# Patient Record
Sex: FEMALE | Race: OTHER | NOT HISPANIC OR LATINO
[De-identification: names, ages, dates, MRNs, and addresses within clinical notes are randomized per-mention and may not be internally consistent; named-entity substitution may affect disease eponyms.]

---

## 2017-01-30 ENCOUNTER — APPOINTMENT (OUTPATIENT)
Dept: GYNECOLOGIC ONCOLOGY | Facility: CLINIC | Age: 57
End: 2017-01-30

## 2017-01-30 VITALS
DIASTOLIC BLOOD PRESSURE: 70 MMHG | BODY MASS INDEX: 22.47 KG/M2 | HEIGHT: 61 IN | SYSTOLIC BLOOD PRESSURE: 112 MMHG | WEIGHT: 119 LBS

## 2017-02-23 ENCOUNTER — APPOINTMENT (OUTPATIENT)
Dept: ENDOCRINOLOGY | Facility: CLINIC | Age: 57
End: 2017-02-23

## 2017-02-23 VITALS
SYSTOLIC BLOOD PRESSURE: 94 MMHG | OXYGEN SATURATION: 98 % | HEIGHT: 61 IN | WEIGHT: 123 LBS | DIASTOLIC BLOOD PRESSURE: 66 MMHG | BODY MASS INDEX: 23.22 KG/M2 | HEART RATE: 67 BPM

## 2017-03-13 ENCOUNTER — MED ADMIN CHARGE (OUTPATIENT)
Age: 57
End: 2017-03-13

## 2017-03-13 ENCOUNTER — APPOINTMENT (OUTPATIENT)
Dept: ENDOCRINOLOGY | Facility: CLINIC | Age: 57
End: 2017-03-13

## 2017-03-13 DIAGNOSIS — M81.0 AGE-RELATED OSTEOPOROSIS W/OUT CURRENT PATHOLOGICAL FRACTURE: ICD-10-CM

## 2017-03-13 RX ORDER — DENOSUMAB 60 MG/ML
60 INJECTION SUBCUTANEOUS
Qty: 1 | Refills: 0 | Status: COMPLETED | OUTPATIENT
Start: 2017-03-13

## 2017-03-13 RX ADMIN — DENOSUMAB 0 MG/ML: 60 INJECTION SUBCUTANEOUS at 00:00

## 2017-03-14 ENCOUNTER — APPOINTMENT (OUTPATIENT)
Dept: ENDOCRINOLOGY | Facility: CLINIC | Age: 57
End: 2017-03-14

## 2017-03-30 ENCOUNTER — OTHER (OUTPATIENT)
Age: 57
End: 2017-03-30

## 2017-08-01 ENCOUNTER — APPOINTMENT (OUTPATIENT)
Dept: GYNECOLOGIC ONCOLOGY | Facility: CLINIC | Age: 57
End: 2017-08-01
Payer: COMMERCIAL

## 2017-08-01 VITALS
HEIGHT: 61 IN | DIASTOLIC BLOOD PRESSURE: 68 MMHG | BODY MASS INDEX: 23.03 KG/M2 | WEIGHT: 122 LBS | SYSTOLIC BLOOD PRESSURE: 104 MMHG

## 2017-08-01 PROCEDURE — 99214 OFFICE O/P EST MOD 30 MIN: CPT

## 2017-08-01 RX ORDER — UBIDECARENONE/VIT E ACET 100MG-5
50 MCG CAPSULE ORAL
Refills: 0 | Status: DISCONTINUED | COMMUNITY
End: 2017-08-01

## 2017-08-09 ENCOUNTER — APPOINTMENT (OUTPATIENT)
Dept: MAMMOGRAPHY | Facility: IMAGING CENTER | Age: 57
End: 2017-08-09

## 2017-12-07 ENCOUNTER — APPOINTMENT (OUTPATIENT)
Dept: ENDOCRINOLOGY | Facility: CLINIC | Age: 57
End: 2017-12-07

## 2018-02-27 ENCOUNTER — APPOINTMENT (OUTPATIENT)
Dept: GYNECOLOGIC ONCOLOGY | Facility: CLINIC | Age: 58
End: 2018-02-27
Payer: COMMERCIAL

## 2018-02-27 VITALS
DIASTOLIC BLOOD PRESSURE: 60 MMHG | SYSTOLIC BLOOD PRESSURE: 100 MMHG | WEIGHT: 117 LBS | BODY MASS INDEX: 22.09 KG/M2 | HEIGHT: 61 IN

## 2018-02-27 DIAGNOSIS — C57.00 MALIGNANT NEOPLASM OF UNSPECIFIED FALLOPIAN TUBE: ICD-10-CM

## 2018-02-27 PROCEDURE — 99213 OFFICE O/P EST LOW 20 MIN: CPT

## 2018-02-27 RX ORDER — DENOSUMAB 60 MG/ML
60 INJECTION SUBCUTANEOUS
Qty: 1 | Refills: 1 | Status: DISCONTINUED | COMMUNITY
Start: 2017-03-03 | End: 2018-02-27

## 2018-08-28 ENCOUNTER — APPOINTMENT (OUTPATIENT)
Dept: GYNECOLOGIC ONCOLOGY | Facility: CLINIC | Age: 58
End: 2018-08-28

## 2019-08-22 ENCOUNTER — TRANSCRIPTION ENCOUNTER (OUTPATIENT)
Age: 59
End: 2019-08-22

## 2020-12-28 ENCOUNTER — TRANSCRIPTION ENCOUNTER (OUTPATIENT)
Age: 60
End: 2020-12-28

## 2021-02-08 ENCOUNTER — TRANSCRIPTION ENCOUNTER (OUTPATIENT)
Age: 61
End: 2021-02-08

## 2021-03-01 ENCOUNTER — TRANSCRIPTION ENCOUNTER (OUTPATIENT)
Age: 61
End: 2021-03-01

## 2021-03-29 ENCOUNTER — TRANSCRIPTION ENCOUNTER (OUTPATIENT)
Age: 61
End: 2021-03-29

## 2021-04-05 ENCOUNTER — TRANSCRIPTION ENCOUNTER (OUTPATIENT)
Age: 61
End: 2021-04-05

## 2021-04-05 ENCOUNTER — APPOINTMENT (OUTPATIENT)
Dept: ORTHOPEDIC SURGERY | Facility: CLINIC | Age: 61
End: 2021-04-05
Payer: COMMERCIAL

## 2021-04-05 VITALS
TEMPERATURE: 97.5 F | HEART RATE: 62 BPM | BODY MASS INDEX: 23.6 KG/M2 | HEIGHT: 61 IN | SYSTOLIC BLOOD PRESSURE: 122 MMHG | WEIGHT: 125 LBS | DIASTOLIC BLOOD PRESSURE: 83 MMHG

## 2021-04-05 DIAGNOSIS — Z87.39 PERSONAL HISTORY OF OTHER DISEASES OF THE MUSCULOSKELETAL SYSTEM AND CONNECTIVE TISSUE: ICD-10-CM

## 2021-04-05 PROCEDURE — 99072 ADDL SUPL MATRL&STAF TM PHE: CPT

## 2021-04-05 PROCEDURE — 99204 OFFICE O/P NEW MOD 45 MIN: CPT

## 2021-04-05 RX ORDER — MULTIVITAMIN WITH FOLIC ACID 400 MCG
TABLET ORAL
Qty: 70 | Refills: 0 | Status: ACTIVE | COMMUNITY
Start: 2020-11-19

## 2021-04-05 RX ORDER — METHYLPREDNISOLONE 4 MG/1
4 TABLET ORAL
Qty: 21 | Refills: 0 | Status: ACTIVE | COMMUNITY
Start: 2021-04-02

## 2021-04-05 RX ORDER — OLANZAPINE 2.5 MG/1
2.5 TABLET, FILM COATED ORAL
Qty: 20 | Refills: 0 | Status: ACTIVE | COMMUNITY
Start: 2021-01-21

## 2021-04-05 RX ORDER — TRAMADOL HYDROCHLORIDE AND ACETAMINOPHEN 37.5; 325 MG/1; MG/1
37.5-325 TABLET, FILM COATED ORAL
Qty: 20 | Refills: 0 | Status: ACTIVE | COMMUNITY
Start: 2021-03-31

## 2021-04-05 RX ORDER — TRIAMCINOLONE ACETONIDE 1 MG/G
0.1 CREAM TOPICAL
Qty: 80 | Refills: 0 | Status: ACTIVE | COMMUNITY
Start: 2021-03-12

## 2021-04-05 NOTE — DISCUSSION/SUMMARY
[Medication Risks Reviewed] : Medication risks reviewed [de-identified] : At this time recommended a course of physical therapy for the patient.  She has been instructed to complete the Medrol Dosepak previously prescribed.  Prescribed her diclofenac to take when she is has completed the oral steroids.  She can start taking gabapentin tonight.  If her symptoms persist over the next 2 to 4 weeks she can be seen again for follow-up at which time an MRI lumbar spine may be considered followed by lumbar epidural steroid injections.  She has a history of ovarian cancer and is being followed by the oncologist.  She will complete her last chemotherapy which has been scheduled following which she will also get a PET scan to assess for any remaining tumor burden.\par \par The patient was educated regarding their condition, treatment options as well as prescribed course of treatment. \par Risks and benefits as well as alternatives to the proposed treatment were also provided to the patient \par They were given the opportunity to have all their questions answered to their satisfaction.\par \par Vital signs were reviewed with the patient and the patient was instructed to followup with their primary care provider for further management.\par \par Healthy lifestyle recommendations were also made including a tobacco free lifestyle, proper diet, and weight control.

## 2021-04-05 NOTE — HISTORY OF PRESENT ILLNESS
[Worsening] : worsening [___ wks] : [unfilled] week(s) ago [2] : a current pain level of 2/10 [5] : an average pain level of 5/10 [1] : a minimum pain level of 1/10 [10] : a maximum pain level of 10/10 [Daily] : ~He/She~ states the symptoms seem to be occuring daily [Rest] : relieved by rest [de-identified] : Patient is here for a followup of her right low back pain accompanied with numbness and tingling that stops at the bottom of her foot. She states she had pain 10 days ago with no known injury and went to [facility] 3/30/21 and was told she had a herniated disc. Xray were performed. Her primary doctor prescribes steroid medication, on day 4, and it gave her relief. patient states last night her pain started to come back.\par currently in chemotherapy for ovarian cancer.\par Primarily right buttock pain posterior thigh to knee. No back pain or left leg pain.\par Had some improvement till yesterday then last night pain worsened. Voltaren gel with relief. \par Hx of abdominoplasty in 2015 [Bending] : not worsened by bending [Lifting] : not worsened by lifting [Prolonged Sitting] : not worsened by prolonged sitting [Prolonged Standing] : not worsened by prolonged standing [Sitting] : not worsened by sitting [Standing] : not worsened by standing [Walking] : not worsened by walking [Weight Bearing] : not worsened by weight bearing [Acetaminophen] : not relieved by acetaminophen [Acupuncture] : not relieved by acupuncture [Chiropractic] : not relieved by chiropractic manipulation [Exercise Regimen] : not relieved by exercise regimen [Heat] : not relieved by heat [Ice] : not relieved by ice [NSAIDs] : not relieved by nonsteroidal anti-inflammatory drugs [Opioid Analgesics] : not relieved by opioid analgesics [Physical Therapy] : not relieved by physical therapy [Recumbency] : not relieved by recumbency [Ataxia] : no ataxia [Incontinence] : no incontinence [Loss of Dexterity] : good dexterity [Urinary Ret.] : no urinary retention [de-identified] : laying down [de-identified] : Massage

## 2021-04-05 NOTE — CONSULT LETTER
[Dear  ___] : Dear  [unfilled], [Consult Letter:] : I had the pleasure of evaluating your patient, [unfilled]. [FreeTextEntry2] : Geoffrey Pinto [FreeTextEntry1] : Thank you for this referral. I have enclosed my note for your review. Please feel free to contact my office if you have additional questions regarding this patient.\par \par Regards,\par Raymundo Salazar MD, FACS, FAAOS\par \par  of Orthopaedic Surgery\par [facility] [employer]\par Spinal Reconstruction Surgery\par Minimally Invasive Spinal Surgery\par [facility]

## 2021-04-05 NOTE — REASON FOR VISIT
[Follow-Up Visit] : a follow-up visit for [Herniated Discs] : herniated discs [Back Pain] : back pain

## 2021-04-06 RX ORDER — TRAMADOL HYDROCHLORIDE 50 MG/1
50 TABLET, COATED ORAL
Qty: 40 | Refills: 0 | Status: ACTIVE | COMMUNITY
Start: 2021-04-06 | End: 1900-01-01

## 2021-04-13 ENCOUNTER — APPOINTMENT (OUTPATIENT)
Dept: MRI IMAGING | Facility: IMAGING CENTER | Age: 61
End: 2021-04-13

## 2021-04-19 ENCOUNTER — NON-APPOINTMENT (OUTPATIENT)
Age: 61
End: 2021-04-19

## 2021-04-19 ENCOUNTER — APPOINTMENT (OUTPATIENT)
Dept: ORTHOPEDIC SURGERY | Facility: CLINIC | Age: 61
End: 2021-04-19
Payer: COMMERCIAL

## 2021-04-19 VITALS — TEMPERATURE: 97.5 F

## 2021-04-19 VITALS
DIASTOLIC BLOOD PRESSURE: 66 MMHG | HEIGHT: 61 IN | HEART RATE: 70 BPM | SYSTOLIC BLOOD PRESSURE: 96 MMHG | BODY MASS INDEX: 23.6 KG/M2 | WEIGHT: 125 LBS

## 2021-04-19 PROCEDURE — 99072 ADDL SUPL MATRL&STAF TM PHE: CPT

## 2021-04-19 PROCEDURE — 99214 OFFICE O/P EST MOD 30 MIN: CPT

## 2021-04-19 NOTE — PHYSICAL EXAM
[Normal] : normal [Limited] : is limited [Painful] : is painful [LE] : Sensory: Intact in bilateral lower extremities [Knee] : patellar 2+ and symmetric bilaterally [Ankle] : ankle 2+ and symmetric bilaterally [DP] : dorsalis pedis 2+ and symmetric bilaterally [PT] : posterior tibial 2+ and symmetric bilaterally [de-identified] : PATIENT NAME: Layla Haque\par PATIENT ID: [id number]\par : 1960\par DATE OF EXAM: 2021\par MRI-3T LUMBAR SPINE NON CONTRAST\par History: Right sided lower back pain. History of ovarian cancer.\par MRI of the lumbar spine was performed on a 3.0 Teresa ultra high field wide bore\par magnet.\par Comparison: There are no prior studies available for comparison.\par There is diffuse heterogeneous bone marrow signal of the lumbar spine.\par There is diffuse disc desiccation and mild height loss.\par There are prominent lymph nodes in the right pelvis.\par T12-L1: There is no disc herniation, significant central canal or neural\par foraminal stenosis.\par L1-2: There is no disc herniation, significant central canal or neural foraminal\par stenosis.\par L2-3: There is a broad-based disc bulge. Mild bilateral foraminal stenosis. No\par canal stenosis.\par L3-4: There is a broad-based disc bulge and bilateral facet arthropathy. Mild\par canal stenosis. Mild bilateral foraminal stenosis.\par L4-5: There is a broad-based disc bulge and bilateral facet arthropathy. No\par canal or foraminal stenosis.\par L5-S1: There is a broad-based disc bulge and bilateral facet arthropathy. No\par canal stenosis. Mild right foraminal stenosis.\par The conus is normal in position, configuration and signal characteristics.\par \par IMPRESSION:\par 1. Diffuse disc desiccation, multilevel disc bulges and facet arthropathy.\par 2. Mild bilateral foraminal stenosis of L2-L3 and L3-L4.\par 3. Mild canal stenosis of L3-L4.\par 4. Mild right foraminal stenosis of L5-S1.\par 5. Prominent lymph nodes in the right pelvis, possibly related to ovarian\par malignancy. \par 6. Diffuse heterogeneous bone marrow signal in the lumbar spine, may within\par normal variation. Cannot exclude metastasis, red marrow conversion or drug\par exposure.\par \par Signed by: Arvind Thomas MD\par Signed Date: 2021 8:38 AM EDT\par SIGNED BY: Arvind Thomas M.D., Ext. [id number] 08:38 AM [Poor Appearance] : well-appearing [Acute Distress] : not in acute distress [Obese] : not obese [Abl Mood] : in a normal mood [Abl Affect] : with normal affect [Poor Coordination] : normal coordination [Disorientation] : oriented x 3 [SLR] : negative straight leg raise [FreeTextEntry2] : The pt is awake, alert and oriented to self, place and time, is comfortable and in no acute distress. Gait examination reveals a narrow based, non-ataxic, non-antalgic gait. Can heel and toe walk without difficulty. Inspection of neck, back and lower extremities bilaterally reveals no rashes or ecchymotic lesions.  She leans to the right when sitting or standing. There is no tenderness over the cervical, thoracic spine, or the upper and lower extremities musculature. Minimal midline lumbosacral junctional tenderness noted along with significant right paraspinal lumbar tenderness. There is no sacroiliac tenderness. No greater trochanteric tenderness bilaterally. No atrophy or abnormal movements noted in the upper or lower extremities. There is no swelling noted in the upper or lower extremities bilaterally. No cervical lymphadenopathy noted anteriorly. No joint laxity noted in the upper and lower extremity joints bilaterally.\par Negative straight leg raise to 45° in the sitting position bilaterally. There is no groin pain with hip internal rotation and a negative BRONWYN test bilaterally.  [de-identified] : Flexion to ankles with less pain, extension is 20 degrees with pain [de-identified] : pes planus bilaterally. Tenderness along midline lumbar spine and bilateral paraspinal musculature. Healed bilateral bunion incisions

## 2021-04-19 NOTE — DISCUSSION/SUMMARY
[Medication Risks Reviewed] : Medication risks reviewed [de-identified] : The patient completed a course of oral steroids as well as gabapentin with complete resolution of her right-sided low back and right lateral leg symptoms.  This is suggestive of lumbar radiculopathy which is now improved.  MRI reveals prominent lymph nodes for which she is seeing the oncologist and may proceed with additional evaluations.  She has mild degenerative changes of the lumbar spine with small disc protrusions.  If there is recurrence of symptoms transforaminal lumbar epidural steroid injection be considered on the right side at L4 and L5.  I will see her back on as-needed basis.\par \par I spent over 30 minutes reviewing the patient's records as well as imaging studies which were reviewed with with the patient as well and outlining a plan of care as discussed above.\par \par The patient was educated regarding their condition, treatment options as well as prescribed course of treatment. \par Risks and benefits as well as alternatives to the proposed treatment were also provided to the patient \par They were given the opportunity to have all their questions answered to their satisfaction.\par \par Vital signs were reviewed with the patient and the patient was instructed to followup with their primary care provider for further management.\par \par Healthy lifestyle recommendations were also made including a tobacco free lifestyle, proper diet, and weight control.

## 2021-04-19 NOTE — HISTORY OF PRESENT ILLNESS
[Improving] : improving [1] : a current pain level of 1/10 [5] : an average pain level of 5/10 [Bending] : worsened by bending [Heat] : relieved by heat [Ice] : relieved by ice [Rest] : relieved by rest [de-identified] : Patient is here today for follow up visit for MRI review 4/14/21. Patient states her symptoms have been improving with medication but states that her pain is worse in the night time. She has not gone to physical therapy. she continued to take the medication prescribed and feels that it has helped her.\par Is awaiting additional followup with her oncologist for her ovarian cancer.\par Medications helped, stopped 4-5 days ago\par Pain was localizing to right lower back, some right lateral thigh pain to knee. Pain is now resolved with medications [Lifting] : not worsened by lifting [Prolonged Sitting] : not worsened by prolonged sitting [Prolonged Standing] : not worsened by prolonged standing [Sitting] : not worsened by sitting [Standing] : not worsened by standing [Walking] : not worsened by walking [Weight Bearing] : not worsened by weight bearing [Acetaminophen] : not relieved by acetaminophen [Acupuncture] : not relieved by acupuncture [Chiropractic] : not relieved by chiropractic manipulation [Exercise Regimen] : not relieved by exercise regimen [NSAIDs] : not relieved by nonsteroidal anti-inflammatory drugs [Opioid Analgesics] : not relieved by opioid analgesics [Recumbency] : not relieved by recumbency [Ataxia] : no ataxia [Incontinence] : no incontinence [Loss of Dexterity] : good dexterity [Urinary Ret.] : no urinary retention [de-identified] : laying down [de-identified] : diclofenac

## 2021-04-19 NOTE — REASON FOR VISIT
[Follow-Up Visit] : a follow-up visit for [Degenerative Joint Disease] : degenerative joint disease [Radiculopathy] : radiculopathy [Spondylolistheses] : spondylolistheses

## 2021-04-28 ENCOUNTER — APPOINTMENT (OUTPATIENT)
Dept: ORTHOPEDIC SURGERY | Facility: CLINIC | Age: 61
End: 2021-04-28
Payer: COMMERCIAL

## 2021-04-28 VITALS
DIASTOLIC BLOOD PRESSURE: 83 MMHG | WEIGHT: 125 LBS | HEIGHT: 61 IN | SYSTOLIC BLOOD PRESSURE: 117 MMHG | HEART RATE: 83 BPM | BODY MASS INDEX: 23.6 KG/M2 | TEMPERATURE: 97.2 F

## 2021-04-28 PROCEDURE — 99072 ADDL SUPL MATRL&STAF TM PHE: CPT

## 2021-04-28 PROCEDURE — 99213 OFFICE O/P EST LOW 20 MIN: CPT

## 2021-04-28 NOTE — HISTORY OF PRESENT ILLNESS
[Worsening] : worsening [8] : a current pain level of 8/10 [Daily] : ~He/She~ states the symptoms seem to be occuring daily [None] : No relieving factors are noted [de-identified] : Patient is here today due to increase in her right lower back buttock down right leg into right knee. Patient went for 1 session of physical therapy and was told her issue is muscle spasms to come back and see Orthopedist.\par Awaiting authorization for PET scan.\par Pain is worse with laying down no pain with standing or walking.\par Did not take any meds [de-identified] : at night

## 2021-04-28 NOTE — DISCUSSION/SUMMARY
[Medication Risks Reviewed] : Medication risks reviewed [de-identified] : The patient has not taken any medication for her condition her symptoms are suggestive of lumbar radiculopathy.  Recommended she try gabapentin previously prescribed.  New physical therapy prescription was also provided and she is interested in seeing another physical therapist at this time.  She understands that if her symptoms persist or worsen transforaminal lumbar epidural steroid injection right side L4-L5 may be considered.\par \par She is still awaiting a PET scan through her oncology service for ovarian cancer.\par \par I will see her back in 4 to 6 weeks on as-needed basis.\par I spent 25 minutes reviewing the patient's medical condition prior studies as well as current exam and clinical evaluation as outlined above.\par \par The patient was educated regarding their condition, treatment options as well as prescribed course of treatment. \par Risks and benefits as well as alternatives to the proposed treatment were also provided to the patient \par They were given the opportunity to have all their questions answered to their satisfaction.\par \par Vital signs were reviewed with the patient and the patient was instructed to followup with their primary care provider for further management.\par \par Healthy lifestyle recommendations were also made including a tobacco free lifestyle, proper diet, and weight control.

## 2021-04-28 NOTE — PHYSICAL EXAM
[Normal] : normal [Limited] : is limited [Painful] : is painful [LE] : Sensory: Intact in bilateral lower extremities [Knee] : patellar 2+ and symmetric bilaterally [Ankle] : ankle 2+ and symmetric bilaterally [DP] : dorsalis pedis 2+ and symmetric bilaterally [PT] : posterior tibial 2+ and symmetric bilaterally [de-identified] : PATIENT NAME: Layla Haque\par PATIENT ID: [id number]\par : 1960\par DATE OF EXAM: 2021\par MRI-3T LUMBAR SPINE NON CONTRAST\par History: Right sided lower back pain. History of ovarian cancer.\par MRI of the lumbar spine was performed on a 3.0 Teresa ultra high field wide bore\par magnet.\par Comparison: There are no prior studies available for comparison.\par There is diffuse heterogeneous bone marrow signal of the lumbar spine.\par There is diffuse disc desiccation and mild height loss.\par There are prominent lymph nodes in the right pelvis.\par T12-L1: There is no disc herniation, significant central canal or neural\par foraminal stenosis.\par L1-2: There is no disc herniation, significant central canal or neural foraminal\par stenosis.\par L2-3: There is a broad-based disc bulge. Mild bilateral foraminal stenosis. No\par canal stenosis.\par L3-4: There is a broad-based disc bulge and bilateral facet arthropathy. Mild\par canal stenosis. Mild bilateral foraminal stenosis.\par L4-5: There is a broad-based disc bulge and bilateral facet arthropathy. No\par canal or foraminal stenosis.\par L5-S1: There is a broad-based disc bulge and bilateral facet arthropathy. No\par canal stenosis. Mild right foraminal stenosis.\par The conus is normal in position, configuration and signal characteristics.\par \par IMPRESSION:\par 1. Diffuse disc desiccation, multilevel disc bulges and facet arthropathy.\par 2. Mild bilateral foraminal stenosis of L2-L3 and L3-L4.\par 3. Mild canal stenosis of L3-L4.\par 4. Mild right foraminal stenosis of L5-S1.\par 5. Prominent lymph nodes in the right pelvis, possibly related to ovarian\par malignancy. \par 6. Diffuse heterogeneous bone marrow signal in the lumbar spine, may within\par normal variation. Cannot exclude metastasis, red marrow conversion or drug\par exposure.\par \par Signed by: Arvind Thomas MD\par Signed Date: 2021 8:38 AM EDT\par SIGNED BY: Arvind Thomas M.D., Ext. [phone] 08:38 AM [Poor Appearance] : well-appearing [Acute Distress] : not in acute distress [Obese] : not obese [Abl Mood] : in a normal mood [Abl Affect] : with normal affect [Poor Coordination] : normal coordination [Disorientation] : oriented x 3 [SLR] : negative straight leg raise [FreeTextEntry2] : The pt is awake, alert and oriented to self, place and time, is comfortable and in no acute distress. Gait examination reveals a narrow based, non-ataxic, non-antalgic gait. Can heel and toe walk without difficulty. Inspection of neck, back and lower extremities bilaterally reveals no rashes or ecchymotic lesions.  She leans to the right when sitting or standing. There is no tenderness over the cervical, thoracic spine, or the upper and lower extremities musculature. Minimal midline lumbosacral junctional tenderness noted along with significant right paraspinal lumbar tenderness. There is no sacroiliac tenderness. No greater trochanteric tenderness bilaterally. No atrophy or abnormal movements noted in the upper or lower extremities. There is no swelling noted in the upper or lower extremities bilaterally. No cervical lymphadenopathy noted anteriorly. No joint laxity noted in the upper and lower extremity joints bilaterally.\par Negative straight leg raise to 45° in the sitting position bilaterally. There is no groin pain with hip internal rotation and a negative BRONWYN test bilaterally.  [de-identified] : Flexion to ankles with less pain, extension is 20 degrees with pain [de-identified] : pes planus bilaterally. Tenderness along midline lumbar spine and bilateral paraspinal musculature. Healed bilateral bunion incisions\par right GT tenderness

## 2021-04-28 NOTE — REASON FOR VISIT
[Follow-Up Visit] : a follow-up visit for [Degenerative Joint Disease] : degenerative joint disease [Back Pain] : back pain [Radiculopathy] : radiculopathy [Spondylolistheses] : spondylolistheses

## 2021-07-26 ENCOUNTER — TRANSCRIPTION ENCOUNTER (OUTPATIENT)
Age: 61
End: 2021-07-26

## 2021-11-20 ENCOUNTER — TRANSCRIPTION ENCOUNTER (OUTPATIENT)
Age: 61
End: 2021-11-20

## 2021-11-27 ENCOUNTER — TRANSCRIPTION ENCOUNTER (OUTPATIENT)
Age: 61
End: 2021-11-27

## 2022-01-15 ENCOUNTER — TRANSCRIPTION ENCOUNTER (OUTPATIENT)
Age: 62
End: 2022-01-15

## 2022-01-17 ENCOUNTER — OUTPATIENT (OUTPATIENT)
Dept: OUTPATIENT SERVICES | Facility: HOSPITAL | Age: 62
LOS: 1 days | End: 2022-01-17
Payer: COMMERCIAL

## 2022-01-17 ENCOUNTER — APPOINTMENT (OUTPATIENT)
Dept: ULTRASOUND IMAGING | Facility: CLINIC | Age: 62
End: 2022-01-17
Payer: COMMERCIAL

## 2022-01-17 DIAGNOSIS — M79.89 OTHER SPECIFIED SOFT TISSUE DISORDERS: ICD-10-CM

## 2022-01-17 DIAGNOSIS — Z98.89 OTHER SPECIFIED POSTPROCEDURAL STATES: Chronic | ICD-10-CM

## 2022-01-17 DIAGNOSIS — Z90.710 ACQUIRED ABSENCE OF BOTH CERVIX AND UTERUS: Chronic | ICD-10-CM

## 2022-01-17 PROCEDURE — 93971 EXTREMITY STUDY: CPT | Mod: 26,RT

## 2022-01-17 PROCEDURE — 93971 EXTREMITY STUDY: CPT

## 2022-05-31 ENCOUNTER — APPOINTMENT (OUTPATIENT)
Dept: ORTHOPEDIC SURGERY | Facility: CLINIC | Age: 62
End: 2022-05-31

## 2022-05-31 ENCOUNTER — NON-APPOINTMENT (OUTPATIENT)
Age: 62
End: 2022-05-31

## 2022-05-31 VITALS — HEART RATE: 76 BPM | DIASTOLIC BLOOD PRESSURE: 73 MMHG | SYSTOLIC BLOOD PRESSURE: 126 MMHG

## 2022-05-31 PROCEDURE — 96372 THER/PROPH/DIAG INJ SC/IM: CPT

## 2022-05-31 PROCEDURE — 99212 OFFICE O/P EST SF 10 MIN: CPT | Mod: 25

## 2022-05-31 RX ORDER — GABAPENTIN 100 MG/1
100 CAPSULE ORAL
Qty: 30 | Refills: 2 | Status: ACTIVE | COMMUNITY
Start: 2021-04-05 | End: 1900-01-01

## 2022-06-01 RX ORDER — DICLOFENAC SODIUM 75 MG/1
75 TABLET, DELAYED RELEASE ORAL
Qty: 20 | Refills: 0 | Status: ACTIVE | COMMUNITY
Start: 2021-04-05 | End: 1900-01-01

## 2022-06-03 ENCOUNTER — NON-APPOINTMENT (OUTPATIENT)
Age: 62
End: 2022-06-03

## 2022-06-03 RX ORDER — CYCLOBENZAPRINE HYDROCHLORIDE 10 MG/1
10 TABLET, FILM COATED ORAL
Qty: 30 | Refills: 0 | Status: ACTIVE | COMMUNITY
Start: 2022-06-03 | End: 1900-01-01

## 2022-06-05 RX ORDER — OXYCODONE AND ACETAMINOPHEN 5; 325 MG/1; MG/1
5-325 TABLET ORAL
Qty: 20 | Refills: 0 | Status: ACTIVE | COMMUNITY
Start: 2022-06-05 | End: 1900-01-01

## 2022-06-05 RX ORDER — GABAPENTIN 100 MG/1
100 CAPSULE ORAL
Qty: 60 | Refills: 0 | Status: ACTIVE | COMMUNITY
Start: 2022-06-05 | End: 1900-01-01

## 2022-06-06 ENCOUNTER — NON-APPOINTMENT (OUTPATIENT)
Age: 62
End: 2022-06-06

## 2022-06-08 ENCOUNTER — APPOINTMENT (OUTPATIENT)
Dept: ORTHOPEDIC SURGERY | Facility: CLINIC | Age: 62
End: 2022-06-08
Payer: COMMERCIAL

## 2022-06-08 VITALS
SYSTOLIC BLOOD PRESSURE: 110 MMHG | DIASTOLIC BLOOD PRESSURE: 79 MMHG | BODY MASS INDEX: 24.17 KG/M2 | HEART RATE: 78 BPM | WEIGHT: 128 LBS | HEIGHT: 61 IN

## 2022-06-08 DIAGNOSIS — Z01.818 ENCOUNTER FOR OTHER PREPROCEDURAL EXAMINATION: ICD-10-CM

## 2022-06-08 PROCEDURE — 99214 OFFICE O/P EST MOD 30 MIN: CPT

## 2022-06-08 NOTE — HISTORY OF PRESENT ILLNESS
[Daily] : ~He/She~ states the symptoms seem to be occuring daily [Improving] : improving [___ wks] : [unfilled] week(s) ago [de-identified] : Patient is here today to review mri's lumbar and pelvis 6/3/22. Patient saw Crystal PENALOZA on 5/31/22 due to acute low back leg pain. Patient given toradol flexeril oxycodone prescription for physical therapy which she is starting next week. Patient states no pain with resting but lying down and trying to sleep pain is a 10. \par Gabapentin with oxycodone helped, did not take diclofenac [de-identified] : lying down and sleeping [de-identified] : medication

## 2022-06-08 NOTE — PHYSICAL EXAM
[Normal] : normal [Limited] : is limited [Painful] : is painful [LE] : Sensory: Intact in bilateral lower extremities [Knee] : patellar 2+ and symmetric bilaterally [Ankle] : ankle 2+ and symmetric bilaterally [DP] : dorsalis pedis 2+ and symmetric bilaterally [PT] : posterior tibial 2+ and symmetric bilaterally [de-identified] : MRI lumbar spine performed at [facility] on [place] 3, 2021 was independently reviewed by me and findings discussed with patient.  Comparison was made with a CT lumbar spine from March 31, 2021.  Disc osteophyte complex with disc protrusion into the right foramen L5-S1 with moderate right foraminal stenosis.  There is contact of the exiting right L5 nerve root.  L4-5 broad-based disc protrusion.  L3-4 narrowing of the right right lateral recess with mass-effect on the descending right L4 nerve root.  No findings suspicious for metastatic disease.\par \par MRI of the pelvis performed the same hospital on the same date demonstrates irregular nodule enhancing mass lesion deep in the pelvis measuring 9.8 x 4.4 x 6.4 cm.  Undermining and elevating the iliac vessels near the inguinal canal.  Additional enhancing lobulated mass in the midline between the upper rectum and sacrum measuring approximately 5 x 5 x 4.7 cm.  No osseous metastatic disease or invasion of the sacral foramen. [Poor Appearance] : well-appearing [Acute Distress] : not in acute distress [Obese] : not obese [Abl Mood] : in a normal mood [Abl Affect] : with normal affect [Poor Coordination] : normal coordination [Disorientation] : oriented x 3 [SLR] : negative straight leg raise [FreeTextEntry2] : The pt is awake, alert and oriented to self, place and time, is comfortable and in no acute distress. Gait examination reveals a narrow based, non-ataxic, non-antalgic gait. Can heel and toe walk without difficulty. Inspection of neck, back and lower extremities bilaterally reveals no rashes or ecchymotic lesions.  She leans to the right when sitting or standing. There is no tenderness over the cervical, thoracic spine, or the upper and lower extremities musculature. Minimal midline lumbosacral junctional tenderness noted along with significant right paraspinal lumbar tenderness. There is no sacroiliac tenderness. No greater trochanteric tenderness bilaterally. No atrophy or abnormal movements noted in the upper or lower extremities. There is no swelling noted in the upper extremities bilaterally. No cervical lymphadenopathy noted anteriorly. No joint laxity noted in the upper and lower extremity joints bilaterally.\par Negative straight leg raise to 45° in the sitting position bilaterally. There is no groin pain with hip internal rotation and a negative BRONWYN test bilaterally.  [de-identified] : Flexion to ankles with less pain, extension is 20 degrees with pain [de-identified] : pes planus bilaterally. Tenderness along midline lumbar spine and bilateral paraspinal musculature. Healed bilateral bunion incisions\par right GT tenderness\par Right leg edema

## 2022-06-08 NOTE — DISCUSSION/SUMMARY
[Medication Risks Reviewed] : Medication risks reviewed [de-identified] : I spoke at length with the patient about her current presentation and reviewed the MRI lumbar spine findings.  She has a known diagnosis of ovarian cancer has been followed by oncologist at [facility].  I strongly recommended she follow-up with the oncologist to discuss further treatment options of her pelvic cancers.  However she does appear to have some foraminal stenosis on the right side and has radicular pain component.  We discussed the option of an epidural steroid injection we will schedule right L4 and L5 transforaminal steroid injection at her earliest convenience.  She may continue use of diclofenac gabapentin and Flexeril.\par \par Patient understands that her leg pain may not respond to the epidural steroid injection if there is a component of L3 compression on the lumbar plexus.  She will need definitive treatment for ovarian cancer pelvic mass by the oncology service which is beyond the scope of my practice and she understands this.

## 2022-06-09 ENCOUNTER — APPOINTMENT (OUTPATIENT)
Dept: ORTHOPEDIC SURGERY | Facility: HOSPITAL | Age: 62
End: 2022-06-09

## 2022-06-10 LAB — SARS-COV-2 N GENE NPH QL NAA+PROBE: NOT DETECTED

## 2022-06-17 ENCOUNTER — APPOINTMENT (OUTPATIENT)
Dept: ORTHOPEDIC SURGERY | Facility: HOSPITAL | Age: 62
End: 2022-06-17

## 2022-12-01 ENCOUNTER — NON-APPOINTMENT (OUTPATIENT)
Age: 62
End: 2022-12-01

## 2023-01-01 ENCOUNTER — NON-APPOINTMENT (OUTPATIENT)
Age: 63
End: 2023-01-01

## 2023-02-23 ENCOUNTER — APPOINTMENT (OUTPATIENT)
Dept: ORTHOPEDIC SURGERY | Facility: CLINIC | Age: 63
End: 2023-02-23
Payer: COMMERCIAL

## 2023-02-23 DIAGNOSIS — M54.16 RADICULOPATHY, LUMBAR REGION: ICD-10-CM

## 2023-02-23 PROCEDURE — 99213 OFFICE O/P EST LOW 20 MIN: CPT | Mod: 95

## 2023-02-23 NOTE — HISTORY OF PRESENT ILLNESS
[Home] : at home, [unfilled] , at the time of the visit. [Medical Office: ([facility])___] : at the medical office located in  [Verbal consent obtained from patient] : the patient, [unfilled] [8] : a current pain level of 8/10 [Walking] : walking [Standing] : standing [Daily] : ~He/She~ states the symptoms seem to be occuring daily [Acupuncture] : relieved by acupuncture [Exercise Regimen] : relieved by exercise regimen [de-identified] : Patient would like to get prescription for physical therapy. Patient has not been seen in awhile. Patient had flare up last month. Been going for massage acupuncture slightly better but wants physical therapy.\par Oncologist is private practice in [place].\mary Since last visit had a stent in her vessels for compressive mass in pelvis, had stent at [facility]. \mary had another mass in pelvis obstructing colon, has resolved\mary has another mass in pelvis at present which may need surgery - for now chemo and maintenance drug every 2 weeks, another PET scan in 5/2023\mary Has LBP, shoots down left leg some pain at night.\par Acupuncture Dr Bateman and massage therapy. \par Meloxicam and gabapentin with some relief, took 3 times a month. \mary Is on lovenox for at least another month [de-identified] : meloxicam

## 2023-02-23 NOTE — DISCUSSION/SUMMARY
[Medication Risks Reviewed] : Medication risks reviewed [PRN] : PRN [de-identified] : Recommend she d/c meloxicam because she is on lovenox to minimize risk of bleeding.\par May call for muscle relaxant or gabapentin refill\par PT Rx provided.  She understands that given the current use of Lovenox and recent vascular stenting spinal surgery invasive treatments are contraindicated unless it is an emergency.\par She will continue followup with her oncology team for management of her pelvic masses.\par \par The patient was educated regarding their condition, treatment options as well as prescribed course of treatment. \par Risks and benefits as well as alternatives to the proposed treatment were also provided to the patient \par They were given the opportunity to have all their questions answered to their satisfaction.\par \par Vital signs were reviewed with the patient and the patient was instructed to followup with their primary care provider for further management. There were no PAs or scribes used in the evaluation, exam or treatment plan discussion. The surgeon was the primary evaluating or treating physician as noted above.\par

## 2023-07-12 ENCOUNTER — INPATIENT (INPATIENT)
Facility: HOSPITAL | Age: 63
LOS: 0 days | Discharge: HOME | DRG: 916 | End: 2023-07-13
Payer: COMMERCIAL

## 2023-07-12 VITALS
SYSTOLIC BLOOD PRESSURE: 92 MMHG | WEIGHT: 160.06 LBS | HEART RATE: 80 BPM | DIASTOLIC BLOOD PRESSURE: 66 MMHG | OXYGEN SATURATION: 96 % | RESPIRATION RATE: 18 BRPM

## 2023-07-12 DIAGNOSIS — F32.9 MAJOR DEPRESSIVE DISORDER, SINGLE EPISODE, UNSPECIFIED: ICD-10-CM

## 2023-07-12 DIAGNOSIS — Z98.89 OTHER SPECIFIED POSTPROCEDURAL STATES: Chronic | ICD-10-CM

## 2023-07-12 DIAGNOSIS — Z90.710 ACQUIRED ABSENCE OF BOTH CERVIX AND UTERUS: Chronic | ICD-10-CM

## 2023-07-12 DIAGNOSIS — Z29.9 ENCOUNTER FOR PROPHYLACTIC MEASURES, UNSPECIFIED: ICD-10-CM

## 2023-07-12 DIAGNOSIS — T78.2XXA ANAPHYLACTIC SHOCK, UNSPECIFIED, INITIAL ENCOUNTER: ICD-10-CM

## 2023-07-12 DIAGNOSIS — C57.00 MALIGNANT NEOPLASM OF UNSPECIFIED FALLOPIAN TUBE: ICD-10-CM

## 2023-07-12 LAB
ALBUMIN SERPL ELPH-MCNC: 3.4 G/DL — SIGNIFICANT CHANGE UP (ref 3.3–5)
ALP SERPL-CCNC: 99 U/L — SIGNIFICANT CHANGE UP (ref 40–120)
ALT FLD-CCNC: 22 U/L — SIGNIFICANT CHANGE UP (ref 10–45)
ANION GAP SERPL CALC-SCNC: 15 MMOL/L — SIGNIFICANT CHANGE UP (ref 5–17)
AST SERPL-CCNC: 33 U/L — SIGNIFICANT CHANGE UP (ref 10–40)
BASOPHILS # BLD AUTO: 0 K/UL — SIGNIFICANT CHANGE UP (ref 0–0.2)
BASOPHILS NFR BLD AUTO: 0 % — SIGNIFICANT CHANGE UP (ref 0–2)
BILIRUB SERPL-MCNC: 0.2 MG/DL — SIGNIFICANT CHANGE UP (ref 0.2–1.2)
BUN SERPL-MCNC: 18 MG/DL — SIGNIFICANT CHANGE UP (ref 7–23)
CALCIUM SERPL-MCNC: 8.4 MG/DL — SIGNIFICANT CHANGE UP (ref 8.4–10.5)
CHLORIDE SERPL-SCNC: 114 MMOL/L — HIGH (ref 96–108)
CO2 SERPL-SCNC: 16 MMOL/L — LOW (ref 22–31)
CREAT SERPL-MCNC: 0.72 MG/DL — SIGNIFICANT CHANGE UP (ref 0.5–1.3)
EGFR: 96 ML/MIN/1.73M2 — SIGNIFICANT CHANGE UP
EOSINOPHIL # BLD AUTO: 0 K/UL — SIGNIFICANT CHANGE UP (ref 0–0.5)
EOSINOPHIL NFR BLD AUTO: 0 % — SIGNIFICANT CHANGE UP (ref 0–6)
GIANT PLATELETS BLD QL SMEAR: PRESENT — SIGNIFICANT CHANGE UP
GLUCOSE SERPL-MCNC: 260 MG/DL — HIGH (ref 70–99)
HCT VFR BLD CALC: 47.3 % — HIGH (ref 34.5–45)
HGB BLD-MCNC: 14.4 G/DL — SIGNIFICANT CHANGE UP (ref 11.5–15.5)
LYMPHOCYTES # BLD AUTO: 0.76 K/UL — LOW (ref 1–3.3)
LYMPHOCYTES # BLD AUTO: 8 % — LOW (ref 13–44)
MAGNESIUM SERPL-MCNC: 1.8 MG/DL — SIGNIFICANT CHANGE UP (ref 1.6–2.6)
MANUAL SMEAR VERIFICATION: SIGNIFICANT CHANGE UP
MCHC RBC-ENTMCNC: 30.4 GM/DL — LOW (ref 32–36)
MCHC RBC-ENTMCNC: 31.2 PG — SIGNIFICANT CHANGE UP (ref 27–34)
MCV RBC AUTO: 102.6 FL — HIGH (ref 80–100)
MONOCYTES # BLD AUTO: 0.09 K/UL — SIGNIFICANT CHANGE UP (ref 0–0.9)
MONOCYTES NFR BLD AUTO: 0.9 % — LOW (ref 2–14)
NEUTROPHILS # BLD AUTO: 8.67 K/UL — HIGH (ref 1.8–7.4)
NEUTROPHILS NFR BLD AUTO: 86.7 % — HIGH (ref 43–77)
NEUTS BAND # BLD: 4.4 % — SIGNIFICANT CHANGE UP (ref 0–8)
PLAT MORPH BLD: NORMAL — SIGNIFICANT CHANGE UP
PLATELET # BLD AUTO: 205 K/UL — SIGNIFICANT CHANGE UP (ref 150–400)
POTASSIUM SERPL-MCNC: 3.8 MMOL/L — SIGNIFICANT CHANGE UP (ref 3.5–5.3)
POTASSIUM SERPL-SCNC: 3.8 MMOL/L — SIGNIFICANT CHANGE UP (ref 3.5–5.3)
PROT SERPL-MCNC: 5.6 G/DL — LOW (ref 6–8.3)
RBC # BLD: 4.61 M/UL — SIGNIFICANT CHANGE UP (ref 3.8–5.2)
RBC # FLD: 15.4 % — HIGH (ref 10.3–14.5)
RBC BLD AUTO: NORMAL — SIGNIFICANT CHANGE UP
SMUDGE CELLS # BLD: PRESENT — SIGNIFICANT CHANGE UP
SODIUM SERPL-SCNC: 145 MMOL/L — SIGNIFICANT CHANGE UP (ref 135–145)
WBC # BLD: 9.52 K/UL — SIGNIFICANT CHANGE UP (ref 3.8–10.5)
WBC # FLD AUTO: 9.52 K/UL — SIGNIFICANT CHANGE UP (ref 3.8–10.5)

## 2023-07-12 PROCEDURE — 99291 CRITICAL CARE FIRST HOUR: CPT

## 2023-07-12 PROCEDURE — 99223 1ST HOSP IP/OBS HIGH 75: CPT | Mod: GC

## 2023-07-12 RX ORDER — DIPHENHYDRAMINE HCL 50 MG
25 CAPSULE ORAL EVERY 6 HOURS
Refills: 0 | Status: DISCONTINUED | OUTPATIENT
Start: 2023-07-12 | End: 2023-07-12

## 2023-07-12 RX ORDER — ENOXAPARIN SODIUM 100 MG/ML
60 INJECTION SUBCUTANEOUS EVERY 12 HOURS
Refills: 0 | Status: DISCONTINUED | OUTPATIENT
Start: 2023-07-12 | End: 2023-07-13

## 2023-07-12 RX ORDER — FAMOTIDINE 10 MG/ML
20 INJECTION INTRAVENOUS ONCE
Refills: 0 | Status: COMPLETED | OUTPATIENT
Start: 2023-07-12 | End: 2023-07-12

## 2023-07-12 RX ORDER — POLYETHYLENE GLYCOL 3350 17 G/17G
17 POWDER, FOR SOLUTION ORAL EVERY 24 HOURS
Refills: 0 | Status: DISCONTINUED | OUTPATIENT
Start: 2023-07-12 | End: 2023-07-13

## 2023-07-12 RX ORDER — DIPHENHYDRAMINE HCL 50 MG
50 CAPSULE ORAL ONCE
Refills: 0 | Status: COMPLETED | OUTPATIENT
Start: 2023-07-12 | End: 2023-07-12

## 2023-07-12 RX ORDER — EPINEPHRINE 0.3 MG/.3ML
0.3 INJECTION INTRAMUSCULAR; SUBCUTANEOUS ONCE
Refills: 0 | Status: COMPLETED | OUTPATIENT
Start: 2023-07-12 | End: 2023-07-12

## 2023-07-12 RX ORDER — SODIUM CHLORIDE 9 MG/ML
1000 INJECTION INTRAMUSCULAR; INTRAVENOUS; SUBCUTANEOUS ONCE
Refills: 0 | Status: COMPLETED | OUTPATIENT
Start: 2023-07-12 | End: 2023-07-12

## 2023-07-12 RX ORDER — DIPHENHYDRAMINE HCL 50 MG
25 CAPSULE ORAL EVERY 6 HOURS
Refills: 0 | Status: DISCONTINUED | OUTPATIENT
Start: 2023-07-12 | End: 2023-07-13

## 2023-07-12 RX ORDER — PANTOPRAZOLE SODIUM 20 MG/1
40 TABLET, DELAYED RELEASE ORAL
Refills: 0 | Status: DISCONTINUED | OUTPATIENT
Start: 2023-07-12 | End: 2023-07-13

## 2023-07-12 RX ORDER — DEXAMETHASONE 0.5 MG/5ML
4 ELIXIR ORAL EVERY 6 HOURS
Refills: 0 | Status: DISCONTINUED | OUTPATIENT
Start: 2023-07-12 | End: 2023-07-12

## 2023-07-12 RX ORDER — PANTOPRAZOLE SODIUM 20 MG/1
40 TABLET, DELAYED RELEASE ORAL ONCE
Refills: 0 | Status: COMPLETED | OUTPATIENT
Start: 2023-07-12 | End: 2023-07-12

## 2023-07-12 RX ORDER — ESCITALOPRAM OXALATE 10 MG/1
10 TABLET, FILM COATED ORAL EVERY 24 HOURS
Refills: 0 | Status: DISCONTINUED | OUTPATIENT
Start: 2023-07-13 | End: 2023-07-13

## 2023-07-12 RX ADMIN — Medication 50 MILLIGRAM(S): at 15:00

## 2023-07-12 RX ADMIN — POLYETHYLENE GLYCOL 3350 17 GRAM(S): 17 POWDER, FOR SOLUTION ORAL at 22:53

## 2023-07-12 RX ADMIN — ENOXAPARIN SODIUM 60 MILLIGRAM(S): 100 INJECTION SUBCUTANEOUS at 21:00

## 2023-07-12 RX ADMIN — SODIUM CHLORIDE 1000 MILLILITER(S): 9 INJECTION INTRAMUSCULAR; INTRAVENOUS; SUBCUTANEOUS at 15:10

## 2023-07-12 RX ADMIN — Medication 25 MILLIGRAM(S): at 22:00

## 2023-07-12 RX ADMIN — EPINEPHRINE 0.3 MILLIGRAM(S): 0.3 INJECTION INTRAMUSCULAR; SUBCUTANEOUS at 14:55

## 2023-07-12 RX ADMIN — FAMOTIDINE 20 MILLIGRAM(S): 10 INJECTION INTRAVENOUS at 15:00

## 2023-07-12 RX ADMIN — Medication 125 MILLIGRAM(S): at 15:00

## 2023-07-12 NOTE — ED ADULT TRIAGE NOTE - CHIEF COMPLAINT QUOTE
Pt BIBA s/p allergic reaction to chemotherapy. +lip swelling and difficulty swallowing on arrival. Pt pre-medicated with 25 mg of benadryl prior to chemo and given IM Epi and Zofran by EMS. Unable to obtain oral temperature in triage.

## 2023-07-12 NOTE — H&P ADULT - PROBLEM SELECTOR PLAN 1
Known h/o platinum allergy, p/w generalized itching, diaphoresis, chills, throat/lip swelling, nausea, and difficulty breathing within 5-10 min of starting infusion containing oxaliplatin, despite being premedicated with benadryl 25mg PO prior. Symptoms improving s/p epinepherine 0.3 x 1, methylprednisone 125mg x 1, benadryl 50mg x 1.  - give methylpred 75mg IVP one time dose tomorrow 24hr after first dose   - give zofran 8mg q8 PRN  - monitor respiratory status   - admit to [facility] for freq vital checks

## 2023-07-12 NOTE — PROGRESS NOTE ADULT - SUBJECTIVE AND OBJECTIVE BOX
OVERNIGHT EVENTS:    SUBJECTIVE / INTERVAL HPI: Patient seen and examined at bedside.     VITAL SIGNS:  Vital Signs Last 24 Hrs  T(C): 36.4 (12 Jul 2023 15:10), Max: 36.4 (12 Jul 2023 15:10)  T(F): 97.5 (12 Jul 2023 15:10), Max: 97.5 (12 Jul 2023 15:10)  HR: 77 (12 Jul 2023 15:40) (77 - 80)  BP: 115/63 (12 Jul 2023 15:40) (92/66 - 116/73)  BP(mean): --  RR: 16 (12 Jul 2023 15:40) (16 - 18)  SpO2: 97% (12 Jul 2023 15:40) (96% - 97%)    Parameters below as of 12 Jul 2023 15:40  Patient On (Oxygen Delivery Method): room air        PHYSICAL EXAM:    General: NAD  HEENT: NC/AT; PERRL, anicteric sclera; MMM  Neck: supple w/o palpable nodularity  Cardiovascular: +S1/S2; RRR  Respiratory: CTA B/L; no W/R/R  Gastrointestinal: soft, NT/ND; +BSx4  Extremities: WWP; no edema, clubbing or cyanosis  Vascular: 2+ radial, DP/PT pulses B/L  Neurological: AAOx3; no focal deficits    MEDICATIONS:  MEDICATIONS  (STANDING):  enoxaparin Injectable 60 milliGRAM(s) SubCutaneous every 12 hours    MEDICATIONS  (PRN):  diphenhydrAMINE Injectable 25 milliGRAM(s) IntraMuscular every 6 hours PRN Itching      ALLERGIES:  Allergies    amoxicillin (Anaphylaxis)  platinum containing compounds (Anaphylaxis)    Intolerances        LABS:                        14.4   9.52  )-----------( 205      ( 12 Jul 2023 15:22 )             47.3     07-12    145  |  114<H>  |  18  ----------------------------<  260<H>  3.8   |  16<L>  |  0.72    Ca    8.4      12 Jul 2023 15:22  Mg     1.8     07-12    TPro  5.6<L>  /  Alb  3.4  /  TBili  0.2  /  DBili  x   /  AST  33  /  ALT  22  /  AlkPhos  99  07-12      Urinalysis Basic - ( 12 Jul 2023 15:22 )    Color: x / Appearance: x / SG: x / pH: x  Gluc: 260 mg/dL / Ketone: x  / Bili: x / Urobili: x   Blood: x / Protein: x / Nitrite: x   Leuk Esterase: x / RBC: x / WBC x   Sq Epi: x / Non Sq Epi: x / Bacteria: x      CAPILLARY BLOOD GLUCOSE          RADIOLOGY & ADDITIONAL TESTS: Reviewed.

## 2023-07-12 NOTE — PATIENT PROFILE ADULT - FALL HARM RISK - HARM RISK INTERVENTIONS

## 2023-07-12 NOTE — ED PROVIDER NOTE - ENMT, MLM
Airway patent, Nasal mucosa clear. Mouth with normal mucosa. Throat has no vesicles, no oropharyngeal exudates and uvula is midline. Mild lip swelling noted.

## 2023-07-12 NOTE — CONSULT NOTE ADULT - ASSESSMENT
61 y/o female with known h/o platinum allergy and PMHx of recurring fallopian tube carcinoma s/p CAMERON/BSO, BIBEMS from [facility] for allergic reaction to chemotherapy containing Oxaliplatin, improved after receiving steroids, benadryl, and epinephrine, not requiring supplemental O2, being admitted for additional monitoring.     #Anaphylaxis   Known h/o platinum allergy, p/w generalized itching, diaphoresis, chills, throat/lip swelling, nausea, and difficulty breathing within 5-10 min of starting infusion containing oxaliplatin, despite being premedicated with benadryl 25mg PO prior. Sxs largely improved after receiving epinephrine 0.3 x1, methylprednisolone 125mg x1, benadryl 50mg x1.   - give methylpred 75mg IVP one time dose tomorrow 24hr after first dose   - give zofran 8mg q8 PRN  - monitor respiratory status   - admit to [facility] for freq vital checks     #stage 3a fallopian carcinoma   Dx'd in 2016, was found to have b/l adnexal masses underwent LAVH BSO, omentectomy. 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 She continued taxol carbo chemo thru 2016 and into 2017 but surveillance CT in 9/2017 showed multiple new lesions in abd cavity. 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 She continued taxol and Avastin x6 cycles with good response and continued on low dose Taxol and Avastin then just Toxol alone. However Avastin was reintroduced d/t worsening CA-125 and pt underwent 2nd ex lap for debulking with successful mass removal but found to have unresectable 1.6cm LN in R obturator fossa that has since caused venous obstruction necessitating stenting and initiation of daily AC. Since 2020, patient has continued chemotherapy and current regiment initiated 6/28 and includes oxaliplatin, Avastin, gemcitabine, irinotecan, and cyclophosphamide every 2 weeks. Additionally takes lovenox 60mg IM BID and Filgastrim 0.6mg 1-2 days after chemo sessions   - give one dose lovenox 60mg tonight and continue BID tomorrow    - contact oncologist Valentin ([phone]) to ask if pt needs to cont filgrastim and/or other post chemo meds    #depression   Takes Lexapro 10mg PO daily at home. Likely 2/2 difficulty dealing with advanced cancer dx  - c/w Lexapro 10mg starting tomorrow AM    #prophylaxis   F: s/p 1L NS  E: replete as needed   N: regular   GI: famotidine 20 qd  DVT: lovenox 60 BID  Code: Full  63 y/o female with known h/o platinum allergy and PMHx of recurring fallopian tube carcinoma s/p CAMERON/BSO, BIBEMS from [facility] for allergic reaction to chemotherapy containing Oxaliplatin, improved after receiving steroids, benadryl, and epinephrine, not requiring supplemental O2, being admitted for additional monitoring.     #Anaphylaxis   Known h/o platinum allergy, p/w generalized itching, diaphoresis, chills, throat/lip swelling, nausea, and difficulty breathing within 5-10 min of starting infusion containing oxaliplatin, despite being premedicated with benadryl 25mg PO prior. Sxs largely improved after receiving epinephrine 0.3 x1, methylprednisolone 125mg x1, benadryl 50mg x1.   - start decadron 4mg PO q6   - start benadryl 25mg PO q6  - start zofran 8mg q8 PRN  - monitor respiratory status   - admit to [facility] for freq vital checks     #stage 3a fallopian carcinoma   Dx'd in 2016, was found to have b/l adnexal masses underwent LAVH BSO, omentectomy. 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 She continued taxol carbo chemo thru 2016 and into 2017 but surveillance CT in 9/2017 showed multiple new lesions in abd cavity. 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 She continued taxol and Avastin x6 cycles with good response and continued on low dose Taxol and Avastin then just Toxol alone. However Avastin was reintroduced d/t worsening CA-125 and pt underwent 2nd ex lap for debulking with successful mass removal but found to have unresectable 1.6cm LN in R obturator fossa that has since caused venous obstruction necessitating stenting and initiation of daily AC. Since 2020, patient has continued chemotherapy and current regiment initiated 6/28 and includes oxaliplatin, Avastin, gemcitabine, irinotecan, and cyclophosphamide every 2 weeks. Additionally takes lovenox 60mg IM BID and Filgastrim 0.6mg 1-2 days after chemo sessions   - give one dose lovenox 60mg tonight and continue BID tomorrow    - contact oncologist Valentin ([phone]) to ask if pt needs to cont filgrastim and/or other post chemo meds    #depression   Takes Lexapro 10mg PO daily at home. Likely 2/2 difficulty dealing with advanced cancer dx  - c/w Lexapro 10mg starting tomorrow AM    #prophylaxis   F: s/p 1L NS  E: replete as needed   N: regular   GI: famotidine 20 qd  DVT: lovenox 60 BID  Code: Full

## 2023-07-12 NOTE — PROGRESS NOTE ADULT - PROBLEM SELECTOR PLAN 2
Dx'd in 2016, was found to have b/l adnexal masses underwent LAVH BSO, omentectomy.  - give one dose lovenox 60mg tonight and continue BID tomorrow    - contact oncologist Valentin ([phone]) to ask if pt needs to cont filgrastim and/or other post chemo meds

## 2023-07-12 NOTE — H&P ADULT - PROBLEM SELECTOR PLAN 2
Dx'd in 2016, was found to have b/l adnexal masses underwent LAVH BSO, omentectomy.  - give one dose lovenox 60mg tonight and continue BID tomorrow    - contact oncologist Valentin ([phone]) to ask if pt needs to cont filgrastim and/or other post chemo meds

## 2023-07-12 NOTE — H&P ADULT - TIME BILLING
Patient is non-verbal Patient seen and examined with house-staff during bedside rounds.  Resident note read, including vitals, physical findings, laboratory data, and radiological reports.   Revisions included below.  Direct personal management at bed side and extensive interpretation of the data.  Plan was outlined and discussed in details with the housestaff.  Decision making of high complexity  Action taken for acute disease activity to reflect the level of care provided:  - medication reconciliation  - review laboratory data  Patient developed anaphylaxis related to cisplatin.  Patient was given epinephrine due to hypotension which resolved.  The patient has no evidence of upper airway obstruction.  Observe overnight.  Decadron Benadryl and monitor the airway and blood pressure

## 2023-07-12 NOTE — H&P ADULT - NSHPLABSRESULTS_GEN_ALL_CORE
VITAL SIGNS:  Vital Signs Last 24 Hrs  T(C): 36.4 (12 Jul 2023 15:10), Max: 36.4 (12 Jul 2023 15:10)  T(F): 97.5 (12 Jul 2023 15:10), Max: 97.5 (12 Jul 2023 15:10)  HR: 77 (12 Jul 2023 15:40) (77 - 80)  BP: 115/63 (12 Jul 2023 15:40) (92/66 - 116/73)  BP(mean): --  RR: 16 (12 Jul 2023 15:40) (16 - 18)  SpO2: 97% (12 Jul 2023 15:40) (96% - 97%)    LABS:                        14.4   9.52  )-----------( 205      ( 12 Jul 2023 15:22 )             47.3     07-12    145  |  114<H>  |  18  ----------------------------<  260<H>  3.8   |  16<L>  |  0.72    Ca    8.4      12 Jul 2023 15:22  Mg     1.8     07-12    TPro  5.6<L>  /  Alb  3.4  /  TBili  0.2  /  DBili  x   /  AST  33  /  ALT  22  /  AlkPhos  99  07-12

## 2023-07-12 NOTE — ED PROVIDER NOTE - OBJECTIVE STATEMENT
63 yo F with PMH history of osteoporosis and fallopian tube carcinoma 5/2016, s/p CAMERON/BSO, currently getting chemo BIBA from infusion center for allergic reaction to chemotherapy. Per patient and daughter who is at bedside, she is on her 2nd cycle of chemo regimen oxaliplatin + gemcitabine + bevacizumab. Reports that despite being premedicated with Benadryl 25mg PO prior to infusion, she developed generalized itching, diaphoresis, chills, throat/lip swelling, nausea, and difficulty breathing within 5-10 min of starting infusion. Denies any LOC, vision changes, tongue swelling, rash, CP, abd pain, vomiting, diarrhea, LE swelling/weakness. EMS was called and gave pt 0.2 mg Epi IM and IVF and brought pt to [facility] ED. Per EMS, pt's SBP was in 80s. Of note, pt has a known allergy to platinum based agents and had a milder reaction to first round of oxaliplatin 4 months ago.

## 2023-07-12 NOTE — ED ADULT NURSE NOTE - NSFALLUNIVINTERV_ED_ALL_ED
Bed/Stretcher in lowest position, wheels locked, appropriate side rails in place/Call bell, personal items and telephone in reach/Instruct patient to call for assistance before getting out of bed/chair/stretcher/Non-slip footwear applied when patient is off stretcher/[place] to call system/Physically safe environment - no spills, clutter or unnecessary equipment/Purposeful proactive rounding/Room/bathroom lighting operational, light cord in reach

## 2023-07-12 NOTE — ED PROVIDER NOTE - CLINICAL SUMMARY MEDICAL DECISION MAKING FREE TEXT BOX
61 yo F with PMH history of osteoporosis and fallopian tube carcinoma 5/2016, s/p CAMERON/BSO, currently getting chemo BIBA from infusion center for allergic reaction to chemotherapy. Per patient and daughter who is at bedside, she is on her 2nd cycle of chemo regimen oxaliplatin + gemcitabine + bevacizumab. Reports that despite being premedicated with Benadryl 25mg PO prior to infusion, she developed generalized itching, diaphoresis, chills, throat/lip swelling, nausea, and difficulty breathing within 5-10 min of starting infusion. Denies any LOC, vision changes, tongue swelling, rash, CP, abd pain, vomiting, diarrhea, LE swelling/weakness. EMS was called and gave pt 0.2 mg Epi IM and IVF and brought pt to [facility] ED. Per EMS, pt's SBP was in 80s. Of note, pt has a known allergy to platinum based agents and had a milder reaction to first round of oxaliplatin 4 months ago. Pt reports some improvement in sxs post meds from EMS.     ED course: VS noted. BP 90s/60s, rest of VS WNL. Pt pale appearing. Given IVF, IV benadryl, Pepcid and Solumedrol with improvement in sxs. BPs improved. Seen by Dr. Scott in the ED. Admitted to Medicine/tele for observation for anaphylaxis. Improved in ED with no recurrence of sxs.

## 2023-07-12 NOTE — CONSULT NOTE ADULT - SUBJECTIVE AND OBJECTIVE BOX
Patient is a 62y old  Female who presents with a chief complaint of allergic reaction    Consult reason:  ED vitals: T: 97.5, HR: 80, RR: 18, BP: 92/66, SpO2: 96% room air  Labs: WBC 9.52, Hgb 14.4, Plt 205, Na 145, K 3.8, Cl 114, Bicarb 15, Glucose 260  EKG: NSR no ST or T wave changes  Interventions: Benadryl 50mg IV x 1, Epi 0.3mg IM, Pepcid 20mg IV, Solumederol 125mg x 1, 1L NS bolus   Consults: ICU    HPI:    PAST MEDICAL & SURGICAL HISTORY:    Home Medications:    MEDICATIONS  (STANDING):    MEDICATIONS  (PRN):      Allergies    amoxicillin (Anaphylaxis)  platinum containing compounds (Anaphylaxis)    Intolerances    SOCIAL HX:       FAMILY HISTORY:    PHYSICAL EXAM:    ICU Vital Signs Last 24 Hrs  T(C): 36.4 (12 Jul 2023 15:10), Max: 36.4 (12 Jul 2023 15:10)  T(F): 97.5 (12 Jul 2023 15:10), Max: 97.5 (12 Jul 2023 15:10)  HR: 77 (12 Jul 2023 15:40) (77 - 80)  BP: 115/63 (12 Jul 2023 15:40) (92/66 - 116/73)  BP(mean): --  ABP: --  ABP(mean): --  RR: 16 (12 Jul 2023 15:40) (16 - 18)  SpO2: 97% (12 Jul 2023 15:40) (96% - 97%)    O2 Parameters below as of 12 Jul 2023 15:40  Patient On (Oxygen Delivery Method): room air    General: NC/AT, lying in bed   HEENT:  NC/AT, EOMI, sclera anicteric, PERRL       Lymphatic system: No LN  Lungs: Bilateral BS  Cardiovascular: RRR, nl s1, s2, no m/r/g appreciated  Gastrointestinal: abdomen soft, NTND, bowel sounds present  Musculoskeletal: No clubbing.  Moves all extremities.    Skin: Warm, dry, intact. No rashes noted.  Neurological: A&Ox3, strength 5/5 and sensation intact in all extremities     LABS:                       14.4   9.52  )-----------( 205      ( 12 Jul 2023 15:22 )             47.3                                               07-12    145  |  114<H>  |  18  ----------------------------<  260<H>  3.8   |  16<L>  |  0.72    Ca    8.4      12 Jul 2023 15:22  Mg     1.8     07-12    TPro  5.6<L>  /  Alb  3.4  /  TBili  0.2  /  DBili  x   /  AST  33  /  ALT  22  /  AlkPhos  99  07-12    Urinalysis Basic - ( 12 Jul 2023 15:22 )    Color: x / Appearance: x / SG: x / pH: x  Gluc: 260 mg/dL / Ketone: x  / Bili: x / Urobili: x   Blood: x / Protein: x / Nitrite: x   Leuk Esterase: x / RBC: x / WBC x   Sq Epi: x / Non Sq Epi: x / Bacteria: x    LIVER FUNCTIONS - ( 12 Jul 2023 15:22 )  Alb: 3.4 g/dL / Pro: 5.6 g/dL / ALK PHOS: 99 U/L / ALT: 22 U/L / AST: 33 U/L / GGT: x                                                                                                                                        Patient is a 61 y/o old  Female who presents with a chief complaint of allergic reaction    Consult reason:  ED vitals: T: 97.5, HR: 80, RR: 18, BP: 92/66, SpO2: 96% room air  Labs: WBC 9.52, Hgb 14.4, Plt 205, Na 145, K 3.8, Cl 114, Bicarb 15, Glucose 260  EKG: NSR no ST or T wave changes  Interventions: Benadryl 50mg IV x 1, Epi 0.3mg IM, Pepcid 20mg IV, Solumederol 125mg x 1, 1L NS bolus   Consults: ICU    HPI: 61 y/o female with history of osteoporosis and fallopian tube ca 5/2016, CAMERON/BSO, presenting after allergic reaction to chemotherapy.  Patient received Cisplatin based chemotherapy on 7/12, was premedicated with Benadryl 25mg po.      PAST MEDICAL & SURGICAL HISTORY:    Home Medications:    MEDICATIONS  (STANDING):    MEDICATIONS  (PRN):    Allergies    amoxicillin (Anaphylaxis)  platinum containing compounds (Anaphylaxis)    Intolerances    SOCIAL HX:       FAMILY HISTORY:    PHYSICAL EXAM:    ICU Vital Signs Last 24 Hrs  T(C): 36.4 (12 Jul 2023 15:10), Max: 36.4 (12 Jul 2023 15:10)  T(F): 97.5 (12 Jul 2023 15:10), Max: 97.5 (12 Jul 2023 15:10)  HR: 77 (12 Jul 2023 15:40) (77 - 80)  BP: 115/63 (12 Jul 2023 15:40) (92/66 - 116/73)  BP(mean): --  ABP: --  ABP(mean): --  RR: 16 (12 Jul 2023 15:40) (16 - 18)  SpO2: 97% (12 Jul 2023 15:40) (96% - 97%)    O2 Parameters below as of 12 Jul 2023 15:40  Patient On (Oxygen Delivery Method): room air    General: NC/AT, lying in bed   HEENT:  NC/AT, EOMI, sclera anicteric, PERRL       Lymphatic system: No LN  Lungs: Bilateral BS  Cardiovascular: RRR, nl s1, s2, no m/r/g appreciated  Gastrointestinal: abdomen soft, NTND, bowel sounds present  Musculoskeletal: No clubbing.  Moves all extremities.    Skin: Warm, dry, intact. No rashes noted.  Neurological: A&Ox3, strength 5/5 and sensation intact in all extremities     LABS:                       14.4   9.52  )-----------( 205      ( 12 Jul 2023 15:22 )             47.3                                               07-12    145  |  114<H>  |  18  ----------------------------<  260<H>  3.8   |  16<L>  |  0.72    Ca    8.4      12 Jul 2023 15:22  Mg     1.8     07-12    TPro  5.6<L>  /  Alb  3.4  /  TBili  0.2  /  DBili  x   /  AST  33  /  ALT  22  /  AlkPhos  99  07-12    Urinalysis Basic - ( 12 Jul 2023 15:22 )    Color: x / Appearance: x / SG: x / pH: x  Gluc: 260 mg/dL / Ketone: x  / Bili: x / Urobili: x   Blood: x / Protein: x / Nitrite: x   Leuk Esterase: x / RBC: x / WBC x   Sq Epi: x / Non Sq Epi: x / Bacteria: x    LIVER FUNCTIONS - ( 12 Jul 2023 15:22 )  Alb: 3.4 g/dL / Pro: 5.6 g/dL / ALK PHOS: 99 U/L / ALT: 22 U/L / AST: 33 U/L / GGT: x                                                                                                                                        Patient is a 63 y/o old  Female who presents with a chief complaint of allergic reaction    Consult reason: anaphylaxis     HPI: 63 y/o female with history of osteoporosis and fallopian tube carcinoma 5/2016, CAMERON/DEVEN JOHNSON from [facility] center for allergic reaction to chemotherapy. Per patient, she is on 2nd cycle of chemo regimen oxaliplatin + gemcitabine + bevacizumab. Reports that despite being premedicated with Benadryl 25mg PO prior to infusion, she developed generalized itching, diaphoresis, chills, throat/lip swelling, nausea, and difficulty breathing within 5-10 min of starting infusion; she denied any LOC, vision changes, tongue swelling, rash, CP, abd pain, vomiting, diarrhea, LE swelling/weakness. EMS was called and brought to [facility] ED. Since coming to ED and receiving medication, pts difficulty breathing, nausea resolved but she continues to have mild throat/lip swelling, chills, and pruritis. Able to speak in full sentences and provide accurate history. Daughter at bedside.     Of note, pt has a known allergy to platinum based agents and had a milder reaction to first round of oxaliplatin 4 months ago. Her oncologist is Dr Sahara Hanson ([phone]). Pharmacy is [facility] on [facility] ([phone]). She also uses [facility] delivery service.       ED Course:  ED vitals: T: 97.5, HR: 80, RR: 18, BP: 92/66 ->116/73 , SpO2: 96% room air  Labs: WBC 9.52, Hgb 14.4, Plt 205, Na 145, K 3.8, Cl 114, Bicarb 15, Glucose 260  EKG: NSR no ST or T wave changes  Interventions: Benadryl 50mg IV x 1, Epi 0.3mg IM, Pepcid 20mg IV, Solumederol 125mg x 1, 1L NS bolus   Consults: ICU      PAST MEDICAL & SURGICAL HISTORY:    Home Medications: Lexapro 10mg qd, Lovenox BID    MEDICATIONS  (STANDING):    MEDICATIONS  (PRN):    Allergies    amoxicillin (Anaphylaxis)  platinum containing compounds (Anaphylaxis)  contrast dye (Anaphylaxis)    Intolerances    SOCIAL HX:       FAMILY HISTORY:    PHYSICAL EXAM:    ICU Vital Signs Last 24 Hrs  T(C): 36.4 (12 Jul 2023 15:10), Max: 36.4 (12 Jul 2023 15:10)  T(F): 97.5 (12 Jul 2023 15:10), Max: 97.5 (12 Jul 2023 15:10)  HR: 77 (12 Jul 2023 15:40) (77 - 80)  BP: 115/63 (12 Jul 2023 15:40) (92/66 - 116/73)  BP(mean): --  ABP: --  ABP(mean): --  RR: 16 (12 Jul 2023 15:40) (16 - 18)  SpO2: 97% (12 Jul 2023 15:40) (96% - 97%)    O2 Parameters below as of 12 Jul 2023 15:40  Patient On (Oxygen Delivery Method): room air    General: NC/AT, lying in bed, NAD, chills  HEENT:  Mild perioral erythema, swelling. No tongue swelling. Poor dentition, EOMI  Lymphatic system: No swollen neck LN  Lungs: Bilateral BS, no wheezing, stridor   Cardiovascular: RRR, nl s1, s2, no m/r/g appreciated  Gastrointestinal: abdomen soft, NTND, bowel sounds present  Musculoskeletal: No clubbing.  Moves all extremities.  No peripheral edema.   Skin: Warm, dry, intact. No rashes noted.  Neurological: A&Ox3, strength 5/5 and sensation intact in all extremities     LABS:                       14.4   9.52  )-----------( 205      ( 12 Jul 2023 15:22 )             47.3                                               07-12    145  |  114<H>  |  18  ----------------------------<  260<H>  3.8   |  16<L>  |  0.72    Ca    8.4      12 Jul 2023 15:22  Mg     1.8     07-12    TPro  5.6<L>  /  Alb  3.4  /  TBili  0.2  /  DBili  x   /  AST  33  /  ALT  22  /  AlkPhos  99  07-12    Urinalysis Basic - ( 12 Jul 2023 15:22 )    Color: x / Appearance: x / SG: x / pH: x  Gluc: 260 mg/dL / Ketone: x  / Bili: x / Urobili: x   Blood: x / Protein: x / Nitrite: x   Leuk Esterase: x / RBC: x / WBC x   Sq Epi: x / Non Sq Epi: x / Bacteria: x    LIVER FUNCTIONS - ( 12 Jul 2023 15:22 )  Alb: 3.4 g/dL / Pro: 5.6 g/dL / ALK PHOS: 99 U/L / ALT: 22 U/L / AST: 33 U/L / GGT: x

## 2023-07-12 NOTE — H&P ADULT - ASSESSMENT
63 y/o female with known h/o platinum allergy and PMHx of recurring fallopian tube carcinoma s/p CAMERON/BSO, BIBEMS from [facility] for allergic reaction to chemotherapy containing Oxaliplatin, improved after receiving steroids, benadryl, and epinephrine, not requiring supplemental O2, being admitted for additional monitoring.

## 2023-07-12 NOTE — ED ADULT NURSE NOTE - ISOLATION TYPE:
Caller: Miguelina Hensley    Relationship: Self    Best call back number: [phone]    What is the best time to reach you: ANYTIME    Who are you requesting to speak with (clinical staff, provider,  specific staff member): FELISHA SALMERON     What was the call regarding: PLEASE CALL PT TO DISCUSS HER RECENT BONE SCAN RESULTS.     Do you require a callback: YES     None

## 2023-07-12 NOTE — H&P ADULT - HISTORY OF PRESENT ILLNESS
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 Per patient, she is on 2nd cycle of chemo regimen oxaliplatin + gemcitabine + bevacizumab. Reports that despite being premedicated with Benadryl 25mg PO prior to infusion, she developed generalized itching, diaphoresis, chills, throat/lip swelling, nausea, and difficulty breathing within 5-10 min of starting infusion; she denied any LOC, vision changes, tongue swelling, rash, CP, abd pain, vomiting, diarrhea, LE swelling/weakness. EMS was called and brought to [facility] ED. Since coming to ED and receiving medication, pts difficulty breathing, nausea resolved but she continues to have mild throat/lip swelling, chills, and pruritis. Able to speak in full sentences and provide accurate history. Daughter at bedside.     Of note, pt has a known allergy to platinum based agents and had a milder reaction to first round of oxaliplatin 4 months ago. Her oncologist is Dr Sahara Hanson ([phone]). Pharmacy is [facility] on [facility] ([phone]). She also uses [facility] delivery service.

## 2023-07-12 NOTE — H&P ADULT - NSHPPHYSICALEXAM_GEN_ALL_CORE
General: Lying in bed, appears pale, complaining of sore throat  HEENT:  Mild perioral erythema, swelling. No tongue swelling. Poor dentition, EOMI  Lymphatic system: No swollen neck LN  Lungs: Bilateral BS, no wheezing, stridor   Cardiovascular: RRR, nl s1, s2, no m/r/g appreciated  Gastrointestinal: abdomen soft, NTND, bowel sounds present  Musculoskeletal: No clubbing.  Moves all extremities. Mild bilateral foot swelling.   Skin: Warm, dry, intact. No rashes noted.  Neurological: A&Ox3, strength 5/5 and sensation intact in all extremities

## 2023-07-12 NOTE — PROGRESS NOTE ADULT - PROBLEM SELECTOR PLAN 1
Known h/o platinum allergy, p/w generalized itching, diaphoresis, chills, throat/lip swelling, nausea, and difficulty breathing within 5-10 min of starting infusion containing oxaliplatin, despite being premedicated with benadryl 25mg PO prior. Symptoms improving s/p epinepherine 0.3 x 1, methylprednisone 125mg x 1, benadryl 50mg x 1.  - give methylpred 75mg IVP one time dose tomorrow 24hr after first dose   - give zofran 8mg q8 PRN  - monitor respiratory status   - admit to [facility] for freq vital checks

## 2023-07-12 NOTE — PROGRESS NOTE ADULT - PROBLEM SELECTOR PLAN 4
F: s/p 1L NS  E: replete as needed   N: regular   GI: famotidine 20 qd  DVT: lovenox 60 BID  Code: Full

## 2023-07-12 NOTE — ED ADULT NURSE NOTE - OBJECTIVE STATEMENT
patient presents to ED with allergic reaction after chemo, complaining of sob, dizziness, shivering. A&OX4.

## 2023-07-13 ENCOUNTER — TRANSCRIPTION ENCOUNTER (OUTPATIENT)
Age: 63
End: 2023-07-13

## 2023-07-13 VITALS
OXYGEN SATURATION: 98 % | RESPIRATION RATE: 15 BRPM | SYSTOLIC BLOOD PRESSURE: 111 MMHG | HEART RATE: 88 BPM | DIASTOLIC BLOOD PRESSURE: 72 MMHG

## 2023-07-13 LAB
ALBUMIN SERPL ELPH-MCNC: 3.3 G/DL — SIGNIFICANT CHANGE UP (ref 3.3–5)
ALP SERPL-CCNC: 57 U/L — SIGNIFICANT CHANGE UP (ref 40–120)
ALT FLD-CCNC: 22 U/L — SIGNIFICANT CHANGE UP (ref 10–45)
ANION GAP SERPL CALC-SCNC: 7 MMOL/L — SIGNIFICANT CHANGE UP (ref 5–17)
ANISOCYTOSIS BLD QL: SIGNIFICANT CHANGE UP
AST SERPL-CCNC: 23 U/L — SIGNIFICANT CHANGE UP (ref 10–40)
BASOPHILS # BLD AUTO: 0 K/UL — SIGNIFICANT CHANGE UP (ref 0–0.2)
BASOPHILS NFR BLD AUTO: 0 % — SIGNIFICANT CHANGE UP (ref 0–2)
BILIRUB SERPL-MCNC: 0.3 MG/DL — SIGNIFICANT CHANGE UP (ref 0.2–1.2)
BUN SERPL-MCNC: 17 MG/DL — SIGNIFICANT CHANGE UP (ref 7–23)
CALCIUM SERPL-MCNC: 7.6 MG/DL — LOW (ref 8.4–10.5)
CHLORIDE SERPL-SCNC: 107 MMOL/L — SIGNIFICANT CHANGE UP (ref 96–108)
CO2 SERPL-SCNC: 23 MMOL/L — SIGNIFICANT CHANGE UP (ref 22–31)
CREAT SERPL-MCNC: 0.85 MG/DL — SIGNIFICANT CHANGE UP (ref 0.5–1.3)
EGFR: 78 ML/MIN/1.73M2 — SIGNIFICANT CHANGE UP
EOSINOPHIL # BLD AUTO: 0 K/UL — SIGNIFICANT CHANGE UP (ref 0–0.5)
EOSINOPHIL NFR BLD AUTO: 0 % — SIGNIFICANT CHANGE UP (ref 0–6)
GLUCOSE SERPL-MCNC: 122 MG/DL — HIGH (ref 70–99)
HCT VFR BLD CALC: 34.9 % — SIGNIFICANT CHANGE UP (ref 34.5–45)
HCV AB S/CO SERPL IA: 0.04 S/CO — SIGNIFICANT CHANGE UP
HCV AB SERPL-IMP: SIGNIFICANT CHANGE UP
HGB BLD-MCNC: 10.8 G/DL — LOW (ref 11.5–15.5)
HYPOCHROMIA BLD QL: SLIGHT — SIGNIFICANT CHANGE UP
LYMPHOCYTES # BLD AUTO: 0.47 K/UL — LOW (ref 1–3.3)
LYMPHOCYTES # BLD AUTO: 4.4 % — LOW (ref 13–44)
MACROCYTES BLD QL: SIGNIFICANT CHANGE UP
MAGNESIUM SERPL-MCNC: 1.8 MG/DL — SIGNIFICANT CHANGE UP (ref 1.6–2.6)
MANUAL SMEAR VERIFICATION: SIGNIFICANT CHANGE UP
MCHC RBC-ENTMCNC: 30.9 GM/DL — LOW (ref 32–36)
MCHC RBC-ENTMCNC: 30.9 PG — SIGNIFICANT CHANGE UP (ref 27–34)
MCV RBC AUTO: 99.7 FL — SIGNIFICANT CHANGE UP (ref 80–100)
MICROCYTES BLD QL: SLIGHT — SIGNIFICANT CHANGE UP
MONOCYTES # BLD AUTO: 0.1 K/UL — SIGNIFICANT CHANGE UP (ref 0–0.9)
MONOCYTES NFR BLD AUTO: 0.9 % — LOW (ref 2–14)
NEUTROPHILS # BLD AUTO: 10.13 K/UL — HIGH (ref 1.8–7.4)
NEUTROPHILS NFR BLD AUTO: 94.7 % — HIGH (ref 43–77)
OVALOCYTES BLD QL SMEAR: SLIGHT — SIGNIFICANT CHANGE UP
PHOSPHATE SERPL-MCNC: 3.3 MG/DL — SIGNIFICANT CHANGE UP (ref 2.5–4.5)
PLAT MORPH BLD: NORMAL — SIGNIFICANT CHANGE UP
PLATELET # BLD AUTO: 145 K/UL — LOW (ref 150–400)
POIKILOCYTOSIS BLD QL AUTO: SLIGHT — SIGNIFICANT CHANGE UP
POLYCHROMASIA BLD QL SMEAR: SLIGHT — SIGNIFICANT CHANGE UP
POTASSIUM SERPL-MCNC: 4.4 MMOL/L — SIGNIFICANT CHANGE UP (ref 3.5–5.3)
POTASSIUM SERPL-SCNC: 4.4 MMOL/L — SIGNIFICANT CHANGE UP (ref 3.5–5.3)
PROT SERPL-MCNC: 5.6 G/DL — LOW (ref 6–8.3)
RBC # BLD: 3.5 M/UL — LOW (ref 3.8–5.2)
RBC # FLD: 15.5 % — HIGH (ref 10.3–14.5)
RBC BLD AUTO: ABNORMAL
SODIUM SERPL-SCNC: 137 MMOL/L — SIGNIFICANT CHANGE UP (ref 135–145)
WBC # BLD: 10.7 K/UL — HIGH (ref 3.8–10.5)
WBC # FLD AUTO: 10.7 K/UL — HIGH (ref 3.8–10.5)

## 2023-07-13 PROCEDURE — 99239 HOSP IP/OBS DSCHRG MGMT >30: CPT | Mod: GC

## 2023-07-13 RX ORDER — CHLORHEXIDINE GLUCONATE 213 G/1000ML
1 SOLUTION TOPICAL DAILY
Refills: 0 | Status: DISCONTINUED | OUTPATIENT
Start: 2023-07-13 | End: 2023-07-13

## 2023-07-13 RX ORDER — ACETAMINOPHEN 500 MG
650 TABLET ORAL ONCE
Refills: 0 | Status: COMPLETED | OUTPATIENT
Start: 2023-07-13 | End: 2023-07-13

## 2023-07-13 RX ADMIN — PANTOPRAZOLE SODIUM 40 MILLIGRAM(S): 20 TABLET, DELAYED RELEASE ORAL at 06:48

## 2023-07-13 RX ADMIN — ENOXAPARIN SODIUM 60 MILLIGRAM(S): 100 INJECTION SUBCUTANEOUS at 09:24

## 2023-07-13 RX ADMIN — ESCITALOPRAM OXALATE 10 MILLIGRAM(S): 10 TABLET, FILM COATED ORAL at 11:15

## 2023-07-13 RX ADMIN — Medication 650 MILLIGRAM(S): at 07:30

## 2023-07-13 RX ADMIN — CHLORHEXIDINE GLUCONATE 1 APPLICATION(S): 213 SOLUTION TOPICAL at 13:08

## 2023-07-13 RX ADMIN — Medication 650 MILLIGRAM(S): at 06:48

## 2023-07-13 RX ADMIN — Medication 300 UNIT(S): at 12:14

## 2023-07-13 NOTE — DISCHARGE NOTE PROVIDER - NSDCMRMEDTOKEN_GEN_ALL_CORE_FT
escitalopram 10 mg oral tablet: 1 orally once a day  Lovenox 60 mg/0.6 mL injectable solution: 60 subcutaneously 2 times a day

## 2023-07-13 NOTE — DISCHARGE NOTE PROVIDER - CARE PROVIDER_API CALL
Jes Scott Pancho  Pulmonary Disease  [address] [address]  Phone: [phone]  Fax: [phone]  Follow Up Time:     MICHELLE MADRIGAL  [address] [facility],  [address]  Phone: [phone]  Fax: [phone]  Established Patient  Follow Up Time:

## 2023-07-13 NOTE — DISCHARGE NOTE PROVIDER - NSDCCPCAREPLAN_GEN_ALL_CORE_FT
PRINCIPAL DISCHARGE DIAGNOSIS  Diagnosis: Anaphylaxis  Assessment and Plan of Treatment:      PRINCIPAL DISCHARGE DIAGNOSIS  Diagnosis: Anaphylaxis  Assessment and Plan of Treatment: You were admitted to the hospital after receiving your chemotherapy and having an alleric reaction called anapylaxis. Anaphylaxis is the term doctors use to describe a serious allergic reaction. It can happen very quickly and can cause death. Anaphylaxis can happen after a person eats a food they are allergic to, takes a medicine they are allergic to, is stung by an insect they are allergic to, or touches something made out of latex if they are allergic to latex. Other triggers can also cause anaphylaxis. You might know if you are allergic to something. But you can also have anaphylaxis even if you don't know that you have an allergy. Anaphylaxis can involve 1 or more parts of the body. The most common symptoms are hives (raised pathces of skin) and angioedema (swelling). You were treated with a dose of epinepherine 0.3mg, a dose of methylprednisone 125mg, and a dose of benadryl 50mg. You had some nausea, which we treated with Zofran 8mg. You were admitted to telemitry for close examination. This morning you are doing much better, your symptoms have improved, and we believe you are medically stable to be discharged. Please follow up with your oncologist to discuss your chemotherapy regimen.

## 2023-07-13 NOTE — DISCHARGE NOTE PROVIDER - NSDCCAREPROVSEEN_GEN_ALL_CORE_FT
Jes Scott Jes Scott A  Patient seen and examined with house-staff during bedside rounds.  Resident note read, including vitals, physical findings, laboratory data, and radiological reports.   Revisions included below.  Direct personal management at bed side and extensive interpretation of the data.  Plan was outlined and discussed in details with the housestaff.  Decision making of high complexity  Action taken for acute disease activity to reflect the level of care provided:  - medication reconciliation  - review laboratory data  The patient is clinically stable.  No evidence of airway disorder.  The patient is refusing steroids.  Discussed case with her oncologist.  Blood pressure is stable.  Discharge and follow-up as an outpatient

## 2023-07-13 NOTE — DISCHARGE NOTE PROVIDER - HOSPITAL COURSE
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 Per patient, she is on 2nd cycle of chemo regimen oxaliplatin + gemcitabine + bevacizumab. Reports that despite being premedicated with Benadryl 25mg PO prior to infusion, she developed generalized itching, diaphoresis, chills, throat/lip swelling, nausea, and difficulty breathing within 5-10 min of starting infusion; she denied any LOC, vision changes, tongue swelling, rash, CP, abd pain, vomiting, diarrhea, LE swelling/weakness. EMS was called and brought to [facility] ED. Of note, pt has a known allergy to platinum based agents and had a milder reaction to first round of oxaliplatin 4 months ago. Her oncologist is Dr Sahara Hanson ([phone]). Patient was treated with methylprednisolone, epinephrine, and benadryl. Her symptoms improved, and she is now medically stable for discharge.    # Anaphylaxis.   Known h/o platinum allergy, p/w generalized itching, diaphoresis, chills, throat/lip swelling, nausea, and difficulty breathing within 5-10 min of starting infusion containing oxaliplatin, despite being premedicated with benadryl 25mg PO prior. Symptoms improved s/p epinepherine 0.3 x 1, methylprednisone 125mg x 1, benadryl 50mg x 1.  - RESOLVED    # Fallopian tube carcinoma.   Dx'd in 2016, was found to have b/l adnexal masses underwent [facility] BSO, omentectomy.  - c/w home lovenox bid  - f/u oncologist Valentin ([phone]) as scheduled    # Depression, major.   Takes Lexapro 10mg PO daily at home. Likely 2/2 difficulty dealing with advanced cancer dx  - c/w Lexapro 10mg     New medications: none  Old medications discontinued: none    Physical Exam  General: Lying in bed, appears pale, complaining of sore throat  HEENT:  Mild perioral erythema, swelling. No tongue swelling. Poor dentition, EOMI  Lymphatic system: No swollen neck LN  Lungs: Bilateral BS, no wheezing, stridor   Cardiovascular: RRR, nl s1, s2, no m/r/g appreciated  Gastrointestinal: abdomen soft, NTND, bowel sounds present  Musculoskeletal: No clubbing.  Moves all extremities. Mild bilateral foot swelling.   Skin: Warm, dry, intact. No rashes noted.  Neurological: A&Ox3, strength 5/5 and sensation intact in all extremities

## 2023-07-13 NOTE — DISCHARGE NOTE NURSING/CASE MANAGEMENT/SOCIAL WORK - PATIENT PORTAL LINK FT
You can access the FollowMyHealth Patient Portal offered by [facility] by registering at the following website: [email or web address]. By joining [facility]’s FollowMyHealth portal, you will also be able to view your health information using other applications (apps) compatible with our system.

## 2023-07-19 DIAGNOSIS — Z88.8 ALLERGY STATUS TO OTHER DRUGS, MEDICAMENTS AND BIOLOGICAL SUBSTANCES: ICD-10-CM

## 2023-07-19 DIAGNOSIS — Z90.79 ACQUIRED ABSENCE OF OTHER GENITAL ORGAN(S): ICD-10-CM

## 2023-07-19 DIAGNOSIS — I95.2 HYPOTENSION DUE TO DRUGS: ICD-10-CM

## 2023-07-19 DIAGNOSIS — Z90.710 ACQUIRED ABSENCE OF BOTH CERVIX AND UTERUS: ICD-10-CM

## 2023-07-19 DIAGNOSIS — R22.0 LOCALIZED SWELLING, MASS AND LUMP, HEAD: ICD-10-CM

## 2023-07-19 DIAGNOSIS — R11.0 NAUSEA: ICD-10-CM

## 2023-07-19 DIAGNOSIS — R06.9 UNSPECIFIED ABNORMALITIES OF BREATHING: ICD-10-CM

## 2023-07-19 DIAGNOSIS — Z85.89 PERSONAL HISTORY OF MALIGNANT NEOPLASM OF OTHER ORGANS AND SYSTEMS: ICD-10-CM

## 2023-07-19 DIAGNOSIS — R61 GENERALIZED HYPERHIDROSIS: ICD-10-CM

## 2023-07-19 DIAGNOSIS — T45.1X5A ADVERSE EFFECT OF ANTINEOPLASTIC AND IMMUNOSUPPRESSIVE DRUGS, INITIAL ENCOUNTER: ICD-10-CM

## 2023-07-19 DIAGNOSIS — Z90.722 ACQUIRED ABSENCE OF OVARIES, BILATERAL: ICD-10-CM

## 2023-07-19 DIAGNOSIS — T88.6XXA ANAPHYLACTIC REACTION DUE TO ADVERSE EFFECT OF CORRECT DRUG OR MEDICAMENT PROPERLY ADMINISTERED, INITIAL ENCOUNTER: ICD-10-CM

## 2023-07-19 DIAGNOSIS — C78.6 SECONDARY MALIGNANT NEOPLASM OF RETROPERITONEUM AND PERITONEUM: ICD-10-CM

## 2023-07-19 DIAGNOSIS — C77.5 SECONDARY AND UNSPECIFIED MALIGNANT NEOPLASM OF INTRAPELVIC LYMPH NODES: ICD-10-CM

## 2023-07-19 DIAGNOSIS — Z95.820 PERIPHERAL VASCULAR ANGIOPLASTY STATUS WITH IMPLANTS AND GRAFTS: ICD-10-CM
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
## 2023-07-19 DIAGNOSIS — F32.9 MAJOR DEPRESSIVE DISORDER, SINGLE EPISODE, UNSPECIFIED: ICD-10-CM

## 2023-07-19 DIAGNOSIS — T44.5X5A ADVERSE EFFECT OF PREDOMINANTLY BETA-ADRENORECEPTOR AGONISTS, INITIAL ENCOUNTER: ICD-10-CM

## 2023-07-19 DIAGNOSIS — Z88.0 ALLERGY STATUS TO PENICILLIN: ICD-10-CM

## 2023-08-31 ENCOUNTER — EMERGENCY (EMERGENCY)
Facility: HOSPITAL | Age: 63
LOS: 1 days | Discharge: HOME | End: 2023-08-31
Attending: EMERGENCY MEDICINE
Payer: COMMERCIAL

## 2023-08-31 VITALS
RESPIRATION RATE: 18 BRPM | OXYGEN SATURATION: 99 % | SYSTOLIC BLOOD PRESSURE: 135 MMHG | HEART RATE: 62 BPM | TEMPERATURE: 98 F | DIASTOLIC BLOOD PRESSURE: 80 MMHG

## 2023-08-31 VITALS
SYSTOLIC BLOOD PRESSURE: 123 MMHG | OXYGEN SATURATION: 98 % | RESPIRATION RATE: 16 BRPM | HEIGHT: 61 IN | DIASTOLIC BLOOD PRESSURE: 72 MMHG | TEMPERATURE: 98 F | WEIGHT: 117.07 LBS | HEART RATE: 74 BPM

## 2023-08-31 DIAGNOSIS — Z98.89 OTHER SPECIFIED POSTPROCEDURAL STATES: Chronic | ICD-10-CM

## 2023-08-31 DIAGNOSIS — Z90.710 ACQUIRED ABSENCE OF BOTH CERVIX AND UTERUS: Chronic | ICD-10-CM

## 2023-08-31 PROBLEM — F32.9 MAJOR DEPRESSIVE DISORDER, SINGLE EPISODE, UNSPECIFIED: Chronic | Status: ACTIVE | Noted: 2023-07-12

## 2023-08-31 PROBLEM — C57.00 MALIGNANT NEOPLASM OF UNSPECIFIED FALLOPIAN TUBE: Chronic | Status: ACTIVE | Noted: 2023-07-12

## 2023-08-31 LAB
ALBUMIN SERPL ELPH-MCNC: 4.2 G/DL — SIGNIFICANT CHANGE UP (ref 3.3–5)
ALP SERPL-CCNC: 91 U/L — SIGNIFICANT CHANGE UP (ref 40–120)
ALT FLD-CCNC: 34 U/L — SIGNIFICANT CHANGE UP (ref 10–45)
ANION GAP SERPL CALC-SCNC: 12 MMOL/L — SIGNIFICANT CHANGE UP (ref 5–17)
APTT BLD: 34.9 SEC — SIGNIFICANT CHANGE UP (ref 24.5–35.6)
AST SERPL-CCNC: 26 U/L — SIGNIFICANT CHANGE UP (ref 10–40)
BASOPHILS # BLD AUTO: 0.02 K/UL — SIGNIFICANT CHANGE UP (ref 0–0.2)
BASOPHILS NFR BLD AUTO: 0.3 % — SIGNIFICANT CHANGE UP (ref 0–2)
BILIRUB SERPL-MCNC: 0.1 MG/DL — LOW (ref 0.2–1.2)
BUN SERPL-MCNC: 15 MG/DL — SIGNIFICANT CHANGE UP (ref 7–23)
CALCIUM SERPL-MCNC: 9.7 MG/DL — SIGNIFICANT CHANGE UP (ref 8.4–10.5)
CHLORIDE SERPL-SCNC: 101 MMOL/L — SIGNIFICANT CHANGE UP (ref 96–108)
CO2 SERPL-SCNC: 22 MMOL/L — SIGNIFICANT CHANGE UP (ref 22–31)
CREAT SERPL-MCNC: 0.68 MG/DL — SIGNIFICANT CHANGE UP (ref 0.5–1.3)
EGFR: 99 ML/MIN/1.73M2 — SIGNIFICANT CHANGE UP
EOSINOPHIL # BLD AUTO: 0.09 K/UL — SIGNIFICANT CHANGE UP (ref 0–0.5)
EOSINOPHIL NFR BLD AUTO: 1.3 % — SIGNIFICANT CHANGE UP (ref 0–6)
GLUCOSE SERPL-MCNC: 84 MG/DL — SIGNIFICANT CHANGE UP (ref 70–99)
HCT VFR BLD CALC: 38.2 % — SIGNIFICANT CHANGE UP (ref 34.5–45)
HGB BLD-MCNC: 11.6 G/DL — SIGNIFICANT CHANGE UP (ref 11.5–15.5)
IMM GRANULOCYTES NFR BLD AUTO: 1 % — HIGH (ref 0–0.9)
INR BLD: 0.95 RATIO — SIGNIFICANT CHANGE UP (ref 0.85–1.18)
LYMPHOCYTES # BLD AUTO: 1.27 K/UL — SIGNIFICANT CHANGE UP (ref 1–3.3)
LYMPHOCYTES # BLD AUTO: 17.9 % — SIGNIFICANT CHANGE UP (ref 13–44)
MCHC RBC-ENTMCNC: 30.4 GM/DL — LOW (ref 32–36)
MCHC RBC-ENTMCNC: 31.2 PG — SIGNIFICANT CHANGE UP (ref 27–34)
MCV RBC AUTO: 102.7 FL — HIGH (ref 80–100)
MONOCYTES # BLD AUTO: 0.51 K/UL — SIGNIFICANT CHANGE UP (ref 0–0.9)
MONOCYTES NFR BLD AUTO: 7.2 % — SIGNIFICANT CHANGE UP (ref 2–14)
NEUTROPHILS # BLD AUTO: 5.15 K/UL — SIGNIFICANT CHANGE UP (ref 1.8–7.4)
NEUTROPHILS NFR BLD AUTO: 72.3 % — SIGNIFICANT CHANGE UP (ref 43–77)
NRBC # BLD: 0 /100 WBCS — SIGNIFICANT CHANGE UP (ref 0–0)
PLATELET # BLD AUTO: 150 K/UL — SIGNIFICANT CHANGE UP (ref 150–400)
POTASSIUM SERPL-MCNC: 4.5 MMOL/L — SIGNIFICANT CHANGE UP (ref 3.5–5.3)
POTASSIUM SERPL-SCNC: 4.5 MMOL/L — SIGNIFICANT CHANGE UP (ref 3.5–5.3)
PROT SERPL-MCNC: 7.1 G/DL — SIGNIFICANT CHANGE UP (ref 6–8.3)
PROTHROM AB SERPL-ACNC: 10.5 SEC — SIGNIFICANT CHANGE UP (ref 9.5–13)
RBC # BLD: 3.72 M/UL — LOW (ref 3.8–5.2)
RBC # FLD: 16.3 % — HIGH (ref 10.3–14.5)
SODIUM SERPL-SCNC: 135 MMOL/L — SIGNIFICANT CHANGE UP (ref 135–145)
WBC # BLD: 7.11 K/UL — SIGNIFICANT CHANGE UP (ref 3.8–10.5)
WBC # FLD AUTO: 7.11 K/UL — SIGNIFICANT CHANGE UP (ref 3.8–10.5)

## 2023-08-31 PROCEDURE — 99284 EMERGENCY DEPT VISIT MOD MDM: CPT | Mod: 25

## 2023-08-31 PROCEDURE — 70450 CT HEAD/BRAIN W/O DYE: CPT | Mod: MA

## 2023-08-31 PROCEDURE — 80053 COMPREHEN METABOLIC PANEL: CPT

## 2023-08-31 PROCEDURE — 85025 COMPLETE CBC W/AUTO DIFF WBC: CPT

## 2023-08-31 PROCEDURE — 71250 CT THORAX DX C-: CPT | Mod: 26,MA

## 2023-08-31 PROCEDURE — 74176 CT ABD & PELVIS W/O CONTRAST: CPT | Mod: 26,MA

## 2023-08-31 PROCEDURE — 70450 CT HEAD/BRAIN W/O DYE: CPT | Mod: 26,MA

## 2023-08-31 PROCEDURE — 74176 CT ABD & PELVIS W/O CONTRAST: CPT | Mod: MA

## 2023-08-31 PROCEDURE — 86850 RBC ANTIBODY SCREEN: CPT

## 2023-08-31 PROCEDURE — 85610 PROTHROMBIN TIME: CPT

## 2023-08-31 PROCEDURE — 99285 EMERGENCY DEPT VISIT HI MDM: CPT

## 2023-08-31 PROCEDURE — 86901 BLOOD TYPING SEROLOGIC RH(D): CPT

## 2023-08-31 PROCEDURE — 71250 CT THORAX DX C-: CPT | Mod: MA

## 2023-08-31 PROCEDURE — 86900 BLOOD TYPING SEROLOGIC ABO: CPT

## 2023-08-31 PROCEDURE — 96374 THER/PROPH/DIAG INJ IV PUSH: CPT

## 2023-08-31 PROCEDURE — 85730 THROMBOPLASTIN TIME PARTIAL: CPT

## 2023-08-31 PROCEDURE — 36415 COLL VENOUS BLD VENIPUNCTURE: CPT

## 2023-08-31 RX ORDER — SODIUM CHLORIDE 9 MG/ML
1000 INJECTION INTRAMUSCULAR; INTRAVENOUS; SUBCUTANEOUS ONCE
Refills: 0 | Status: COMPLETED | OUTPATIENT
Start: 2023-08-31 | End: 2023-08-31

## 2023-08-31 RX ORDER — DIPHENHYDRAMINE HCL 50 MG
50 CAPSULE ORAL ONCE
Refills: 0 | Status: DISCONTINUED | OUTPATIENT
Start: 2023-08-31 | End: 2023-08-31

## 2023-08-31 RX ORDER — ACETAMINOPHEN 500 MG
1000 TABLET ORAL ONCE
Refills: 0 | Status: COMPLETED | OUTPATIENT
Start: 2023-08-31 | End: 2023-08-31

## 2023-08-31 RX ORDER — HYDROCORTISONE 20 MG
200 TABLET ORAL EVERY 8 HOURS
Refills: 0 | Status: DISCONTINUED | OUTPATIENT
Start: 2023-08-31 | End: 2023-08-31

## 2023-08-31 RX ADMIN — Medication 400 MILLIGRAM(S): at 12:49

## 2023-08-31 RX ADMIN — SODIUM CHLORIDE 1000 MILLILITER(S): 9 INJECTION INTRAMUSCULAR; INTRAVENOUS; SUBCUTANEOUS at 12:49

## 2023-08-31 NOTE — ED PROVIDER NOTE - PROGRESS NOTE DETAILS
New mass in right pelvis, may be possible hematoma  Patient endorses that she had bilateral oophorectomy and hysterectomy   Most recent PET scan showed no uptake in this area  General surgery consulted Previous Ct shows 4.4 cm x 2.9 cm cystic pelvic mass, spoke to oncologist who indicated they are following mass  Plan to DC with f/u

## 2023-08-31 NOTE — ED ADULT NURSE NOTE - NS PRO AD NO ADVANCE DIRECTIVE
January 28, 2023       Dennis JOSE MARTIN Nunn MD  [address] [place] [address]  Via Fax: [phone]      Patient: Vania Barrientos   YOB: 1996   Date of Visit: 1/28/2023       Dear Dr. Nunn:    I saw your patient, Vania Barrientos, for an evaluation. Below are my notes for this visit with her.    If you have questions, please do not hesitate to call me.      Sincerely,        Avelina Edmonds MD        CC: No Recipients  Avelina Edmonds MD  1/28/2023 11:33 AM  Signed    Patient ID: Vania Barrientos is a 26 year old female.    Chief Complaint   Patient presents with   • fatigue     X 3-4 days - Pt states she  is 33 weeks pregnant -    • Cough     Productive cough x 3-4 days - Pt states she is unable to sleep because of the coughing.  Pt stated she started coughing up a tinge of blood this morning .        HPI: Productive cough x 3-4 days - Pt states she is unable to sleep because of the coughing.  Pt stated she started coughing up a tinge of blood this morning .     Past Medical History:   Diagnosis Date   • Thyroid disease      Past Surgical History:   Procedure Laterality Date   • Shoulder surg proc unlisted Left    • Upper arm/elbow surgery unlisted      torn ligaments     Social History     Socioeconomic History   • Marital status: /Civil Union     Spouse name: Not on file   • Number of children: Not on file   • Years of education: Not on file   • Highest education level: Not on file   Occupational History   • Not on file   Tobacco Use   • Smoking status: Never   • Smokeless tobacco: Never   Vaping Use   • Vaping Use: Some days   • Substances: Nicotine   Substance and Sexual Activity   • Alcohol use: Not Currently     Comment: occasional   • Drug use: Not Currently     Types: Marijuana     Comment: occasionally   • Sexual activity: Not on file   Other Topics Concern   • Not on file   Social History Narrative   • Not on file     Social Determinants of Health     Financial Resource  Strain: Not on file   Food Insecurity: Not on file   Transportation Needs: Not on file   Physical Activity: Not on file   Stress: Not on file   Social Connections: Not on file   Intimate Partner Violence: Not on file     Family History   Problem Relation Age of Onset   • Anemia Mother    • Hypertension Father    • Diabetes Father    • Diabetes Maternal Grandmother      Current Outpatient Medications   Medication Sig Dispense Refill   • guaiFENesin (MUCINEX PO)      • amoxicillin (AMOXIL) 875 MG tablet Take 1 tablet by mouth in the morning and 1 tablet in the evening. 20 tablet 0   • fluticasone (Flonase Allergy Relief) 50 MCG/ACT nasal spray Spray 1 spray in each nostril daily. 16 g 0   • benzonatate (TESSALON PERLES) 100 MG capsule Take 1 capsule by mouth 3 times daily as needed for Cough. 20 capsule 0   • Prenatal Vit-Fe Fumarate-FA (multivitamin & mineral w/folic acid- PRENATAL) 27-1 MG Tab Take 1 tablet by mouth daily.     • levothyroxine 50 MCG tablet as directed, 1 tab PO QAM  on empty stomach. Oral as directed     • cholecalciferol (VITAMIN D) 10 mcg (400 units)/mL oral liquid Take by mouth daily.     • acetaminophen (TYLENOL) 500 MG tablet Take 500 mg by mouth.       No current facility-administered medications for this visit.     ALLERGIES:   Allergen Reactions   • Shellfish-Derived Products   (Food Or Med) Other (See Comments)       Review of Systems   Constitutional: Negative.    HENT: Positive for ear pain, postnasal drip, sinus pressure and sore throat.    Eyes: Negative.    Respiratory: Positive for cough.    Cardiovascular: Negative.    Gastrointestinal: Negative.    Endocrine: Negative.    Genitourinary: Negative.    Musculoskeletal: Negative.    Skin: Negative.    Allergic/Immunologic: Negative.    Neurological: Negative.    Hematological: Negative.    Psychiatric/Behavioral: Negative.        Visit Vitals  /85   Pulse 100   Temp 98.2 °F (36.8 °C)   Resp 18   Ht 5' 2\" (1.575 m)   Wt 131.5 kg  (290 lb)   LMP 05/20/2022   SpO2 97%   BMI 53.04 kg/m²     Physical Exam  Vitals and nursing note reviewed.   Constitutional:       Appearance: She is well-developed.   HENT:      Head: Normocephalic and atraumatic.      Right Ear: External ear normal. Tympanic membrane is injected.      Left Ear: External ear normal. Tympanic membrane is injected.      Nose: Congestion present.      Mouth/Throat:      Pharynx: Posterior oropharyngeal erythema present.      Neck: Normal range of motion and neck supple.   Eyes:      Conjunctiva/sclera: Conjunctivae normal.      Pupils: Pupils are equal, round, and reactive to light.   Cardiovascular:      Rate and Rhythm: Normal rate and regular rhythm.      Heart sounds: Normal heart sounds.   Pulmonary:      Effort: Pulmonary effort is normal.      Breath sounds: Normal breath sounds.   Abdominal:      General: Bowel sounds are normal.      Palpations: Abdomen is soft.   Musculoskeletal:         General: Normal range of motion.   Skin:     General: Skin is warm and dry.   Neurological:      Mental Status: She is alert and oriented to person, place, and time.   Psychiatric:         Behavior: Behavior normal.         Thought Content: Thought content normal.         Judgment: Judgment normal.         MRI ABDOMEN WO CONTRAST  Narrative: EXAM: MRI ABDOMEN WO CONTRAST    CLINICAL INDICATION: Right lower quadrant pain, rule out appendicitis.  Pregnant.    COMPARISON:  Ultrasound 10/20/2022    TECHNIQUE: Multisequential MRI was performed of the abdomen using a  targeted protocol for appendicitis. No IV contrast was given.    FINDINGS:  The appendix is normal with no evidence of appendicitis. No evidence of an  active inflammatory process in the field-of-view.    Gravid uterus with single second trimester gestation in breech position.  Placenta is posterior with no evidence of previa. Fetal anatomy is not  assessed.     There is mild right hydroureteronephrosis, presumably related to  mass  effect from gravid uterus. Remaining solid organs are unremarkable on this  noncontrast exam.    Incidental finding of stones in the otherwise normal gallbladder.  Impression: 1.   No appendicitis.  2.   Cholelithiasis.  3.   Mild right hydroureteronephrosis, presumably related to mass effect  from gravid uterus.    Electronically Signed by: FORD MUSE M.D.   Signed on: 10/21/2022 8:05 AM   US OB 14 WEEKS OR MORE SINGLE GESTATION  Narrative: EXAM: US OB 14 WEEKS OR MORE SINGLE GESTATION    CLINICAL INDICATION: Pain. Pregnancy. Evaluation of fetal well-being.    COMPARISON: None.    FINDINGS:     Single live intrauterine gestation in variable presentation. Posterior  placenta containing possible venous lake located near the cervical os, per  sonographer.     Fetal heart rate of 144 bpm.    Estimated fetal weight of 315 g (+/-47 g)    Amniotic fluid index of 12.7.    Fetal biometric measurements provided below.   Biparietal diameter: 43.8 mm.  Head circumference: 166.5 mm.  Abdominal circumference: 146.9 mm.   Femur length: 31.6 mm.     Biometric measurements correlate to a gestational age of 19 weeks, 4 days.  JASVIR of 3/12/2023.     Limited structural survey is unremarkable.  Impression:  Single live intrauterine gestation of 19 weeks, 4 days.  Posterior placenta containing possible venous lake near the cervical os.  Sonographic follow-up is advised.  Preliminary report was provided to the emergency department by  [employer] at 12:00 AM on 10/21/2022    Electronically Signed by: FELICITA BAKER M.D.   Signed on: 10/21/2022 7:45 AM       No results found for this visit on 01/28/23.       Assessment/Plan:  Acute sinusitis, recurrence not specified, unspecified location    - Discussed with patient the natural history, course and prognosis of illness.    Therapeutic options discussed and explained.  Side effects, risks and benefits, and alternatives discussed.  Patient acknowledges  understanding of disease and treatment plan.  - Keep well hydrated, humidifier, steam bath, gargle with salt and water. Medication as prescribed. Return if worse.    New Prescriptions    AMOXICILLIN (AMOXIL) 875 MG TABLET    Take 1 tablet by mouth in the morning and 1 tablet in the evening.    BENZONATATE (TESSALON PERLES) 100 MG CAPSULE    Take 1 capsule by mouth 3 times daily as needed for Cough.    FLUTICASONE (FLONASE ALLERGY RELIEF) 50 MCG/ACT NASAL SPRAY    Spray 1 spray in each nostril daily.         Avelina Edmonds MD     No
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
## 2023-08-31 NOTE — ED ADULT NURSE NOTE - NSICDXPASTSURGICALHX_GEN_ALL_CORE_FT
PAST SURGICAL HISTORY:  H/O total hysterectomy     S/P abdominoplasty     S/P bunionectomy bilateral    S/P  x 3 , ,     S/P D&C (status post dilation and curettage)

## 2023-08-31 NOTE — ED ADULT NURSE NOTE - NSFALLUNIVINTERV_ED_ALL_ED
Bed/Stretcher in lowest position, wheels locked, appropriate side rails in place/Call bell, personal items and telephone in reach/Instruct patient to call for assistance before getting out of bed/chair/stretcher/Non-slip footwear applied when patient is off stretcher/[place] to call system/Physically safe environment - no spills, clutter or unnecessary equipment/Purposeful proactive rounding/Room/bathroom lighting operational, light cord in reach
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
## 2023-08-31 NOTE — ED PROVIDER NOTE - PATIENT PORTAL LINK FT
You can access the FollowMyHealth Patient Portal offered by [facility] by registering at the following website: [email or web address]. By joining [facility]’s FollowMyHealth portal, you will also be able to view your health information using other applications (apps) compatible with our system.

## 2023-08-31 NOTE — ED ADULT NURSE NOTE - CAS TRG GEN SKIN CONDITION
Physical Therapy Visit    Referred by: MARY Collazo; Medical Diagnosis (from order):    Diagnosis Information      Diagnosis    781.99 (ICD-9-CM) - R26.89 (ICD-10-CM) - Balance problem    E888.9 (ICD-9-CM) - W19.XXXA (ICD-10-CM) - Falls              Visit: 5    Visit Type: Daily Treatment Note    SUBJECTIVE                                                                                                               Reports he is feeling \"so so\" today.   Pain / Symptoms:  Pain rating (out of 10): Current: 0     OBJECTIVE                                                                                                                        TREATMENT                                                                                                                  Therapeutic Exercise:  Stepper x 10:00, Level 3    Cable Column:  1. Forward x 5 (20#)  2. Backward x 5 (20#)  3. Side step x5 (10#)    8\" step up: x20 R/L     3-Way cone tap x 10 each R/L (no UE)  Air ex: march: 2x10 (no UE assistance)    Seated stretching:   Hamstring: R/L  Adductor: R/L     Pro stretcher: x2    Skilled input: verbal instruction/cues    Writer verbally educated and received verbal consent for hand placement, positioning of patient, and techniques to be performed today from patient for therapist position for techniques and hand placement and palpation for techniques as described above and how they are pertinent to the patient's plan of care.       ASSESSMENT                                                                                                             Session continues with balance and strength exercises. He does well today, however, requires encouragement to complete exercise with out UE assistance. He states he is unable to do the exercise unless he holds on, however, he is able to do so w/o assistance but does have slight difficulty which is to be expected. Progressed weight on resisted ambulation and added in side step  today, will progress as able.  Pain/symptoms after session (out of 10): 0  Patient Education:   Results of above outlined education: Verbalizes understanding and Needs reinforcement      PLAN                                                                                                                           Suggestions for next session as indicated: Progress per plan of care         Therapy procedure time and total treatment time can be found documented on the Time Entry flowsheet   Warm

## 2023-08-31 NOTE — ED ADULT NURSE NOTE - NSICDXFAMILYHX_GEN_ALL_CORE_FT
FAMILY HISTORY:  Mother  Still living? Unknown  History of hypertension, Age at diagnosis: Age Unknown

## 2023-08-31 NOTE — ED PROVIDER NOTE - PHYSICAL EXAMINATION
Physical Exam:  Gen: NAD, non-toxic appearing  Head: NCAT  HEENT: Normal conjunctiva, trachea midline, moist mucous membranes  Lung: CTAB, no respiratory distress, no wheezes/rhonchi/rales B/L, speaking in full sentences  CV: RRR, no murmurs, rubs or gallops  Chest wall: pain with palpation over right thoracic ribs, pain with palpation over sternum  Abd: Soft, NT, ND, no guarding, rigidity, rebound tenderness  MSK: No visible deformities, moves all four extremities   Neuro: No focal motor deficits  Skin: Warm, well perfused, no visible rashes, no leg swelling  Psych: appropriate affect and mood
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
## 2023-08-31 NOTE — ED ADULT NURSE NOTE - NSICDXPASTMEDICALHX_GEN_ALL_CORE_FT
PAST MEDICAL HISTORY:  Anxiety     Cancer of fallopian tube     Lower back pain     Major depression     Ovarian ca     Vertigo

## 2023-10-08 PROCEDURE — 84100 ASSAY OF PHOSPHORUS: CPT

## 2023-10-08 PROCEDURE — 80053 COMPREHEN METABOLIC PANEL: CPT

## 2023-10-08 PROCEDURE — 85025 COMPLETE CBC W/AUTO DIFF WBC: CPT

## 2023-10-08 PROCEDURE — 96374 THER/PROPH/DIAG INJ IV PUSH: CPT

## 2023-10-08 PROCEDURE — 36415 COLL VENOUS BLD VENIPUNCTURE: CPT

## 2023-10-08 PROCEDURE — 83735 ASSAY OF MAGNESIUM: CPT

## 2023-10-08 PROCEDURE — 96375 TX/PRO/DX INJ NEW DRUG ADDON: CPT

## 2023-10-08 PROCEDURE — 86803 HEPATITIS C AB TEST: CPT

## 2023-10-08 PROCEDURE — 99285 EMERGENCY DEPT VISIT HI MDM: CPT

## 2023-10-08 PROCEDURE — 93005 ELECTROCARDIOGRAM TRACING: CPT

## 2023-12-11 ENCOUNTER — APPOINTMENT (OUTPATIENT)
Dept: ORTHOPEDIC SURGERY | Facility: CLINIC | Age: 63
End: 2023-12-11
Payer: COMMERCIAL

## 2023-12-11 VITALS
HEART RATE: 80 BPM | HEIGHT: 61 IN | DIASTOLIC BLOOD PRESSURE: 86 MMHG | WEIGHT: 120 LBS | BODY MASS INDEX: 22.66 KG/M2 | SYSTOLIC BLOOD PRESSURE: 127 MMHG

## 2023-12-11 PROCEDURE — 99214 OFFICE O/P EST MOD 30 MIN: CPT

## 2023-12-11 RX ORDER — METHYLPREDNISOLONE 4 MG/1
4 TABLET ORAL
Qty: 1 | Refills: 0 | Status: ACTIVE | COMMUNITY
Start: 2023-12-11 | End: 1900-01-01

## 2023-12-21 RX ORDER — GABAPENTIN 100 MG/1
100 CAPSULE ORAL
Qty: 60 | Refills: 0 | Status: ACTIVE | COMMUNITY
Start: 2023-12-21 | End: 1900-01-01

## 2023-12-26 ENCOUNTER — NON-APPOINTMENT (OUTPATIENT)
Age: 63
End: 2023-12-26

## 2024-01-15 ENCOUNTER — APPOINTMENT (OUTPATIENT)
Dept: ORTHOPEDIC SURGERY | Facility: CLINIC | Age: 64
End: 2024-01-15
Payer: COMMERCIAL

## 2024-01-15 ENCOUNTER — MED ADMIN CHARGE (OUTPATIENT)
Age: 64
End: 2024-01-15

## 2024-01-15 ENCOUNTER — NON-APPOINTMENT (OUTPATIENT)
Age: 64
End: 2024-01-15

## 2024-01-15 VITALS
HEIGHT: 61 IN | HEART RATE: 89 BPM | DIASTOLIC BLOOD PRESSURE: 91 MMHG | BODY MASS INDEX: 22.66 KG/M2 | WEIGHT: 120 LBS | SYSTOLIC BLOOD PRESSURE: 131 MMHG

## 2024-01-15 DIAGNOSIS — M25.559 PAIN IN UNSPECIFIED HIP: ICD-10-CM

## 2024-01-15 DIAGNOSIS — M70.61 TROCHANTERIC BURSITIS, RIGHT HIP: ICD-10-CM

## 2024-01-15 DIAGNOSIS — M51.26 OTHER INTERVERTEBRAL DISC DISPLACEMENT, LUMBAR REGION: ICD-10-CM

## 2024-01-15 PROCEDURE — 20610 DRAIN/INJ JOINT/BURSA W/O US: CPT | Mod: RT

## 2024-01-15 PROCEDURE — 99214 OFFICE O/P EST MOD 30 MIN: CPT | Mod: 25

## 2024-01-15 RX ADMIN — LIDOCAINE HYDROCHLORIDE %: 10 INJECTION, SOLUTION INFILTRATION; PERINEURAL at 00:00

## 2024-01-15 RX ADMIN — Medication %: at 00:00

## 2024-01-15 RX ADMIN — METHYLPREDNISOLONE ACETATE MG/ML: 40 INJECTION, SUSPENSION INTRA-ARTICULAR; INTRALESIONAL; INTRAMUSCULAR; SOFT TISSUE at 00:00

## 2024-01-16 RX ORDER — METHYLPRED ACET/NACL,ISO-OS/PF 40 MG/ML
40 VIAL (ML) INJECTION
Qty: 1 | Refills: 0 | Status: COMPLETED | OUTPATIENT
Start: 2024-01-15

## 2024-01-16 RX ORDER — LIDOCAINE HYDROCHLORIDE 10 MG/ML
1 INJECTION, SOLUTION INFILTRATION; PERINEURAL
Refills: 0 | Status: COMPLETED | OUTPATIENT
Start: 2024-01-15

## 2024-01-16 RX ORDER — BUPIVACAINE HCL/PF 5 MG/ML
0.5 VIAL (ML) INJECTION
Qty: 0 | Refills: 0 | Status: COMPLETED | OUTPATIENT
Start: 2024-01-15

## 2024-01-16 NOTE — HISTORY OF PRESENT ILLNESS
[4] : a current pain level of 4/10 [Daily] : ~He/She~ states the symptoms seem to be occuring daily [Prolonged Sitting] : worsened by prolonged sitting [Prolonged Standing] : worsened by prolonged standing [de-identified] : Patient states since her last office visit 12/11/2023 had to go to [facility] ER on 12/22/2023 and 1/4/2024 due to severe right sciatica pain. Patient had cat scan of her abdomen in the ER and also another one at 's office on 1/8/2024. Patient given medrol dose pack and trigger point injections no relief then given lyrica and oxycodone 10 mg which seems to be controlling her pain at this time. Pain primarily right flank and right leg On xaeralto [de-identified] : lying down [de-identified] : medication

## 2024-01-16 NOTE — DISCUSSION/SUMMARY
[Medication Risks Reviewed] : Medication risks reviewed [de-identified] : Patient currently is not a candidate for surgical interventions.  She understands that there is underlying spinal stenosis and neural compression. Consider R L4, L5 DANA if the trochanteric bursa injection is not helpful. Will do R GT injection today.  Under sterile conditions 40 mg of Depo-Medrol mixed with a 6 cc solution of 1% lidocaine and 0.5% bupivacaine was injected into the right hip trochanteric bursa by the nurse practitioner without incident.  will continue treatment for ovarian cancer through her oncologist.  Given her medical comorbidities she is not a candidate for surgical intervention on her spine at this time and she understands this we will follow her symptomatically  The patient was educated regarding their condition, treatment options as well as prescribed course of treatment.  Risks and benefits as well as alternatives to the proposed treatment were also provided to the patient  They were given the opportunity to have all their questions answered to their satisfaction.  Vital signs were reviewed with the patient and the patient was instructed to followup with their primary care provider for further management. There were no PAs or scribes used in the evaluation, exam or treatment plan discussion. The surgeon was the primary evaluating or treating physician as noted above.

## 2024-01-16 NOTE — PHYSICAL EXAM
[Normal] : normal [Limited] : is limited [Painful] : is painful [LE] : Sensory: Intact in bilateral lower extremities [Knee] : patellar 2+ and symmetric bilaterally [Ankle] : ankle 2+ and symmetric bilaterally [DP] : dorsalis pedis 2+ and symmetric bilaterally [PT] : posterior tibial 2+ and symmetric bilaterally [Poor Appearance] : well-appearing [Acute Distress] : not in acute distress [Obese] : not obese [Abl Mood] : in a normal mood [Abl Affect] : with normal affect [Poor Coordination] : normal coordination [Disorientation] : oriented x 3 [SLR] : negative straight leg raise [FreeTextEntry2] : The pt is awake, alert and oriented to self, place and time, is comfortable and in no acute distress. Gait examination reveals a narrow based, non-ataxic, non-antalgic gait. Can heel and toe walk without difficulty. Inspection of neck, back and lower extremities bilaterally reveals no rashes or ecchymotic lesions.  She leans to the right when sitting or standing. There is no tenderness over the cervical, thoracic spine, or the upper and lower extremities musculature. Minimal midline lumbosacral junctional tenderness noted along with significant right paraspinal lumbar tenderness. There is no sacroiliac tenderness. No greater trochanteric tenderness bilaterally. No atrophy or abnormal movements noted in the upper or lower extremities. There is no swelling noted in the upper or lower extremities bilaterally. No cervical lymphadenopathy noted anteriorly. No joint laxity noted in the upper and lower extremity joints bilaterally.\par  Negative straight leg raise to 45 in the sitting position bilaterally. There is no groin pain with hip internal rotation and a negative BRONWYN test bilaterally.  [de-identified] : Flexion to ankles with less pain, extension is 20 degrees with pain [de-identified] : pes planus bilaterally. Tenderness along midline lumbar spine and bilateral paraspinal musculature. Healed bilateral bunion incisions no right GT tenderness

## 2024-02-01 ENCOUNTER — INPATIENT (INPATIENT)
Facility: HOSPITAL | Age: 64
LOS: 1 days | Discharge: HOME | DRG: 375 | End: 2024-02-03
Attending: STUDENT IN AN ORGANIZED HEALTH CARE EDUCATION/TRAINING PROGRAM | Admitting: STUDENT IN AN ORGANIZED HEALTH CARE EDUCATION/TRAINING PROGRAM
Payer: COMMERCIAL

## 2024-02-01 VITALS
RESPIRATION RATE: 18 BRPM | HEART RATE: 93 BPM | WEIGHT: 119.93 LBS | HEIGHT: 60 IN | DIASTOLIC BLOOD PRESSURE: 73 MMHG | TEMPERATURE: 98 F | SYSTOLIC BLOOD PRESSURE: 140 MMHG | OXYGEN SATURATION: 98 %

## 2024-02-01 DIAGNOSIS — Z98.89 OTHER SPECIFIED POSTPROCEDURAL STATES: Chronic | ICD-10-CM

## 2024-02-01 DIAGNOSIS — Z90.710 ACQUIRED ABSENCE OF BOTH CERVIX AND UTERUS: Chronic | ICD-10-CM

## 2024-02-01 LAB
ALBUMIN SERPL ELPH-MCNC: 4.3 G/DL — SIGNIFICANT CHANGE UP (ref 3.3–5)
ALP SERPL-CCNC: 71 U/L — SIGNIFICANT CHANGE UP (ref 40–120)
ALT FLD-CCNC: 16 U/L — SIGNIFICANT CHANGE UP (ref 10–45)
ANION GAP SERPL CALC-SCNC: 9 MMOL/L — SIGNIFICANT CHANGE UP (ref 5–17)
APPEARANCE UR: CLEAR — SIGNIFICANT CHANGE UP
APTT BLD: 37.8 SEC — HIGH (ref 24.5–35.6)
AST SERPL-CCNC: 18 U/L — SIGNIFICANT CHANGE UP (ref 10–40)
BACTERIA # UR AUTO: NEGATIVE /HPF — SIGNIFICANT CHANGE UP
BASE EXCESS BLDV CALC-SCNC: 2 MMOL/L — SIGNIFICANT CHANGE UP (ref -2–3)
BASOPHILS # BLD AUTO: 0.04 K/UL — SIGNIFICANT CHANGE UP (ref 0–0.2)
BASOPHILS NFR BLD AUTO: 1 % — SIGNIFICANT CHANGE UP (ref 0–2)
BILIRUB SERPL-MCNC: 0.3 MG/DL — SIGNIFICANT CHANGE UP (ref 0.2–1.2)
BILIRUB UR-MCNC: NEGATIVE — SIGNIFICANT CHANGE UP
BUN SERPL-MCNC: 15 MG/DL — SIGNIFICANT CHANGE UP (ref 7–23)
CA-I SERPL-SCNC: 1.3 MMOL/L — SIGNIFICANT CHANGE UP (ref 1.15–1.33)
CALCIUM SERPL-MCNC: 9.7 MG/DL — SIGNIFICANT CHANGE UP (ref 8.4–10.5)
CAST: 1 /LPF — SIGNIFICANT CHANGE UP (ref 0–4)
CHLORIDE BLDV-SCNC: 103 MMOL/L — SIGNIFICANT CHANGE UP (ref 96–108)
CHLORIDE SERPL-SCNC: 103 MMOL/L — SIGNIFICANT CHANGE UP (ref 96–108)
CO2 BLDV-SCNC: 31 MMOL/L — HIGH (ref 22–26)
CO2 SERPL-SCNC: 27 MMOL/L — SIGNIFICANT CHANGE UP (ref 22–31)
COLOR SPEC: YELLOW — SIGNIFICANT CHANGE UP
CREAT SERPL-MCNC: 0.92 MG/DL — SIGNIFICANT CHANGE UP (ref 0.5–1.3)
DIFF PNL FLD: NEGATIVE — SIGNIFICANT CHANGE UP
EGFR: 71 ML/MIN/1.73M2 — SIGNIFICANT CHANGE UP
EOSINOPHIL # BLD AUTO: 0.03 K/UL — SIGNIFICANT CHANGE UP (ref 0–0.5)
EOSINOPHIL NFR BLD AUTO: 0.7 % — SIGNIFICANT CHANGE UP (ref 0–6)
GAS PNL BLDV: 135 MMOL/L — LOW (ref 136–145)
GAS PNL BLDV: SIGNIFICANT CHANGE UP
GAS PNL BLDV: SIGNIFICANT CHANGE UP
GLUCOSE BLDV-MCNC: 94 MG/DL — SIGNIFICANT CHANGE UP (ref 70–99)
GLUCOSE SERPL-MCNC: 96 MG/DL — SIGNIFICANT CHANGE UP (ref 70–99)
GLUCOSE UR QL: NEGATIVE MG/DL — SIGNIFICANT CHANGE UP
HCO3 BLDV-SCNC: 29 MMOL/L — SIGNIFICANT CHANGE UP (ref 22–29)
HCT VFR BLD CALC: 41.4 % — SIGNIFICANT CHANGE UP (ref 34.5–45)
HCT VFR BLDA CALC: 38 % — SIGNIFICANT CHANGE UP (ref 34.5–46.5)
HGB BLD CALC-MCNC: 12.7 G/DL — SIGNIFICANT CHANGE UP (ref 11.7–16.1)
HGB BLD-MCNC: 12.4 G/DL — SIGNIFICANT CHANGE UP (ref 11.5–15.5)
IMM GRANULOCYTES NFR BLD AUTO: 0.2 % — SIGNIFICANT CHANGE UP (ref 0–0.9)
INR BLD: 1.71 RATIO — HIGH (ref 0.85–1.18)
KETONES UR-MCNC: 15 MG/DL
LACTATE BLDV-MCNC: 1.5 MMOL/L — SIGNIFICANT CHANGE UP (ref 0.5–2)
LEUKOCYTE ESTERASE UR-ACNC: NEGATIVE — SIGNIFICANT CHANGE UP
LIDOCAIN IGE QN: 44 U/L — SIGNIFICANT CHANGE UP (ref 7–60)
LYMPHOCYTES # BLD AUTO: 0.97 K/UL — LOW (ref 1–3.3)
LYMPHOCYTES # BLD AUTO: 23.2 % — SIGNIFICANT CHANGE UP (ref 13–44)
MAGNESIUM SERPL-MCNC: 2.2 MG/DL — SIGNIFICANT CHANGE UP (ref 1.6–2.6)
MCHC RBC-ENTMCNC: 30 GM/DL — LOW (ref 32–36)
MCHC RBC-ENTMCNC: 30.3 PG — SIGNIFICANT CHANGE UP (ref 27–34)
MCV RBC AUTO: 101.2 FL — HIGH (ref 80–100)
MONOCYTES # BLD AUTO: 0.49 K/UL — SIGNIFICANT CHANGE UP (ref 0–0.9)
MONOCYTES NFR BLD AUTO: 11.7 % — SIGNIFICANT CHANGE UP (ref 2–14)
NEUTROPHILS # BLD AUTO: 2.64 K/UL — SIGNIFICANT CHANGE UP (ref 1.8–7.4)
NEUTROPHILS NFR BLD AUTO: 63.2 % — SIGNIFICANT CHANGE UP (ref 43–77)
NITRITE UR-MCNC: NEGATIVE — SIGNIFICANT CHANGE UP
NRBC # BLD: 0 /100 WBCS — SIGNIFICANT CHANGE UP (ref 0–0)
PCO2 BLDV: 55 MMHG — HIGH (ref 39–42)
PH BLDV: 7.33 — SIGNIFICANT CHANGE UP (ref 7.32–7.43)
PH UR: 6 — SIGNIFICANT CHANGE UP (ref 5–8)
PLATELET # BLD AUTO: 234 K/UL — SIGNIFICANT CHANGE UP (ref 150–400)
PO2 BLDV: 28 MMHG — SIGNIFICANT CHANGE UP (ref 25–45)
POTASSIUM BLDV-SCNC: 4.6 MMOL/L — SIGNIFICANT CHANGE UP (ref 3.5–5.1)
POTASSIUM SERPL-MCNC: 4.4 MMOL/L — SIGNIFICANT CHANGE UP (ref 3.5–5.3)
POTASSIUM SERPL-SCNC: 4.4 MMOL/L — SIGNIFICANT CHANGE UP (ref 3.5–5.3)
PROT SERPL-MCNC: 7 G/DL — SIGNIFICANT CHANGE UP (ref 6–8.3)
PROT UR-MCNC: 100 MG/DL
PROTHROM AB SERPL-ACNC: 18.5 SEC — HIGH (ref 9.5–13)
RBC # BLD: 4.09 M/UL — SIGNIFICANT CHANGE UP (ref 3.8–5.2)
RBC # FLD: 14.9 % — HIGH (ref 10.3–14.5)
RBC CASTS # UR COMP ASSIST: 3 /HPF — SIGNIFICANT CHANGE UP (ref 0–4)
SAO2 % BLDV: 32.3 % — LOW (ref 67–88)
SODIUM SERPL-SCNC: 139 MMOL/L — SIGNIFICANT CHANGE UP (ref 135–145)
SP GR SPEC: 1.02 — SIGNIFICANT CHANGE UP (ref 1–1.03)
SQUAMOUS # UR AUTO: 0 /HPF — SIGNIFICANT CHANGE UP (ref 0–5)
UROBILINOGEN FLD QL: 0.2 MG/DL — SIGNIFICANT CHANGE UP (ref 0.2–1)
WBC # BLD: 4.18 K/UL — SIGNIFICANT CHANGE UP (ref 3.8–10.5)
WBC # FLD AUTO: 4.18 K/UL — SIGNIFICANT CHANGE UP (ref 3.8–10.5)
WBC UR QL: 1 /HPF — SIGNIFICANT CHANGE UP (ref 0–5)

## 2024-02-01 PROCEDURE — 99285 EMERGENCY DEPT VISIT HI MDM: CPT

## 2024-02-01 PROCEDURE — 74177 CT ABD & PELVIS W/CONTRAST: CPT | Mod: 26,MA

## 2024-02-01 RX ORDER — DIPHENHYDRAMINE HCL 50 MG
50 CAPSULE ORAL ONCE
Refills: 0 | Status: COMPLETED | OUTPATIENT
Start: 2024-02-01 | End: 2024-02-01

## 2024-02-01 RX ADMIN — Medication 50 MILLIGRAM(S): at 22:13

## 2024-02-01 RX ADMIN — Medication 40 MILLIGRAM(S): at 18:09
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
## 2024-02-01 NOTE — ED ADULT TRIAGE NOTE - BSA (M2)
1.5 living with , adult children x 2  Retired from work as a business owner with , now volunteers with gardening projects.

## 2024-02-01 NOTE — ED PROVIDER NOTE - ATTENDING CONTRIBUTION TO CARE
I performed a history and physical exam of the patient and discussed their management with the resident. I reviewed the resident's note and agree with the documented findings and plan of care.  Destiny Goode MD

## 2024-02-01 NOTE — ED PROVIDER NOTE - RAPID ASSESSMENT
63-year-old female with past medical history of ovarian cancer (not currently on chemo), DVT on Xarelto presenting with rectal pain.  Patient reports that she has had intermittent episodes of rectal pain over the past few months.  Patient symptoms have acutely worsened over the past few days.  Pain is associated with constipation and poor appetite.  Denies vomiting.  Patient only able to pass a very small amount of stool which is new for her.  CT scan in August 2023 had showed a 4 x 3 cm pelvic soft tissue density concerning for possible rectal mass.  Patient contacted her oncologist who instructed her to come to the ER for repeat CT scan.  Patient with IV contrast allergy.  Previous imaging in August had recommended IV contrast study to better evaluate mass.  Discussed with patient who would be agreeable with premedication which she has tolerated in the past.    **Patient was rapidly assessed by me, Isrrael Gruber PA-C. A limited history was obtained. The patient will be seen and further examined/worked up in the main ED and their care will be completed by the main ED team. Receiving team will follow up on labs, analgesia, any clinical imaging, and perform reassessment and disposition of the patient as clinically indicated. All decisions regarding the progression of care will be made at their discretion.

## 2024-02-01 NOTE — ED ADULT TRIAGE NOTE - AS TEMP SITE
aspiration precautions  dysphagia diet with assistance Aspiration precautions   Fall precautions Aspiration precautions   Fall precautions Aspiration precautions   Fall precautions Aspiration precautions   Fall precautions oral

## 2024-02-01 NOTE — ED PROVIDER NOTE - CLINICAL SUMMARY MEDICAL DECISION MAKING FREE TEXT BOX
*** INCOMPLETE ***  63F with h/o Ovarian Ca with compression of rectum    ROS negative except as above.    GEN: Awake, AOx3, NAD.  HEENT: NCAT  ---EYES: no scleral icterus, EOMI, PERRLA  CARDIO: RRR. Normal S1/S2, no m/r/g. No JVD.  RESP: CTAB, no w/r/r  ABD: Soft, NTND.   MSK: No obvious deformity or ROM deficit.   SKIN: Warm, dry.   NEURO: Moves all four extremities spontaneously  PSYCH: Appropriate mood & affect.     MDM  63F with h/o Ovarian Ca who presents c/o worsening constipation which may or may not be related to her malignancy. She has a relatively benign exam & is welli-appearing. May be iatrogenic (opioid-induced constipation). Ultimately, unable to really tell if POD or not, so will have to scan.   - Pre-medication for IV contrast scan  - CT A/P with IVC  - labs reviewed per QPA 63F with h/o Ovarian Ca with compression of rectum    ROS negative except as above.    GEN: Awake, AOx3, NAD.  HEENT: NCAT  ---EYES: no scleral icterus, EOMI, PERRLA  CARDIO: RRR. Normal S1/S2, no m/r/g. No JVD.  RESP: CTAB, no w/r/r  ABD: Soft, NTND.   MSK: No obvious deformity or ROM deficit.   SKIN: Warm, dry.   NEURO: Moves all four extremities spontaneously  PSYCH: Appropriate mood & affect.     MDM  63F with h/o Ovarian Ca who presents c/o worsening constipation which may or may not be related to her malignancy. She has a relatively benign exam & is well-appearing. May be iatrogenic (opioid-induced constipation). Ultimately, unable to really tell if POD or not, so will have to scan.   - Pre-medication for IV contrast scan  - CT A/P with IVC  - labs reviewed per QPA

## 2024-02-01 NOTE — ED PROVIDER NOTE - CARE PLAN
1 Principal Discharge DX:	Ovarian mass  Secondary Diagnosis:	Hydronephrosis, right  Secondary Diagnosis:	EVARISTO (acute kidney injury)

## 2024-02-01 NOTE — ED ADULT NURSE NOTE - OBJECTIVE STATEMENT
62 y/o female came to the Ed with complaints of rectal pain x a few months on and off. Increasing in pain the past few days. Constipation and poor appetite. Denies bloody stools, diarrhea, hematuria, dysuria, fevers, chills. Does have ovarian cancer and was supposed to start a new treatment today. Possible rectal mass from prior CT in august of 2023.

## 2024-02-02 ENCOUNTER — TRANSCRIPTION ENCOUNTER (OUTPATIENT)
Age: 64
End: 2024-02-02

## 2024-02-02 DIAGNOSIS — N83.8 OTHER NONINFLAMMATORY DISORDERS OF OVARY, FALLOPIAN TUBE AND BROAD LIGAMENT: ICD-10-CM

## 2024-02-02 LAB
ANION GAP SERPL CALC-SCNC: 11 MMOL/L — SIGNIFICANT CHANGE UP (ref 5–17)
BUN SERPL-MCNC: 19 MG/DL — SIGNIFICANT CHANGE UP (ref 7–23)
CALCIUM SERPL-MCNC: 9.5 MG/DL — SIGNIFICANT CHANGE UP (ref 8.4–10.5)
CHLORIDE SERPL-SCNC: 104 MMOL/L — SIGNIFICANT CHANGE UP (ref 96–108)
CO2 SERPL-SCNC: 24 MMOL/L — SIGNIFICANT CHANGE UP (ref 22–31)
CREAT SERPL-MCNC: 0.95 MG/DL — SIGNIFICANT CHANGE UP (ref 0.5–1.3)
EGFR: 68 ML/MIN/1.73M2 — SIGNIFICANT CHANGE UP
GLUCOSE SERPL-MCNC: 123 MG/DL — HIGH (ref 70–99)
POTASSIUM SERPL-MCNC: 4.6 MMOL/L — SIGNIFICANT CHANGE UP (ref 3.5–5.3)
POTASSIUM SERPL-SCNC: 4.6 MMOL/L — SIGNIFICANT CHANGE UP (ref 3.5–5.3)
SODIUM SERPL-SCNC: 139 MMOL/L — SIGNIFICANT CHANGE UP (ref 135–145)

## 2024-02-02 RX ORDER — ESCITALOPRAM OXALATE 10 MG/1
10 TABLET, FILM COATED ORAL DAILY
Refills: 0 | Status: DISCONTINUED | OUTPATIENT
Start: 2024-02-02 | End: 2024-02-03

## 2024-02-02 RX ORDER — ASPIRIN/CALCIUM CARB/MAGNESIUM 324 MG
325 TABLET ORAL DAILY
Refills: 0 | Status: DISCONTINUED | OUTPATIENT
Start: 2024-02-02 | End: 2024-02-03

## 2024-02-02 RX ORDER — SODIUM CHLORIDE 9 MG/ML
1000 INJECTION, SOLUTION INTRAVENOUS ONCE
Refills: 0 | Status: COMPLETED | OUTPATIENT
Start: 2024-02-02 | End: 2024-02-02

## 2024-02-02 RX ORDER — SOD SULF/SODIUM/NAHCO3/KCL/PEG
2000 SOLUTION, RECONSTITUTED, ORAL ORAL ONCE
Refills: 0 | Status: DISCONTINUED | OUTPATIENT
Start: 2024-02-02 | End: 2024-02-03

## 2024-02-02 RX ORDER — ENOXAPARIN SODIUM 100 MG/ML
60 INJECTION SUBCUTANEOUS
Refills: 0 | DISCHARGE

## 2024-02-02 RX ORDER — SODIUM CHLORIDE 9 MG/ML
1000 INJECTION INTRAMUSCULAR; INTRAVENOUS; SUBCUTANEOUS
Refills: 0 | Status: DISCONTINUED | OUTPATIENT
Start: 2024-02-02 | End: 2024-02-03

## 2024-02-02 RX ORDER — ESCITALOPRAM OXALATE 10 MG/1
1 TABLET, FILM COATED ORAL
Refills: 0 | DISCHARGE

## 2024-02-02 RX ORDER — INFLUENZA VIRUS VACCINE 15; 15; 15; 15 UG/.5ML; UG/.5ML; UG/.5ML; UG/.5ML
0.5 SUSPENSION INTRAMUSCULAR ONCE
Refills: 0 | Status: COMPLETED | OUTPATIENT
Start: 2024-02-02 | End: 2024-02-02

## 2024-02-02 RX ADMIN — Medication 325 MILLIGRAM(S): at 11:40

## 2024-02-02 RX ADMIN — SODIUM CHLORIDE 1333.33 MILLILITER(S): 9 INJECTION, SOLUTION INTRAVENOUS at 01:36

## 2024-02-02 RX ADMIN — SODIUM CHLORIDE 75 MILLILITER(S): 9 INJECTION INTRAMUSCULAR; INTRAVENOUS; SUBCUTANEOUS at 17:35

## 2024-02-02 RX ADMIN — ESCITALOPRAM OXALATE 10 MILLIGRAM(S): 10 TABLET, FILM COATED ORAL at 11:41

## 2024-02-02 NOTE — DISCHARGE NOTE PROVIDER - HOSPITAL COURSE
HPI: 63 year old female PMH: Anxiety Chronic back pain, Depression, Ovarian ca and Vertigo. Presents to [facility] for intermittent episodes of rectal pain over the past few months. She is unable to pass stool. Denies any known exacerbating or alleviating factors. Associated [id number] are fatigue, decreased appetite constipation Denies chest pain, palpitations, fever, chills, sob, headaches, dizziness, lightheadedness, N/V/D. She was advised by her Oncologist to come to the hospital for further evaluation.        Hospital Course: Patient presented with rectal pain: CT A/P with IV contrast, rectum /sigmoid colon with malignancy, mass effect adjacent to distal colon w/o obstruction, mass effect on distal R ureter, with moderate R nephrogram. GI and colorectal surgery following, patient not agreeable to colostomy at this time. Seen by GI and colorectal surgery. Patient declines colostomy at this time, will re-evaluate.        Important Medication Changes and Reason: *****      Active or Pending Issues Requiring Follow-up:  Follow-up with primary care provider.   Follow-up with surgeon.   Follow-up with oncologist.   Follow-up with gastroenterologist.     Advanced Directives:   [X] Full code  [ ] DNR  [ ] Hospice    Discharge Diagnoses:  Rectal pain         HPI: 63 year old female PMH: Anxiety Chronic back pain, Depression, Ovarian ca and Vertigo. Presents to [facility] for intermittent episodes of rectal pain over the past few months. She is unable to pass stool. Denies any known exacerbating or alleviating factors. Associated [id number] are fatigue, decreased appetite constipation Denies chest pain, palpitations, fever, chills, sob, headaches, dizziness, lightheadedness, N/V/D. She was advised by her Oncologist to come to the hospital for further evaluation.        Hospital Course: Patient presented with rectal pain: CT A/P with IV contrast, rectum /sigmoid colon with malignancy, mass effect adjacent to distal colon w/o obstruction, mass effect on distal R ureter, with moderate R nephrogram. GI and colorectal surgery following, patient not agreeable to colostomy at this time. Seen by GI and colorectal surgery. Patient declines colostomy at this time, will re-evaluate. Patient would like a second opinion with [facility] surgery. Dr. Tong clears patient for discharge today once tolerating a diet.    Discharge planning discussed with attending Dr. Tong. Patient is medically cleared and stable for discharge home. Medication Reconciliation reviewed with attending.    Important Medication Changes and Reason:   Continue all home medications with no new meds per Dr. Tong.    Active or Pending Issues Requiring Follow-up:  Follow-up with primary care provider.   Follow-up with surgeon.   Follow-up with oncologist.   Follow-up with gastroenterologist.     Advanced Directives:   [X] Full code  [ ] DNR  [ ] Hospice    Discharge Diagnoses:  Rectal pain

## 2024-02-02 NOTE — H&P ADULT - NS ATTEND AMEND GEN_ALL_CORE FT
64yo F PMH: Anxiety, Chronic back pain, Depression, Ovarian ca and Vertigo. Presents to [facility] for intermittent episodes of rectal pain over the past few months.    # Rectal pain / mass r/o Malignancy  - CT A/P w/ Interval enlargement of abnormal soft tissue density in the right and left pelvis in close proximity to the rectum/distal sigmoid colon   compared with 8/31/2023, as described above, concerning for malignancy.  - concern for obstruction, has had minimal bowel movements  - surgery and GI called  - Pain control  - NPO for now  - onc called    # Anxiety/ Depression  - C/w home Lexapro    # H/o DVT  - On Xarelto at home, currently on hold for possible intervention    [facility]

## 2024-02-02 NOTE — H&P ADULT - NSHPPHYSICALEXAM_GEN_ALL_CORE
PHYSICAL EXAM:  GENERAL: NAD, well-developed, comfortable  HEAD:  Atraumatic, Normocephalic  EYES: EOMI, PERRLA, conjunctiva and sclera clear  NECK: Supple, No JVD  CHEST/LUNG: Clear to auscultation bilaterally; No wheeze  HEART: Regular rate and rhythm; No murmurs, rubs, or gallops  ABDOMEN: Soft, Nontender, Nondistended; Bowel sounds present  NEURO: AAOx3, no focal weakness, 5/5 b/l extremity strength, b/l knee no arthritis, no effusion   EXTREMITIES:  2+ Peripheral Pulses, No clubbing, cyanosis, or edema  SKIN: No rashes or lesions

## 2024-02-02 NOTE — CONSULT NOTE ADULT - ASSESSMENT
63 year old female presenting with constipation in the setting of advanced ovrian CA    1. Constipation. Imaging reviewed. Colonic/rectal impingement by pelvic mass. CLinically and radiographically not currently obstructed, but obstruction may be impending.  -bowel purge  -colorectal sugery consultation for possible diversion    2. Ovarian cancer on chemotherapy. Currently not responding to current regimen, is planned for next line of treatment with her oncologist.    3. Iliac vein occlusion   status post stent      I had a prolonged conversation with the patient regarding the hospital course, differential diagnosis, results of diagnostic tests this far, and therapeutic modalities available. Plan of care discussed with the patient after the evaluation. Patient expresses a clear understanding of the plan of care.  Sixty five minutes spent on the total encounter, of which more than fifty percent of the encounter was spent on counseling and/or coordinating care by the attending physician.  Advanced care planning forms were discussed. Code status including forceful chest compressions, defibrillation and intubation were discussed. The risks benefits and alternatives to pertinent gastrointestinal procedures and interventions were discussed in detail and all questions were answered. Duration: 15 Minutes.    [facility]  Sachin Palumbo M.D.   [address] [place], [place]  Office: [phone]

## 2024-02-02 NOTE — CONSULT NOTE ADULT - SUBJECTIVE AND OBJECTIVE BOX
Capsule Endoscopy for Scarlet BRAVO [place]    Your procedure is scheduled on 1/24/2023.  Please arrive at 8:00 am, at our [facility], [address], [place] [address].  You will need to return to our office the next day between 8:00-9:00 am.      One week Before:  1/18/2023    1. Do not take iron tablets one week prior to your test.  2. You will need to purchase, over the counter, at your pharmacy:  a. A container of Miralax or Glycolax powder (120 grams).  b. Two (2) over the counter Dulcolax (5 mg bisacodyl) tablets at your pharmacy.  You will find these under \"laxatives\".  c. One (1) liter of Gatorade (any color except red or purple).     Day Before: 1/23/2023    1. In the morning, mix 120 grams Miralax (or Glycolax) powder with 1 liter of Gatorade (any color except red or purple) and put in the refrigerator to chill until it is time to be consumed (see #5).     2. You may have breakfast and a light lunch the day before your test.    3. You may have only clear liquids from 1:00 pm - 9:00 pm the day before your test.    These include the following:   Soft drinks (ginger ale, cola, sprite, 7-up, etc.), Gatorade, Nito-Aid, strained fruit juices without pulp (apple, white grape, orange, lemonade, etc.), water, tea, coffee (no milk or non-dairy creamer), low sodium bouillon/broth, hard candies, popsicles.   **DO NOT EAT OR DRINK ANYTHING COLORED RED OR PURPLE**    4.  At 6:00 pm, take 2 Dulcolax tablets.    5.  At 7:00 pm, drink the Miralax/Gatorade mixture, eight (8) oz. every ten (10) minutes.  You should have about four (4) glasses to drink.      6.  You may continue to have clear liquids until 9:00 pm the night before the test, then nothing else to drink prior to the test.    Day of Test:  1/24/2023    7.  Do not take any of your morning medications.  See #10 below for information about your heart and blood pressure medications.    8.  When you arrive at the office, you will be asked to swallow  a capsule containing a camera which will communicate with a monitor.  You will carry this monitor with you for twelve (12) hours.    9.  After swallowing the capsule when you arrive about 8:00 am, it is important that you do not eat or drink anything for the next two (2) hours.    10.  After two (2) hours have passed, (about 10:00 am) you have clear liquids for the next two (2) hours.   If you take heart and/or blood pressure medication, you can take it at this time.   Clear liquids include the following:  Soft drinks (ginger ale, cola, sprite, 7-up, etc.), Gatorade, Nito-Aid, strained fruit juices without pulp (apple, white grape, orange, lemonade, etc.), water, tea, coffee (no milk or non-dairy creamer), low sodium bouillon/broth, hard candies, popsicles.   **DO NOT EAT OR DRINK ANYTHING COLORED RED OR PURPLE**    11.  After two (2) hours of clear liquids, (about 12:00 pm) you may eat and drink normally.      12.  Twelve (12) hours after swallowing the capsule, between 8:00 - 8:30 pm, you will carefully removed the equipment that you will return the next day.     13.  The capsule will later be expelled with a bowel movement.  You likely will not even see or feel it.  The capsule itself does not need to be returned, it may be flushed.    14.  If you need an MRI in the days following your test, you must notify your physician.  It is important to confirm that the camera has passed out of your system.  An X-ray test may be needed to confirm this.    15.  Special Instructions: It typically takes about 10-14 days to receive the results of your test.  After the physician has reviewed the results, our office will either call you or you will receive a letter in the mail summarizing your results.      16.  If you have any questions or problems with these instructions, please call your GI physician day or night at [phone]    [facility] HEMATOLOGY/ONCOLOGY CONSULT NOTE     HPI:  Patient is a 63y Female with PMHx of     ROS: pertinent positives and negatives as per HPI    Allergies: amoxicillin (Rash)  platinum containing compounds (Anaphylaxis)  IV Contrast (Rash)  amoxicillin (Anaphylaxis)    PMHx:  Anxiety  Vertigo  Lower back pain  Ovarian ca  Cancer of fallopian tube  Major depression    SurgHx:   S/P   S/P bunionectomy  S/P D&C (status post dilation and curettage)  S/P abdominoplasty  H/O total hysterectomy    FHx:   History of hypertension (Mother)    SocHx:     Meds:   MEDICATIONS  (STANDING):  aspirin 325 milliGRAM(s) Oral daily  escitalopram 10 milliGRAM(s) Oral daily  influenza   Vaccine 0.5 milliLiter(s) IntraMuscular once  polyethylene glycol/electrolyte Solution 2000 milliLiter(s) Oral once  sodium chloride 0.9%. 1000 milliLiter(s) (75 mL/Hr) IV Continuous <Continuous>    MEDICATIONS  (PRN):    Vital Signs  T(C): 36.6 (24 @ 11:35), Max: 37.1 (24 @ 18:10)  T(F): 97.8 (24 @ 11:35), Max: 98.7 (24 @ 18:10)  HR: 80 (24 @ 11:35) (74 - 86)  BP: 147/97 (24 @ 11:35) (129/91 - 150/81)  RR: 18 (24 @ 11:35) (15 - 18)  SpO2: 100% (24 @ 11:35) (98% - 100%)    Physical Exam:  Gen:   HEENT:   Chest:   Cardiac:  Abd:   Ext:   Neuro:   Integument:     Labs:  CBC Full  -  ( 2024 14:17 )  WBC Count : 4.18 K/uL  RBC Count : 4.09 M/uL  Hemoglobin : 12.4 g/dL  Hematocrit : 41.4 %  Platelet Count - Automated : 234 K/uL  Mean Cell Volume : 101.2 fl  Mean Cell Hemoglobin : 30.3 pg  Mean Cell Hemoglobin Concentration : 30.0 gm/dL  Auto Neutrophil # : 2.64 K/uL  Auto Lymphocyte # : 0.97 K/uL  Auto Monocyte # : 0.49 K/uL  Auto Eosinophil # : 0.03 K/uL  Auto Basophil # : 0.04 K/uL  Auto Neutrophil % : 63.2 %  Auto Lymphocyte % : 23.2 %  Auto Monocyte % : 11.7 %  Auto Eosinophil % : 0.7 %  Auto Basophil % : 1.0 %        139  |  104  |  19  ----------------------------<  123<H>  4.6   |  24  |  0.95    Ca    9.5      2024 02:14  Mg     2.2     -    TPro  7.0  /  Alb  4.3  /  TBili  0.3  /  DBili  x   /  AST  18  /  ALT  16  /  AlkPhos  71  02-    PT/INR - ( 2024 14:17 )   PT: 18.5 sec;   INR: 1.71 ratio         PTT - ( 2024 14:17 )  PTT:37.8 sec   [facility] HEMATOLOGY/ONCOLOGY CONSULT NOTE     HPI:  Patient is a 63y Female with PMHx of DVT on Xarelto, metastatic ovarian cancer, CAMERON/ BSO 2016, ex lap 2018 x2 and again in  was sent in by her [facility] cancer specialist after she stated on tele visit that she was having difficulty with her bowel movements. She had most recently been on Gemcitabine/Avastin based regimen with her Oncologist, Dr. Brandt and given progression of disease plan was to move forward with Elahere (Mirvetuximab soravtansine) which has not yet been started.     CT A/P here showed a 6.8cm  right adnexal mass  in close proximity to the distal sigmoid colon/rectum, which is focally effaced. Along a right lateral aspect, there is abnormal heterogeneous soft tissue along the  right pelvic sidewall, abutting the the patient's previously seen right iliac vein stent; there is filling defect within the midportion of the stent, which is severely stenosed. Along the left lateral aspect of the rectum, previously seen asymmetric wall thickening has increased, and now appears to be a heterogeneous lobulated mass lesion projecting to the left and posteriorly of the rectum and distal sigmoid colon, measuring up to 7.2 cm without obvious complete bowel obstruction but with moderate amount of retained fecal material within the colon. Additionally there is mass effect on the distal right ureter, with moderate right   hydroureteronephrosis and delayed right nephrogram. Colorectal surgery, GI and Gyn oncology consulted, recommendations pending.     ROS: pertinent positives and negatives as per HPI    Allergies: amoxicillin (Rash)  platinum containing compounds (Anaphylaxis)  IV Contrast (Rash)  amoxicillin (Anaphylaxis)    PMHx:  Anxiety  Vertigo  Lower back pain  Ovarian ca  Cancer of fallopian tube  Major depression    SurgHx:   S/P   S/P bunionectomy  S/P D&C (status post dilation and curettage)  S/P abdominoplasty  H/O total hysterectomy    FHx:   History of hypertension (Mother)  Father  at 91 from prostate cancer     SocHx:   Lives at home with    Denied smoking, ETOH, illicit drug use  Denied previous exposures    Meds:   MEDICATIONS  (STANDING):  aspirin 325 milliGRAM(s) Oral daily  escitalopram 10 milliGRAM(s) Oral daily  influenza   Vaccine 0.5 milliLiter(s) IntraMuscular once  polyethylene glycol/electrolyte Solution 2000 milliLiter(s) Oral once  sodium chloride 0.9%. 1000 milliLiter(s) (75 mL/Hr) IV Continuous <Continuous>    MEDICATIONS  (PRN):    Vital Signs  T(C): 36.6 (24 @ 11:35), Max: 37.1 (24 @ 18:10)  T(F): 97.8 (24 @ 11:35), Max: 98.7 (24 @ 18:10)  HR: 80 (24 @ 11:35) (74 - 86)  BP: 147/97 (24 @ 11:35) (129/91 - 150/81)  RR: 18 (24 @ 11:35) (15 - 18)  SpO2: 100% (24 @ 11:35) (98% - 100%)    Physical Exam:  Gen: NAD  HEENT: EOMI, MMM  Chest: equal chest rise, speaking full sentences   Cardiac: RR  Abd: Nondistended   Ext: No edema   Neuro: AAOx3, normal mood and affect    Labs:                        12.4   4.18  )-----------( 234      ( 2024 14:17 )             41.4     CBC Full  -  ( 2024 14:17 )  WBC Count : 4.18 K/uL  RBC Count : 4.09 M/uL  Hemoglobin : 12.4 g/dL  Hematocrit : 41.4 %  Platelet Count - Automated : 234 K/uL  Mean Cell Volume : 101.2 fl  Mean Cell Hemoglobin : 30.3 pg  Mean Cell Hemoglobin Concentration : 30.0 gm/dL  Auto Neutrophil # : 2.64 K/uL  Auto Lymphocyte # : 0.97 K/uL  Auto Monocyte # : 0.49 K/uL  Auto Eosinophil # : 0.03 K/uL  Auto Basophil # : 0.04 K/uL  Auto Neutrophil % : 63.2 %  Auto Lymphocyte % : 23.2 %  Auto Monocyte % : 11.7 %  Auto Eosinophil % : 0.7 %  Auto Basophil % : 1.0 %        139  |  104  |  19  ----------------------------<  123<H>  4.6   |  24  |  0.95    Ca    9.5      2024 02:14  Mg     2.2         TPro  7.0  /  Alb  4.3  /  TBili  0.3  /  DBili  x   /  AST  18  /  ALT  16  /  AlkPhos  71  -    PT/INR - ( 2024 14:17 )   PT: 18.5 sec;   INR: 1.71 ratio         PTT - ( 2024 14:17 )  PTT:37.8 sec

## 2024-02-02 NOTE — H&P ADULT - NSHPLABSRESULTS_GEN_ALL_CORE
LABS:                        12.4   4.18  )-----------( 234      ( 01 Feb 2024 14:17 )             41.4     02-02    139  |  104  |  19  ----------------------------<  123<H>  4.6   |  24  |  0.95    Ca    9.5      02 Feb 2024 02:14  Mg     2.2     02-01    TPro  7.0  /  Alb  4.3  /  TBili  0.3  /  DBili  x   /  AST  18  /  ALT  16  /  AlkPhos  71  02-01    PT/INR - ( 01 Feb 2024 14:17 )   PT: 18.5 sec;   INR: 1.71 ratio         PTT - ( 01 Feb 2024 14:17 )  PTT:37.8 sec  CAPILLARY BLOOD GLUCOSE            Urinalysis Basic - ( 02 Feb 2024 02:14 )    Color: x / Appearance: x / SG: x / pH: x  Gluc: 123 mg/dL / Ketone: x  / Bili: x / Urobili: x   Blood: x / Protein: x / Nitrite: x   Leuk Esterase: x / RBC: x / WBC x   Sq Epi: x / Non Sq Epi: x / Bacteria: x        RADIOLOGY & ADDITIONAL TESTS: < from: CT Abdomen and Pelvis w/ IV Cont (02.01.24 @ 23:34) >      IMPRESSION:  Interval enlargement of abnormal soft tissue density in the right and   left pelvis in close proximity to the rectum/distal sigmoid colon   compared with 8/31/2023, as described above, concerning for malignancy.   Abnormal soft tissue abuts the stentin the right common/internal iliac   vein, which demonstrates focal severe luminal narrowing.    Mass effect on the adjacent distal colon, though without definite   obstruction. Moderate amount of retained fecal material within the colon.    Mass effect on the distal right ureter, with moderate right   hydroureteronephrosis and delayed right nephrogram.        --- End of Report ---    < end of copied text >        Imaging Personally Reviewed:  [x] YES  [ ] NO    Consultant(s) Notes Reviewed:  [x] YES  [ ] NO    MEDICATIONS  (STANDING):    MEDICATIONS  (PRN):      Care Discussed with Consultants/Other Providers [x] YES  [ ] NO    Vital Signs Last 24 Hrs  T(C): 36.5 (02 Feb 2024 06:30), Max: 37.1 (01 Feb 2024 18:10)  T(F): 97.7 (02 Feb 2024 06:30), Max: 98.7 (01 Feb 2024 18:10)  HR: 75 (02 Feb 2024 06:30) (74 - 93)  BP: 131/87 (02 Feb 2024 06:30) (129/91 - 150/81)  BP(mean): 101 (02 Feb 2024 01:36) (101 - 101)  RR: 16 (02 Feb 2024 06:30) (15 - 18)  SpO2: 98% (02 Feb 2024 06:30) (98% - 99%)    Parameters below as of 02 Feb 2024 06:30  Patient On (Oxygen Delivery Method): room air      I&O's Summary

## 2024-02-02 NOTE — CONSULT NOTE ADULT - SUBJECTIVE AND OBJECTIVE BOX
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 The patient states that she continues to have small bowel movements with increased straining. The patient denies any change in the caliber or consistency of her stool. She also denies any melena or hematochezia. The patient states that she continues to tolerate a regular diet without bloating or nausea.       PAST MEDICAL HISTORY:  Anxiety  Vertigo  Lower back pain  Ovarian ca  Cancer of fallopian tube  Major depression      PAST SURGICAL HISTORY:  S/P   S/P bunionectomy  S/P D&C (status post dilation and curettage)  S/P abdominoplasty  H/O total hysterectomy      MEDICATIONS:  aspirin 325 milliGRAM(s) Oral daily  escitalopram 10 milliGRAM(s) Oral daily      ALLERGIES:  amoxicillin (Rash)  platinum containing compounds (Anaphylaxis)  IV Contrast (Rash)  amoxicillin (Anaphylaxis)      VITALS & I/Os:  Vital Signs Last 24 Hrs  T(C): 36.5 (2024 06:30), Max: 37.1 (2024 18:10)  T(F): 97.7 (2024 06:30), Max: 98.7 (2024 18:10)  HR: 75 (2024 06:30) (74 - 93)  BP: 131/87 (2024 06:30) (129/91 - 150/81)  BP(mean): 101 (2024 01:36) (101 - 101)  RR: 16 (2024 06:30) (15 - 18)  SpO2: 98% (2024 06:30) (98% - 99%)    Parameters below as of 2024 06:30  Patient On (Oxygen Delivery Method): room air      PHYSICAL EXAM:  General: No acute distress  Respiratory: Nonlabored  Cardiovascular: normotensive, regular rate  Abdominal: Soft, nondistended, nontender. No rebound or guarding. No organomegaly, no palpable mass.  Extremities: Warm      LABS:                        12.4   4.18  )-----------( 234      ( 2024 14:17 )             41.4     -    139  |  104  |  19  ----------------------------<  123<H>  4.6   |  24  |  0.95    Ca    9.5      2024 02:14  Mg     2.2     -    TPro  7.0  /  Alb  4.3  /  TBili  0.3  /  DBili  x   /  AST  18  /  ALT  16  /  AlkPhos  71      Lactate:   @ 13:50  1.5    PT/INR - ( 2024 14:17 )   PT: 18.5 sec;   INR: 1.71 ratio      PTT - ( 2024 14:17 )  PTT:37.8 sec    Urinalysis Basic - ( 2024 02:14 )    Color: x / Appearance: x / SG: x / pH: x  Gluc: 123 mg/dL / Ketone: x  / Bili: x / Urobili: x   Blood: x / Protein: x / Nitrite: x   Leuk Esterase: x / RBC: x / WBC x   Sq Epi: x / Non Sq Epi: x / Bacteria: x      IMAGING:  CT AP:  LOWER CHEST: Mild dependent atelectasis at the lung bases. Partially   visualized catheter tip in the right atrium.    LIVER: 1 cm cyst, several smaller subcentimeter hypodensities too small   to characterize.  BILE DUCTS: Normal caliber.  GALLBLADDER: Within normal limits.  SPLEEN: Within normal limits.  PANCREAS: Within normal limits.  ADRENALS: Within normal limits.  KIDNEYS/URETERS: Moderate right hydroureteronephrosis to the level of the   distal ureter, which is compressed by abnormal soft tissue as described   below. Delayed right nephrogram. Left kidney is normal. No left   hydronephrosis.    BLADDER: Underdistended, thick-walled; nonspecific.  REPRODUCTIVE ORGANS: Hysterectomy. Hypodense right adnexal lesion   measures 6.8 x 5.4 x 4.3 cm (301:83, 601:76), in close proximity to the   distal sigmoid colon/rectum, which is focally effaced. Along a right   lateral aspect, there is abnormal heterogeneous soft tissue along the   right pelvic sidewall, abutting the the patient's previously seen right   iliac vein stent; there is filling defect within the midportion of the   stent, which is severely stenosed (301:84). Along the left lateral aspect   of the rectum, previously seen asymmetric wall thickening has increased,   and now appears to be a heterogeneous lobulated mass lesion projecting to   the left and posteriorly of the rectum and distal sigmoid colon,   measuring up to 5.5 x 4.5 cm in transaxial dimension (301:95) and up to   approximately 7.2 cm in craniocaudal dimension (602:64).    BOWEL: No bowel obstruction. Appendix is normal. Moderate amount of   retained fecal material within the colon.  PERITONEUM: No ascites.  VESSELS: Right common/external iliac vein stent, with severe focal   stenosis as above.  RETROPERITONEUM/LYMPH NODES: Abnormal soft tissue in the right pelvic   sidewall, as above.  ABDOMINAL WALL: Surgical clips along the ventral abdominal wall.  BONES: Degenerative changes.    IMPRESSION:  Interval enlargement of abnormal soft tissue density in the right and   left pelvis in close proximity to the rectum/distal sigmoid colon   compared with 2023, as described above, concerning for malignancy.   Abnormal soft tissue abuts the stent in the right common/internal iliac   vein, which demonstrates focal severe luminal narrowing.    Mass effect on the adjacent distal colon, though without definite   obstruction. Moderate amount of retained fecal material within the colon.    Mass effect on the distal right ureter, with moderate right   hydroureteronephrosis and delayed right nephrogram.                                                                                               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 The patient states that she continues to have small bowel movements with increased straining. The patient denies any change in the caliber or consistency of her stool. She also denies any melena or hematochezia. The patient states that she continues to tolerate a regular diet without bloating or nausea.       PAST MEDICAL HISTORY:  Anxiety  Vertigo  Lower back pain  Ovarian ca  Cancer of fallopian tube  Major depression      PAST SURGICAL HISTORY:  S/P   S/P bunionectomy  S/P D&C (status post dilation and curettage)  S/P abdominoplasty  H/O total hysterectomy      MEDICATIONS:  aspirin 325 milliGRAM(s) Oral daily  escitalopram 10 milliGRAM(s) Oral daily      ALLERGIES:  amoxicillin (Rash)  platinum containing compounds (Anaphylaxis)  IV Contrast (Rash)  amoxicillin (Anaphylaxis)      VITALS & I/Os:  Vital Signs Last 24 Hrs  T(C): 36.5 (2024 06:30), Max: 37.1 (2024 18:10)  T(F): 97.7 (2024 06:30), Max: 98.7 (2024 18:10)  HR: 75 (2024 06:30) (74 - 93)  BP: 131/87 (2024 06:30) (129/91 - 150/81)  BP(mean): 101 (2024 01:36) (101 - 101)  RR: 16 (2024 06:30) (15 - 18)  SpO2: 98% (2024 06:30) (98% - 99%)    Parameters below as of 2024 06:30  Patient On (Oxygen Delivery Method): room air      PHYSICAL EXAM:  General: No acute distress  Respiratory: Nonlabored  Cardiovascular: normotensive, regular rate  Abdominal: Soft, nondistended, nontender. No rebound or guarding. No organomegaly, no palpable mass. Lower midline incision with right lateral port sites.  Rectal: no perianal disease, no palpable masses  Extremities: Warm      LABS:                        12.4   4.18  )-----------( 234      ( 2024 14:17 )             41.4         139  |  104  |  19  ----------------------------<  123<H>  4.6   |  24  |  0.95    Ca    9.5      2024 02:14  Mg     2.2     -    TPro  7.0  /  Alb  4.3  /  TBili  0.3  /  DBili  x   /  AST  18  /  ALT  16  /  AlkPhos  71  -    Lactate:   @ 13:50  1.5    PT/INR - ( 2024 14:17 )   PT: 18.5 sec;   INR: 1.71 ratio      PTT - ( 2024 14:17 )  PTT:37.8 sec    Urinalysis Basic - ( 2024 02:14 )    Color: x / Appearance: x / SG: x / pH: x  Gluc: 123 mg/dL / Ketone: x  / Bili: x / Urobili: x   Blood: x / Protein: x / Nitrite: x   Leuk Esterase: x / RBC: x / WBC x   Sq Epi: x / Non Sq Epi: x / Bacteria: x      IMAGING:  CT AP:  LOWER CHEST: Mild dependent atelectasis at the lung bases. Partially   visualized catheter tip in the right atrium.    LIVER: 1 cm cyst, several smaller subcentimeter hypodensities too small   to characterize.  BILE DUCTS: Normal caliber.  GALLBLADDER: Within normal limits.  SPLEEN: Within normal limits.  PANCREAS: Within normal limits.  ADRENALS: Within normal limits.  KIDNEYS/URETERS: Moderate right hydroureteronephrosis to the level of the   distal ureter, which is compressed by abnormal soft tissue as described   below. Delayed right nephrogram. Left kidney is normal. No left   hydronephrosis.    BLADDER: Underdistended, thick-walled; nonspecific.  REPRODUCTIVE ORGANS: Hysterectomy. Hypodense right adnexal lesion   measures 6.8 x 5.4 x 4.3 cm (301:83, 601:76), in close proximity to the   distal sigmoid colon/rectum, which is focally effaced. Along a right   lateral aspect, there is abnormal heterogeneous soft tissue along the   right pelvic sidewall, abutting the the patient's previously seen right   iliac vein stent; there is filling defect within the midportion of the   stent, which is severely stenosed (301:84). Along the left lateral aspect   of the rectum, previously seen asymmetric wall thickening has increased,   and now appears to be a heterogeneous lobulated mass lesion projecting to   the left and posteriorly of the rectum and distal sigmoid colon,   measuring up to 5.5 x 4.5 cm in transaxial dimension (301:95) and up to   approximately 7.2 cm in craniocaudal dimension (602:64).    BOWEL: No bowel obstruction. Appendix is normal. Moderate amount of   retained fecal material within the colon.  PERITONEUM: No ascites.  VESSELS: Right common/external iliac vein stent, with severe focal   stenosis as above.  RETROPERITONEUM/LYMPH NODES: Abnormal soft tissue in the right pelvic   sidewall, as above.  ABDOMINAL WALL: Surgical clips along the ventral abdominal wall.  BONES: Degenerative changes.    IMPRESSION:  Interval enlargement of abnormal soft tissue density in the right and   left pelvis in close proximity to the rectum/distal sigmoid colon   compared with 2023, as described above, concerning for malignancy.   Abnormal soft tissue abuts the stent in the right common/internal iliac   vein, which demonstrates focal severe luminal narrowing.    Mass effect on the adjacent distal colon, though without definite   obstruction. Moderate amount of retained fecal material within the colon.    Mass effect on the distal right ureter, with moderate right   hydroureteronephrosis and delayed right nephrogram.

## 2024-02-02 NOTE — CONSULT NOTE ADULT - SUBJECTIVE AND OBJECTIVE BOX
Chief Complaint:  Patient is a 63y old  Female who presents with a chief complaint of rectal pain (2024 11:03)      Date of service: 24 @ 14:04    HPI:    The patient is a a 63 year old female with ovarian cancer (dx in 2017) s/p CAMERON BSO presenting with constipaton for several days.  SHe can pass flatus.  She had a very slight BM yesterday.    The patient denies dysphagia, nausea and vomiting, abdominal pain.      Allergies:  amoxicillin (Rash)  platinum containing compounds (Anaphylaxis)  IV Contrast (Rash)  amoxicillin (Anaphylaxis)      Home Medications:    Hospital Medications:  aspirin 325 milliGRAM(s) Oral daily  escitalopram 10 milliGRAM(s) Oral daily  influenza   Vaccine 0.5 milliLiter(s) IntraMuscular once  polyethylene glycol/electrolyte Solution 2000 milliLiter(s) Oral once      PMHX/PSHX:  No pertinent past medical history    Anxiety    Vertigo    Lower back pain    Ovarian ca    Cancer of fallopian tube    Major depression    S/P     S/P bunionectomy    S/P D&C (status post dilation and curettage)    S/P abdominoplasty    H/O total hysterectomy        Family history:  No pertinent family history in first degree relatives    History of hypertension (Mother)        Social History:   Denies ethanol use.  Denies illicit drug use.    ROS:     General:  No wt loss, fevers, chills, night sweats, fatigue,   Eyes:  Good vision, no reported pain  ENT:  No sore throat, pain, runny nose, dysphagia  CV:  No pain, palpitations, hypo/hypertension  Resp:  No dyspnea, cough, tachypnea, wheezing  GI:  See HPI  :  No pain, bleeding, incontinence, nocturia  Muscle:  No pain, weakness  Neuro:  No weakness, tingling, memory problems  Psych:  No fatigue, insomnia, mood problems, depression  Endocrine:  No polyuria, polydipsia, cold/heat intolerance  Heme:  No petechiae, ecchymosis, easy bruisability  Integumentary:  No rash, edema      PHYSICAL EXAM:     GENERAL:  Appears stated age, well-groomed, well-nourished, no distress  HEENT:  NC/AT,  conjunctivae anicteric, clear and pink,   NECK: supple, trachea midline  CHEST:  Full & symmetric excursion, no increased effort, breath sounds clear  HEART:  Regular rhythm, no JVD  ABDOMEN:  Soft, non-tender, non-distended, normoactive bowel sounds,  no masses , no hepatosplenomegaly  EXTREMITIES:  no cyanosis,clubbing or edema  SKIN:  No rash, erythema, or, ecchymoses, no jaundice  NEURO:  Alert, non-focal, no asterixis  PSYCH: Appropriate affect, oriented to place and time  RECTAL: Deferred      Vital Signs:  Vital Signs Last 24 Hrs  T(C): 36.6 (2024 11:35), Max: 37.1 (2024 18:10)  T(F): 97.8 (2024 11:35), Max: 98.7 (2024 18:10)  HR: 80 (2024 11:35) (74 - 86)  BP: 147/97 (2024 11:35) (129/91 - 150/81)  BP(mean): 101 (2024 01:36) (101 - 101)  RR: 18 (2024 11:35) (15 - 18)  SpO2: 100% (2024 11:35) (98% - 100%)    Parameters below as of 2024 11:35  Patient On (Oxygen Delivery Method): room air      Daily     Daily     LABS: Labs personally reviewed by me:                        12.4   4.18  )-----------( 234      ( 2024 14:17 )             41.4     02-    139  |  104  |  19  ----------------------------<  123<H>  4.6   |  24  |  0.95    Ca    9.5      2024 02:14  Mg     2.2     02-    TPro  7.0  /  Alb  4.3  /  TBili  0.3  /  DBili  x   /  AST  18  /  ALT  16  /  AlkPhos  71  02-    LIVER FUNCTIONS - ( 2024 14:17 )  Alb: 4.3 g/dL / Pro: 7.0 g/dL / ALK PHOS: 71 U/L / ALT: 16 U/L / AST: 18 U/L / GGT: x           PT/INR - ( 2024 14:17 )   PT: 18.5 sec;   INR: 1.71 ratio         PTT - ( 2024 14:17 )  PTT:37.8 sec  Urinalysis Basic - ( 2024 02:14 )    Color: x / Appearance: x / SG: x / pH: x  Gluc: 123 mg/dL / Ketone: x  / Bili: x / Urobili: x   Blood: x / Protein: x / Nitrite: x   Leuk Esterase: x / RBC: x / WBC x   Sq Epi: x / Non Sq Epi: x / Bacteria: x      Amylase Serum--      Lipase serum44       Ammonia--      Imaging personally reviewed by me:

## 2024-02-02 NOTE — DISCHARGE NOTE PROVIDER - CARE PROVIDER_API CALL
Geoffrey Pinto  Internal Medicine  [address], [address]  [place] [address]  Phone: [phone]  Fax: [phone]  Follow Up Time: 1-3 days   Geoffrey Pinto  Internal Medicine  [address], [address]  [place] [address]  Phone: [phone]  Fax: [phone]  Follow Up Time: 1-3 days    Tari Tnog  Surgery  [address] [place] [address]  Phone: [phone]  Fax: [phone]  Follow Up Time: 1 week    Isrrael Miller  Obstetrics and Gynecology  [address] [address]  Phone: ()-  Fax: ()-  Established Patient  Follow Up Time: 1 week    Valentin   Oncologist  Phone: (   )    -  Fax: (   )    -  Established Patient  Follow Up Time: 1 week

## 2024-02-02 NOTE — DISCHARGE NOTE PROVIDER - NSDCFUADDAPPT_GEN_ALL_CORE_FT
APPTS ARE READY TO BE MADE: [X] YES    Best Family or Patient Contact (if needed):    Additional Information about above appointments (if needed):    1: Follow-up with PCP.  2: Follow-up with gastroenterologist.   3: Follow-up with oncologist.   4. Follow-up with colorectal surgeon.   Other comments or requests:    APPTS ARE READY TO BE MADE: [X] YES    Best Family or Patient Contact (if needed):    Additional Information about above appointments (if needed):    1: Follow-up with PCP.  2: Follow-up with gastroenterologist.   3: Follow-up with oncologist.   4. Follow-up with colorectal surgeon.   Other comments or requests:     Patient was outreached, however they advised they were readmitted to the hospital.

## 2024-02-02 NOTE — DISCHARGE NOTE PROVIDER - NSDCQMSTROKE_NEU_ALL_CORE
Ok each  
Pt calls for oxycodone and morphine to be picked up 5/7 afternoon.  Last refill of morphine 5/7 and oxycodone 5/9.  Md not in Friday.    Cannot fill without M.D. authorization.    
No

## 2024-02-02 NOTE — H&P ADULT - ASSESSMENT
This is a 63 year old female PMH: Anxiety, Chronic back pain, Depression, Ovarian ca and Vertigo. Presents to [facility] for intermittent episodes of rectal pain over the past few months.    Plan:    # Rectal pain r/o Malignancy:      # Anxiety/ Depression:      # GI ppx:    # DVT ppx:    [facility]  [phone]     This is a 63 year old female PMH: Anxiety, Chronic back pain, Depression, Ovarian ca and Vertigo. Presents to [facility] for intermittent episodes of rectal pain over the past few months.    Plan:    # Rectal pain r/o Malignancy:  - No leukocytosis. H/H stable. UA negative  - CT A/P w/ Interval enlargement of abnormal soft tissue density in the right and   left pelvis in close proximity to the rectum/distal sigmoid colon   compared with 8/31/2023, as described above, concerning for malignancy.  - Pain control  - Diet per GI prec's, NPO for now  - GI and Heme onc consult pending    # Anxiety/ Depression:  - C/w home Lexapro =    # H/o DVT/ DVT ppx:  - On Xarelto at home, currently on hold    [facility]  [phone]

## 2024-02-02 NOTE — DISCHARGE NOTE PROVIDER - PROVIDER TOKENS
PROVIDER:[TOKEN:[[id number]],FOLLOWUP:[1-3 days]] PROVIDER:[TOKEN:[[id number]],FOLLOWUP:[1-3 days]],PROVIDER:[TOKEN:[[id number]],FOLLOWUP:[1 week]],PROVIDER:[TOKEN:[[id number]],FOLLOWUP:[1 week],ESTABLISHEDPATIENT:[T]],FREE:[LAST:[[place]],PHONE:[(   )    -],FAX:[(   )    -],ADDRESS:[Oncologist],FOLLOWUP:[1 week],ESTABLISHEDPATIENT:[T]]

## 2024-02-02 NOTE — H&P ADULT - NSHPREVIEWOFSYSTEMS_GEN_ALL_CORE
GENERAL: +weakness, no fever/chills, no weight loss/gain  EYES/ENT: No visual changes, no vertigo or throat pain  NECK: No pain or stiffness   RESPIRATORY: no cough, no wheezing, no hemoptysis, no dyspnea, no shortness of breath  CARDIOVASCULAR: no chest pain or palpitations  GASTROINTESTINAL: + Abd pain, constipation   GENITOURINARY: no dysuria, no frequency, no nocturia, no hematuria  MUSCULOSKELETAL: no trauma, no sprain/strain, no myalgias, no arthralgias, no fracture  NEUROLOGICAL: no HA, no dizziness, no weakness, no numbness  SKIN: No itching, rashes

## 2024-02-02 NOTE — DISCHARGE NOTE PROVIDER - NSFOLLOWUPCLINICS_GEN_ALL_ED_FT
A Gastroenterologist  Gastroenterology  .  [place]   Phone:   Fax:     [facility]  Hematology/Oncology  [address] [place] [address]  Phone: [phone]  Fax:

## 2024-02-02 NOTE — H&P ADULT - HISTORY OF PRESENT ILLNESS
This is a 63 year old female PMH: AXniety, Chronic back pain, Depression, Ovarian ca and Vertigo. Presents to [facility] for intermittent episodes of rectal pain over the past few months. This is a 63 year old female PMH: Anxiety Chronic back pain, Depression, Ovarian ca and Vertigo. Presents to [facility] for intermittent episodes of rectal pain over the past few months. She is unable to pass stool. Denies any known exacerbating or alleviating factors. Associated [id number] are fatigue, decreased appetite constipation Denies chest pain, palpitations, fever, chills, sob, headaches, dizziness, lightheadedness, N/V/D. She was advised by her Oncologist to come to the hospital for further evaluation.

## 2024-02-02 NOTE — CONSULT NOTE ADULT - ASSESSMENT
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 CT demonstrating a large pelvic mass with interval size increase. Right hydronephrosis also noted. Patient without complete large bowel obstruction, although mass is resulting in some changes in bowel function.     PLAN:  - No emergent indication for surgical intervention.  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 CT demonstrating a large pelvic mass with interval size increase. Right hydronephrosis also noted. Patient without complete large bowel obstruction, although mass is resulting in some changes in bowel function, concerning for impending obstruction.  Clinically well right now. Abdominal exam benign right now.      PLAN:  - No emergent indication for surgical intervention.  - Recommend diverting colostomy to relieve symptoms and prevent a full obstruction. Extensive discussion with the family regarding the risks of perforation, which would preclude her from undergoing further treatment. At this point, given the extent of her disease, I would be wary of starting her on any treatment that would put her at increased risk of bowel perforation  I spoke with her Gyn-Onc surgeon Dr. Miller who agrees with the surgical plan  The patient is not agreeable to a colostomy at this time but will consider it. If she is agreeable, would tentatively plan for Tuesday after her Xarelto has been held for 5 days.     [facility]  [id number]

## 2024-02-02 NOTE — CONSULT NOTE ADULT - ASSESSMENT
Ms. Haque is a 63 year old female with PMHx of DVT on Xarelto, metastatic ovarian cancer, CAMERON/ BSO 2016, ex lap 2018 x2 and again in 2020 was sent in by her [facility] cancer specialist after she stated on tele visit that she was having difficulty with her bowel movements.       Onc Hx:  patient was diagnosed with stage IIIA fallopian tube high-grade carcinoma in 2016 and underwent RTLH, BSO, omentectomy, P&PA LND, staging 5/19/16  and was subsequently [place] [facility] [place] carbo [place]  for 6 cycles ending 11/20/2016 and subsequently on surveillance in 09/2017 ( 10 month platinum free interval) shown to have  multifocal recurrence and underwent  debulking 06/20/2018  with partial debulking, but found to have unresectable 1.6cm LN in R obturator fossa that has sincecaused venous obstruction necessitating stenting and initiation of daily AC continue on Taxol and Avastin for 6 cycles with a good response on low-dose Taxol Avastin and then just Taxol.  Patient's disease worsened and Avastin was reintroduced and she underwent 2nd ex lap for debulking.  Since 2020 patient continue chemotherapy and was previously on gemcitabine irinotecan Cyclophosphamide Avastin oxaliplatin every 2 weeks and was eventually  also found to have DVT of the lower extremity and is now on Xarelto   Ms. Haque is a 63 year old female with PMHx of DVT on Xarelto, metastatic ovarian cancer, CAMERON/ BSO 2016, ex lap 2018 x2 and again in 2020 was sent in by her [facility] cancer specialist after she stated on tele visit that she was having difficulty with her bowel movements.     CT A/P here showed a 6.8cm  right adnexal mass  in close proximity to the distal sigmoid colon/rectum, which is focally effaced. Along a right lateral aspect, there is abnormal heterogeneous soft tissue along the  right pelvic sidewall, abutting the the patient's previously seen right iliac vein stent; there is filling defect within the midportion of the stent, which is severely stenosed. Along the left lateral aspect of the rectum, previously seen asymmetric wall thickening has increased,   and now appears to be a heterogeneous lobulated mass lesion projecting to the left and posteriorly of the rectum and distal sigmoid colon, measuring up to 7.2 cm without obvious complete bowel obstruction but with moderate amount of   retained fecal material within the colon. Additionally there is mass effect on the distal right ureter, with moderate right   hydroureteronephrosis and delayed right nephrogram.     Colorectal surgery, GI and Gyn oncology consulted, recommendations pending.       Onc Hx:  Patient was diagnosed with stage IIIA fallopian tube high-grade carcinoma in 2016 and underwent RTLH, BSO, omentectomy, P&PA LND, staging 5/19/16  and [place] [facility] [place]  for 6 cycles ending 11/20/2016 and subsequently on surveillance in 09/2017 ( 10 month platinum free interval) shown to have  multifocal recurrence and underwent  debulking 06/20/2018  with partial debulking, but found to have unresectable 1.6cm LN in R obturator fossa that has since caused venous obstruction necessitating stenting and initiation of daily AC continue on Taxol and Avastin for 6 cycles with a good response on low-dose Taxol Avastin and then just Taxol.  Patient's disease worsened and Avastin was reintroduced and she underwent 2nd ex lap for debulking.  Since 2020 patient continue chemotherapy and was previously on gemcitabine irinotecan Cyclophosphamide Avastin oxaliplatin every 2 weeks and was eventually  also found to have DVT of the lower extremity and is now on Xarelto    # Metastatic Fallopian tube carcinoma   - Dx 2016 see hx above   - Gyn Onc at [facility] is her treatment center?  - f/u Gyn-Onc recs    # Pelvic/Colon mass  - without complete obstruction at this time  - Possible diversion needed   - Consider radiation oncology recommendations for palliative control of mass   - f/u GI, Colorectal surgery recs     # Hydronephrosis   - moderate right hydroureteronephrosis and delayed right nephrogram  - Consider urology referral to assess given mass affect   - Rad/Onc consult as above     # Coagulopathy  - Likely due to Xarelto    # Hx of Iliac stenosis and DVT  - On Xarelto, was previously on lovenox   - Filling defect, acute as above  - Consider vascular surgery consult     Thank you for allowing me to participate in the care of this patient, please do  not hesitate to call or text me if you have further questions or concerns.     Nikos Case MD  [facility] [place]   Division of Hematology/Oncology  [address] [place] [address], [address]  [place] [address]  P: [phone]  F: [phone]    Attestation:   Total time spent on the encounter: >** minutes   ----Including ** min face-to-face interaction in addition to chart review, reviewing treatment plan, and managing the patient’s chronic diagnoses as listed in the assessment----     1.	I have reviewed, analyzed and interpreted the following labs: CBC, CMP, imaging:  CT A/P and impressions as above.   2.	I have reviewed notes stating pts current admission, consultants and follow ups.   3.	I have reviewed the past medical, family, and surgical history and confirmed with outpatient records available in EMR Ms. Haque is a 63 year old female with PMHx of DVT on Xarelto, metastatic ovarian cancer, CAMERON/ BSO 2016, ex lap 2018 x2 and again in 2020 was sent in by her [facility] cancer specialist after she stated on tele visit that she was having difficulty with her bowel movements. She had most recently been on Gemcitabine/Avastin based regimen with her Oncologist, Dr. Brandt and given progression of disease plan was to move forward with Elahere (Mirvetuximab soravtansine) which has not yet been started.     CT A/P here showed a 6.8cm  right adnexal mass  in close proximity to the distal sigmoid colon/rectum, which is focally effaced. Along a right lateral aspect, there is abnormal heterogeneous soft tissue along the  right pelvic sidewall, abutting the the patient's previously seen right iliac vein stent; there is filling defect within the midportion of the stent, which is severely stenosed. Along the left lateral aspect of the rectum, previously seen asymmetric wall thickening has increased, and now appears to be a heterogeneous lobulated mass lesion projecting to the left and posteriorly of the rectum and distal sigmoid colon, measuring up to 7.2 cm without obvious complete bowel obstruction but with moderate amount of retained fecal material within the colon. Additionally there is mass effect on the distal right ureter, with moderate right   hydroureteronephrosis and delayed right nephrogram.     Colorectal surgery, GI and Gyn oncology consulted, recommendations pending.       Onc Hx:  Patient was diagnosed with stage IIIA fallopian tube high-grade carcinoma in 2016 and underwent RTLH, BSO, omentectomy, P&PA LND, staging 5/19/16  and [place] [facility] [place]  for 6 cycles ending 11/20/2016 and subsequently on surveillance in 09/2017 ( 10 month platinum free interval) shown to have  multifocal recurrence and underwent  debulking 06/20/2018  with partial debulking, but found to have unresectable 1.6cm LN in R obturator fossa that has since caused venous obstruction necessitating stenting and initiation of daily AC continue on Taxol and Avastin for 6 cycles with a good response on low-dose Taxol Avastin and then just Taxol.  Patient's disease worsened and Avastin was reintroduced and she underwent 2nd ex lap for debulking.  Since 2020 patient continue chemotherapy and was previously on gemcitabine irinotecan Cyclophosphamide Avastin oxaliplatin every 2 weeks and more recently continued a Gemzar/Avastin based regimen.        Discussed patients case with her Oncologist. Dr. Hanson, and he is in agreement with surgical diversion if required as he would likely not be able to proceed with Elahere if patient has pending clinical instability. I discussed this information at length with the patient and her son Geoffrey today so that they can make the decision and is in line best with the patients wishes. Pt wishes to speak to Dr. Miller and Dr. Hanson and decide thereafter.     # Metastatic Fallopian tube carcinoma   - Dx 2016 see hx above   - Her Gyn/Onc is Dr. Miller at [place]  - Her Oncologist is Dr. Hanson  - f/u Gyn-Onc recs    # Pelvic/Colon mass  - without complete obstruction at this time  - Possible diversion needed, discussed with patient at bedside   - f/u GI, Colorectal surgery recs     # Hydronephrosis   - moderate right hydroureteronephrosis and delayed right nephrogram  - Consider urology referral to assess given mass affect     # Coagulopathy  - Likely due to Xarelto    # Hx of Iliac stenosis and DVT  - On Xarelto last dose 02/01/2024 AM, was previously on Lovenox   - Filling defect, acute as above  - Consider vascular surgery consult     Thank you for allowing me to participate in the care of Ms. Haque, please do not hesitate to call or text me if you have further questions or concerns.     Nikos Case MD  [facility] [place]   Division of Hematology/Oncology  [address] [place] [address], [address]  [place] [address]  P: [phone]  F: [phone]    Attestation:   Total time spent on the encounter: >60 minutes   ----Including >45 min face-to-face interaction in addition to chart review, reviewing treatment plan, and managing the patient’s chronic diagnoses as listed in the assessment----     1.	I have reviewed, analyzed and interpreted the following labs: CBC, CMP, imaging:  CT A/P and impressions as above.   2.	I have reviewed notes stating pts current admission, consultants and follow ups.   3.	I have reviewed the past medical, family, and surgical history and confirmed with outpatient records available in EMR

## 2024-02-02 NOTE — DISCHARGE NOTE PROVIDER - CARE PROVIDERS DIRECT ADDRESSES
[email or web address]

## 2024-02-02 NOTE — DISCHARGE NOTE PROVIDER - NSDCCPCAREPLAN_GEN_ALL_CORE_FT
PRINCIPAL DISCHARGE DIAGNOSIS  Diagnosis: Rectal pain  Assessment and Plan of Treatment: CT A/P w/ Interval enlargement of abnormal soft tissue density in the right and left pelvis in close proximity to the rectum/distal sigmoid colon compared with 8/31/2023, as described above, concerning for malignancy.  Concern for obstruction, GI and colorectal surgery called.   Follow-up with GI, oncology, PCP and colorectal surgery.     PRINCIPAL DISCHARGE DIAGNOSIS  Diagnosis: Rectal pain  Assessment and Plan of Treatment: CT A/P w/ Interval enlargement of abnormal soft tissue density in the right and left pelvis in close proximity to the rectum/distal sigmoid colon compared with 8/31/2023, as described above, concerning for malignancy.  Concern for impending obstruction, GI and colorectal surgery called.   You will likely need diverting colostomy to tolerate further treatment.  As you would like a second opinion at [place] regarding colostomy, please follow-up with [facility] surgical department promptly within 1-3 days upon discharge.   Follow-up with GI, oncology, PCP and colorectal surgery.

## 2024-02-03 ENCOUNTER — TRANSCRIPTION ENCOUNTER (OUTPATIENT)
Age: 64
End: 2024-02-03

## 2024-02-03 VITALS
RESPIRATION RATE: 18 BRPM | TEMPERATURE: 98 F | DIASTOLIC BLOOD PRESSURE: 76 MMHG | SYSTOLIC BLOOD PRESSURE: 117 MMHG | OXYGEN SATURATION: 96 % | HEART RATE: 74 BPM

## 2024-02-03 LAB
A1C WITH ESTIMATED AVERAGE GLUCOSE RESULT: 5.2 % — SIGNIFICANT CHANGE UP (ref 4–5.6)
ALBUMIN SERPL ELPH-MCNC: 3.5 G/DL — SIGNIFICANT CHANGE UP (ref 3.3–5)
ALP SERPL-CCNC: 56 U/L — SIGNIFICANT CHANGE UP (ref 40–120)
ALT FLD-CCNC: 11 U/L — SIGNIFICANT CHANGE UP (ref 10–45)
ANION GAP SERPL CALC-SCNC: 13 MMOL/L — SIGNIFICANT CHANGE UP (ref 5–17)
AST SERPL-CCNC: 14 U/L — SIGNIFICANT CHANGE UP (ref 10–40)
BASOPHILS # BLD AUTO: 0.03 K/UL — SIGNIFICANT CHANGE UP (ref 0–0.2)
BASOPHILS NFR BLD AUTO: 0.7 % — SIGNIFICANT CHANGE UP (ref 0–2)
BILIRUB SERPL-MCNC: 0.3 MG/DL — SIGNIFICANT CHANGE UP (ref 0.2–1.2)
BUN SERPL-MCNC: 19 MG/DL — SIGNIFICANT CHANGE UP (ref 7–23)
CALCIUM SERPL-MCNC: 9 MG/DL — SIGNIFICANT CHANGE UP (ref 8.4–10.5)
CHLORIDE SERPL-SCNC: 104 MMOL/L — SIGNIFICANT CHANGE UP (ref 96–108)
CO2 SERPL-SCNC: 21 MMOL/L — LOW (ref 22–31)
CREAT SERPL-MCNC: 1.05 MG/DL — SIGNIFICANT CHANGE UP (ref 0.5–1.3)
CULTURE RESULTS: SIGNIFICANT CHANGE UP
EGFR: 60 ML/MIN/1.73M2 — SIGNIFICANT CHANGE UP
EOSINOPHIL # BLD AUTO: 0.04 K/UL — SIGNIFICANT CHANGE UP (ref 0–0.5)
EOSINOPHIL NFR BLD AUTO: 0.9 % — SIGNIFICANT CHANGE UP (ref 0–6)
ESTIMATED AVERAGE GLUCOSE: 103 MG/DL — SIGNIFICANT CHANGE UP (ref 68–114)
GLUCOSE SERPL-MCNC: 73 MG/DL — SIGNIFICANT CHANGE UP (ref 70–99)
HCT VFR BLD CALC: 36.2 % — SIGNIFICANT CHANGE UP (ref 34.5–45)
HGB BLD-MCNC: 11.1 G/DL — LOW (ref 11.5–15.5)
IMM GRANULOCYTES NFR BLD AUTO: 0.2 % — SIGNIFICANT CHANGE UP (ref 0–0.9)
LYMPHOCYTES # BLD AUTO: 1.29 K/UL — SIGNIFICANT CHANGE UP (ref 1–3.3)
LYMPHOCYTES # BLD AUTO: 29.9 % — SIGNIFICANT CHANGE UP (ref 13–44)
MCHC RBC-ENTMCNC: 30.4 PG — SIGNIFICANT CHANGE UP (ref 27–34)
MCHC RBC-ENTMCNC: 30.7 GM/DL — LOW (ref 32–36)
MCV RBC AUTO: 99.2 FL — SIGNIFICANT CHANGE UP (ref 80–100)
MONOCYTES # BLD AUTO: 0.46 K/UL — SIGNIFICANT CHANGE UP (ref 0–0.9)
MONOCYTES NFR BLD AUTO: 10.7 % — SIGNIFICANT CHANGE UP (ref 2–14)
NEUTROPHILS # BLD AUTO: 2.48 K/UL — SIGNIFICANT CHANGE UP (ref 1.8–7.4)
NEUTROPHILS NFR BLD AUTO: 57.6 % — SIGNIFICANT CHANGE UP (ref 43–77)
NRBC # BLD: 0 /100 WBCS — SIGNIFICANT CHANGE UP (ref 0–0)
PLATELET # BLD AUTO: 203 K/UL — SIGNIFICANT CHANGE UP (ref 150–400)
POTASSIUM SERPL-MCNC: 3.9 MMOL/L — SIGNIFICANT CHANGE UP (ref 3.5–5.3)
POTASSIUM SERPL-SCNC: 3.9 MMOL/L — SIGNIFICANT CHANGE UP (ref 3.5–5.3)
PROT SERPL-MCNC: 5.8 G/DL — LOW (ref 6–8.3)
RBC # BLD: 3.65 M/UL — LOW (ref 3.8–5.2)
RBC # FLD: 14.8 % — HIGH (ref 10.3–14.5)
SODIUM SERPL-SCNC: 138 MMOL/L — SIGNIFICANT CHANGE UP (ref 135–145)
SPECIMEN SOURCE: SIGNIFICANT CHANGE UP
WBC # BLD: 4.31 K/UL — SIGNIFICANT CHANGE UP (ref 3.8–10.5)
WBC # FLD AUTO: 4.31 K/UL — SIGNIFICANT CHANGE UP (ref 3.8–10.5)

## 2024-02-03 PROCEDURE — 85014 HEMATOCRIT: CPT

## 2024-02-03 PROCEDURE — 81001 URINALYSIS AUTO W/SCOPE: CPT

## 2024-02-03 PROCEDURE — 74177 CT ABD & PELVIS W/CONTRAST: CPT | Mod: MA

## 2024-02-03 PROCEDURE — 84132 ASSAY OF SERUM POTASSIUM: CPT

## 2024-02-03 PROCEDURE — 96374 THER/PROPH/DIAG INJ IV PUSH: CPT

## 2024-02-03 PROCEDURE — 86850 RBC ANTIBODY SCREEN: CPT

## 2024-02-03 PROCEDURE — 83690 ASSAY OF LIPASE: CPT

## 2024-02-03 PROCEDURE — 82435 ASSAY OF BLOOD CHLORIDE: CPT

## 2024-02-03 PROCEDURE — 85018 HEMOGLOBIN: CPT

## 2024-02-03 PROCEDURE — 83605 ASSAY OF LACTIC ACID: CPT

## 2024-02-03 PROCEDURE — 85730 THROMBOPLASTIN TIME PARTIAL: CPT

## 2024-02-03 PROCEDURE — 84295 ASSAY OF SERUM SODIUM: CPT

## 2024-02-03 PROCEDURE — 85610 PROTHROMBIN TIME: CPT

## 2024-02-03 PROCEDURE — 83735 ASSAY OF MAGNESIUM: CPT

## 2024-02-03 PROCEDURE — 96375 TX/PRO/DX INJ NEW DRUG ADDON: CPT

## 2024-02-03 PROCEDURE — 80053 COMPREHEN METABOLIC PANEL: CPT

## 2024-02-03 PROCEDURE — 82330 ASSAY OF CALCIUM: CPT

## 2024-02-03 PROCEDURE — 86900 BLOOD TYPING SEROLOGIC ABO: CPT

## 2024-02-03 PROCEDURE — 87086 URINE CULTURE/COLONY COUNT: CPT

## 2024-02-03 PROCEDURE — 85025 COMPLETE CBC W/AUTO DIFF WBC: CPT

## 2024-02-03 PROCEDURE — 82947 ASSAY GLUCOSE BLOOD QUANT: CPT

## 2024-02-03 PROCEDURE — 99285 EMERGENCY DEPT VISIT HI MDM: CPT

## 2024-02-03 PROCEDURE — 83036 HEMOGLOBIN GLYCOSYLATED A1C: CPT

## 2024-02-03 PROCEDURE — 82803 BLOOD GASES ANY COMBINATION: CPT

## 2024-02-03 PROCEDURE — 80048 BASIC METABOLIC PNL TOTAL CA: CPT

## 2024-02-03 PROCEDURE — 86901 BLOOD TYPING SEROLOGIC RH(D): CPT

## 2024-02-03 RX ORDER — ASPIRIN/CALCIUM CARB/MAGNESIUM 324 MG
81 TABLET ORAL DAILY
Refills: 0 | Status: DISCONTINUED | OUTPATIENT
Start: 2024-02-04 | End: 2024-02-03

## 2024-02-03 RX ORDER — ENOXAPARIN SODIUM 100 MG/ML
40 INJECTION SUBCUTANEOUS EVERY 24 HOURS
Refills: 0 | Status: DISCONTINUED | OUTPATIENT
Start: 2024-02-03 | End: 2024-02-03

## 2024-02-03 RX ADMIN — ESCITALOPRAM OXALATE 10 MILLIGRAM(S): 10 TABLET, FILM COATED ORAL at 13:21

## 2024-02-03 NOTE — PROGRESS NOTE ADULT - ASSESSMENT
63 year old female presenting with constipation in the setting of advanced ovarian CA    1. Pelvic mass w/ mass effect on the colon   - not completely obstructed at this time   - pending tx to [facility] for diverting colostomy     2. Ovarian cancer on chemotherapy  per Heme/onc     3. Iliac vein occlusion   status post stent  on AC      I had a prolonged conversation with the patient regarding the hospital course, differential diagnosis, results of diagnostic tests this far, and therapeutic modalities available. Plan of care discussed with the patient after the evaluation. Patient expresses a clear understanding of the plan of care.      Sachin Rolon D.O.  Gastroenterology and Hepatology  [address] [facility], [address]  [place], [place]  Office: [phone]

## 2024-02-03 NOTE — PROGRESS NOTE ADULT - ASSESSMENT
Ms. Haque is a 63 year old female with PMHx of DVT on Xarelto, metastatic ovarian cancer, CAMERON/ BSO 2016, ex lap 2018 x2 and again in 2020 was sent in by her [facility] cancer specialist after she stated on tele visit that she was having difficulty with her bowel movements. She had most recently been on Gemcitabine/Avastin based regimen with her Oncologist, Dr. Brandt and given progression of disease plan was to move forward with Elahere (Mirvetuximab soravtansine) which has not yet been started.     CT A/P here showed a 6.8cm  right adnexal mass  in close proximity to the distal sigmoid colon/rectum, which is focally effaced. Along a right lateral aspect, there is abnormal heterogeneous soft tissue along the  right pelvic sidewall, abutting the the patient's previously seen right iliac vein stent; there is filling defect within the midportion of the stent, which is severely stenosed. Along the left lateral aspect of the rectum, previously seen asymmetric wall thickening has increased, and now appears to be a heterogeneous lobulated mass lesion projecting to the left and posteriorly of the rectum and distal sigmoid colon, measuring up to 7.2 cm without obvious complete bowel obstruction but with moderate amount of retained fecal material within the colon. Additionally there is mass effect on the distal right ureter, with moderate right   hydroureteronephrosis and delayed right nephrogram.     Colorectal surgery, GI and Gyn oncology consulted, recommendations pending.       Onc Hx:  Patient was diagnosed with stage IIIA fallopian tube high-grade carcinoma in 2016 and underwent RTLH, BSO, omentectomy, P&PA LND, staging 5/19/16  and was subsequently treated at [facility] with carbo Taxol  for 6 cycles ending 11/20/2016 and subsequently on surveillance in 09/2017 ( 10 month platinum free interval) shown to have  multifocal recurrence and underwent  debulking 06/20/2018  with partial debulking, but found to have unresectable 1.6cm LN in R obturator fossa that has since caused venous obstruction necessitating stenting and initiation of daily AC continue on Taxol and Avastin for 6 cycles with a good response on low-dose Taxol Avastin and then just Taxol.  Patient's disease worsened and Avastin was reintroduced and she underwent 2nd ex lap for debulking.  Since 2020 patient continue chemotherapy and was previously on gemcitabine irinotecan Cyclophosphamide Avastin oxaliplatin every 2 weeks and more recently continued a Gemzar/Avastin based regimen.        Discussed patients case with her Oncologist. Dr. Hanson, and he is in agreement with surgical diversion if required as he would likely not be able to proceed with Elahere if patient has pending clinical instability. I discussed this information at length with the patient and her son Geoffrey today so that they can make the decision and is in line best with the patients wishes. Pt wishes to speak to Dr. Miller and Dr. Hanson and decide thereafter.     # Metastatic Fallopian tube carcinoma   - Dx 2016 see hx above   - Her Gyn/Onc is Dr. Miller at [facility]  - Her Oncologist is Dr. Hanson  - f/u Gyn-Onc recs    # Pelvic/Colon mass  - without complete obstruction at this time  - Possible diversion needed, discussed with patient at bedside   - f/u GI, Colorectal surgery recs     # Hydronephrosis   - moderate right hydroureteronephrosis and delayed right nephrogram  - Consider urology referral to assess given mass affect     # Coagulopathy  - Likely due to Xarelto    # Hx of Iliac stenosis and DVT  - On Xarelto last dose 02/01/2024 AM, was previously on Lovenox   - Filling defect, acute as above  - Consider vascular surgery consult     Thank you for allowing me to participate in the care of Ms. Haque, please do not hesitate to call or text me if you have further questions or concerns.     Nikos Case MD  [facility] [place]   Division of Hematology/Oncology  [address] [place] [address], [address]  [place] [address]  P: [phone]  F: [phone]    Attestation:   Total time spent on the encounter: >60 minutes   ----Including >45 min face-to-face interaction in addition to chart review, reviewing treatment plan, and managing the patient’s chronic diagnoses as listed in the assessment----     1.	I have reviewed, analyzed and interpreted the following labs: CBC, CMP, imaging:  CT A/P and impressions as above.   2.	I have reviewed notes stating pts current admission, consultants and follow ups.   3.	I have reviewed the past medical, family, and surgical history and confirmed with outpatient records available in EMR Ms. Haque is a 63 year old female with PMHx of DVT on Xarelto, metastatic ovarian cancer, CAMERON/ BSO 2016, ex lap 2018 x2 and again in 2020 was sent in by her [facility] cancer specialist after she stated on tele visit that she was having difficulty with her bowel movements. She had most recently been on Gemcitabine/Avastin based regimen with her Oncologist, Dr. Brandt and given progression of disease plan was to move forward with Elahere (Mirvetuximab soravtansine) which has not yet been started.     CT A/P here showed a 6.8cm  right adnexal mass  in close proximity to the distal sigmoid colon/rectum, which is focally effaced. Along a right lateral aspect, there is abnormal heterogeneous soft tissue along the  right pelvic sidewall, abutting the the patient's previously seen right iliac vein stent; there is filling defect within the midportion of the stent, which is severely stenosed. Along the left lateral aspect of the rectum, previously seen asymmetric wall thickening has increased, and now appears to be a heterogeneous lobulated mass lesion projecting to the left and posteriorly of the rectum and distal sigmoid colon, measuring up to 7.2 cm without obvious complete bowel obstruction but with moderate amount of retained fecal material within the colon. Additionally there is mass effect on the distal right ureter, with moderate right   hydroureteronephrosis and delayed right nephrogram. Colorectal surgery, GI and Gyn oncology consulted, recommendations pending.       Onc Hx:  Patient was diagnosed with stage IIIA fallopian tube high-grade carcinoma in 2016 and underwent RTLH, BSO, omentectomy, P&PA LND, staging 5/19/16  and was [place] [facility] [place] carbo [place]  for 6 cycles ending 11/20/2016 and subsequently on surveillance in 09/2017 ( 10 month platinum free interval) shown to have  multifocal recurrence and underwent  debulking 06/20/2018  with partial debulking, but found to have unresectable 1.6cm LN in R obturator fossa that has since caused venous obstruction necessitating stenting and initiation of daily AC continue on Taxol and Avastin for 6 cycles with a good response on low-dose Taxol Avastin and then just Taxol.  Patient's disease worsened and Avastin was reintroduced and she underwent 2nd ex lap for debulking.  Since 2020 patient continue chemotherapy and was previously on gemcitabine irinotecan Cyclophosphamide Avastin oxaliplatin every 2 weeks and more recently continued a Gemzar/Avastin based regimen.    Discussed patients case with her Oncologist. Dr. Hanson, and he is in agreement with surgical diversion if required as he would likely not be able to proceed with Elahere if patient has pending clinical instability. I discussed this information at length with the patient and her son Geoffrey so that they can make the decision and is in line best with the patients wishes. Pt wishes to speak to Dr. Miller and Dr. Hanson and decide thereafter. She has decided to proceed with diverting colostomy    # Metastatic Fallopian tube carcinoma   - Dx 2016 see hx above   - Her Gyn/Onc is Dr. Miller at [place]  - Her Oncologist is Dr. Hanson  - f/u Gyn-Onc recs    # Pelvic/Colon mass  - without complete obstruction at this time  - Pt agreeable to proceed with diverting colostomy   - f/u GI, Colorectal surgery recs     # Hydronephrosis   - moderate right hydroureteronephrosis and delayed right nephrogram  - Consider urology referral to assess given mass affect     # Coagulopathy  - Likely due to Xarelto    # Hx of Iliac stenosis and DVT  - On Xarelto last dose 02/01/2024 AM, was previously on Lovenox   - Filling defect, acute as above  - Consider vascular surgery consult     Thank you for allowing me to participate in the care of Ms. Haque, please do not hesitate to call or text me if you have further questions or concerns.     Nikos Case MD  [facility] [place]   Division of Hematology/Oncology  [facility], [address]  [place] [address]  P: [phone]  F: [phone]    Attestation:    ----Including face-to-face interaction in addition to chart review, reviewing treatment plan, and managing the patient’s chronic diagnoses as listed in the assessment----

## 2024-02-03 NOTE — PROGRESS NOTE ADULT - SUBJECTIVE AND OBJECTIVE BOX
Chief Complaint:  Patient is a 63y old  Female who presents with a chief complaint of rectal pain (03 Feb 2024 07:38)      Date of service 02-03-24 @ 09:48      Interval Events:     Hospital Medications:  enoxaparin Injectable 40 milliGRAM(s) SubCutaneous every 24 hours  escitalopram 10 milliGRAM(s) Oral daily  influenza   Vaccine 0.5 milliLiter(s) IntraMuscular once  polyethylene glycol/electrolyte Solution 2000 milliLiter(s) Oral once  sodium chloride 0.9%. 1000 milliLiter(s) IV Continuous <Continuous>        Review of Systems:  General:  No wt loss, fevers, chills, night sweats, fatigue,   Eyes:  Good vision, no reported pain  ENT:  No sore throat, pain, runny nose, dysphagia  CV:  No pain, palpitations, hypo/hypertension  Resp:  No dyspnea, cough, tachypnea, wheezing  GI:  See HPI  :  No pain, bleeding, incontinence, nocturia  Muscle:  No pain, weakness  Neuro:  No weakness, tingling, memory problems  Psych:  No fatigue, insomnia, mood problems, depression  Endocrine:  No polyuria, polydipsia, cold/heat intolerance  Heme:  No petechiae, ecchymosis, easy bruisability  Integumentary:  No rash, edema    PHYSICAL EXAM:   Vital Signs:  Vital Signs Last 24 Hrs  T(C): 36.8 (03 Feb 2024 04:20), Max: 36.8 (03 Feb 2024 04:20)  T(F): 98.2 (03 Feb 2024 04:20), Max: 98.2 (03 Feb 2024 04:20)  HR: 74 (03 Feb 2024 04:20) (71 - 80)  BP: 117/76 (03 Feb 2024 04:20) (117/76 - 147/97)  BP(mean): --  RR: 18 (03 Feb 2024 04:20) (18 - 18)  SpO2: 96% (03 Feb 2024 04:20) (96% - 100%)    Parameters below as of 03 Feb 2024 04:20  Patient On (Oxygen Delivery Method): room air      Daily     Daily       PHYSICAL EXAM:     GENERAL:  Appears stated age, well-groomed, well-nourished, no distress  HEENT:  NC/AT,  conjunctivae anicteric, clear and pink,   NECK: supple, trachea midline  CHEST:  Full & symmetric excursion, no increased effort, breath sounds clear  HEART:  Regular rhythm, no JVD  ABDOMEN:  Soft, non-tender, non-distended, normoactive bowel sounds,  no masses , no hepatosplenomegaly  EXTREMITIES:  no cyanosis,clubbing or edema  SKIN:  No rash, erythema, or, ecchymoses, no jaundice  NEURO:  Alert, non-focal, no asterixis  PSYCH: Appropriate affect, oriented to place and time  RECTAL: Deferred      LABS Personally reviewed by me:                        11.1   4.31  )-----------( 203      ( 03 Feb 2024 06:30 )             36.2     Mean Cell Volume: 99.2 fl (02-03-24 @ 06:30)    02-03    138  |  104  |  19  ----------------------------<  73  3.9   |  21<L>  |  1.05    Ca    9.0      03 Feb 2024 06:30  Mg     2.2     02-01    TPro  5.8<L>  /  Alb  3.5  /  TBili  0.3  /  DBili  x   /  AST  14  /  ALT  11  /  AlkPhos  56  02-03    LIVER FUNCTIONS - ( 03 Feb 2024 06:30 )  Alb: 3.5 g/dL / Pro: 5.8 g/dL / ALK PHOS: 56 U/L / ALT: 11 U/L / AST: 14 U/L / GGT: x           PT/INR - ( 01 Feb 2024 14:17 )   PT: 18.5 sec;   INR: 1.71 ratio         PTT - ( 01 Feb 2024 14:17 )  PTT:37.8 sec  Urinalysis Basic - ( 03 Feb 2024 06:30 )    Color: x / Appearance: x / SG: x / pH: x  Gluc: 73 mg/dL / Ketone: x  / Bili: x / Urobili: x   Blood: x / Protein: x / Nitrite: x   Leuk Esterase: x / RBC: x / WBC x   Sq Epi: x / Non Sq Epi: x / Bacteria: x                              11.1   4.31  )-----------( 203      ( 03 Feb 2024 06:30 )             36.2                         12.4   4.18  )-----------( 234      ( 01 Feb 2024 14:17 )             41.4       Imaging personally reviewed by me:

## 2024-02-03 NOTE — PROGRESS NOTE ADULT - SUBJECTIVE AND OBJECTIVE BOX
[facility] HEMATOLOGY/ONCOLOGY INPATIENT PROGRESS NOTE     Interval Hx:   02-03-24: Ms. Haque was seen at bedside today.    Meds:   MEDICATIONS  (STANDING):  aspirin 325 milliGRAM(s) Oral daily  escitalopram 10 milliGRAM(s) Oral daily  influenza   Vaccine 0.5 milliLiter(s) IntraMuscular once  polyethylene glycol/electrolyte Solution 2000 milliLiter(s) Oral once  sodium chloride 0.9%. 1000 milliLiter(s) (75 mL/Hr) IV Continuous <Continuous>    MEDICATIONS  (PRN):    Vital Signs Last 24 Hrs  T(C): 36.8 (03 Feb 2024 04:20), Max: 36.8 (03 Feb 2024 04:20)  T(F): 98.2 (03 Feb 2024 04:20), Max: 98.2 (03 Feb 2024 04:20)  HR: 74 (03 Feb 2024 04:20) (71 - 80)  BP: 117/76 (03 Feb 2024 04:20) (117/76 - 147/97)  BP(mean): --  RR: 18 (03 Feb 2024 04:20) (16 - 18)  SpO2: 96% (03 Feb 2024 04:20) (96% - 100%)    Parameters below as of 03 Feb 2024 04:20  Patient On (Oxygen Delivery Method): room air    Physical Exam:  Gen: NAD  HEENT: EOMI, MMM  Chest: equal chest rise, speaking full sentences   Cardiac: RR  Abd: Nondistended   Ext: No edema   Neuro: AAOx3, normal mood and affect    Labs:                        12.4   4.18  )-----------( 234      ( 01 Feb 2024 14:17 )             41.4     CBC Full  -  ( 01 Feb 2024 14:17 )  WBC Count : 4.18 K/uL  RBC Count : 4.09 M/uL  Hemoglobin : 12.4 g/dL  Hematocrit : 41.4 %  Platelet Count - Automated : 234 K/uL  Mean Cell Volume : 101.2 fl  Mean Cell Hemoglobin : 30.3 pg  Mean Cell Hemoglobin Concentration : 30.0 gm/dL  Auto Neutrophil # : 2.64 K/uL  Auto Lymphocyte # : 0.97 K/uL  Auto Monocyte # : 0.49 K/uL  Auto Eosinophil # : 0.03 K/uL  Auto Basophil # : 0.04 K/uL  Auto Neutrophil % : 63.2 %  Auto Lymphocyte % : 23.2 %  Auto Monocyte % : 11.7 %  Auto Eosinophil % : 0.7 %  Auto Basophil % : 1.0 %    02-02    139  |  104  |  19  ----------------------------<  123<H>  4.6   |  24  |  0.95    Ca    9.5      02 Feb 2024 02:14  Mg     2.2     02-01    TPro  7.0  /  Alb  4.3  /  TBili  0.3  /  DBili  x   /  AST  18  /  ALT  16  /  AlkPhos  71  02-01    PT/INR - ( 01 Feb 2024 14:17 )   PT: 18.5 sec;   INR: 1.71 ratio         PTT - ( 01 Feb 2024 14:17 )  PTT:37.8 sec   [facility] HEMATOLOGY/ONCOLOGY INPATIENT PROGRESS NOTE     Interval Hx:   02-03-24: Ms. Haque was seen at bedside today, stated she is feeling well, she had decided to proceed with diverting colostomy, support for decision provided, questions answered,  also at bedside this morning     Meds:   MEDICATIONS  (STANDING):  aspirin 325 milliGRAM(s) Oral daily  escitalopram 10 milliGRAM(s) Oral daily  influenza   Vaccine 0.5 milliLiter(s) IntraMuscular once  polyethylene glycol/electrolyte Solution 2000 milliLiter(s) Oral once  sodium chloride 0.9%. 1000 milliLiter(s) (75 mL/Hr) IV Continuous <Continuous>    MEDICATIONS  (PRN):    Vital Signs Last 24 Hrs  T(C): 36.8 (03 Feb 2024 04:20), Max: 36.8 (03 Feb 2024 04:20)  T(F): 98.2 (03 Feb 2024 04:20), Max: 98.2 (03 Feb 2024 04:20)  HR: 74 (03 Feb 2024 04:20) (71 - 80)  BP: 117/76 (03 Feb 2024 04:20) (117/76 - 147/97)  BP(mean): --  RR: 18 (03 Feb 2024 04:20) (16 - 18)  SpO2: 96% (03 Feb 2024 04:20) (96% - 100%)    Parameters below as of 03 Feb 2024 04:20  Patient On (Oxygen Delivery Method): room air    Physical Exam:  Gen: NAD  HEENT: EOMI, MMM  Chest: equal chest rise, speaking full sentences   Cardiac: RR  Abd: Nondistended   Ext: No edema   Neuro: AAOx3, normal mood and affect    Labs:                        12.4   4.18  )-----------( 234      ( 01 Feb 2024 14:17 )             41.4     CBC Full  -  ( 01 Feb 2024 14:17 )  WBC Count : 4.18 K/uL  RBC Count : 4.09 M/uL  Hemoglobin : 12.4 g/dL  Hematocrit : 41.4 %  Platelet Count - Automated : 234 K/uL  Mean Cell Volume : 101.2 fl  Mean Cell Hemoglobin : 30.3 pg  Mean Cell Hemoglobin Concentration : 30.0 gm/dL  Auto Neutrophil # : 2.64 K/uL  Auto Lymphocyte # : 0.97 K/uL  Auto Monocyte # : 0.49 K/uL  Auto Eosinophil # : 0.03 K/uL  Auto Basophil # : 0.04 K/uL  Auto Neutrophil % : 63.2 %  Auto Lymphocyte % : 23.2 %  Auto Monocyte % : 11.7 %  Auto Eosinophil % : 0.7 %  Auto Basophil % : 1.0 %    02-02    139  |  104  |  19  ----------------------------<  123<H>  4.6   |  24  |  0.95    Ca    9.5      02 Feb 2024 02:14  Mg     2.2     02-01    TPro  7.0  /  Alb  4.3  /  TBili  0.3  /  DBili  x   /  AST  18  /  ALT  16  /  AlkPhos  71  02-01    PT/INR - ( 01 Feb 2024 14:17 )   PT: 18.5 sec;   INR: 1.71 ratio         PTT - ( 01 Feb 2024 14:17 )  PTT:37.8 sec

## 2024-02-03 NOTE — DISCHARGE NOTE NURSING/CASE MANAGEMENT/SOCIAL WORK - NSDCPNINST_GEN_ALL_CORE
call MD // go to ER:  temperature, nausea, vomiting;  follow all instructions at [facility] (pt to be admitted to [facility] at her request) call MD // go to ER:  temperature, nausea, vomiting, increased pain; follow all instructions at [facility] (pt to be admitted to [facility] at her request)

## 2024-02-03 NOTE — DISCHARGE NOTE NURSING/CASE MANAGEMENT/SOCIAL WORK - NSDCFUADDAPPT_GEN_ALL_CORE_FT
APPTS ARE READY TO BE MADE: [X] YES    Best Family or Patient Contact (if needed):    Additional Information about above appointments (if needed):    1: Follow-up with PCP.  2: Follow-up with gastroenterologist.   3: Follow-up with oncologist.   4. Follow-up with colorectal surgeon.   Other comments or requests:

## 2024-02-03 NOTE — CHART NOTE - NSCHARTNOTEFT_GEN_A_CORE
patient says she expects to be transferred to [facility] for surgery evaluation    will sign off at this time  please call us back with any questions or concerns    RED SURGERY  [phone] patient says she expects to be transferred to [facility] for surgery evaluation    will sign off at this time  please call us back with any questions or concerns    RED SURGERY  [phone]    ---------  Attending addendum    D/w pt, being transferred to prior surgeon at [facility] Today

## 2024-02-03 NOTE — DISCHARGE NOTE NURSING/CASE MANAGEMENT/SOCIAL WORK - PATIENT PORTAL LINK FT
You can access the FollowMyHealth Patient Portal offered by [facility] by registering at the following website: [email or web address]. By joining [email or web address] FollowMyHealth portal, you will also be able to view your health information using other applications (apps) compatible with our system.

## 2024-02-03 NOTE — PROGRESS NOTE ADULT - SUBJECTIVE AND OBJECTIVE BOX
Patient is a 63y old  Female who presents with a chief complaint of rectal pain (03 Feb 2024 06:06)      SUBJECTIVE / OVERNIGHT EVENTS:    Patient seen and examined. co some pain. otherwise okay,      Vital Signs Last 24 Hrs  T(C): 36.8 (03 Feb 2024 04:20), Max: 36.8 (03 Feb 2024 04:20)  T(F): 98.2 (03 Feb 2024 04:20), Max: 98.2 (03 Feb 2024 04:20)  HR: 74 (03 Feb 2024 04:20) (71 - 80)  BP: 117/76 (03 Feb 2024 04:20) (117/76 - 147/97)  BP(mean): --  RR: 18 (03 Feb 2024 04:20) (18 - 18)  SpO2: 96% (03 Feb 2024 04:20) (96% - 100%)    Parameters below as of 03 Feb 2024 04:20  Patient On (Oxygen Delivery Method): room air      I&O's Summary    02 Feb 2024 07:01  -  03 Feb 2024 07:00  --------------------------------------------------------  IN: 1325 mL / OUT: 125 mL / NET: 1200 mL        PE:  GENERAL: NAD, AAOx3  CHEST/LUNG: CTABL, No wheeze  HEART: Regular rate and rhythm; + murmur  ABDOMEN: Soft, Nontender, distended; Bowel sounds present  EXTREMITIES:  2+ Peripheral Pulses, No edema  NEURO: No focal deficits    LABS:                        11.1   4.31  )-----------( 203      ( 03 Feb 2024 06:30 )             36.2     02-03    138  |  104  |  19  ----------------------------<  73  3.9   |  21<L>  |  1.05    Ca    9.0      03 Feb 2024 06:30  Mg     2.2     02-01    TPro  5.8<L>  /  Alb  3.5  /  TBili  0.3  /  DBili  x   /  AST  14  /  ALT  11  /  AlkPhos  56  02-03    PT/INR - ( 01 Feb 2024 14:17 )   PT: 18.5 sec;   INR: 1.71 ratio         PTT - ( 01 Feb 2024 14:17 )  PTT:37.8 sec  CAPILLARY BLOOD GLUCOSE            Urinalysis Basic - ( 03 Feb 2024 06:30 )    Color: x / Appearance: x / SG: x / pH: x  Gluc: 73 mg/dL / Ketone: x  / Bili: x / Urobili: x   Blood: x / Protein: x / Nitrite: x   Leuk Esterase: x / RBC: x / WBC x   Sq Epi: x / Non Sq Epi: x / Bacteria: x        RADIOLOGY & ADDITIONAL TESTS:    Imaging Personally Reviewed:  [x] YES  [ ] NO    Consultant(s) Notes Reviewed:  [x] YES  [ ] NO    MEDICATIONS  (STANDING):  enoxaparin Injectable 40 milliGRAM(s) SubCutaneous every 24 hours  escitalopram 10 milliGRAM(s) Oral daily  influenza   Vaccine 0.5 milliLiter(s) IntraMuscular once  polyethylene glycol/electrolyte Solution 2000 milliLiter(s) Oral once  sodium chloride 0.9%. 1000 milliLiter(s) (75 mL/Hr) IV Continuous <Continuous>    MEDICATIONS  (PRN):      Care Discussed with Consultants/Other Providers [x] YES  [ ] NO    HEALTH ISSUES - PROBLEM Dx:       Patient is a 63y old  Female who presents with a chief complaint of rectal pain (03 Feb 2024 06:06)      SUBJECTIVE / OVERNIGHT EVENTS:    Patient seen and examined. denies pain. otherwise okay,      Vital Signs Last 24 Hrs  T(C): 36.8 (03 Feb 2024 04:20), Max: 36.8 (03 Feb 2024 04:20)  T(F): 98.2 (03 Feb 2024 04:20), Max: 98.2 (03 Feb 2024 04:20)  HR: 74 (03 Feb 2024 04:20) (71 - 80)  BP: 117/76 (03 Feb 2024 04:20) (117/76 - 147/97)  BP(mean): --  RR: 18 (03 Feb 2024 04:20) (18 - 18)  SpO2: 96% (03 Feb 2024 04:20) (96% - 100%)    Parameters below as of 03 Feb 2024 04:20  Patient On (Oxygen Delivery Method): room air      I&O's Summary    02 Feb 2024 07:01  -  03 Feb 2024 07:00  --------------------------------------------------------  IN: 1325 mL / OUT: 125 mL / NET: 1200 mL        PE:  GENERAL: NAD, AAOx3  CHEST/LUNG: CTABL, No wheeze  HEART: Regular rate and rhythm; + murmur  ABDOMEN: Soft, Nontender, distended; Bowel sounds present  EXTREMITIES:  2+ Peripheral Pulses, No edema  NEURO: No focal deficits    LABS:                        11.1   4.31  )-----------( 203      ( 03 Feb 2024 06:30 )             36.2     02-03    138  |  104  |  19  ----------------------------<  73  3.9   |  21<L>  |  1.05    Ca    9.0      03 Feb 2024 06:30  Mg     2.2     02-01    TPro  5.8<L>  /  Alb  3.5  /  TBili  0.3  /  DBili  x   /  AST  14  /  ALT  11  /  AlkPhos  56  02-03    PT/INR - ( 01 Feb 2024 14:17 )   PT: 18.5 sec;   INR: 1.71 ratio         PTT - ( 01 Feb 2024 14:17 )  PTT:37.8 sec  CAPILLARY BLOOD GLUCOSE            Urinalysis Basic - ( 03 Feb 2024 06:30 )    Color: x / Appearance: x / SG: x / pH: x  Gluc: 73 mg/dL / Ketone: x  / Bili: x / Urobili: x   Blood: x / Protein: x / Nitrite: x   Leuk Esterase: x / RBC: x / WBC x   Sq Epi: x / Non Sq Epi: x / Bacteria: x        RADIOLOGY & ADDITIONAL TESTS:    Imaging Personally Reviewed:  [x] YES  [ ] NO    Consultant(s) Notes Reviewed:  [x] YES  [ ] NO    MEDICATIONS  (STANDING):  enoxaparin Injectable 40 milliGRAM(s) SubCutaneous every 24 hours  escitalopram 10 milliGRAM(s) Oral daily  influenza   Vaccine 0.5 milliLiter(s) IntraMuscular once  polyethylene glycol/electrolyte Solution 2000 milliLiter(s) Oral once  sodium chloride 0.9%. 1000 milliLiter(s) (75 mL/Hr) IV Continuous <Continuous>    MEDICATIONS  (PRN):      Care Discussed with Consultants/Other Providers [x] YES  [ ] NO    HEALTH ISSUES - PROBLEM Dx:

## 2024-02-03 NOTE — PROGRESS NOTE ADULT - ASSESSMENT
64yo F PMH: Anxiety, Chronic back pain, Depression, Ovarian ca and Vertigo. Presents to [facility] for intermittent episodes of rectal pain over the past few months.    # Rectal pain / mass, impending obstruction  - CT A/P w/ Interval enlargement of abnormal soft tissue density in the right and left pelvis in close proximity to the rectum/distal sigmoid colon compared with 8/31/2023, as described above, concerning for malignancy.  - concern for obstruction, appreciate colorectal and oncology recs  - NPO for now, IVF  - will likely need diverting colostomy to tolerate further treatment, hold xarelto for now, if pt is agreeable, would tentatively plan for Tuesday per surgery    # Anxiety/ Depression  - C/w home Lexapro    # H/o DVT  - On Xarelto at home, currently on hold    dvt ppx lovenox    [facility]   62yo F PMH: Anxiety, Chronic back pain, Depression, Ovarian ca and Vertigo. Presents to [facility] for intermittent episodes of rectal pain over the past few months.    # Rectal pain / mass, impending obstruction  - CT A/P w/ Interval enlargement of abnormal soft tissue density in the right and left pelvis in close proximity to the rectum/distal sigmoid colon compared with 8/31/2023, as described above, concerning for malignancy.  - concern for impending obstruction, appreciate colorectal and oncology recs  - will likely need diverting colostomy to tolerate further treatment, hold xarelto, pt declined surgery here, to go to [place] after discharge    # Anxiety/ Depression  - C/w home Lexapro    # H/o DVT  - On Xarelto at home, currently on hold    dvt ppx lovenox    dc planning    [facility]  [phone]

## 2024-03-28 ENCOUNTER — OFFICE (OUTPATIENT)
Dept: URBAN - METROPOLITAN AREA CLINIC 27 | Facility: CLINIC | Age: 64
Setting detail: OPHTHALMOLOGY
End: 2024-03-28
Payer: COMMERCIAL

## 2024-03-28 DIAGNOSIS — H25.13: ICD-10-CM

## 2024-03-28 DIAGNOSIS — H16.8: ICD-10-CM

## 2024-03-28 PROCEDURE — 92134 CPTRZ OPH DX IMG PST SGM RTA: CPT | Performed by: OPHTHALMOLOGY

## 2024-03-28 PROCEDURE — 92004 COMPRE OPH EXAM NEW PT 1/>: CPT | Performed by: OPHTHALMOLOGY

## 2024-04-03 ASSESSMENT — KERATOMETRY
OS_AXISANGLE_DEGREES: 74
OD_K1POWER_DIOPTERS: 40.75
OD_AXISANGLE_DEGREES: 91
OD_K2POWER_DIOPTERS: 47.00
METHOD_AUTO_MANUAL: AUTO
OS_K1POWER_DIOPTERS: 44.50
OS_K2POWER_DIOPTERS: 47.00

## 2024-04-03 ASSESSMENT — REFRACTION_CURRENTRX
OD_OVR_VA: 20/
OS_AXIS: 169
OD_SPHERE: +1.75
OS_SPHERE: +2.00
OS_CYLINDER: +0.25
OS_ADD: +2.25
OS_OVR_VA: 20/
OS_VPRISM_DIRECTION: PROGS
OD_ADD: +2.25
OD_CYLINDER: +0.50
OD_VPRISM_DIRECTION: PROGS
OD_AXIS: 172

## 2024-04-15 ENCOUNTER — RX ONLY (RX ONLY)
Age: 64
End: 2024-04-15

## 2024-04-15 ENCOUNTER — OFFICE (OUTPATIENT)
Dept: URBAN - METROPOLITAN AREA CLINIC 27 | Facility: CLINIC | Age: 64
Setting detail: OPHTHALMOLOGY
End: 2024-04-15
Payer: COMMERCIAL

## 2024-04-15 DIAGNOSIS — H16.223: ICD-10-CM

## 2024-04-15 DIAGNOSIS — H25.13: ICD-10-CM

## 2024-04-15 DIAGNOSIS — H16.8: ICD-10-CM

## 2024-04-15 PROCEDURE — 92012 INTRM OPH EXAM EST PATIENT: CPT | Performed by: OPHTHALMOLOGY

## 2024-04-15 PROCEDURE — 92285 EXTERNAL OCULAR PHOTOGRAPHY: CPT | Performed by: OPHTHALMOLOGY

## 2024-04-18 ENCOUNTER — TRANSCRIPTION ENCOUNTER (OUTPATIENT)
Age: 64
End: 2024-04-18

## 2024-04-18 VITALS
SYSTOLIC BLOOD PRESSURE: 117 MMHG | WEIGHT: 117.95 LBS | HEART RATE: 75 BPM | RESPIRATION RATE: 16 BRPM | TEMPERATURE: 98 F | DIASTOLIC BLOOD PRESSURE: 79 MMHG | HEIGHT: 61 IN | OXYGEN SATURATION: 99 %

## 2024-04-18 RX ORDER — RIVAROXABAN 15 MG-20MG
1 KIT ORAL
Refills: 0 | DISCHARGE

## 2024-04-18 NOTE — ASU DISCHARGE PLAN (ADULT/PEDIATRIC) - OK TO LEAVE MESSAGE ON VOICEMAIL
Subjective:      10:21 AM     Patient ID: Skyler Espinoza is a 70 y.o. female.    Chief Complaint: Hypertension    HPI           CHIEF COMPLAINT: Hypertension  HPI:     ONSET:      QUALITY/COURSE:   Unchanged.     INTENSITY/SEVERITY:  Average blood pressure is unknown.      MODIFIERS/TREATMENTS:  Taking medications: yes. .High sodium intake: no. alcohol: no      The following symptoms are positive only if BOLDED, otherwise are negative.      SYMPTOMS/RELATED: Possible medication side effects include:   Depression..  . Cough. . Constipation.    REVIEW OF SYMPTOMS: . Weight_loss . Weight_gain . Leg_cramps .Potency_problems .    TARGET ORGAN DAMAGE:: angina/ prior myocardial infarction, chronic kidney disease, heart failure, left ventricular hypertrophy, peripheral artery disease, prior coronary revascularization, retinopathy, stroke. transient ischemic attack.    CHIEF COMPLAINT: dizziness .  HPI:     ONSET/TIMING: Onset   2 weeks    days ago.         Trauma: no    DURATION:  Intermittent     QUALITY/COURSE:  unchanged    INTENSITY/SEVERITY:  severity 5   (on a 1-10 scale).        MODIFIERS/TREATMENTS:   Taking medications:   . ENT consult done: no.     SYMPTOMS/RELATED:  Possible medication side effects include:        The following symptoms/statements are positive if BOLDED, otherwise negative.          CONTEXT/WHEN:  Lying down.  Sitting up .Standing up. turning head.  Sudden.. Trauma. Similar_problems_in_past.           .     REVIEW OF SYSTEMS :   vertigo . visual aura.    CULPRIT MEDICATIONS: : alpha blockers, beta blockers, calcium channel blockers, diuretics, epileptogenic medication, hypoglycemic agents, hypotensive medications            Review of Systems   HENT: Negative for hearing loss and tinnitus.    Cardiovascular: Negative for palpitations.   Gastrointestinal: Negative for diarrhea, nausea and vomiting.   Musculoskeletal: Negative for gait problem.   Neurological: Positive for dizziness.      "    Objective:      Vitals:    02/27/20 0942   BP: (!) 100/50   Pulse: 78   Resp: 18   Temp: 98.2 °F (36.8 °C)   TempSrc: Oral   SpO2: 96%   Weight: 78.3 kg (172 lb 9.9 oz)   Height: 5' 7" (1.702 m)   PainSc: 0-No pain   PainLoc: Generalized    Blood pressure lying 110/80 pulse 65   blood pressure standing 98/80 pulse 90  Physical Exam   Constitutional: She appears well-developed and well-nourished.   Cardiovascular: Normal rate, regular rhythm and normal heart sounds.   Pulmonary/Chest: Effort normal and breath sounds normal.   Abdominal: Soft. There is no tenderness.   Neurological: She is alert.   Psychiatric: She has a normal mood and affect. Her behavior is normal. Thought content normal.   Nursing note and vitals reviewed.        Assessment:       1. Orthostatic dizziness    2. Non-seasonal allergic rhinitis, unspecified trigger    3. Chronic rhinitis    4. Hypertension, unspecified type          Plan:       Orthostatic dizziness    Non-seasonal allergic rhinitis, unspecified trigger  -     azelastine (ASTELIN) 137 mcg (0.1 %) nasal spray; 1 spray (137 mcg total) by Nasal route 2 (two) times daily.  Dispense: 30 mL; Refill: 5  -     fluticasone propionate (FLONASE) 50 mcg/actuation nasal spray; 1 spray (50 mcg total) by Each Nostril route once daily.  Dispense: 16 g; Refill: 5    Chronic rhinitis  -     ipratropium (ATROVENT) 0.03 % nasal spray; 2 sprays by Nasal route 4 (four) times daily as needed for Rhinitis.  Dispense: 30 mL; Refill: 3    Hypertension, unspecified type      Follow up in about 3 months (around 5/27/2020).      " Yes

## 2024-04-18 NOTE — ASU PATIENT PROFILE, ADULT - PRO ARRIVE FROM
home Ear Star Wedge Flap Text: The defect edges were debeveled with a #15 blade scalpel.  Given the location of the defect and the proximity to free margins (helical rim) an ear star wedge flap was deemed most appropriate.  Using a sterile surgical marker, the appropriate flap was drawn incorporating the defect and placing the expected incisions between the helical rim and antihelix where possible.  The area thus outlined was incised through and through with a #15 scalpel blade.

## 2024-04-18 NOTE — ASU DISCHARGE PLAN (ADULT/PEDIATRIC) - NS MD DC FALL RISK RISK
For information on Fall & Injury Prevention, visit: [email or web address] OR  [email or web address] OR  https://www.cdc.gov/steadi/patient.html

## 2024-04-18 NOTE — ASU PATIENT PROFILE, ADULT - NSICDXPASTSURGICALHX_GEN_ALL_CORE_FT
PAST SURGICAL HISTORY:  H/O total hysterectomy     S/P abdominoplasty     S/P bunionectomy bilateral    S/P  x 3 , ,     S/P D&C (status post dilation and curettage)      PAST SURGICAL HISTORY:  H/O total hysterectomy     S/P abdominoplasty     S/P bunionectomy bilateral    S/P  x 3 , ,     S/P colostomy 2024    S/P D&C (status post dilation and curettage)      PAST SURGICAL HISTORY:  H/O total hysterectomy     H/O ureteroscopy B/L stent placement    S/P abdominoplasty     S/P bunionectomy bilateral    S/P  x 3 , ,     S/P colostomy 2024    S/P D&C (status post dilation and curettage)

## 2024-04-18 NOTE — ASU PATIENT PROFILE, ADULT - NSICDXPASTMEDICALHX_GEN_ALL_CORE_FT
PAST MEDICAL HISTORY:  Anxiety     Cancer of fallopian tube     Lower back pain     Major depression     Ovarian ca     Vertigo      PAST MEDICAL HISTORY:  Anxiety     Cancer of fallopian tube     Lower back pain     Major depression     Ovarian ca continues target chemotherapy at this time 4/24    Vertigo

## 2024-04-18 NOTE — H&P PST ADULT - PRO ARRIVE FROM
home Doxepin Counseling:  Patient advised that the medication is sedating and not to drive a car after taking this medication. Patient informed of potential adverse effects including but not limited to dry mouth, urinary retention, and blurry vision.  The patient verbalized understanding of the proper use and possible adverse effects of doxepin.  All of the patient's questions and concerns were addressed.

## 2024-04-18 NOTE — ASU PATIENT PROFILE, ADULT - FALL HARM RISK - UNIVERSAL INTERVENTIONS
Bed in lowest position, wheels locked, appropriate side rails in place/Call bell, personal items and telephone in reach/Instruct patient to call for assistance before getting out of bed or chair/Non-slip footwear when patient is out of bed/[place] to call system/Physically safe environment - no spills, clutter or unnecessary equipment/Purposeful Proactive Rounding/Room/bathroom lighting operational, light cord in reach

## 2024-04-18 NOTE — H&P PST ADULT - NSICDXPROCEDURE_GEN_ALL_CORE_FT
PROCEDURES:  Injection, steroid, lumbar, epidural, transforaminal 19-Apr-2024 09:53:31  Gabriel Morris  Transforaminal epidural injection into lumbar spine 19-Apr-2024 09:54:13  Gabriel Morris

## 2024-04-19 ENCOUNTER — OUTPATIENT (OUTPATIENT)
Dept: OUTPATIENT SERVICES | Facility: HOSPITAL | Age: 64
LOS: 1 days | End: 2024-04-19
Payer: COMMERCIAL

## 2024-04-19 ENCOUNTER — APPOINTMENT (OUTPATIENT)
Dept: ORTHOPEDIC SURGERY | Facility: HOSPITAL | Age: 64
End: 2024-04-19

## 2024-04-19 VITALS
HEART RATE: 77 BPM | RESPIRATION RATE: 23 BRPM | DIASTOLIC BLOOD PRESSURE: 80 MMHG | SYSTOLIC BLOOD PRESSURE: 117 MMHG | OXYGEN SATURATION: 100 %

## 2024-04-19 DIAGNOSIS — M54.16 RADICULOPATHY, LUMBAR REGION: ICD-10-CM

## 2024-04-19 DIAGNOSIS — Z93.3 COLOSTOMY STATUS: Chronic | ICD-10-CM

## 2024-04-19 DIAGNOSIS — M70.61 TROCHANTERIC BURSITIS, RIGHT HIP: ICD-10-CM

## 2024-04-19 DIAGNOSIS — Z98.89 OTHER SPECIFIED POSTPROCEDURAL STATES: Chronic | ICD-10-CM

## 2024-04-19 DIAGNOSIS — Z98.890 OTHER SPECIFIED POSTPROCEDURAL STATES: Chronic | ICD-10-CM

## 2024-04-19 DIAGNOSIS — M43.10 SPONDYLOLISTHESIS, SITE UNSPECIFIED: ICD-10-CM

## 2024-04-19 DIAGNOSIS — M51.26 OTHER INTERVERTEBRAL DISC DISPLACEMENT, LUMBAR REGION: ICD-10-CM

## 2024-04-19 DIAGNOSIS — Z90.710 ACQUIRED ABSENCE OF BOTH CERVIX AND UTERUS: Chronic | ICD-10-CM

## 2024-04-19 PROCEDURE — 64483 NJX AA&/STRD TFRM EPI L/S 1: CPT | Mod: RT

## 2024-04-19 PROCEDURE — 64484 NJX AA&/STRD TFRM EPI L/S EA: CPT | Mod: RT

## 2024-04-19 RX ORDER — ESCITALOPRAM OXALATE 10 MG/1
1 TABLET, FILM COATED ORAL
Refills: 0 | DISCHARGE

## 2024-04-19 RX ORDER — CYCLOBENZAPRINE HYDROCHLORIDE 10 MG/1
1 TABLET, FILM COATED ORAL
Refills: 0 | DISCHARGE

## 2024-04-19 RX ORDER — ASPIRIN/CALCIUM CARB/MAGNESIUM 324 MG
1 TABLET ORAL
Refills: 0 | DISCHARGE

## 2024-04-19 RX ORDER — DICLOFENAC SODIUM 75 MG/1
1 TABLET, DELAYED RELEASE ORAL
Refills: 0 | DISCHARGE

## 2024-04-19 RX ORDER — GABAPENTIN 400 MG/1
1 CAPSULE ORAL
Refills: 0 | DISCHARGE

## 2024-05-01 ENCOUNTER — APPOINTMENT (OUTPATIENT)
Dept: ORTHOPEDIC SURGERY | Facility: CLINIC | Age: 64
End: 2024-05-01
Payer: COMMERCIAL

## 2024-05-01 ENCOUNTER — TRANSCRIPTION ENCOUNTER (OUTPATIENT)
Age: 64
End: 2024-05-01

## 2024-05-01 VITALS
WEIGHT: 118 LBS | HEART RATE: 88 BPM | HEIGHT: 61 IN | SYSTOLIC BLOOD PRESSURE: 109 MMHG | DIASTOLIC BLOOD PRESSURE: 76 MMHG | BODY MASS INDEX: 22.28 KG/M2

## 2024-05-01 DIAGNOSIS — M51.36 OTHER INTERVERTEBRAL DISC DEGENERATION, LUMBAR REGION: ICD-10-CM

## 2024-05-01 DIAGNOSIS — M43.10 SPONDYLOLISTHESIS, SITE UNSPECIFIED: ICD-10-CM

## 2024-05-01 DIAGNOSIS — M54.16 RADICULOPATHY, LUMBAR REGION: ICD-10-CM

## 2024-05-01 PROCEDURE — 99213 OFFICE O/P EST LOW 20 MIN: CPT

## 2024-05-01 NOTE — DISCUSSION/SUMMARY
[Medication Risks Reviewed] : Medication risks reviewed [de-identified] : - Summary : The plan is to continue managing the low back pain with pain management under the guidance of the oncology team, considering the patient's worsening cancer condition and upcoming treatments. Epidural injections can be repeated as needed, but surgery is not recommended given the patient's overall condition. Travel plans should be discussed with the oncology team. - Plan : - Refer to pain management team affiliated with oncology for comprehensive management of cancer-related pain.  - Continue self-directed exercises and home physical therapy program as tolerated.  - Consider trial of Lyrica or other neuropathic pain medications if constipation is manageable.  She will call for prescription  - Repeat epidural injections every 3-4 months as needed for pain relief.  Right lumbar L4-L5  - Discuss travel plans with oncology team before starting new treatments.  - Avoid surgical intervention unless absolutely necessary due to overall condition and cancer treatments.   complains of pain/discomfort

## 2024-05-01 NOTE — PHYSICAL EXAM
[Limited] : is limited [Painful] : is painful [LE] : Sensory: Intact in bilateral lower extremities [Knee] : patellar 2+ and symmetric bilaterally [Ankle] : ankle 2+ and symmetric bilaterally [DP] : dorsalis pedis 2+ and symmetric bilaterally [PT] : posterior tibial 2+ and symmetric bilaterally [Poor Appearance] : well-appearing [Acute Distress] : not in acute distress [Obese] : not obese [Abl Mood] : in a normal mood [Abl Affect] : with normal affect [Poor Coordination] : normal coordination [Disorientation] : oriented x 3 [SLR] : negative straight leg raise [FreeTextEntry2] : The pt is awake, alert and oriented to self, place and time, is comfortable and in no acute distress. Gait examination reveals a narrow based, non-ataxic, non-antalgic gait. Can heel and toe walk without difficulty. Inspection of neck, back and lower extremities bilaterally reveals no rashes or ecchymotic lesions.  She leans to the right when sitting or standing. There is no tenderness over the cervical, thoracic spine, or the upper and lower extremities musculature. Minimal midline lumbosacral junctional tenderness noted along with significant right paraspinal lumbar tenderness. There is no sacroiliac tenderness. No greater trochanteric tenderness bilaterally. No atrophy or abnormal movements noted in the upper or lower extremities. There is no swelling noted in the upper or lower extremities bilaterally. No cervical lymphadenopathy noted anteriorly. No joint laxity noted in the upper and lower extremity joints bilaterally.\par  Negative straight leg raise to 45 in the sitting position bilaterally. There is no groin pain with hip internal rotation and a negative BRONWYN test bilaterally.  [de-identified] : Flexion to ankles with less pain, extension is 20 degrees with pain [de-identified] : pes planus bilaterally. Tenderness along midline lumbar spine and bilateral paraspinal musculature. Healed bilateral bunion incisions no right GT tenderness

## 2024-05-01 NOTE — REASON FOR VISIT
[Follow-Up Visit] : a follow-up visit for [FreeTextEntry2] : Right L4,L5 Epidural Injection 04/19/2024

## 2024-05-01 NOTE — HISTORY OF PRESENT ILLNESS
[___ wks] : [unfilled] week(s) ago [2] : a current pain level of 2/10 [Stable] : stable [de-identified] : Patient presents today for follow up 2 weeks after epidural injection.  Patient states she feels a 70% relief. She is feeling numbness and tingling in the left hand, she is also having radiating pain down the right leg. Patient is stating that the pain does reach a 10/10 especially at night.  Takes oxycodone 10 mg qhs prn - every other night or so. takes tylenol prn as well

## 2024-12-14 ENCOUNTER — NON-APPOINTMENT (OUTPATIENT)
Age: 64
End: 2024-12-14

## 2025-02-13 ENCOUNTER — NON-APPOINTMENT (OUTPATIENT)
Age: 65
End: 2025-02-13

## 2025-02-20 ENCOUNTER — OUTPATIENT (OUTPATIENT)
Dept: OUTPATIENT SERVICES | Facility: HOSPITAL | Age: 65
LOS: 1 days | Discharge: HOME | End: 2025-02-20

## 2025-02-20 DIAGNOSIS — Z93.3 COLOSTOMY STATUS: Chronic | ICD-10-CM

## 2025-02-20 DIAGNOSIS — Z90.710 ACQUIRED ABSENCE OF BOTH CERVIX AND UTERUS: Chronic | ICD-10-CM

## 2025-02-20 DIAGNOSIS — Z98.89 OTHER SPECIFIED POSTPROCEDURAL STATES: Chronic | ICD-10-CM

## 2025-02-20 DIAGNOSIS — Z98.890 OTHER SPECIFIED POSTPROCEDURAL STATES: Chronic | ICD-10-CM

## 2025-02-20 DIAGNOSIS — C57.00 MALIGNANT NEOPLASM OF UNSPECIFIED FALLOPIAN TUBE: ICD-10-CM

## 2025-02-21 ENCOUNTER — APPOINTMENT (OUTPATIENT)
Dept: HEMATOLOGY ONCOLOGY | Facility: CLINIC | Age: 65
End: 2025-02-21
Payer: COMMERCIAL

## 2025-02-21 VITALS
WEIGHT: 130.51 LBS | HEIGHT: 60.24 IN | TEMPERATURE: 98.8 F | SYSTOLIC BLOOD PRESSURE: 109 MMHG | HEART RATE: 99 BPM | OXYGEN SATURATION: 97 % | BODY MASS INDEX: 25.29 KG/M2 | DIASTOLIC BLOOD PRESSURE: 77 MMHG | RESPIRATION RATE: 16 BRPM

## 2025-02-21 DIAGNOSIS — C57.00 MALIGNANT NEOPLASM OF UNSPECIFIED FALLOPIAN TUBE: ICD-10-CM

## 2025-02-21 DIAGNOSIS — I89.0 LYMPHEDEMA, NOT ELSEWHERE CLASSIFIED: ICD-10-CM

## 2025-02-21 PROCEDURE — 99205 OFFICE O/P NEW HI 60 MIN: CPT

## 2025-03-12 ENCOUNTER — RESULT REVIEW (OUTPATIENT)
Age: 65
End: 2025-03-12

## 2025-03-12 ENCOUNTER — APPOINTMENT (OUTPATIENT)
Dept: HEMATOLOGY ONCOLOGY | Facility: CLINIC | Age: 65
End: 2025-03-12
Payer: COMMERCIAL

## 2025-03-12 ENCOUNTER — NON-APPOINTMENT (OUTPATIENT)
Age: 65
End: 2025-03-12

## 2025-03-12 VITALS
WEIGHT: 129.98 LBS | HEART RATE: 90 BPM | TEMPERATURE: 97.9 F | OXYGEN SATURATION: 97 % | BODY MASS INDEX: 25.19 KG/M2 | SYSTOLIC BLOOD PRESSURE: 116 MMHG | RESPIRATION RATE: 16 BRPM | DIASTOLIC BLOOD PRESSURE: 81 MMHG

## 2025-03-12 DIAGNOSIS — C57.00 MALIGNANT NEOPLASM OF UNSPECIFIED FALLOPIAN TUBE: ICD-10-CM

## 2025-03-12 LAB
BASOPHILS # BLD AUTO: 0.07 K/UL — SIGNIFICANT CHANGE UP (ref 0–0.2)
BASOPHILS NFR BLD AUTO: 0.6 % — SIGNIFICANT CHANGE UP (ref 0–2)
EOSINOPHIL # BLD AUTO: 0.03 K/UL — SIGNIFICANT CHANGE UP (ref 0–0.5)
EOSINOPHIL NFR BLD AUTO: 0.2 % — SIGNIFICANT CHANGE UP (ref 0–6)
HCT VFR BLD CALC: 31.2 % — LOW (ref 34.5–45)
HGB BLD-MCNC: 9.5 G/DL — LOW (ref 11.5–15.5)
IMM GRANULOCYTES NFR BLD AUTO: 4.5 % — HIGH (ref 0–0.9)
LYMPHOCYTES # BLD AUTO: 0.54 K/UL — LOW (ref 1–3.3)
LYMPHOCYTES # BLD AUTO: 4.4 % — LOW (ref 13–44)
MCHC RBC-ENTMCNC: 29.1 PG — SIGNIFICANT CHANGE UP (ref 27–34)
MCHC RBC-ENTMCNC: 30.4 G/DL — LOW (ref 32–36)
MCV RBC AUTO: 95.7 FL — SIGNIFICANT CHANGE UP (ref 80–100)
MONOCYTES # BLD AUTO: 1.28 K/UL — HIGH (ref 0–0.9)
MONOCYTES NFR BLD AUTO: 10.4 % — SIGNIFICANT CHANGE UP (ref 2–14)
NEUTROPHILS # BLD AUTO: 9.86 K/UL — HIGH (ref 1.8–7.4)
NEUTROPHILS NFR BLD AUTO: 79.9 % — HIGH (ref 43–77)
NRBC BLD AUTO-RTO: 0 /100 WBCS — SIGNIFICANT CHANGE UP (ref 0–0)
PLATELET # BLD AUTO: 419 K/UL — HIGH (ref 150–400)
RBC # BLD: 3.26 M/UL — LOW (ref 3.8–5.2)
RBC # FLD: 18.7 % — HIGH (ref 10.3–14.5)
WBC # BLD: 12.34 K/UL — HIGH (ref 3.8–10.5)
WBC # FLD AUTO: 12.34 K/UL — HIGH (ref 3.8–10.5)

## 2025-03-12 PROCEDURE — 99214 OFFICE O/P EST MOD 30 MIN: CPT

## 2025-03-14 LAB
ALBUMIN SERPL ELPH-MCNC: 2.9 G/DL
ALP BLD-CCNC: 140 U/L
ALT SERPL-CCNC: 11 U/L
ANION GAP SERPL CALC-SCNC: 9 MMOL/L
AST SERPL-CCNC: 28 U/L
BILIRUB SERPL-MCNC: 0.2 MG/DL
BUN SERPL-MCNC: 18 MG/DL
CALCIUM SERPL-MCNC: 8.5 MG/DL
CANCER AG125 SERPL-ACNC: 4120 U/ML
CHLORIDE SERPL-SCNC: 99 MMOL/L
CO2 SERPL-SCNC: 25 MMOL/L
CREAT SERPL-MCNC: 1.12 MG/DL
EGFRCR SERPLBLD CKD-EPI 2021: 56 ML/MIN/1.73M2
GLUCOSE SERPL-MCNC: 103 MG/DL
MAGNESIUM SERPL-MCNC: 1.9 MG/DL
POTASSIUM SERPL-SCNC: 5.7 MMOL/L
PROT SERPL-MCNC: 5.2 G/DL
SODIUM SERPL-SCNC: 133 MMOL/L

## 2025-03-17 ENCOUNTER — APPOINTMENT (OUTPATIENT)
Dept: PEDIATRIC ALLERGY IMMUNOLOGY | Facility: CLINIC | Age: 65
End: 2025-03-17
Payer: COMMERCIAL

## 2025-03-17 VITALS
WEIGHT: 129.98 LBS | HEART RATE: 79 BPM | HEIGHT: 61 IN | DIASTOLIC BLOOD PRESSURE: 70 MMHG | OXYGEN SATURATION: 90 % | SYSTOLIC BLOOD PRESSURE: 108 MMHG | BODY MASS INDEX: 24.54 KG/M2

## 2025-03-17 DIAGNOSIS — Z88.0 ALLERGY STATUS TO PENICILLIN: ICD-10-CM

## 2025-03-17 DIAGNOSIS — Z01.89 ENCOUNTER FOR OTHER SPECIFIED SPECIAL EXAMINATIONS: ICD-10-CM

## 2025-03-17 DIAGNOSIS — T45.1X5A ADVERSE EFFECT OF ANTINEOPLASTIC AND IMMUNOSUPPRESSIVE DRUGS, INITIAL ENCOUNTER: ICD-10-CM

## 2025-03-17 DIAGNOSIS — Z87.892 PERSONAL HISTORY OF ANAPHYLAXIS: ICD-10-CM

## 2025-03-17 PROCEDURE — 95076 INGEST CHALLENGE INI 120 MIN: CPT

## 2025-03-17 PROCEDURE — 99205 OFFICE O/P NEW HI 60 MIN: CPT | Mod: 25

## 2025-03-17 PROCEDURE — 95018 ALL TSTG PERQ&IQ DRUGS/BIOL: CPT

## 2025-03-17 RX ORDER — HYDROMORPHONE HYDROCHLORIDE 8 MG/1
TABLET ORAL
Refills: 0 | Status: ACTIVE | COMMUNITY

## 2025-03-19 LAB — TRYPTASE: 7.9 UG/L

## 2025-03-20 PROBLEM — Z01.89 ENCOUNTER FOR DRUG CHALLENGE: Status: ACTIVE | Noted: 2025-03-20

## 2025-03-20 LAB — IL6 SERPL-MCNC: 48.4 PG/ML

## 2025-03-20 RX ORDER — CETIRIZINE HYDROCHLORIDE 10 MG/1
10 TABLET, COATED ORAL
Qty: 1 | Refills: 0 | Status: ACTIVE | COMMUNITY
Start: 2025-03-20 | End: 1900-01-01

## 2025-03-20 RX ORDER — MONTELUKAST 10 MG/1
10 TABLET, FILM COATED ORAL
Qty: 15 | Refills: 1 | Status: ACTIVE | COMMUNITY
Start: 2025-03-20 | End: 1900-01-01

## 2025-03-24 RX ORDER — CARBOPLATIN 10 MG/ML
257 INJECTION, SOLUTION INTRAVENOUS ONCE
Refills: 0 | Status: COMPLETED | OUTPATIENT
Start: 2025-03-25 | End: 2025-03-25

## 2025-03-24 RX ORDER — DIPHENHYDRAMINE HCL 12.5MG/5ML
50 ELIXIR ORAL ONCE
Refills: 0 | Status: DISCONTINUED | OUTPATIENT
Start: 2025-03-25 | End: 2025-04-08

## 2025-03-24 RX ORDER — DIPHENHYDRAMINE HCL 12.5MG/5ML
50 ELIXIR ORAL ONCE
Refills: 0 | Status: DISCONTINUED | OUTPATIENT
Start: 2025-03-25 | End: 2025-04-01

## 2025-03-24 RX ORDER — MONTELUKAST SODIUM 10 MG/1
10 TABLET ORAL ONCE
Refills: 0 | Status: COMPLETED | OUTPATIENT
Start: 2025-03-25 | End: 2025-03-25

## 2025-03-24 RX ORDER — LORAZEPAM 4 MG/ML
1 VIAL (ML) INJECTION ONCE
Refills: 0 | Status: DISCONTINUED | OUTPATIENT
Start: 2025-03-25 | End: 2025-04-01

## 2025-03-24 RX ORDER — METOCLOPRAMIDE HCL 10 MG
10 TABLET ORAL ONCE
Refills: 0 | Status: COMPLETED | OUTPATIENT
Start: 2025-03-25 | End: 2025-03-25

## 2025-03-24 RX ORDER — ONDANSETRON HCL/PF 4 MG/2 ML
16 VIAL (ML) INJECTION ONCE
Refills: 0 | Status: COMPLETED | OUTPATIENT
Start: 2025-03-25 | End: 2025-03-25

## 2025-03-24 RX ORDER — ALBUTEROL SULFATE 2.5 MG/3ML
2.5 VIAL, NEBULIZER (ML) INHALATION ONCE
Refills: 0 | Status: DISCONTINUED | OUTPATIENT
Start: 2025-03-25 | End: 2025-04-08

## 2025-03-24 RX ORDER — FOSAPREPITANT 150 MG/5ML
150 INJECTION, POWDER, LYOPHILIZED, FOR SOLUTION INTRAVENOUS ONCE
Refills: 0 | Status: COMPLETED | OUTPATIENT
Start: 2025-03-25 | End: 2025-03-25

## 2025-03-24 RX ORDER — DEXAMETHASONE 0.5 MG/1
8 TABLET ORAL ONCE
Refills: 0 | Status: COMPLETED | OUTPATIENT
Start: 2025-03-25 | End: 2025-03-25

## 2025-03-24 RX ORDER — GLUCAGON 3 MG/1
2 POWDER NASAL ONCE
Refills: 0 | Status: DISCONTINUED | OUTPATIENT
Start: 2025-03-25 | End: 2025-04-08

## 2025-03-24 RX ORDER — GLUCAGON 3 MG/1
1 POWDER NASAL ONCE
Refills: 0 | Status: DISCONTINUED | OUTPATIENT
Start: 2025-03-25 | End: 2025-04-08

## 2025-03-25 ENCOUNTER — INPATIENT (INPATIENT)
Facility: HOSPITAL | Age: 65
LOS: 13 days | Discharge: HOSPICE | End: 2025-04-08
Attending: INTERNAL MEDICINE | Admitting: INTERNAL MEDICINE
Payer: COMMERCIAL

## 2025-03-25 ENCOUNTER — RESULT REVIEW (OUTPATIENT)
Age: 65
End: 2025-03-25

## 2025-03-25 VITALS
DIASTOLIC BLOOD PRESSURE: 75 MMHG | SYSTOLIC BLOOD PRESSURE: 105 MMHG | HEART RATE: 84 BPM | RESPIRATION RATE: 18 BRPM | OXYGEN SATURATION: 88 %

## 2025-03-25 DIAGNOSIS — Z93.3 COLOSTOMY STATUS: Chronic | ICD-10-CM

## 2025-03-25 DIAGNOSIS — Z98.89 OTHER SPECIFIED POSTPROCEDURAL STATES: Chronic | ICD-10-CM

## 2025-03-25 DIAGNOSIS — Z98.890 OTHER SPECIFIED POSTPROCEDURAL STATES: Chronic | ICD-10-CM

## 2025-03-25 DIAGNOSIS — C57.00 MALIGNANT NEOPLASM OF UNSPECIFIED FALLOPIAN TUBE: ICD-10-CM

## 2025-03-25 DIAGNOSIS — R09.89 OTHER SPECIFIED SYMPTOMS AND SIGNS INVOLVING THE CIRCULATORY AND RESPIRATORY SYSTEMS: ICD-10-CM

## 2025-03-25 DIAGNOSIS — Z90.710 ACQUIRED ABSENCE OF BOTH CERVIX AND UTERUS: Chronic | ICD-10-CM

## 2025-03-25 LAB
ADD ON TEST-SPECIMEN IN LAB: SIGNIFICANT CHANGE UP
ALBUMIN SERPL ELPH-MCNC: 2.2 G/DL — LOW (ref 3.3–5)
ALP SERPL-CCNC: 140 U/L — HIGH (ref 40–120)
ALT FLD-CCNC: 7 U/L — SIGNIFICANT CHANGE UP (ref 4–33)
ANION GAP SERPL CALC-SCNC: 9 MMOL/L — SIGNIFICANT CHANGE UP (ref 7–14)
APPEARANCE UR: CLEAR — SIGNIFICANT CHANGE UP
APTT BLD: 28.4 SEC — SIGNIFICANT CHANGE UP (ref 24.5–35.6)
AST SERPL-CCNC: 23 U/L — SIGNIFICANT CHANGE UP (ref 4–32)
BACTERIA # UR AUTO: NEGATIVE /HPF — SIGNIFICANT CHANGE UP
BILIRUB SERPL-MCNC: <0.2 MG/DL — SIGNIFICANT CHANGE UP (ref 0.2–1.2)
BILIRUB UR-MCNC: NEGATIVE — SIGNIFICANT CHANGE UP
BUN SERPL-MCNC: 21 MG/DL — SIGNIFICANT CHANGE UP (ref 7–23)
CALCIUM SERPL-MCNC: 8.2 MG/DL — LOW (ref 8.4–10.5)
CAST: 4 /LPF — SIGNIFICANT CHANGE UP (ref 0–4)
CHLORIDE SERPL-SCNC: 102 MMOL/L — SIGNIFICANT CHANGE UP (ref 98–107)
CO2 SERPL-SCNC: 23 MMOL/L — SIGNIFICANT CHANGE UP (ref 22–31)
COLOR SPEC: YELLOW — SIGNIFICANT CHANGE UP
CREAT SERPL-MCNC: 1.18 MG/DL — SIGNIFICANT CHANGE UP (ref 0.5–1.3)
DIFF PNL FLD: ABNORMAL
EGFR: 52 ML/MIN/1.73M2 — LOW
EGFR: 52 ML/MIN/1.73M2 — LOW
GAS PNL BLDV: SIGNIFICANT CHANGE UP
GLUCOSE SERPL-MCNC: 100 MG/DL — HIGH (ref 70–99)
GLUCOSE UR QL: NEGATIVE MG/DL — SIGNIFICANT CHANGE UP
HCT VFR BLD CALC: 28.6 % — LOW (ref 34.5–45)
HGB BLD-MCNC: 8.7 G/DL — LOW (ref 11.5–15.5)
INR BLD: 1.03 RATIO — SIGNIFICANT CHANGE UP (ref 0.85–1.16)
KETONES UR-MCNC: NEGATIVE MG/DL — SIGNIFICANT CHANGE UP
LEUKOCYTE ESTERASE UR-ACNC: ABNORMAL
MAGNESIUM SERPL-MCNC: 1.9 MG/DL — SIGNIFICANT CHANGE UP (ref 1.6–2.6)
MCHC RBC-ENTMCNC: 29 PG — SIGNIFICANT CHANGE UP (ref 27–34)
MCHC RBC-ENTMCNC: 30.4 G/DL — LOW (ref 32–36)
MCV RBC AUTO: 95.3 FL — SIGNIFICANT CHANGE UP (ref 80–100)
NITRITE UR-MCNC: NEGATIVE — SIGNIFICANT CHANGE UP
NRBC # BLD AUTO: 0 K/UL — SIGNIFICANT CHANGE UP (ref 0–0)
NRBC # FLD: 0 K/UL — SIGNIFICANT CHANGE UP (ref 0–0)
NRBC BLD AUTO-RTO: 0 /100 WBCS — SIGNIFICANT CHANGE UP (ref 0–0)
PH UR: 6.5 — SIGNIFICANT CHANGE UP (ref 5–8)
PHOSPHATE SERPL-MCNC: 3.7 MG/DL — SIGNIFICANT CHANGE UP (ref 2.5–4.5)
PLATELET # BLD AUTO: 408 K/UL — HIGH (ref 150–400)
POTASSIUM SERPL-MCNC: 4.3 MMOL/L — SIGNIFICANT CHANGE UP (ref 3.5–5.3)
POTASSIUM SERPL-SCNC: 4.3 MMOL/L — SIGNIFICANT CHANGE UP (ref 3.5–5.3)
PROT SERPL-MCNC: 5 G/DL — LOW (ref 6–8.3)
PROT UR-MCNC: 100 MG/DL
PROTHROM AB SERPL-ACNC: 12 SEC — SIGNIFICANT CHANGE UP (ref 9.9–13.4)
RBC # BLD: 3 M/UL — LOW (ref 3.8–5.2)
RBC # FLD: 18.1 % — HIGH (ref 10.3–14.5)
RBC CASTS # UR COMP ASSIST: 2 /HPF — SIGNIFICANT CHANGE UP (ref 0–4)
REVIEW: SIGNIFICANT CHANGE UP
SODIUM SERPL-SCNC: 134 MMOL/L — LOW (ref 135–145)
SP GR SPEC: 1.01 — SIGNIFICANT CHANGE UP (ref 1–1.03)
SQUAMOUS # UR AUTO: 2 /HPF — SIGNIFICANT CHANGE UP (ref 0–5)
UROBILINOGEN FLD QL: 0.2 MG/DL — SIGNIFICANT CHANGE UP (ref 0.2–1)
WBC # BLD: 10.17 K/UL — SIGNIFICANT CHANGE UP (ref 3.8–10.5)
WBC # FLD AUTO: 10.17 K/UL — SIGNIFICANT CHANGE UP (ref 3.8–10.5)
WBC UR QL: 4 /HPF — SIGNIFICANT CHANGE UP (ref 0–5)

## 2025-03-25 PROCEDURE — 76376 3D RENDER W/INTRP POSTPROCES: CPT | Mod: 26

## 2025-03-25 PROCEDURE — 71045 X-RAY EXAM CHEST 1 VIEW: CPT | Mod: 26

## 2025-03-25 PROCEDURE — 93356 MYOCRD STRAIN IMG SPCKL TRCK: CPT

## 2025-03-25 PROCEDURE — 71250 CT THORAX DX C-: CPT | Mod: 26

## 2025-03-25 PROCEDURE — 76604 US EXAM CHEST: CPT | Mod: 26,GC

## 2025-03-25 PROCEDURE — 93306 TTE W/DOPPLER COMPLETE: CPT | Mod: 26

## 2025-03-25 PROCEDURE — 99291 CRITICAL CARE FIRST HOUR: CPT | Mod: GC

## 2025-03-25 RX ORDER — HYDROMORPHONE/SOD CHLOR,ISO/PF 2 MG/10 ML
4 SYRINGE (ML) INJECTION EVERY 4 HOURS
Refills: 0 | Status: DISCONTINUED | OUTPATIENT
Start: 2025-03-25 | End: 2025-03-30

## 2025-03-25 RX ORDER — MONTELUKAST SODIUM 10 MG/1
10 TABLET ORAL ONCE
Refills: 0 | Status: DISCONTINUED | OUTPATIENT
Start: 2025-03-25 | End: 2025-03-25

## 2025-03-25 RX ORDER — ESCITALOPRAM OXALATE 20 MG/1
10 TABLET ORAL DAILY
Refills: 0 | Status: DISCONTINUED | OUTPATIENT
Start: 2025-03-25 | End: 2025-04-08

## 2025-03-25 RX ORDER — SIMETHICONE 80 MG
80 TABLET,CHEWABLE ORAL ONCE
Refills: 0 | Status: COMPLETED | OUTPATIENT
Start: 2025-03-25 | End: 2025-03-25

## 2025-03-25 RX ORDER — CYANOCOBALAMIN 1000 UG/ML
1000 INJECTION INTRAMUSCULAR; SUBCUTANEOUS DAILY
Refills: 0 | Status: DISCONTINUED | OUTPATIENT
Start: 2025-03-25 | End: 2025-04-08

## 2025-03-25 RX ORDER — LORAZEPAM 4 MG/ML
0.5 VIAL (ML) INJECTION ONCE
Refills: 0 | Status: DISCONTINUED | OUTPATIENT
Start: 2025-03-25 | End: 2025-03-25

## 2025-03-25 RX ORDER — INFLUENZA A VIRUS A/IDAHO/07/2018 (H1N1) ANTIGEN (MDCK CELL DERIVED, PROPIOLACTONE INACTIVATED, INFLUENZA A VIRUS A/INDIANA/08/2018 (H3N2) ANTIGEN (MDCK CELL DERIVED, PROPIOLACTONE INACTIVATED), INFLUENZA B VIRUS B/SINGAPORE/INFTT-16-0610/2016 ANTIGEN (MDCK CELL DERIVED, PROPIOLACTONE INACTIVATED), INFLUENZA B VIRUS B/IOWA/06/2017 ANTIGEN (MDCK CELL DERIVED, PROPIOLACTONE INACTIVATED) 15; 15; 15; 15 UG/.5ML; UG/.5ML; UG/.5ML; UG/.5ML
0.5 INJECTION, SUSPENSION INTRAMUSCULAR ONCE
Refills: 0 | Status: DISCONTINUED | OUTPATIENT
Start: 2025-03-25 | End: 2025-04-01

## 2025-03-25 RX ORDER — ACETAMINOPHEN 500 MG/5ML
1000 LIQUID (ML) ORAL ONCE
Refills: 0 | Status: COMPLETED | OUTPATIENT
Start: 2025-03-25 | End: 2025-03-25

## 2025-03-25 RX ORDER — DIPHENHYDRAMINE HCL 12.5MG/5ML
25 ELIXIR ORAL ONCE
Refills: 0 | Status: COMPLETED | OUTPATIENT
Start: 2025-03-25 | End: 2025-03-25

## 2025-03-25 RX ADMIN — Medication 400 MILLIGRAM(S): at 09:09

## 2025-03-25 RX ADMIN — FOSAPREPITANT 300 MILLIGRAM(S): 150 INJECTION, POWDER, LYOPHILIZED, FOR SOLUTION INTRAVENOUS at 12:06

## 2025-03-25 RX ADMIN — Medication 116 MILLIGRAM(S): at 12:06

## 2025-03-25 RX ADMIN — Medication 20 MILLIGRAM(S): at 12:25

## 2025-03-25 RX ADMIN — Medication 10 MILLIGRAM(S): at 12:24

## 2025-03-25 RX ADMIN — Medication 100 MILLILITER(S): at 12:06

## 2025-03-25 RX ADMIN — CYANOCOBALAMIN 1000 MICROGRAM(S): 1000 INJECTION INTRAMUSCULAR; SUBCUTANEOUS at 17:59

## 2025-03-25 RX ADMIN — CARBOPLATIN 257 MILLIGRAM(S): 10 INJECTION, SOLUTION INTRAVENOUS at 13:25

## 2025-03-25 RX ADMIN — Medication 10 MILLIGRAM(S): at 16:07

## 2025-03-25 RX ADMIN — Medication 25 MILLIGRAM(S): at 12:54

## 2025-03-25 RX ADMIN — MONTELUKAST SODIUM 10 MILLIGRAM(S): 10 TABLET ORAL at 12:24

## 2025-03-25 RX ADMIN — Medication 1000 MILLIGRAM(S): at 09:35

## 2025-03-25 RX ADMIN — Medication 4 MILLIGRAM(S): at 22:17

## 2025-03-25 RX ADMIN — Medication 250 MILLILITER(S): at 16:08

## 2025-03-25 RX ADMIN — Medication 4 MILLIGRAM(S): at 17:59

## 2025-03-25 RX ADMIN — Medication 80 MILLIGRAM(S): at 19:55

## 2025-03-25 RX ADMIN — Medication 10 MILLIGRAM(S): at 12:25

## 2025-03-25 RX ADMIN — DEXAMETHASONE 101.6 MILLIGRAM(S): 0.5 TABLET ORAL at 12:06

## 2025-03-25 NOTE — H&P ADULT - NSICDXPASTSURGICALHX_GEN_ALL_CORE_FT
OFFICE VISIT      CHIEF COMPLAINT    Patient presents with    HPI     Eye Exam     Additional comments: 66 year old presents for a yag Right Eye today and Left Eye next week. Denies any pain or irritition. Right Eye is slightly more blurred.                  HISTORY OF PRESENT ILLNESS    She is here today for yag Right Eye treatment of posterior capsular opacity.  She is just over 3 months postop cataract removal and lens implantation and has residual posterior capsular opacity which is visually significant and interferes with activities of daily living including seeing details at near, television, distance such as signs.  She was referred by Dr. Ciaran Mandujano.  Nazia was unable to improve her vision with glasses due to the secondary cataracts.    I have reviewed the past medical, family and social history sections including the medications and allergies listed in the above medical record as well as the nursing notes.     REVIEW OF SYSTEMS      Eye Problem(s):visual blurring and secondary cataract  ENT Problem(s):negative  Cardiovascular problem(s):negative  Respiratory problem(s):negative  Gastro-intestinal problem(s):negative GI  Genito-urinary problem(s):negative  Musculoskeletal problem(s):negative  Integumentary problem(s):negative  Neurological problem(s):negative  Psychiatric problem(s):negative  Endocrine problem(s):negative  Hematologic and/or Lymphatic problem(s):negative      PHYSICAL EXAM      Visual Acuity       Right Left    Dist sc 20/40 20/25          Main Ophthalmology Exam     External Exam       Right Left    External Normal Normal          Slit Lamp Exam       Right Left    Lids/Lashes 1+ Meibomian gland dysfunction, Telangiectasia 1+ Meibomian gland dysfunction, Telangiectasia    Conjunctiva/Sclera Cyst Cyst    Cornea Clear corneal incision Clear corneal incision    Anterior Chamber Deep and quiet Deep and quiet    Iris Round and regular Round and regular; (-) NVI    Lens Centered posterior  chamber intraocular lens, gr.1 central PCO, mild anterior capsular phimosis Centered posterior chamber intraocular lens, gr.1 central PCO, mild anterior capsular phimosis    Anterior Vitreous Vitreous syneresis Vitreous syneresis          Fundus Exam       Right Left    Disc Flat, pink with a healthy rim; (-) NVD/hemes; (+) SVP; temporal PPA Flat, pink with a healthy rim; (-) NVD/hemes    C/D Ratio Vertical 0.3 0.2    C/D Ratio Horizontal 0.3 0.2    Macula Healthy with sharp foveal reflex Healthy with sharp foveal reflex    Vessels Healthy with normal Artery-Vein ratio Healthy with normal Artery-Vein ratio    Periphery Flat, healthy, without tears or detachments Flat, healthy, without tears or detachments                  ASSESSMENT:    1. PCO (posterior capsular opacification), bilateral    2. Bilateral pseudophakia      Orders Placed This Encounter   • neomycin-polymyxin B-dexamethasone (MAXITROL) 3.5-98558-0.1 ophthalmic suspension     Sig: Place 1 drop into right eye 4 times daily for 6 days.     Dispense:  10 mL     Refill:  0        COMMENT:  Dilated exam today. Pseudophakia, well centered opacified posterior capsule, visually significant in both eyes. Advise Yag laser posterior capsulotomy.  This patient is currently dissatisfied with her vision and functioning which is limited by the opacified posterior capsules in each eye.  Dr. Mandujano could not improve her vision with glasses.  We advised beginning with the right eye today and the left eye in about a week.  We discussed the findings with the patient and her symptoms.  We discussed the treatment.  We discussed treatment risks, benefits, hazards, alternatives, complications, and expectations.  She voiced good understanding of our discussion.  She signed consent forms for both eyes. PROCEDURE NOTE:    Diagnosis is secondary cataract right eye with decreased vision as above.      PROCEDURE:    Laser Posterior Capsulotomy right eye.    Surgeon:  Nitesh Barnes,  MD.  Complications:  None.  Specimens:  None.   Hernando YAG capsulotomy lens: 5.0 mJ X 12 pulses = 60 mJ total.  Well tolerated by patient.  Followup within 2 weeks.  Postoperative medication prescribed by electronic medical records.    Operative finding was moderate secondary cataract.  Preop drops were brimonidine 0.2% X 2 and anesthetic.        PLAN:  Follow up exam in 1, week.    Please refer to full Ophthalmic Exam within the encounter for additional information.    The patient indicates understanding of these issues and agrees with the plan.            PAST SURGICAL HISTORY:  H/O total hysterectomy     H/O ureteroscopy B/L stent placement    S/P abdominoplasty     S/P bunionectomy bilateral    S/P  x 3 , ,     S/P colostomy 2024    S/P D&C (status post dilation and curettage)

## 2025-03-25 NOTE — H&P ADULT - NSHPLABSRESULTS_GEN_ALL_CORE
.  LABS:                     RADIOLOGY, EKG & ADDITIONAL TESTS: Reviewed. .  LABS:                         8.7    10.17 )-----------( 408      ( 25 Mar 2025 11:00 )             28.6         134[L]  |  102  |  21  ----------------------------<  100[H]  4.3   |  23  |  1.18    Ca    8.2[L]      25 Mar 2025 11:00  Phos  3.7       Mg     1.90         TPro  5.0[L]  /  Alb  2.2[L]  /  TBili  <0.2  /  DBili  x   /  AST  23  /  ALT  7   /  AlkPhos  140[H]  25    PT/INR - ( 25 Mar 2025 11:00 )   PT: 12.0 sec;   INR: 1.03 ratio         PTT - ( 25 Mar 2025 11:00 )  PTT:28.4 sec  Urinalysis Basic - ( 25 Mar 2025 12:15 )    Color: Yellow / Appearance: Clear / S.011 / pH: x  Gluc: x / Ketone: Negative mg/dL  / Bili: Negative / Urobili: 0.2 mg/dL   Blood: x / Protein: 100 mg/dL / Nitrite: Negative   Leuk Esterase: Small / RBC: 2 /HPF / WBC 4 /HPF   Sq Epi: x / Non Sq Epi: 2 /HPF / Bacteria: Negative /HPF            RADIOLOGY, EKG & ADDITIONAL TESTS: Reviewed.

## 2025-03-25 NOTE — CHART NOTE - NSCHARTNOTEFT_GEN_A_CORE
: Yue Mckeon     INDICATION: AHRF    PROCEDURE:   [x ] LIMITED CHEST      FINDINGS:   Bilateral pleural effusion   Simple pleural effusion on the Rt side  Loculated pleural effusion [multiple septation] noted on the left  bilateral B lines noted         Supervised by Dr. Albert  Should not be considered final until signed by attending. : Yue Mckeon     INDICATION: AHRF    PROCEDURE:   [x ] LIMITED CHEST      FINDINGS:   Bilateral pleural effusion   Simple pleural effusion on the Rt side  Loculated pleural effusion [multiple septation] noted on the left  bilateral B lines noted         Supervised by Dr. Albert  Should not be considered final until signed by attending    Attending note :  I was present for the duration of the procedure and supervised as needed.

## 2025-03-25 NOTE — H&P ADULT - NSICDXPASTMEDICALHX_GEN_ALL_CORE_FT
PAST MEDICAL HISTORY:  Anxiety     Cancer of fallopian tube     Lower back pain     Major depression     Ovarian ca continues target chemotherapy at this time 4/24    Vertigo

## 2025-03-25 NOTE — H&P ADULT - ASSESSMENT
63 year old female PMH: Anxiety Chronic back pain, Depression, Ovarian ca and Vertigo mass effect adjacent to distal colon w/o obstruction, mass effect on distal R ureter s/p ureterl stent now s/p colostomy c/b bleeding now off AC,  Iliac vein occlusion status post stent presents for desensitization of carboplatin.  63 year old female PMH: Anxiety Chronic back pain, Depression, Ovarian ca and Vertigo mass effect adjacent to distal colon w/o obstruction, mass effect on distal R ureter s/p ureterl stent now s/p colostomy c/b bleeding now off AC,  Iliac vein occlusion status post stent presents for desensitization of carboplatin.       #Neuro  AOx3 no active issues; fatigued. Has good insight    #Cardiovascular  New worsening SOB. Denies any CP/Palpitations. Will obtain EKG  -f/u TTE    #Respiratory  New O2 requirement; will obtain CXR to see if any effusion. Prior CT chest at OSH with pulmonary spread and enlarging hilar lymphadenopathy  -will obtain OSH report; will obtain new CT chest as she did not require oxygen then  -will monitor off abx, low concern for any infectious processes but low threshold to obtain full infectious w/u if patient spikes a fever    #GI/Nutrition  Pt with multiple surgeries and now with mass effect from ehr ovarian cancer. She is s/p diverting colostomy with some leakage noted around the bag  -will reach out to ostomy RN for help with leakage  -No signs of infection noted      #/Renal  Pt voiding well; bladder scan wnl. She is s/p ureteral stent due to mass effect  -will trend Scr and Is and Os      #Skin  No active issues    #ID  No active issues but will CTM    #Endocrine  No active issues but will CTM    #Hematologic/DVT ppx  Pt here for carboplatin desensitization; has been trialed with multiple chemo medications in the past with a poor response. (refer to OP heme onc notes)  -will perform as protocol; chemotherapy nurse at bedside; orders placed    #Ethics/ICU Bundle  - GOC/Code Status: Full for now; pending  - Diet: regular  - Access: PIV  - Tubes/Drains: Ostomy bag  - Activity/PT/OT: pending

## 2025-03-25 NOTE — H&P ADULT - NSHPPHYSICALEXAM_GEN_ALL_CORE
T(C): 36.7 (03-25-25 @ 08:00), Max: 36.7 (03-25-25 @ 08:00)  HR: 86 (03-25-25 @ 10:00) (83 - 86)  BP: 103/76 (03-25-25 @ 10:00) (102/74 - 106/67)  RR: 20 (03-25-25 @ 10:00) (13 - 20)  SpO2: 96% (03-25-25 @ 10:00) (88% - 96%)    CONSTITUTIONAL: Well groomed, no apparent distress  EYES: PERRLA and symmetric, EOMI, No conjunctival or scleral injection, non-icteric  ENMT: Oral mucosa with moist membranes. Normal dentition; no pharyngeal injection or exudates             NECK: Supple, symmetric and without tracheal deviation   RESP: No respiratory distress, no use of accessory muscles; CTA b/l, no WRR  CV: RRR, +S1S2, no MRG; no JVD; no peripheral edema  GI: Soft, NT, ND, no rebound, no guarding; no palpable masses; no hepatosplenomegaly; no hernia palpated  LYMPH: No cervical LAD or tenderness; no axillary LAD or tenderness; no inguinal LAD or tenderness  MSK: Normal gait; No digital clubbing or cyanosis;   SKIN: No rashes or ulcers noted; no subcutaneous nodules or induration palpable  NEURO: CN II-XII intact; sensation intact in upper and lower extremities b/l to light touch   PSYCH: Appropriate insight/judgment; A+O x 3, mood and affect appropriate, recent/remote memory intact T(C): 36.7 (03-25-25 @ 08:00), Max: 36.7 (03-25-25 @ 08:00)  HR: 86 (03-25-25 @ 10:00) (83 - 86)  BP: 103/76 (03-25-25 @ 10:00) (102/74 - 106/67)  RR: 20 (03-25-25 @ 10:00) (13 - 20)  SpO2: 96% (03-25-25 @ 10:00) (88% - 96%)    CONSTITUTIONAL: Well groomed, no apparent distress, +fatigued  EYES: PERRLA and symmetric, EOMI, No conjunctival or scleral injection, non-icteric  ENMT: Oral mucosa with moist membranes. Normal dentition; no pharyngeal injection or exudates   NECK: Supple, symmetric and without tracheal deviation   RESP: No respiratory distress, ++decreased lung sounds R Lower lobe. Trace crackles L lung entirely  CV: RRR, +S1S2, no MRG; no JVD; no peripheral edema  GI: Soft, ++distension ++ostomy bag with some associated leakage, non infected appearing  SKIN: No rashes or ulcers noted; no subcutaneous nodules or induration palpable  NEURO: CN II-XII intact; sensation intact in upper and lower extremities b/l to light touch   PSYCH: Appropriate insight/judgment; A+O x 3, mood and affect appropriate, recent/remote memory intact

## 2025-03-25 NOTE — H&P ADULT - ATTENDING COMMENTS
63 year old female PMH: Anxiety Chronic back pain, Depression, Ovarian ca and Vertigo mass effect adjacent to distal colon w/o obstruction, mass effect on distal R ureter s/p ureterl stent now s/p colostomy c/b bleeding now off AC,  Iliac vein occlusion status post stent presents for desensitization of carboplatin.     Patient here for desensitization. Reported have been sob for the last several weeks. Has a R sided PLEURX which has not been draining well was pending removal. On arrival sat o2 of 88.     # Chemodesensitization  # Metastatic Ovarian CA  # Chronic LE edema 2/2 to malignant obstruction  # acute hypoxemic respiratory failure  - Started on chemo-desensitization per protocol from   - Monitor for signs of reaction and anaphylaxis  - Airway monitoring. Epi by bedside  - Will check CXR, drain R pleurx, Was drained yesterday  - Requiring O2 prior to start of chemo. Will wean as tolerated.   - DVT ppx - Encourage ambulation   - Dispo full cdoe.

## 2025-03-25 NOTE — H&P ADULT - NSHPREVIEWOFSYSTEMS_GEN_ALL_CORE
REVIEW OF SYSTEMS:  CONSTITUTIONAL: ++weakness, fevers, ++chills, no sick contacts, ++unintended weight loss  EYES: No visual changes or vertigo  ENT: No throat pain, rhinorrhea, or hearing loss   NECK: No pain or stiffness  RESPIRATORY: No cough, wheezing, hemoptysis; ++ shortness of breath  CARDIOVASCULAR: No chest pain or palpitations  GASTROINTESTINAL:++abdominal pain ++swelling   GENITOURINARY: ++dysuria, no frequency or hematuria  NEUROLOGICAL: No numbness or weakness  SKIN: No itching, rashes, or bruises  Psych: Good mood, no substance use

## 2025-03-25 NOTE — H&P ADULT - HISTORY OF PRESENT ILLNESS
63 year old female PMH: Anxiety Chronic back pain, Depression, Ovarian ca and Vertigo mass effect adjacent to distal colon w/o obstruction, mass effect on distal R ureter now s/p colostomy c/b bleeding on xarelto due to DVT presents for desensitization of carboplatin. Patient still endorses  63 year old female PMH: Anxiety Chronic back pain, Depression, Ovarian ca and Vertigo mass effect adjacent to distal colon w/o obstruction, mass effect on distal R ureter s/p ureterl stent now s/p colostomy c/b bleeding now off AC,  Iliac vein occlusion status post stent presents for desensitization of carboplatin. Patient endorses fatigue. She denies fevers but endorses chills. Also endorses abdominal pain and some colostomy leakage. Pt denies any bleeding currently. Endorses difficulty with BMs and urinating as well, states she feels burning when she goes.. Overall also endorses sob and has a lot of difficulty walking. 63 year old female PMH: Anxiety Chronic back pain, Depression, Ovarian ca and Vertigo mass effect adjacent to distal colon w/o obstruction, mass effect on distal R ureter s/p ureterl stent now s/p colostomy c/b bleeding now off AC,  Iliac vein occlusion status post stent presents for desensitization of carboplatin. Patient endorses fatigue. She denies fevers but endorses chills. Also endorses abdominal pain and some colostomy leakage. Pt denies any bleeding currently. Endorses difficulty with BMs and urinating as well, states she feels burning when she goes. Overall also endorses sob and has a lot of difficulty walking. Has noted worsening SOB over the past few weeks. She had a pleurx placed and has it drained every other day.  Patient presented to the MICU and required supplemental O2 as she was noted to have low O2 sats

## 2025-03-26 LAB
ALBUMIN SERPL ELPH-MCNC: 2.4 G/DL — LOW (ref 3.3–5)
ALP SERPL-CCNC: 148 U/L — HIGH (ref 40–120)
ALT FLD-CCNC: 7 U/L — SIGNIFICANT CHANGE UP (ref 4–33)
ANION GAP SERPL CALC-SCNC: 11 MMOL/L — SIGNIFICANT CHANGE UP (ref 7–14)
APTT BLD: 27.9 SEC — SIGNIFICANT CHANGE UP (ref 24.5–35.6)
AST SERPL-CCNC: 22 U/L — SIGNIFICANT CHANGE UP (ref 4–32)
BASOPHILS # BLD AUTO: 0.02 K/UL — SIGNIFICANT CHANGE UP (ref 0–0.2)
BASOPHILS NFR BLD AUTO: 0.2 % — SIGNIFICANT CHANGE UP (ref 0–2)
BILIRUB SERPL-MCNC: <0.2 MG/DL — SIGNIFICANT CHANGE UP (ref 0.2–1.2)
BLOOD GAS VENOUS COMPREHENSIVE RESULT: SIGNIFICANT CHANGE UP
BUN SERPL-MCNC: 22 MG/DL — SIGNIFICANT CHANGE UP (ref 7–23)
CA-I BLD-SCNC: 1.18 MMOL/L — SIGNIFICANT CHANGE UP (ref 1.15–1.29)
CALCIUM SERPL-MCNC: 8.4 MG/DL — SIGNIFICANT CHANGE UP (ref 8.4–10.5)
CHLORIDE SERPL-SCNC: 104 MMOL/L — SIGNIFICANT CHANGE UP (ref 98–107)
CO2 SERPL-SCNC: 21 MMOL/L — LOW (ref 22–31)
CREAT SERPL-MCNC: 1.08 MG/DL — SIGNIFICANT CHANGE UP (ref 0.5–1.3)
EGFR: 58 ML/MIN/1.73M2 — LOW
EGFR: 58 ML/MIN/1.73M2 — LOW
EOSINOPHIL # BLD AUTO: 0 K/UL — SIGNIFICANT CHANGE UP (ref 0–0.5)
EOSINOPHIL NFR BLD AUTO: 0 % — SIGNIFICANT CHANGE UP (ref 0–6)
GLUCOSE SERPL-MCNC: 119 MG/DL — HIGH (ref 70–99)
HCT VFR BLD CALC: 31.2 % — LOW (ref 34.5–45)
HGB BLD-MCNC: 9.1 G/DL — LOW (ref 11.5–15.5)
IANC: 8.51 K/UL — HIGH (ref 1.8–7.4)
IMM GRANULOCYTES NFR BLD AUTO: 1.7 % — HIGH (ref 0–0.9)
INR BLD: 0.96 RATIO — SIGNIFICANT CHANGE UP (ref 0.85–1.16)
LYMPHOCYTES # BLD AUTO: 0.54 K/UL — LOW (ref 1–3.3)
LYMPHOCYTES # BLD AUTO: 5.7 % — LOW (ref 13–44)
MAGNESIUM SERPL-MCNC: 1.9 MG/DL — SIGNIFICANT CHANGE UP (ref 1.6–2.6)
MCHC RBC-ENTMCNC: 28.5 PG — SIGNIFICANT CHANGE UP (ref 27–34)
MCHC RBC-ENTMCNC: 29.2 G/DL — LOW (ref 32–36)
MCV RBC AUTO: 97.8 FL — SIGNIFICANT CHANGE UP (ref 80–100)
MONOCYTES # BLD AUTO: 0.18 K/UL — SIGNIFICANT CHANGE UP (ref 0–0.9)
MONOCYTES NFR BLD AUTO: 1.9 % — LOW (ref 2–14)
NEUTROPHILS # BLD AUTO: 8.51 K/UL — HIGH (ref 1.8–7.4)
NEUTROPHILS NFR BLD AUTO: 90.5 % — HIGH (ref 43–77)
NRBC # BLD AUTO: 0 K/UL — SIGNIFICANT CHANGE UP (ref 0–0)
NRBC # FLD: 0 K/UL — SIGNIFICANT CHANGE UP (ref 0–0)
NRBC BLD AUTO-RTO: 0 /100 WBCS — SIGNIFICANT CHANGE UP (ref 0–0)
PHOSPHATE SERPL-MCNC: 4.1 MG/DL — SIGNIFICANT CHANGE UP (ref 2.5–4.5)
PLATELET # BLD AUTO: 431 K/UL — HIGH (ref 150–400)
POTASSIUM SERPL-MCNC: 4.4 MMOL/L — SIGNIFICANT CHANGE UP (ref 3.5–5.3)
POTASSIUM SERPL-SCNC: 4.4 MMOL/L — SIGNIFICANT CHANGE UP (ref 3.5–5.3)
PROT SERPL-MCNC: 5.1 G/DL — LOW (ref 6–8.3)
PROTHROM AB SERPL-ACNC: 11.4 SEC — SIGNIFICANT CHANGE UP (ref 9.9–13.4)
RBC # BLD: 3.19 M/UL — LOW (ref 3.8–5.2)
RBC # FLD: 18.3 % — HIGH (ref 10.3–14.5)
SODIUM SERPL-SCNC: 136 MMOL/L — SIGNIFICANT CHANGE UP (ref 135–145)
WBC # BLD: 9.41 K/UL — SIGNIFICANT CHANGE UP (ref 3.8–10.5)
WBC # FLD AUTO: 9.41 K/UL — SIGNIFICANT CHANGE UP (ref 3.8–10.5)

## 2025-03-26 PROCEDURE — 76705 ECHO EXAM OF ABDOMEN: CPT | Mod: 26,GC

## 2025-03-26 PROCEDURE — 93321 DOPPLER ECHO F-UP/LMTD STD: CPT | Mod: 26,GC

## 2025-03-26 PROCEDURE — 76604 US EXAM CHEST: CPT | Mod: 26,GC

## 2025-03-26 PROCEDURE — 93308 TTE F-UP OR LMTD: CPT | Mod: 26,GC

## 2025-03-26 PROCEDURE — 99291 CRITICAL CARE FIRST HOUR: CPT | Mod: GC

## 2025-03-26 RX ORDER — BACITRACIN 500 UNIT/G
1 OINTMENT (GRAM) TOPICAL THREE TIMES A DAY
Refills: 0 | Status: DISCONTINUED | OUTPATIENT
Start: 2025-03-26 | End: 2025-03-27

## 2025-03-26 RX ORDER — ALTEPLASE 2.2 MG/2ML
10 INJECTION, POWDER, LYOPHILIZED, FOR SOLUTION INTRAVENOUS EVERY 12 HOURS
Refills: 0 | Status: DISCONTINUED | OUTPATIENT
Start: 2025-03-26 | End: 2025-03-26

## 2025-03-26 RX ORDER — SIMETHICONE 80 MG
80 TABLET,CHEWABLE ORAL THREE TIMES A DAY
Refills: 0 | Status: DISCONTINUED | OUTPATIENT
Start: 2025-03-26 | End: 2025-04-08

## 2025-03-26 RX ORDER — ALTEPLASE 2.2 MG/2ML
10 INJECTION, POWDER, LYOPHILIZED, FOR SOLUTION INTRAVENOUS EVERY 12 HOURS
Refills: 0 | Status: DISCONTINUED | OUTPATIENT
Start: 2025-03-26 | End: 2025-04-08

## 2025-03-26 RX ORDER — DORNASE ALFA 1 MG/ML
5 SOLUTION RESPIRATORY (INHALATION) EVERY 12 HOURS
Refills: 0 | Status: DISCONTINUED | OUTPATIENT
Start: 2025-03-26 | End: 2025-04-08

## 2025-03-26 RX ORDER — MAGNESIUM, ALUMINUM HYDROXIDE 200-200 MG
30 TABLET,CHEWABLE ORAL EVERY 8 HOURS
Refills: 0 | Status: DISCONTINUED | OUTPATIENT
Start: 2025-03-26 | End: 2025-03-26

## 2025-03-26 RX ADMIN — Medication 4 MILLIGRAM(S): at 22:22

## 2025-03-26 RX ADMIN — Medication 4 MILLIGRAM(S): at 09:54

## 2025-03-26 RX ADMIN — Medication 4 MILLIGRAM(S): at 06:45

## 2025-03-26 RX ADMIN — ALTEPLASE 10 MILLIGRAM(S): 2.2 INJECTION, POWDER, LYOPHILIZED, FOR SOLUTION INTRAVENOUS at 17:42

## 2025-03-26 RX ADMIN — Medication 4 MILLIGRAM(S): at 10:30

## 2025-03-26 RX ADMIN — Medication 1 APPLICATION(S): at 21:23

## 2025-03-26 RX ADMIN — DORNASE ALFA 5 MILLIGRAM(S): 1 SOLUTION RESPIRATORY (INHALATION) at 17:40

## 2025-03-26 RX ADMIN — Medication 80 MILLIGRAM(S): at 20:08

## 2025-03-26 RX ADMIN — Medication 80 MILLIGRAM(S): at 09:54

## 2025-03-26 RX ADMIN — Medication 4 MILLIGRAM(S): at 18:24

## 2025-03-26 RX ADMIN — Medication 4 MILLIGRAM(S): at 14:06

## 2025-03-26 RX ADMIN — CYANOCOBALAMIN 1000 MICROGRAM(S): 1000 INJECTION INTRAMUSCULAR; SUBCUTANEOUS at 12:01

## 2025-03-26 RX ADMIN — ESCITALOPRAM OXALATE 10 MILLIGRAM(S): 20 TABLET ORAL at 12:01

## 2025-03-26 RX ADMIN — Medication 4 MILLIGRAM(S): at 23:30

## 2025-03-26 RX ADMIN — Medication 4 MILLIGRAM(S): at 19:04

## 2025-03-26 RX ADMIN — Medication 4 MILLIGRAM(S): at 14:40

## 2025-03-26 RX ADMIN — Medication 1 APPLICATION(S): at 06:55

## 2025-03-26 RX ADMIN — Medication 1 APPLICATION(S): at 13:52

## 2025-03-26 RX ADMIN — Medication 4 MILLIGRAM(S): at 02:38

## 2025-03-26 NOTE — PROGRESS NOTE ADULT - SUBJECTIVE AND OBJECTIVE BOX
Shazia Snyder PGY3  pager [phone] or check resident tab for coverage    Patient is a 64y old  Female who presents with a chief complaint of     SUBJECTIVE / OVERNIGHT EVENTS: CT done overnight; patient able to tolerate chemo without any adverse effects    MEDICATIONS  (STANDING):  bacitracin   Ointment 1 Application(s) Topical three times a day  chlorhexidine 2% Cloths 1 Application(s) Topical <User Schedule>  cyanocobalamin 1000 MICROGram(s) Oral daily  escitalopram 10 milliGRAM(s) Oral daily  fentaNYL   Patch 100 MICROgram(s)/Hr 1 Patch Transdermal every 72 hours  HYDROmorphone   Tablet 4 milliGRAM(s) Oral every 4 hours  influenza   Vaccine 0.5 milliLiter(s) IntraMuscular once  simethicone 80 milliGRAM(s) Chew three times a day  sodium chloride 0.9%. 1000 milliLiter(s) (100 mL/Hr) IV Continuous <Continuous>  sodium chloride 0.9%. 1000 milliLiter(s) (250 mL/Hr) IV Continuous <Continuous>    MEDICATIONS  (PRN):  albuterol    0.083%. 2.5 milliGRAM(s) Nebulizer once PRN Wheezing  diphenhydrAMINE 50 milliGRAM(s) Oral once PRN MILD ALERGIC REACTION  diphenhydrAMINE Injectable 50 milliGRAM(s) IntraMuscular once PRN MILD ALLERGIC REACTION  diphenhydrAMINE Injectable 50 milliGRAM(s) IV Push once PRN MILD ALLERGIC REACTION  EPINEPHrine     1 mG/mL Injectable 0.3 milliGRAM(s) IntraMuscular once PRN ANAPHYLAXIS  glucagon  Injectable 1 milliGRAM(s) IV Push once PRN REFRACTORY CASES AND/OR ON BETA BLOCKERS  glucagon  Injectable 2 milliGRAM(s) IV Push once PRN REFRACTORY CASES AND/ OR ON BETA BLOCKERS  LORazepam     Tablet 1 milliGRAM(s) Oral once PRN Anxiety      CAPILLARY BLOOD GLUCOSE        I&O's Summary    25 Mar 2025 07:01  -  26 Mar 2025 07:00  --------------------------------------------------------  IN: 1889 mL / OUT: 1325 mL / NET: 564 mL    26 Mar 2025 07:01  -  26 Mar 2025 13:45  --------------------------------------------------------  IN: 600 mL / OUT: 350 mL / NET: 250 mL        Vital Signs Last 24 Hrs  T(C): 36.2 (26 Mar 2025 08:00), Max: 37 (25 Mar 2025 20:00)  T(F): 97.1 (26 Mar 2025 08:00), Max: 98.6 (25 Mar 2025 20:00)  HR: 85 (26 Mar 2025 13:00) (67 - 91)  BP: 114/74 (26 Mar 2025 13:00) (93/67 - 117/75)  BP(mean): 87 (26 Mar 2025 13:00) (77 - 99)  RR: 20 (26 Mar 2025 13:00) (10 - 21)  SpO2: 96% (26 Mar 2025 13:00) (91% - 98%)    Parameters below as of 26 Mar 2025 13:00  Patient On (Oxygen Delivery Method): nasal cannula  O2 Flow (L/min): 3      PHYSICAL EXAM:  GENERAL: no distress  PSYCH: A&O x3  HEAD: Atraumatic, Normocephalic  NECK: Supple, No JVD  CHEST/LUNG:+decreased breath sounds R lung  HEART: regular rate and rhythm, no murmurs  ABDOMEN: nontender to palpation, no rebound tenderness/guarding  EXTREMITIES:2-3+  LE edema b/l  NEUROLOGY: no focal neurologic deficit  SKIN: No rashes or lesions    LABS:                        9.1    9.41  )-----------( 431      ( 26 Mar 2025 02:50 )             31.2      03-26    136  |  104  |  22  ----------------------------<  119[H]  4.4   |  21[L]  |  1.08    Ca    8.4      26 Mar 2025 02:50  Phos  4.1     03-26  Mg     1.90     03-26    TPro  5.1[L]  /  Alb  2.4[L]  /  TBili  <0.2  /  DBili  x   /  AST  22  /  ALT  7   /  AlkPhos  148[H]  03-26    PT/INR - ( 26 Mar 2025 02:50 )   PT: 11.4 sec;   INR: 0.96 ratio         PTT - ( 26 Mar 2025 02:50 )  PTT:27.9 sec      Urinalysis Basic - ( 26 Mar 2025 02:50 )    Color: x / Appearance: x / SG: x / pH: x  Gluc: 119 mg/dL / Ketone: x  / Bili: x / Urobili: x   Blood: x / Protein: x / Nitrite: x   Leuk Esterase: x / RBC: x / WBC x   Sq Epi: x / Non Sq Epi: x / Bacteria: x        RADIOLOGY & ADDITIONAL TESTS:    Imaging Personally Reviewed:    Consultant(s) Notes Reviewed:      Care Discussed with Consultants/Other Providers:

## 2025-03-26 NOTE — DIETITIAN INITIAL EVALUATION ADULT - NSFNSGIIOFT_GEN_A_CORE
03-25-25 @ 07:01  -  03-26-25 @ 07:00  --------------------------------------------------------  OUT:    Chest Tube (mL): 0 mL    Colostomy (mL): 0 mL  Total OUT: 0 mL    Total NET: 0 mL

## 2025-03-26 NOTE — CHART NOTE - NSCHARTNOTEFT_GEN_A_CORE
Intrapleural tPA Instillation Note    Indication: Loculated complex malignant pleural effusion  Risks and benefits discussed with patient who verbalized understanding.     Dose: this is 1st session    Procedure:   - 10mg of alteplase diluted in 30ml of sterile saline   were injected into the R pleurX catheter.   [x] clamped.    [] unclamped after 1 hour of dwell time.     Instillation time: 17:40  Unclamp time: []  Recommendation: Please monitor and document chest tube output in flowsheets. Please hold all anticoagulation. Daily CXR. Intrapleural tPA Instillation Note    Indication: Loculated complex malignant pleural effusion  Risks and benefits discussed with patient who verbalized understanding.     Dose: this is 1st session    Procedure:   - 10mg of alteplase diluted in 30ml of sterile saline   were injected into the R pleurX catheter.   [x] clamped.    [x] unclamped after 1 hour of dwell time.     Instillation time: 17:40  Unclamp time: 16:40  Recommendation: Please monitor and document chest tube output in flowsheets. Please hold all anticoagulation. Daily CXR. Intrapleural tPA Instillation Note    Indication: Loculated complex malignant pleural effusion  Risks and benefits discussed with patient who verbalized understanding.     Dose: this is 1st session    Procedure:   - 10mg of alteplase diluted in 30ml of sterile saline   were injected into the R pleurX catheter.   [x] clamped.    [x] unclamped after 1 hour of dwell time.     Instillation time: 17:40  Unclamp time: 18:40  Recommendation: Please monitor and document chest tube output in flowsheets. Please hold all anticoagulation. Daily CXR.

## 2025-03-26 NOTE — PHYSICAL THERAPY INITIAL EVALUATION ADULT - ADDITIONAL COMMENTS
Pt lives with her  in a house with 2-3 stairs to enter, +17 stairs to her bedroom. prior to admission Pt was independent with all mobility and ambulated short distances without an assistive device. Pt stated she required frequent rest breaks. Medical equipment: wheelchair, rollator. Pt stated that she was getting outpatient PT prior to admission.    Pt. left comfortable in bedside chair with all tubes/lines intact, call bell in reach and in NAD. RNJoan, aware of session and pt current position and present at bedside.

## 2025-03-26 NOTE — DIETITIAN INITIAL EVALUATION ADULT - OTHER INFO
Spoke with RN.  Met with Pt.  Daughter present at time of RDN encounter.   Informed Pt. of prescribed regular diet, as well as hospital unit menu process.  Discussed and obtained food preferences, including that for N[employer] 1x daily.    Preferences updated in [facility], accordingly.  Will inform providers for supplement recommendation.    Advised on importance of calorie/nutrient dense food intake and liberalized diet.    Weight Hx widely variable over past 1 year, per chart review (~118-151lbs), which is perhaps in part fluid related

## 2025-03-26 NOTE — ADVANCED PRACTICE NURSE CONSULT - REASON FOR CONSULT
Patient able to independently manage, consulted RN for leaking overnight, patient states wrong supplies was used. Ostomy supplies left at bedside. Patient states she has no issues with leaking or Skin impairment and manages fine at home and in hospital with proper supplies.

## 2025-03-26 NOTE — DIETITIAN INITIAL EVALUATION ADULT - ADD RECOMMEND
- Provide food preferences upon request   - Nursing to please document % PO intake of meals/supplement via nutrition flow sheet   - Monitor GI status, electrolytes, glucose

## 2025-03-26 NOTE — DIETITIAN INITIAL EVALUATION ADULT - ORAL INTAKE PTA/DIET HISTORY
NKFA but Pt. preference of no milk/yogurt/pudding/ice cream.  Prefers Lactaid milk.  Drinks N[employer]za [employer]'s (strawberry or vanilla) 1 container daily at home.

## 2025-03-26 NOTE — DIETITIAN INITIAL EVALUATION ADULT - REASON FOR ADMISSION
65 y/o F with Hx of anxiety, depressions, chronic back pain, ovarian Ca & diverting colostomy due to mass effect on colon as well as R ureter, s/p stent.   Also with new O2 requirement.  Presents for desensitization of carboplatin.

## 2025-03-26 NOTE — DIETITIAN INITIAL EVALUATION ADULT - PERTINENT LABORATORY DATA
03-26    136  |  104  |  22  ----------------------------<  119[H]  4.4   |  21[L]  |  1.08    Ca    8.4      26 Mar 2025 02:50  Phos  4.1     03-26  Mg     1.90     03-26

## 2025-03-26 NOTE — DIETITIAN INITIAL EVALUATION ADULT - PERTINENT MEDS FT
MEDICATIONS  (STANDING):  bacitracin   Ointment 1 Application(s) Topical three times a day  chlorhexidine 2% Cloths 1 Application(s) Topical <User Schedule>  cyanocobalamin 1000 MICROGram(s) Oral daily  escitalopram 10 milliGRAM(s) Oral daily  fentaNYL   Patch 100 MICROgram(s)/Hr 1 Patch Transdermal every 72 hours  HYDROmorphone   Tablet 4 milliGRAM(s) Oral every 4 hours  influenza   Vaccine 0.5 milliLiter(s) IntraMuscular once  simethicone 80 milliGRAM(s) Chew three times a day  sodium chloride 0.9%. 1000 milliLiter(s) (100 mL/Hr) IV Continuous <Continuous>  sodium chloride 0.9%. 1000 milliLiter(s) (250 mL/Hr) IV Continuous <Continuous>    MEDICATIONS  (PRN):  albuterol    0.083%. 2.5 milliGRAM(s) Nebulizer once PRN Wheezing  diphenhydrAMINE 50 milliGRAM(s) Oral once PRN MILD ALERGIC REACTION  diphenhydrAMINE Injectable 50 milliGRAM(s) IntraMuscular once PRN MILD ALLERGIC REACTION  diphenhydrAMINE Injectable 50 milliGRAM(s) IV Push once PRN MILD ALLERGIC REACTION  EPINEPHrine     1 mG/mL Injectable 0.3 milliGRAM(s) IntraMuscular once PRN ANAPHYLAXIS  glucagon  Injectable 1 milliGRAM(s) IV Push once PRN REFRACTORY CASES AND/OR ON BETA BLOCKERS  glucagon  Injectable 2 milliGRAM(s) IV Push once PRN REFRACTORY CASES AND/ OR ON BETA BLOCKERS  LORazepam     Tablet 1 milliGRAM(s) Oral once PRN Anxiety

## 2025-03-26 NOTE — PHYSICAL THERAPY INITIAL EVALUATION ADULT - GENERAL OBSERVATIONS, REHAB EVAL
Pt received semi-supine, +3L oxygen via nasal cannula, all lines/tubes intact, NAD. HR: 82 beats per minute, oxygen saturation: 96%. pt's daughter present at bedside throughout.

## 2025-03-26 NOTE — DIETITIAN INITIAL EVALUATION ADULT - NSICDXPASTSURGICALHX_GEN_ALL_CORE_FT
PAST SURGICAL HISTORY:  H/O total hysterectomy     H/O ureteroscopy B/L stent placement    S/P abdominoplasty     S/P bunionectomy bilateral    S/P  x 3 , ,     S/P colostomy 2024    S/P D&C (status post dilation and curettage)

## 2025-03-26 NOTE — CHART NOTE - NSCHARTNOTEFT_GEN_A_CORE
: Yue Mckeon     INDICATION: AHRF     PROCEDURE:   [ x] LIMITED ECHO  [x ] LIMITED CHEST      FINDINGS:   Bilateral Pleural effusion   Loculated Rt sided pleural effusion with multiple septation   Simple Lt sided pleural effusion mod-large in size   Grossly normal LV systolic function without wall motion abnormalities  LV>RV size  LVOT VTI ~19 cm        Supervised by Dr. Albert   Should not be considered final until signed by attending. : Yue Mckeon     INDICATION: AHRF     PROCEDURE:   [ x] LIMITED ECHO  [x ] LIMITED CHEST  [x ] LIMITED ABDOMEN       FINDINGS:   Bilateral Pleural effusion   Loculated Rt sided pleural effusion with multiple septation   Simple Lt sided pleural effusion mod-large in size   Grossly normal LV systolic function without wall motion abnormalities  LV>RV size  LVOT VTI ~19 cm  No ascites noted         Supervised by Dr. Albert   Should not be considered final until signed by attending. : Yue Mckeon     INDICATION: AHRF     PROCEDURE:   [ x] LIMITED ECHO  [x ] LIMITED CHEST  [x ] LIMITED ABDOMEN       FINDINGS:   Bilateral Pleural effusion   Loculated Rt sided pleural effusion with multiple septation   Simple Lt sided pleural effusion mod-large in size   Grossly normal LV systolic function without wall motion abnormalities  LV>RV size  LVOT VTI ~19 cm  No ascites noted         Supervised by Dr. Albert   Should not be considered final until signed by attending.    Attending note :  I was present for the duration of the procedure and supervised as needed.

## 2025-03-26 NOTE — PHYSICAL THERAPY INITIAL EVALUATION ADULT - FOLLOWS COMMANDS/ANSWERS QUESTIONS, REHAB EVAL
I have reviewed the surgical (or preoperative) H&P that is linked to this encounter, and examined the patient. There are no significant changes    Clinical Conditions Present on Arrival:  Clinically Significant Risk Factors Present on Admission                         
100% of the time

## 2025-03-26 NOTE — PROGRESS NOTE ADULT - ASSESSMENT
63 year old female PMH: Anxiety Chronic back pain, Depression, Ovarian ca and Vertigo mass effect adjacent to distal colon w/o obstruction, mass effect on distal R ureter s/p ureterl stent now s/p colostomy c/b bleeding now off AC,  Iliac vein occlusion status post stent presents for desensitization of carboplatin.       #Neuro  AOx3 no active issues; fatigued. Has good insight    #Cardiovascular  New worsening SOB. Denies any CP/Palpitations. Will obtain EKG  -f/u TTE    #Respiratory  New O2 requirement; will obtain CXR to see if any effusion. Prior CT chest at OSH with pulmonary spread and enlarging hilar lymphadenopathy  -will obtain OSH report; will obtain new CT chest as she did not require oxygen then  -will monitor off abx, low concern for any infectious processes but low threshold to obtain full infectious w/u if patient spikes a fever  -will set up home O2  -IP also consulted as there are many loculations seen in the CT with fluid and pleurx not appropriately draining; will also coordinate with primary pulm at [place]  -May need MIST protocol    #GI/Nutrition  Pt with multiple surgeries and now with mass effect from ehr ovarian cancer. She is s/p diverting colostomy with some leakage noted around the bag  -will reach out to ostomy RN for help with leakage  -No signs of infection noted      #/Renal  Pt voiding well; bladder scan wnl. She is s/p ureteral stent due to mass effect  -will trend Scr and Is and Os      #Skin  No active issues    #ID  No active issues but will CTM    #Endocrine  No active issues but will CTM    #Hematologic/DVT ppx  Pt here for carboplatin desensitization; has been trailed with multiple chemo medications in the past with a poor response. (refer to OP heme onc notes)  -will perform as protocol; chemotherapy nurse at bedside; orders placed  -Patient tolerated the procedure well without any complications     #Ethics/ICU Bundle  - GOC/Code Status: Full for now; pending  - Diet: regular  - Access: PIV  - Tubes/Drains: Ostomy bag  - Activity/PT/OT: pending

## 2025-03-26 NOTE — ADVANCED PRACTICE NURSE CONSULT - RECOMMEDATIONS
Stoma powder- item #[id number]    Liquid barrier film    Skin barrier ring- item # [id number]  Flat wafer (2 3/4")- item # [id number]  Drainable pouch (2 3/4")- item # [id number]

## 2025-03-27 LAB
ALBUMIN FLD-MCNC: 0.7 G/DL — SIGNIFICANT CHANGE UP
ALBUMIN SERPL ELPH-MCNC: 2.5 G/DL — LOW (ref 3.3–5)
ALP SERPL-CCNC: 166 U/L — HIGH (ref 40–120)
ALT FLD-CCNC: 13 U/L — SIGNIFICANT CHANGE UP (ref 4–33)
ANION GAP SERPL CALC-SCNC: 11 MMOL/L — SIGNIFICANT CHANGE UP (ref 7–14)
APTT BLD: 24.8 SEC — SIGNIFICANT CHANGE UP (ref 24.5–35.6)
AST SERPL-CCNC: 33 U/L — HIGH (ref 4–32)
B PERT IGG+IGM PNL SER: ABNORMAL
BASOPHILS # BLD AUTO: 0.03 K/UL — SIGNIFICANT CHANGE UP (ref 0–0.2)
BASOPHILS NFR BLD AUTO: 0.2 % — SIGNIFICANT CHANGE UP (ref 0–2)
BILIRUB SERPL-MCNC: <0.2 MG/DL — SIGNIFICANT CHANGE UP (ref 0.2–1.2)
BLOOD GAS VENOUS COMPREHENSIVE RESULT: SIGNIFICANT CHANGE UP
BUN SERPL-MCNC: 26 MG/DL — HIGH (ref 7–23)
CA-I BLD-SCNC: 1.14 MMOL/L — LOW (ref 1.15–1.29)
CALCIUM SERPL-MCNC: 8.5 MG/DL — SIGNIFICANT CHANGE UP (ref 8.4–10.5)
CHLORIDE SERPL-SCNC: 103 MMOL/L — SIGNIFICANT CHANGE UP (ref 98–107)
CHOLEST FLD-MCNC: 24 MG/DL — SIGNIFICANT CHANGE UP
CO2 SERPL-SCNC: 22 MMOL/L — SIGNIFICANT CHANGE UP (ref 22–31)
COLOR FLD: ABNORMAL
CREAT SERPL-MCNC: 1 MG/DL — SIGNIFICANT CHANGE UP (ref 0.5–1.3)
EGFR: 64 ML/MIN/1.73M2 — SIGNIFICANT CHANGE UP
EGFR: 64 ML/MIN/1.73M2 — SIGNIFICANT CHANGE UP
EOSINOPHIL # BLD AUTO: 0.09 K/UL — SIGNIFICANT CHANGE UP (ref 0–0.5)
EOSINOPHIL # FLD: 0 % — SIGNIFICANT CHANGE UP
EOSINOPHIL NFR BLD AUTO: 0.7 % — SIGNIFICANT CHANGE UP (ref 0–6)
FLUID INTAKE SUBSTANCE CLASS: SIGNIFICANT CHANGE UP
FOLATE+VIT B12 SERBLD-IMP: 0 % — SIGNIFICANT CHANGE UP
GLUCOSE FLD-MCNC: 33 MG/DL — SIGNIFICANT CHANGE UP
GLUCOSE SERPL-MCNC: 107 MG/DL — HIGH (ref 70–99)
HCT VFR BLD CALC: 29.5 % — LOW (ref 34.5–45)
HGB BLD-MCNC: 8.9 G/DL — LOW (ref 11.5–15.5)
IANC: 10.28 K/UL — HIGH (ref 1.8–7.4)
IMM GRANULOCYTES NFR BLD AUTO: 1.5 % — HIGH (ref 0–0.9)
INR BLD: 0.96 RATIO — SIGNIFICANT CHANGE UP (ref 0.85–1.16)
LDH SERPL L TO P-CCNC: 1133 U/L — SIGNIFICANT CHANGE UP
LYMPHOCYTES # BLD AUTO: 0.8 K/UL — LOW (ref 1–3.3)
LYMPHOCYTES # BLD AUTO: 6.5 % — LOW (ref 13–44)
LYMPHOCYTES # FLD: 26 % — SIGNIFICANT CHANGE UP
MAGNESIUM SERPL-MCNC: 2 MG/DL — SIGNIFICANT CHANGE UP (ref 1.6–2.6)
MCHC RBC-ENTMCNC: 28.9 PG — SIGNIFICANT CHANGE UP (ref 27–34)
MCHC RBC-ENTMCNC: 30.2 G/DL — LOW (ref 32–36)
MCV RBC AUTO: 95.8 FL — SIGNIFICANT CHANGE UP (ref 80–100)
MESOTHL CELL # FLD: 0 % — SIGNIFICANT CHANGE UP
MONOCYTES # BLD AUTO: 0.93 K/UL — HIGH (ref 0–0.9)
MONOCYTES NFR BLD AUTO: 7.5 % — SIGNIFICANT CHANGE UP (ref 2–14)
MONOS+MACROS # FLD: 15 % — SIGNIFICANT CHANGE UP
NEUTROPHILS # BLD AUTO: 10.28 K/UL — HIGH (ref 1.8–7.4)
NEUTROPHILS NFR BLD AUTO: 83.6 % — HIGH (ref 43–77)
NEUTROPHILS-BODY FLUID: 59 % — SIGNIFICANT CHANGE UP
NRBC # BLD AUTO: 0 K/UL — SIGNIFICANT CHANGE UP (ref 0–0)
NRBC # FLD: 0 K/UL — SIGNIFICANT CHANGE UP (ref 0–0)
NRBC BLD AUTO-RTO: 0 /100 WBCS — SIGNIFICANT CHANGE UP (ref 0–0)
OTHER CELLS FLD MANUAL: 0 % — SIGNIFICANT CHANGE UP
PH FLD: 8.1 — SIGNIFICANT CHANGE UP
PHOSPHATE SERPL-MCNC: 3.1 MG/DL — SIGNIFICANT CHANGE UP (ref 2.5–4.5)
PLATELET # BLD AUTO: 435 K/UL — HIGH (ref 150–400)
POTASSIUM SERPL-MCNC: 4.4 MMOL/L — SIGNIFICANT CHANGE UP (ref 3.5–5.3)
POTASSIUM SERPL-SCNC: 4.4 MMOL/L — SIGNIFICANT CHANGE UP (ref 3.5–5.3)
PROT FLD-MCNC: 1.6 G/DL — SIGNIFICANT CHANGE UP
PROT SERPL-MCNC: 5.2 G/DL — LOW (ref 6–8.3)
PROTHROM AB SERPL-ACNC: 11.1 SEC — SIGNIFICANT CHANGE UP (ref 9.9–13.4)
RBC # BLD: 3.08 M/UL — LOW (ref 3.8–5.2)
RBC # FLD: 18.4 % — HIGH (ref 10.3–14.5)
RCV VOL RI: HIGH CELLS/UL (ref 0–5)
SODIUM SERPL-SCNC: 136 MMOL/L — SIGNIFICANT CHANGE UP (ref 135–145)
SPECIMEN SOURCE FLD: SIGNIFICANT CHANGE UP
TOTAL CELLS COUNTED, BODY FLUID: 100 CELLS — SIGNIFICANT CHANGE UP
TOTAL NUCLEATED CELL COUNT, BODY FLUID: 480 CELLS/UL — HIGH (ref 0–5)
TUBE TYPE: SIGNIFICANT CHANGE UP
WBC # BLD: 12.32 K/UL — HIGH (ref 3.8–10.5)
WBC # FLD AUTO: 12.32 K/UL — HIGH (ref 3.8–10.5)

## 2025-03-27 PROCEDURE — 99291 CRITICAL CARE FIRST HOUR: CPT | Mod: GC

## 2025-03-27 PROCEDURE — 71045 X-RAY EXAM CHEST 1 VIEW: CPT | Mod: 26

## 2025-03-27 RX ORDER — VANCOMYCIN HCL IN 5 % DEXTROSE 1.5G/250ML
1000 PLASTIC BAG, INJECTION (ML) INTRAVENOUS EVERY 12 HOURS
Refills: 0 | Status: DISCONTINUED | OUTPATIENT
Start: 2025-03-27 | End: 2025-03-27

## 2025-03-27 RX ORDER — VANCOMYCIN HCL IN 5 % DEXTROSE 1.5G/250ML
1000 PLASTIC BAG, INJECTION (ML) INTRAVENOUS EVERY 12 HOURS
Refills: 0 | Status: DISCONTINUED | OUTPATIENT
Start: 2025-03-27 | End: 2025-03-29

## 2025-03-27 RX ORDER — ACETAMINOPHEN 500 MG/5ML
1000 LIQUID (ML) ORAL ONCE
Refills: 0 | Status: DISCONTINUED | OUTPATIENT
Start: 2025-03-27 | End: 2025-03-28

## 2025-03-27 RX ORDER — ONDANSETRON HCL/PF 4 MG/2 ML
8 VIAL (ML) INJECTION ONCE
Refills: 0 | Status: COMPLETED | OUTPATIENT
Start: 2025-03-27 | End: 2025-03-27

## 2025-03-27 RX ORDER — PIPERACILLIN-TAZO-DEXTROSE,ISO 3.375G/5
4.5 IV SOLUTION, PIGGYBACK PREMIX FROZEN(ML) INTRAVENOUS EVERY 8 HOURS
Refills: 0 | Status: DISCONTINUED | OUTPATIENT
Start: 2025-03-27 | End: 2025-03-31

## 2025-03-27 RX ORDER — MAGNESIUM, ALUMINUM HYDROXIDE 200-200 MG
30 TABLET,CHEWABLE ORAL ONCE
Refills: 0 | Status: COMPLETED | OUTPATIENT
Start: 2025-03-27 | End: 2025-03-27

## 2025-03-27 RX ORDER — PIPERACILLIN-TAZO-DEXTROSE,ISO 3.375G/5
4.5 IV SOLUTION, PIGGYBACK PREMIX FROZEN(ML) INTRAVENOUS ONCE
Refills: 0 | Status: COMPLETED | OUTPATIENT
Start: 2025-03-27 | End: 2025-03-27

## 2025-03-27 RX ORDER — ACETAMINOPHEN 500 MG/5ML
650 LIQUID (ML) ORAL EVERY 6 HOURS
Refills: 0 | Status: DISCONTINUED | OUTPATIENT
Start: 2025-03-27 | End: 2025-03-27

## 2025-03-27 RX ORDER — HYDROMORPHONE/SOD CHLOR,ISO/PF 2 MG/10 ML
0.1 SYRINGE (ML) INJECTION ONCE
Refills: 0 | Status: DISCONTINUED | OUTPATIENT
Start: 2025-03-27 | End: 2025-03-27

## 2025-03-27 RX ORDER — ACETAMINOPHEN 500 MG/5ML
1000 LIQUID (ML) ORAL EVERY 8 HOURS
Refills: 0 | Status: DISCONTINUED | OUTPATIENT
Start: 2025-03-27 | End: 2025-03-30

## 2025-03-27 RX ADMIN — Medication 4 MILLIGRAM(S): at 09:30

## 2025-03-27 RX ADMIN — Medication 4 MILLIGRAM(S): at 22:21

## 2025-03-27 RX ADMIN — Medication 250 MILLIGRAM(S): at 16:40

## 2025-03-27 RX ADMIN — Medication 4 MILLIGRAM(S): at 06:43

## 2025-03-27 RX ADMIN — Medication 4 MILLIGRAM(S): at 05:43

## 2025-03-27 RX ADMIN — Medication 1 APPLICATION(S): at 05:43

## 2025-03-27 RX ADMIN — ALTEPLASE 10 MILLIGRAM(S): 2.2 INJECTION, POWDER, LYOPHILIZED, FOR SOLUTION INTRAVENOUS at 08:40

## 2025-03-27 RX ADMIN — Medication 0.1 MILLIGRAM(S): at 12:34

## 2025-03-27 RX ADMIN — Medication 80 MILLIGRAM(S): at 12:24

## 2025-03-27 RX ADMIN — ESCITALOPRAM OXALATE 10 MILLIGRAM(S): 20 TABLET ORAL at 12:19

## 2025-03-27 RX ADMIN — ALTEPLASE 10 MILLIGRAM(S): 2.2 INJECTION, POWDER, LYOPHILIZED, FOR SOLUTION INTRAVENOUS at 21:17

## 2025-03-27 RX ADMIN — Medication 4 MILLIGRAM(S): at 13:15

## 2025-03-27 RX ADMIN — DORNASE ALFA 5 MILLIGRAM(S): 1 SOLUTION RESPIRATORY (INHALATION) at 21:20

## 2025-03-27 RX ADMIN — Medication 25 GRAM(S): at 18:08

## 2025-03-27 RX ADMIN — Medication 4 MILLIGRAM(S): at 18:40

## 2025-03-27 RX ADMIN — Medication 4 MILLIGRAM(S): at 23:10

## 2025-03-27 RX ADMIN — Medication 4 MILLIGRAM(S): at 18:08

## 2025-03-27 RX ADMIN — Medication 1000 MILLIGRAM(S): at 08:19

## 2025-03-27 RX ADMIN — Medication 1000 MILLIGRAM(S): at 09:00

## 2025-03-27 RX ADMIN — Medication 4 MILLIGRAM(S): at 13:45

## 2025-03-27 RX ADMIN — CYANOCOBALAMIN 1000 MICROGRAM(S): 1000 INJECTION INTRAMUSCULAR; SUBCUTANEOUS at 12:19

## 2025-03-27 RX ADMIN — Medication 8 MILLIGRAM(S): at 14:17

## 2025-03-27 RX ADMIN — Medication 0.1 MILLIGRAM(S): at 13:00

## 2025-03-27 RX ADMIN — Medication 4 MILLIGRAM(S): at 10:00

## 2025-03-27 RX ADMIN — Medication 200 GRAM(S): at 12:20

## 2025-03-27 NOTE — CHART NOTE - NSCHARTNOTEFT_GEN_A_CORE
Intrapleural tPA Instillation Note    Indication: complex malignant pleural effusion   Risks and benefits discussed with patient who verbalized understanding.     Dose: this is 2nd session    Procedure:   - 10mg of alteplase diluted in 30ml of sterile saline   were injected into the Right pleurX catheter.   [x] clamped.    [x] unclamped after 1 hour of dwell time.     Instillation time: 08:40  Unclamp time: 09:40  Recommendation: Please monitor and document chest tube output in flowsheets.

## 2025-03-27 NOTE — PROGRESS NOTE ADULT - ASSESSMENT
63 year old female PMH: Anxiety Chronic back pain, Depression, Ovarian ca and Vertigo mass effect adjacent to distal colon w/o obstruction, mass effect on distal R ureter s/p ureterl stent now s/p colostomy c/b bleeding now off AC,  Iliac vein occlusion status post stent presents for desensitization of carboplatin.       #Neuro  AOx3 no active issues; fatigued. Has good insight    #Cardiovascular  New worsening SOB. Denies any CP/Palpitations. Will obtain EKG  -f/u TTE    #Respiratory  New O2 requirement; will obtain CXR to see if any effusion. Prior CT chest at OSH with pulmonary spread and enlarging hilar lymphadenopathy  -will obtain OSH report; will obtain new CT chest as she did not require oxygen then  -will monitor off abx, low concern for any infectious processes but low threshold to obtain full infectious w/u if patient spikes a fever  -will set up home O2  -IP also consulted as there are many loculations seen in the CT with fluid and pleurx not appropriately draining; will also coordinate with primary pulm at [place]  -May need MIST protocol    #GI/Nutrition  Pt with multiple surgeries and now with mass effect from ehr ovarian cancer. She is s/p diverting colostomy with some leakage noted around the bag  -will reach out to ostomy RN for help with leakage  -No signs of infection noted      #/Renal  Pt voiding well; bladder scan wnl. She is s/p ureteral stent due to mass effect  -will trend Scr and Is and Os      #Skin  No active issues    #ID  No active issues but will CTM    #Endocrine  No active issues but will CTM    #Hematologic/DVT ppx  Pt here for carboplatin desensitization; has been trailed with multiple chemo medications in the past with a poor response. (refer to OP heme onc notes)  -will perform as protocol; chemotherapy nurse at bedside; orders placed  -Patient tolerated the procedure well without any complications     #Ethics/ICU Bundle  - GOC/Code Status: Full for now; pending  - Diet: regular  - Access: PIV  - Tubes/Drains: Ostomy bag  - Activity/PT/OT: pending   63 year old female PMH: Anxiety Chronic back pain, Depression, Ovarian ca and Vertigo mass effect adjacent to distal colon w/o obstruction, mass effect on distal R ureter s/p ureterl stent now s/p colostomy c/b bleeding now off AC,  Iliac vein occlusion status post stent presents for desensitization of carboplatin.       #Neuro  AOx3 no active issues; fatigued. Has good insight    #Cardiovascular  New worsening SOB. Denies any CP/Palpitations. Will obtain EKG  -TTE findings appreciated    #Respiratory  New O2 requirement; will obtain CXR to see if any effusion. Prior CT chest at OSH with pulmonary spread and enlarging hilar lymphadenopathy  -will obtain OSH report; will obtain new CT chest as she did not require oxygen then  -will monitor off abx, low concern for any infectious processes but low threshold to obtain full infectious w/u if patient spikes a fever  -will set up home O2  -IP also consulted as there are many loculations seen in the CT with fluid and pleurx not appropriately draining; will also coordinate with primary pulm at [facility]  -s/p 2nd dose of tPA today with 200cc and counting output from chest time of serousanguinous and bloody fluid  -Studies sent will f/u re infection    #GI/Nutrition  Pt with multiple surgeries and now with mass effect from ehr ovarian cancer. She is s/p diverting colostomy with some leakage noted around the bag  -will reach out to ostomy RN for help with leakage  -No signs of infection noted      #/Renal  Pt voiding well; bladder scan wnl. She is s/p ureteral stent due to mass effect  -will trend Scr and Is and Os      #Skin  No active issues    #ID  No active issues but will CTM    #Endocrine  No active issues but will CTM    #Hematologic/DVT ppx  Pt here for carboplatin desensitization; has been trailed with multiple chemo medications in the past with a poor response. (refer to OP heme onc notes)  -will perform as protocol; chemotherapy nurse at bedside; orders placed  -Patient tolerated the procedure well without any complications     #Ethics/ICU Bundle  - GOC/Code Status: Full for now; pending  - Diet: regular  - Access: PIV  - Tubes/Drains: Ostomy bag  - Activity/PT/OT: pending

## 2025-03-27 NOTE — CHART NOTE - NSCHARTNOTEFT_GEN_A_CORE
MICU Transfer Note  ---------------------------    Transfer from: MICU  Transfer to:  (  ) Medicine    (  ) Telemetry    (  ) RCU    (  ) Palliative    (  ) Stroke Unit    ( X )   Accepting Physician: Ruth (Jie)    HPI:  63 year old female PMH: Anxiety Chronic back pain, Depression, Ovarian ca and Vertigo mass effect adjacent to distal colon w/o obstruction, mass effect on distal R ureter s/p ureterl stent now s/p colostomy c/b bleeding now off AC,  Iliac vein occlusion status post stent presents for desensitization of carboplatin. Patient endorses fatigue. She denies fevers but endorses chills. Also endorses abdominal pain and some colostomy leakage. Pt denies any bleeding currently. Endorses difficulty with BMs and urinating as well, states she feels burning when she goes. Overall also endorses sob and has a lot of difficulty walking. Has noted worsening SOB over the past few weeks. She had a pleurx placed and has it drained every other day.  Patient presented to the MICU and required supplemental O2 as she was noted to have low O2 sats (25 Mar 2025 09:19)      MICU COURSE  Admitted to MICU for carboplatin desensitization, completed successfully on 3/26. Patient developed worsening dyspnea 2/2 loculated R pleural effusion with pleurx in place; pleurx not draining well. Started MIST protocol on 3/26, completed 1 session.          To-Do:    [ ] Daily CXR  [ ] Pulmonary recs    OBJECTIVE --  Vital Signs Last 24 Hrs  T(C): 36.5 (27 Mar 2025 00:00), Max: 36.6 (26 Mar 2025 04:00)  T(F): 97.7 (27 Mar 2025 00:00), Max: 97.9 (26 Mar 2025 04:00)  HR: 80 (27 Mar 2025 02:00) (67 - 90)  BP: 115/86 (27 Mar 2025 02:00) (97/71 - 126/78)  BP(mean): 96 (27 Mar 2025 02:00) (79 - 97)  RR: 16 (27 Mar 2025 02:00) (10 - 22)  SpO2: 97% (27 Mar 2025 02:00) (93% - 99%)    Parameters below as of 27 Mar 2025 02:00  Patient On (Oxygen Delivery Method): nasal cannula  O2 Flow (L/min): 3      I&O's Summary    25 Mar 2025 07:01  -  26 Mar 2025 07:00  --------------------------------------------------------  IN: 1889 mL / OUT: 1325 mL / NET: 564 mL    26 Mar 2025 07:01  -  27 Mar 2025 03:50  --------------------------------------------------------  IN: 1400 mL / OUT: 1187 mL / NET: 213 mL        MEDICATIONS  (STANDING):  alteplase  Injectable for Pleural Effusion 10 milliGRAM(s) IntraPleural. every 12 hours  bacitracin   Ointment 1 Application(s) Topical three times a day  chlorhexidine 2% Cloths 1 Application(s) Topical <User Schedule>  cyanocobalamin 1000 MICROGram(s) Oral daily  dornase nidhi Solution for Pleural Effusion 5 milliGRAM(s) IntraPleural. every 12 hours  escitalopram 10 milliGRAM(s) Oral daily  fentaNYL   Patch 100 MICROgram(s)/Hr 1 Patch Transdermal every 72 hours  HYDROmorphone   Tablet 4 milliGRAM(s) Oral every 4 hours  influenza   Vaccine 0.5 milliLiter(s) IntraMuscular once  simethicone 80 milliGRAM(s) Chew three times a day  sodium chloride 0.9% Solution for Pleural Effusion 30 milliLiter(s) IntraPleural. every 12 hours  sodium chloride 0.9%. 1000 milliLiter(s) (100 mL/Hr) IV Continuous <Continuous>  sodium chloride 0.9%. 1000 milliLiter(s) (250 mL/Hr) IV Continuous <Continuous>    MEDICATIONS  (PRN):  albuterol    0.083%. 2.5 milliGRAM(s) Nebulizer once PRN Wheezing  diphenhydrAMINE 50 milliGRAM(s) Oral once PRN MILD ALERGIC REACTION  diphenhydrAMINE Injectable 50 milliGRAM(s) IntraMuscular once PRN MILD ALLERGIC REACTION  diphenhydrAMINE Injectable 50 milliGRAM(s) IV Push once PRN MILD ALLERGIC REACTION  EPINEPHrine     1 mG/mL Injectable 0.3 milliGRAM(s) IntraMuscular once PRN ANAPHYLAXIS  glucagon  Injectable 1 milliGRAM(s) IV Push once PRN REFRACTORY CASES AND/OR ON BETA BLOCKERS  glucagon  Injectable 2 milliGRAM(s) IV Push once PRN REFRACTORY CASES AND/ OR ON BETA BLOCKERS  LORazepam     Tablet 1 milliGRAM(s) Oral once PRN Anxiety        LABS                                            8.9                   Neurophils% (auto):   x      (03-27 @ 00:33):    12.32)-----------(435          Lymphocytes% (auto):  x                                             29.5                   Eosinphils% (auto):   x        Manual%: Neutrophils x    ; Lymphocytes x    ; Eosinophils x    ; Bands%: x    ; Blasts x                                    136    |  103    |  26                  Calcium: 8.5   / iCa: 1.14   (03-27 @ 00:33)    ----------------------------<  107       Magnesium: 2.00                             4.4     |  22     |  1.00             Phosphorous: 3.1      TPro  5.2    /  Alb  2.5    /  TBili  <0.2   /  DBili  x      /  AST  33     /  ALT  13     /  AlkPhos  166    27 Mar 2025 00:33    ( 03-27 @ 00:33 )   PT: 11.1 sec;   INR: 0.96 ratio  aPTT: 24.8 sec          ASSESSMENT & PLAN:   63 year old female PMH: Anxiety Chronic back pain, Depression, Ovarian ca and Vertigo mass effect adjacent to distal colon w/o obstruction, mass effect on distal R ureter s/p ureterl stent now s/p colostomy c/b bleeding now off AC,  Iliac vein occlusion status post stent presents for desensitization of carboplatin.       #Neuro  AOx3 no active issues; fatigued. Has good insight    #Cardiovascular  New worsening SOB. Denies any CP/Palpitations. Will obtain EKG  -f/u TTE    #Respiratory  New O2 requirement; will obtain CXR to see if any effusion. Prior CT chest at OSH with pulmonary spread and enlarging hilar lymphadenopathy  -will obtain OSH report; will obtain new CT chest as she did not require oxygen then  -will monitor off abx, low concern for any infectious processes but low threshold to obtain full infectious w/u if patient spikes a fever  -will set up home O2  -IP also consulted as there are many loculations seen in the CT with fluid and pleurx not appropriately draining; will also coordinate with primary pulm at [place]  -May need MIST protocol    #GI/Nutrition  Pt with multiple surgeries and now with mass effect from ehr ovarian cancer. She is s/p diverting colostomy with some leakage noted around the bag  -will reach out to ostomy RN for help with leakage  -No signs of infection noted      #/Renal  Pt voiding well; bladder scan wnl. She is s/p ureteral stent due to mass effect  -will trend Scr and Is and Os      #Skin  No active issues    #ID  No active issues but will CTM    #Endocrine  No active issues but will CTM    #Hematologic/DVT ppx  Pt here for carboplatin desensitization; has been trailed with multiple chemo medications in the past with a poor response. (refer to OP heme onc notes)  -will perform as protocol; chemotherapy nurse at bedside; orders placed  -Patient tolerated the procedure well without any complications     #Ethics/ICU Bundle  - GOC/Code Status: Full for now; pending  - Diet: regular  - Access: PIV  - Tubes/Drains: Ostomy bag  - Activity/PT/OT: pending

## 2025-03-27 NOTE — PROGRESS NOTE ADULT - SUBJECTIVE AND OBJECTIVE BOX
Shazia Snyder PGY3  pager [phone] or check resident tab for coverage    Patient is a 64y old  Female who presents with a chief complaint of  65 y/o F with Hx of anxiety, depressions, chronic back pain, ovarian Ca & diverting colostomy due to mass effect on colon as well as R ureter, s/p stent.   Also with new O2 requirement.  Presents for desensitization of carboplatin.      (26 Mar 2025 14:15)      SUBJECTIVE / OVERNIGHT EVENTS:    MEDICATIONS  (STANDING):  alteplase  Injectable for Pleural Effusion 10 milliGRAM(s) IntraPleural. every 12 hours  bacitracin   Ointment 1 Application(s) Topical three times a day  chlorhexidine 2% Cloths 1 Application(s) Topical <User Schedule>  cyanocobalamin 1000 MICROGram(s) Oral daily  dornase nidhi Solution for Pleural Effusion 5 milliGRAM(s) IntraPleural. every 12 hours  escitalopram 10 milliGRAM(s) Oral daily  fentaNYL   Patch 100 MICROgram(s)/Hr 1 Patch Transdermal every 72 hours  HYDROmorphone   Tablet 4 milliGRAM(s) Oral every 4 hours  influenza   Vaccine 0.5 milliLiter(s) IntraMuscular once  simethicone 80 milliGRAM(s) Chew three times a day  sodium chloride 0.9% Solution for Pleural Effusion 30 milliLiter(s) IntraPleural. every 12 hours  sodium chloride 0.9%. 1000 milliLiter(s) (100 mL/Hr) IV Continuous <Continuous>  sodium chloride 0.9%. 1000 milliLiter(s) (250 mL/Hr) IV Continuous <Continuous>    MEDICATIONS  (PRN):  acetaminophen   Oral Liquid .. 1000 milliGRAM(s) Oral every 8 hours PRN Temp greater or equal to 38.5C (101.3F), Mild Pain (1 - 3), Moderate Pain (4 - 6)  albuterol    0.083%. 2.5 milliGRAM(s) Nebulizer once PRN Wheezing  diphenhydrAMINE 50 milliGRAM(s) Oral once PRN MILD ALERGIC REACTION  diphenhydrAMINE Injectable 50 milliGRAM(s) IntraMuscular once PRN MILD ALLERGIC REACTION  diphenhydrAMINE Injectable 50 milliGRAM(s) IV Push once PRN MILD ALLERGIC REACTION  EPINEPHrine     1 mG/mL Injectable 0.3 milliGRAM(s) IntraMuscular once PRN ANAPHYLAXIS  glucagon  Injectable 1 milliGRAM(s) IV Push once PRN REFRACTORY CASES AND/OR ON BETA BLOCKERS  glucagon  Injectable 2 milliGRAM(s) IV Push once PRN REFRACTORY CASES AND/ OR ON BETA BLOCKERS  LORazepam     Tablet 1 milliGRAM(s) Oral once PRN Anxiety      CAPILLARY BLOOD GLUCOSE        I&O's Summary    26 Mar 2025 07:01  -  27 Mar 2025 07:00  --------------------------------------------------------  IN: 1400 mL / OUT: 1450 mL / NET: -50 mL        Vital Signs Last 24 Hrs  T(C): 36.6 (27 Mar 2025 04:00), Max: 36.6 (27 Mar 2025 04:00)  T(F): 97.9 (27 Mar 2025 04:00), Max: 97.9 (27 Mar 2025 04:00)  HR: 85 (27 Mar 2025 07:00) (67 - 90)  BP: 132/85 (27 Mar 2025 07:00) (97/71 - 132/85)  BP(mean): 99 (27 Mar 2025 07:00) (79 - 99)  RR: 18 (27 Mar 2025 07:00) (10 - 22)  SpO2: 97% (27 Mar 2025 07:00) (93% - 99%)    Parameters below as of 27 Mar 2025 07:00  Patient On (Oxygen Delivery Method): nasal cannula  O2 Flow (L/min): 3      PHYSICAL EXAM:  GENERAL: no distress  PSYCH: A&O x3  HEAD: Atraumatic, Normocephalic  NECK: Supple, No JVD  CHEST/LUNG: clear to auscultation bilaterally  HEART: regular rate and rhythm, no murmurs  ABDOMEN: nontender to palpation, no rebound tenderness/guarding  EXTREMITIES: no edema on bilateral LE  NEUROLOGY: no focal neurologic deficit  SKIN: No rashes or lesions    LABS:                        8.9    12.32 )-----------( 435      ( 27 Mar 2025 00:33 )             29.5      03-27    136  |  103  |  26[H]  ----------------------------<  107[H]  4.4   |  22  |  1.00    Ca    8.5      27 Mar 2025 00:33  Phos  3.1     03-27  Mg     2.00     03-27    TPro  5.2[L]  /  Alb  2.5[L]  /  TBili  <0.2  /  DBili  x   /  AST  33[H]  /  ALT  13  /  AlkPhos  166[H]  03-27    PT/INR - ( 27 Mar 2025 00:33 )   PT: 11.1 sec;   INR: 0.96 ratio         PTT - ( 27 Mar 2025 00:33 )  PTT:24.8 sec      Urinalysis Basic - ( 27 Mar 2025 00:33 )    Color: x / Appearance: x / SG: x / pH: x  Gluc: 107 mg/dL / Ketone: x  / Bili: x / Urobili: x   Blood: x / Protein: x / Nitrite: x   Leuk Esterase: x / RBC: x / WBC x   Sq Epi: x / Non Sq Epi: x / Bacteria: x        RADIOLOGY & ADDITIONAL TESTS:    Imaging Personally Reviewed:    Consultant(s) Notes Reviewed:      Care Discussed with Consultants/Other Providers:   Shazia Snyder PGY3  pager [phone] or check resident tab for coverage    Patient is a 64y old  Female who presents with a chief complaint of  65 y/o F with Hx of anxiety, depressions, chronic back pain, ovarian Ca & diverting colostomy due to mass effect on colon as well as R ureter, s/p stent.   Also with new O2 requirement.  Presents for desensitization of carboplatin.      (26 Mar 2025 14:15)      SUBJECTIVE / OVERNIGHT EVENTS: NAONE. Patient feels abdominal pain. Endorses fatigue. Denies worsening SOB    MEDICATIONS  (STANDING):  alteplase  Injectable for Pleural Effusion 10 milliGRAM(s) IntraPleural. every 12 hours  bacitracin   Ointment 1 Application(s) Topical three times a day  chlorhexidine 2% Cloths 1 Application(s) Topical <User Schedule>  cyanocobalamin 1000 MICROGram(s) Oral daily  dornase nidhi Solution for Pleural Effusion 5 milliGRAM(s) IntraPleural. every 12 hours  escitalopram 10 milliGRAM(s) Oral daily  fentaNYL   Patch 100 MICROgram(s)/Hr 1 Patch Transdermal every 72 hours  HYDROmorphone   Tablet 4 milliGRAM(s) Oral every 4 hours  influenza   Vaccine 0.5 milliLiter(s) IntraMuscular once  simethicone 80 milliGRAM(s) Chew three times a day  sodium chloride 0.9% Solution for Pleural Effusion 30 milliLiter(s) IntraPleural. every 12 hours  sodium chloride 0.9%. 1000 milliLiter(s) (100 mL/Hr) IV Continuous <Continuous>  sodium chloride 0.9%. 1000 milliLiter(s) (250 mL/Hr) IV Continuous <Continuous>    MEDICATIONS  (PRN):  acetaminophen   Oral Liquid .. 1000 milliGRAM(s) Oral every 8 hours PRN Temp greater or equal to 38.5C (101.3F), Mild Pain (1 - 3), Moderate Pain (4 - 6)  albuterol    0.083%. 2.5 milliGRAM(s) Nebulizer once PRN Wheezing  diphenhydrAMINE 50 milliGRAM(s) Oral once PRN MILD ALERGIC REACTION  diphenhydrAMINE Injectable 50 milliGRAM(s) IntraMuscular once PRN MILD ALLERGIC REACTION  diphenhydrAMINE Injectable 50 milliGRAM(s) IV Push once PRN MILD ALLERGIC REACTION  EPINEPHrine     1 mG/mL Injectable 0.3 milliGRAM(s) IntraMuscular once PRN ANAPHYLAXIS  glucagon  Injectable 1 milliGRAM(s) IV Push once PRN REFRACTORY CASES AND/OR ON BETA BLOCKERS  glucagon  Injectable 2 milliGRAM(s) IV Push once PRN REFRACTORY CASES AND/ OR ON BETA BLOCKERS  LORazepam     Tablet 1 milliGRAM(s) Oral once PRN Anxiety      CAPILLARY BLOOD GLUCOSE        I&O's Summary    26 Mar 2025 07:01  -  27 Mar 2025 07:00  --------------------------------------------------------  IN: 1400 mL / OUT: 1450 mL / NET: -50 mL        Vital Signs Last 24 Hrs  T(C): 36.6 (27 Mar 2025 04:00), Max: 36.6 (27 Mar 2025 04:00)  T(F): 97.9 (27 Mar 2025 04:00), Max: 97.9 (27 Mar 2025 04:00)  HR: 85 (27 Mar 2025 07:00) (67 - 90)  BP: 132/85 (27 Mar 2025 07:00) (97/71 - 132/85)  BP(mean): 99 (27 Mar 2025 07:00) (79 - 99)  RR: 18 (27 Mar 2025 07:00) (10 - 22)  SpO2: 97% (27 Mar 2025 07:00) (93% - 99%)    Parameters below as of 27 Mar 2025 07:00  Patient On (Oxygen Delivery Method): nasal cannula  O2 Flow (L/min): 3      PHYSICAL EXAM:  GENERAL: no distress  PSYCH: A&O x3  HEAD: Atraumatic, Normocephalic  NECK: Supple, No JVD  CHEST/LUNG: clear to auscultation bilaterally  HEART: regular rate and rhythm, no murmurs  ABDOMEN:+distended +TTP  EXTREMITIES: no edema on bilateral LE  NEUROLOGY: no focal neurologic deficit  SKIN: No rashes or lesions    LABS:                        8.9    12.32 )-----------( 435      ( 27 Mar 2025 00:33 )             29.5      03-27    136  |  103  |  26[H]  ----------------------------<  107[H]  4.4   |  22  |  1.00    Ca    8.5      27 Mar 2025 00:33  Phos  3.1     03-27  Mg     2.00     03-27    TPro  5.2[L]  /  Alb  2.5[L]  /  TBili  <0.2  /  DBili  x   /  AST  33[H]  /  ALT  13  /  AlkPhos  166[H]  03-27    PT/INR - ( 27 Mar 2025 00:33 )   PT: 11.1 sec;   INR: 0.96 ratio         PTT - ( 27 Mar 2025 00:33 )  PTT:24.8 sec      Urinalysis Basic - ( 27 Mar 2025 00:33 )    Color: x / Appearance: x / SG: x / pH: x  Gluc: 107 mg/dL / Ketone: x  / Bili: x / Urobili: x   Blood: x / Protein: x / Nitrite: x   Leuk Esterase: x / RBC: x / WBC x   Sq Epi: x / Non Sq Epi: x / Bacteria: x        RADIOLOGY & ADDITIONAL TESTS:    Imaging Personally Reviewed:    Consultant(s) Notes Reviewed:      Care Discussed with Consultants/Other Providers:

## 2025-03-27 NOTE — CHART NOTE - NSCHARTNOTEFT_GEN_A_CORE
MAR MICU TRANSFER NOTE    Please refer to MICU transfer note for full details.    Briefly, this is a 63F w ovarian ca c/b colonic/ureteral compression s/p colostomy and ureteral stent, anxiety, depression, iliac vein stenting admitted for carboplatin desensitization. Course c/b R pleural effusion requiring NC, MIST protocol started 3/26 via pleurx w improved drainage. Follows w [facility] / [facility]. O2 requirements stable, pulm to continue following on floor. Pt to continue rest of care on medicine service.     FOR FOLLOW UP:    Vital Signs Last 24 Hrs  T(C): 36.6 (27 Mar 2025 16:00), Max: 36.7 (27 Mar 2025 12:00)  T(F): 97.9 (27 Mar 2025 16:00), Max: 98 (27 Mar 2025 12:00)  HR: 85 (27 Mar 2025 18:00) (76 - 91)  BP: 112/77 (27 Mar 2025 18:00) (98/73 - 132/85)  BP(mean): 86 (27 Mar 2025 18:00) (78 - 99)  RR: 13 (27 Mar 2025 18:00) (11 - 22)  SpO2: 97% (27 Mar 2025 18:00) (94% - 100%)    Parameters below as of 27 Mar 2025 15:00  Patient On (Oxygen Delivery Method): nasal cannula  O2 Flow (L/min): 3    I&O's Summary    26 Mar 2025 07:01  -  27 Mar 2025 07:00  --------------------------------------------------------  IN: 1400 mL / OUT: 1450 mL / NET: -50 mL    27 Mar 2025 07:01  -  27 Mar 2025 18:51  --------------------------------------------------------  IN: 150 mL / OUT: 710 mL / NET: -560 mL    Allergies  IV Contrast (Rash)  platinum containing compounds (Anaphylaxis)    Intolerances    MEDICATIONS  (STANDING):  alteplase  Injectable for Pleural Effusion 10 milliGRAM(s) IntraPleural. every 12 hours  bacitracin   Ointment 1 Application(s) Topical three times a day  chlorhexidine 2% Cloths 1 Application(s) Topical <User Schedule>  cyanocobalamin 1000 MICROGram(s) Oral daily  dornase nidhi Solution for Pleural Effusion 5 milliGRAM(s) IntraPleural. every 12 hours  escitalopram 10 milliGRAM(s) Oral daily  fentaNYL   Patch 100 MICROgram(s)/Hr 1 Patch Transdermal every 72 hours  HYDROmorphone   Tablet 4 milliGRAM(s) Oral every 4 hours  influenza   Vaccine 0.5 milliLiter(s) IntraMuscular once  piperacillin/tazobactam IVPB.. 4.5 Gram(s) IV Intermittent every 8 hours  simethicone 80 milliGRAM(s) Chew three times a day  sodium chloride 0.9% Solution for Pleural Effusion 30 milliLiter(s) IntraPleural. every 12 hours  vancomycin  IVPB 1000 milliGRAM(s) IV Intermittent every 12 hours    MEDICATIONS  (PRN):  acetaminophen   Oral Liquid .. 1000 milliGRAM(s) Oral every 8 hours PRN Temp greater or equal to 38.5C (101.3F), Mild Pain (1 - 3), Moderate Pain (4 - 6)  albuterol    0.083%. 2.5 milliGRAM(s) Nebulizer once PRN Wheezing  diphenhydrAMINE 50 milliGRAM(s) Oral once PRN MILD ALERGIC REACTION  diphenhydrAMINE Injectable 50 milliGRAM(s) IntraMuscular once PRN MILD ALLERGIC REACTION  diphenhydrAMINE Injectable 50 milliGRAM(s) IV Push once PRN MILD ALLERGIC REACTION  EPINEPHrine     1 mG/mL Injectable 0.3 milliGRAM(s) IntraMuscular once PRN ANAPHYLAXIS  glucagon  Injectable 1 milliGRAM(s) IV Push once PRN REFRACTORY CASES AND/OR ON BETA BLOCKERS  glucagon  Injectable 2 milliGRAM(s) IV Push once PRN REFRACTORY CASES AND/ OR ON BETA BLOCKERS  LORazepam     Tablet 1 milliGRAM(s) Oral once PRN Anxiety                        8.9    12.32 )-----------( 435      ( 27 Mar 2025 00:33 )             29.5     03-27    136  |  103  |  26[H]  ----------------------------<  107[H]  4.4   |  22  |  1.00    Ca    8.5      27 Mar 2025 00:33  Phos  3.1     03-27  Mg     2.00     03-27    TPro  5.2[L]  /  Alb  2.5[L]  /  TBili  <0.2  /  DBili  x   /  AST  33[H]  /  ALT  13  /  AlkPhos  166[H]  03-27    PT/INR - ( 27 Mar 2025 00:33 )   PT: 11.1 sec;   INR: 0.96 ratio         PTT - ( 27 Mar 2025 00:33 )  PTT:24.8 sec

## 2025-03-28 LAB
ALBUMIN SERPL ELPH-MCNC: 2.1 G/DL — LOW (ref 3.3–5)
ALP SERPL-CCNC: 156 U/L — HIGH (ref 40–120)
ALT FLD-CCNC: 14 U/L — SIGNIFICANT CHANGE UP (ref 4–33)
ANION GAP SERPL CALC-SCNC: 11 MMOL/L — SIGNIFICANT CHANGE UP (ref 7–14)
APTT BLD: 25.1 SEC — SIGNIFICANT CHANGE UP (ref 24.5–35.6)
AST SERPL-CCNC: 31 U/L — SIGNIFICANT CHANGE UP (ref 4–32)
BILIRUB SERPL-MCNC: <0.2 MG/DL — SIGNIFICANT CHANGE UP (ref 0.2–1.2)
BUN SERPL-MCNC: 22 MG/DL — SIGNIFICANT CHANGE UP (ref 7–23)
CALCIUM SERPL-MCNC: 8.4 MG/DL — SIGNIFICANT CHANGE UP (ref 8.4–10.5)
CHLORIDE SERPL-SCNC: 105 MMOL/L — SIGNIFICANT CHANGE UP (ref 98–107)
CO2 SERPL-SCNC: 22 MMOL/L — SIGNIFICANT CHANGE UP (ref 22–31)
CREAT SERPL-MCNC: 0.92 MG/DL — SIGNIFICANT CHANGE UP (ref 0.5–1.3)
EGFR: 70 ML/MIN/1.73M2 — SIGNIFICANT CHANGE UP
EGFR: 70 ML/MIN/1.73M2 — SIGNIFICANT CHANGE UP
GLUCOSE SERPL-MCNC: 91 MG/DL — SIGNIFICANT CHANGE UP (ref 70–99)
HCT VFR BLD CALC: 31.3 % — LOW (ref 34.5–45)
HGB BLD-MCNC: 9.4 G/DL — LOW (ref 11.5–15.5)
INR BLD: 0.94 RATIO — SIGNIFICANT CHANGE UP (ref 0.85–1.16)
MCHC RBC-ENTMCNC: 28.7 PG — SIGNIFICANT CHANGE UP (ref 27–34)
MCHC RBC-ENTMCNC: 30 G/DL — LOW (ref 32–36)
MCV RBC AUTO: 95.7 FL — SIGNIFICANT CHANGE UP (ref 80–100)
MRSA PCR RESULT.: SIGNIFICANT CHANGE UP
NRBC # BLD AUTO: 0 K/UL — SIGNIFICANT CHANGE UP (ref 0–0)
NRBC # FLD: 0 K/UL — SIGNIFICANT CHANGE UP (ref 0–0)
NRBC BLD AUTO-RTO: 0 /100 WBCS — SIGNIFICANT CHANGE UP (ref 0–0)
PLATELET # BLD AUTO: 417 K/UL — HIGH (ref 150–400)
POTASSIUM SERPL-MCNC: 3.6 MMOL/L — SIGNIFICANT CHANGE UP (ref 3.5–5.3)
POTASSIUM SERPL-SCNC: 3.6 MMOL/L — SIGNIFICANT CHANGE UP (ref 3.5–5.3)
PROT SERPL-MCNC: 4.8 G/DL — LOW (ref 6–8.3)
PROTHROM AB SERPL-ACNC: 11.2 SEC — SIGNIFICANT CHANGE UP (ref 9.9–13.4)
RBC # BLD: 3.27 M/UL — LOW (ref 3.8–5.2)
RBC # FLD: 18.1 % — HIGH (ref 10.3–14.5)
S AUREUS DNA NOSE QL NAA+PROBE: SIGNIFICANT CHANGE UP
SODIUM SERPL-SCNC: 138 MMOL/L — SIGNIFICANT CHANGE UP (ref 135–145)
WBC # BLD: 10.63 K/UL — HIGH (ref 3.8–10.5)
WBC # FLD AUTO: 10.63 K/UL — HIGH (ref 3.8–10.5)

## 2025-03-28 PROCEDURE — 76604 US EXAM CHEST: CPT | Mod: 26

## 2025-03-28 PROCEDURE — 99223 1ST HOSP IP/OBS HIGH 75: CPT | Mod: GC,25

## 2025-03-28 PROCEDURE — 99233 SBSQ HOSP IP/OBS HIGH 50: CPT

## 2025-03-28 RX ORDER — PSEUDOEPHEDRINE HCL 30 MG
60 TABLET ORAL EVERY 6 HOURS
Refills: 0 | Status: DISCONTINUED | OUTPATIENT
Start: 2025-03-28 | End: 2025-04-08

## 2025-03-28 RX ORDER — FLUTICASONE PROPIONATE 50 UG/1
1 SPRAY, METERED NASAL
Refills: 0 | Status: DISCONTINUED | OUTPATIENT
Start: 2025-03-28 | End: 2025-04-08

## 2025-03-28 RX ORDER — ACETAMINOPHEN 500 MG/5ML
650 LIQUID (ML) ORAL ONCE
Refills: 0 | Status: DISCONTINUED | OUTPATIENT
Start: 2025-03-28 | End: 2025-03-28

## 2025-03-28 RX ORDER — HYDROMORPHONE/SOD CHLOR,ISO/PF 2 MG/10 ML
0.8 SYRINGE (ML) INJECTION ONCE
Refills: 0 | Status: DISCONTINUED | OUTPATIENT
Start: 2025-03-28 | End: 2025-03-28

## 2025-03-28 RX ORDER — SODIUM CHLORIDE 0.65 %
1 AEROSOL, SPRAY (ML) NASAL
Refills: 0 | Status: DISCONTINUED | OUTPATIENT
Start: 2025-03-28 | End: 2025-04-08

## 2025-03-28 RX ADMIN — Medication 4 MILLIGRAM(S): at 02:24

## 2025-03-28 RX ADMIN — Medication 25 GRAM(S): at 18:23

## 2025-03-28 RX ADMIN — Medication 60 MILLIGRAM(S): at 09:04

## 2025-03-28 RX ADMIN — Medication 4 MILLIGRAM(S): at 13:55

## 2025-03-28 RX ADMIN — Medication 4 MILLIGRAM(S): at 05:43

## 2025-03-28 RX ADMIN — Medication 0.8 MILLIGRAM(S): at 13:33

## 2025-03-28 RX ADMIN — Medication 1 SPRAY(S): at 21:47

## 2025-03-28 RX ADMIN — Medication 1000 MILLIGRAM(S): at 09:47

## 2025-03-28 RX ADMIN — Medication 250 MILLIGRAM(S): at 05:44

## 2025-03-28 RX ADMIN — FLUTICASONE PROPIONATE 1 SPRAY(S): 50 SPRAY, METERED NASAL at 05:46

## 2025-03-28 RX ADMIN — ESCITALOPRAM OXALATE 10 MILLIGRAM(S): 20 TABLET ORAL at 10:29

## 2025-03-28 RX ADMIN — Medication 4 MILLIGRAM(S): at 06:43

## 2025-03-28 RX ADMIN — Medication 1 APPLICATION(S): at 07:47

## 2025-03-28 RX ADMIN — Medication 4 MILLIGRAM(S): at 14:55

## 2025-03-28 RX ADMIN — Medication 4 MILLIGRAM(S): at 17:46

## 2025-03-28 RX ADMIN — Medication 250 MILLIGRAM(S): at 17:47

## 2025-03-28 RX ADMIN — Medication 4 MILLIGRAM(S): at 22:06

## 2025-03-28 RX ADMIN — Medication 4 MILLIGRAM(S): at 23:06

## 2025-03-28 RX ADMIN — Medication 30 MILLILITER(S): at 23:40

## 2025-03-28 RX ADMIN — Medication 25 GRAM(S): at 10:07

## 2025-03-28 RX ADMIN — Medication 25 GRAM(S): at 02:10

## 2025-03-28 RX ADMIN — DORNASE ALFA 5 MILLIGRAM(S): 1 SOLUTION RESPIRATORY (INHALATION) at 23:40

## 2025-03-28 RX ADMIN — Medication 4 MILLIGRAM(S): at 11:05

## 2025-03-28 RX ADMIN — Medication 0.8 MILLIGRAM(S): at 12:33

## 2025-03-28 RX ADMIN — CYANOCOBALAMIN 1000 MICROGRAM(S): 1000 INJECTION INTRAMUSCULAR; SUBCUTANEOUS at 13:55

## 2025-03-28 RX ADMIN — ALTEPLASE 10 MILLIGRAM(S): 2.2 INJECTION, POWDER, LYOPHILIZED, FOR SOLUTION INTRAVENOUS at 23:40

## 2025-03-28 RX ADMIN — Medication 1000 MILLIGRAM(S): at 08:47

## 2025-03-28 RX ADMIN — Medication 1000 MILLIGRAM(S): at 19:42

## 2025-03-28 RX ADMIN — FLUTICASONE PROPIONATE 1 SPRAY(S): 50 SPRAY, METERED NASAL at 17:49

## 2025-03-28 RX ADMIN — Medication 4 MILLIGRAM(S): at 01:58

## 2025-03-28 RX ADMIN — Medication 4 MILLIGRAM(S): at 10:05

## 2025-03-28 RX ADMIN — Medication 4 MILLIGRAM(S): at 18:46

## 2025-03-28 RX ADMIN — Medication 1000 MILLIGRAM(S): at 20:42

## 2025-03-28 NOTE — CHART NOTE - NSCHARTNOTEFT_GEN_A_CORE
: Yoel Yoo     INDICATION:    PROCEDURE:  [ ] LIMITED ECHO  [X] LIMITED CHEST  [ ] LIMITED RETROPERITONEAL  [ ] LIMITED ABDOMINAL  [ ] LIMITED DVT  [ ] NEEDLE GUIDANCE VASCULAR  [ ] NEEDLE GUIDANCE THORACENTESIS  [ ] NEEDLE GUIDANCE PARACENTESIS  [ ] NEEDLE GUIDANCE PERICARDIOCENTESIS  [ ] OTHER    FINDINGS/INTERPRETATION:  R pleural effusion, small residual on base, anterior small pocket as well  L simple effusion, hypoechoic moderate     #Bilateral effusions, R loculated and L simple moderate

## 2025-03-28 NOTE — PROGRESS NOTE ADULT - SUBJECTIVE AND OBJECTIVE BOX
Keron John MD  [employer]  Pager [phone]  Email: [email or web address]  Available on [employer] Teams        PROGRESS NOTE:     Patient is a 64y old  Female who presents with a chief complaint of  63 y/o F with Hx of anxiety, depressions, chronic back pain, ovarian Ca & diverting colostomy due to mass effect on colon as well as R ureter, s/p stent.   Also with new O2 requirement.  Presents for desensitization of carboplatin.      (26 Mar 2025 14:15)      SUBJECTIVE / OVERNIGHT EVENTS:  Patient seen and examined this morning. Transferred from MICU. Continues to experience shortness of breath.  ADDITIONAL REVIEW OF SYSTEMS:  No f/c    MEDICATIONS  (STANDING):  alteplase  Injectable for Pleural Effusion 10 milliGRAM(s) IntraPleural. every 12 hours  chlorhexidine 2% Cloths 1 Application(s) Topical <User Schedule>  cyanocobalamin 1000 MICROGram(s) Oral daily  dornase nidhi Solution for Pleural Effusion 5 milliGRAM(s) IntraPleural. every 12 hours  escitalopram 10 milliGRAM(s) Oral daily  fentaNYL   Patch 100 MICROgram(s)/Hr 1 Patch Transdermal every 72 hours  fluticasone propionate 50 MICROgram(s)/spray Nasal Spray 1 Spray(s) Both Nostrils two times a day  HYDROmorphone   Tablet 4 milliGRAM(s) Oral every 4 hours  influenza   Vaccine 0.5 milliLiter(s) IntraMuscular once  piperacillin/tazobactam IVPB.. 4.5 Gram(s) IV Intermittent every 8 hours  simethicone 80 milliGRAM(s) Chew three times a day  sodium chloride 0.9% Solution for Pleural Effusion 30 milliLiter(s) IntraPleural. every 12 hours  vancomycin  IVPB 1000 milliGRAM(s) IV Intermittent every 12 hours    MEDICATIONS  (PRN):  acetaminophen   Oral Liquid .. 1000 milliGRAM(s) Oral every 8 hours PRN Temp greater or equal to 38.5C (101.3F), Mild Pain (1 - 3), Moderate Pain (4 - 6)  albuterol    0.083%. 2.5 milliGRAM(s) Nebulizer once PRN Wheezing  diphenhydrAMINE 50 milliGRAM(s) Oral once PRN MILD ALERGIC REACTION  diphenhydrAMINE Injectable 50 milliGRAM(s) IntraMuscular once PRN MILD ALLERGIC REACTION  diphenhydrAMINE Injectable 50 milliGRAM(s) IV Push once PRN MILD ALLERGIC REACTION  EPINEPHrine     1 mG/mL Injectable 0.3 milliGRAM(s) IntraMuscular once PRN ANAPHYLAXIS  glucagon  Injectable 1 milliGRAM(s) IV Push once PRN REFRACTORY CASES AND/OR ON BETA BLOCKERS  glucagon  Injectable 2 milliGRAM(s) IV Push once PRN REFRACTORY CASES AND/ OR ON BETA BLOCKERS  LORazepam     Tablet 1 milliGRAM(s) Oral once PRN Anxiety  pseudoephedrine 60 milliGRAM(s) Oral every 6 hours PRN nasal congesiton      CAPILLARY BLOOD GLUCOSE        I&O's Summary    27 Mar 2025 07:01  -  28 Mar 2025 07:00  --------------------------------------------------------  IN: 150 mL / OUT: 1350 mL / NET: -1200 mL    28 Mar 2025 07:01  -  28 Mar 2025 18:15  --------------------------------------------------------  IN: 400 mL / OUT: 1160 mL / NET: -760 mL        PHYSICAL EXAM:  Vital Signs Last 24 Hrs  T(C): 36.1 (28 Mar 2025 14:38), Max: 37.1 (28 Mar 2025 05:35)  T(F): 97 (28 Mar 2025 14:38), Max: 98.8 (28 Mar 2025 05:35)  HR: 89 (28 Mar 2025 14:38) (85 - 90)  BP: 101/70 (28 Mar 2025 14:38) (101/70 - 121/76)  BP(mean): 93 (27 Mar 2025 21:00) (87 - 93)  RR: 18 (28 Mar 2025 14:38) (13 - 18)  SpO2: 95% (28 Mar 2025 14:38) (92% - 95%)    Parameters below as of 28 Mar 2025 14:38  Patient On (Oxygen Delivery Method): nasal cannula  O2 Flow (L/min): 3      CONSTITUTIONAL: Some distress with breathing noted. Hypophonic  RESPIRATORY: Normal respiratory effort; no respiratory distress, CTAB  CARDIOVASCULAR: No visible JVD, No lower extremity edema; S1S2, no m,r,g  ABDOMEN: Not guarding, distended, TTP, BS+  MUSCLOSKELETAL: no clubbing or cyanosis of digits; no joint swelling   PSYCH: AOx3 appropriate    LABS:                        9.4    10.63 )-----------( 417      ( 28 Mar 2025 06:00 )             31.3     03-28    138  |  105  |  22  ----------------------------<  91  3.6   |  22  |  0.92    Ca    8.4      28 Mar 2025 06:00  Phos  3.1     03-27  Mg     2.00     03-27    TPro  4.8[L]  /  Alb  2.1[L]  /  TBili  <0.2  /  DBili  x   /  AST  31  /  ALT  14  /  AlkPhos  156[H]  03-28    PT/INR - ( 28 Mar 2025 06:00 )   PT: 11.2 sec;   INR: 0.94 ratio         PTT - ( 28 Mar 2025 06:00 )  PTT:25.1 sec      Urinalysis Basic - ( 28 Mar 2025 06:00 )    Color: x / Appearance: x / SG: x / pH: x  Gluc: 91 mg/dL / Ketone: x  / Bili: x / Urobili: x   Blood: x / Protein: x / Nitrite: x   Leuk Esterase: x / RBC: x / WBC x   Sq Epi: x / Non Sq Epi: x / Bacteria: x          RADIOLOGY & ADDITIONAL TESTS:  Results Reviewed:   Imaging Personally Reviewed:  Electrocardiogram Personally Reviewed:    COORDINATION OF CARE:  Care Discussed with Consultants/Other Providers [Y/N]: Case discussed during interdisciplinary rounds with social work and case management  Prior or Outpatient Records Reviewed [Y/N]:

## 2025-03-28 NOTE — CONSULT NOTE ADULT - ASSESSMENT
63 year old female PMH:Anxiety Chronic back pain, Depression, Ovarian ca with malignant effusion R s/p R Pleurx (Dimmit) and Vertigo mass effect adjacent to distal colon w/o obstruction, mass effect on distal R ureter s/p ureter stent now s/p colostomy c/b bleeding now off AC,  Iliac vein occlusion status post stent presents for desensitization of carboplatin. Patient endorses fatigue. She denies fevers but endorses chills. Also endorses abdominal pain and some colostomy leakage. Pt denies any bleeding currently. Endorses difficulty with BMs and urinating as well, states she feels burning when she goes. Overall also endorses sob and has a lot of difficulty walking. Has noted worsening SOB over the past few weeks. Admitted to MICU for carboplatin desensitization, completed successfully on 3/26. Patient developed worsening dyspnea 2/2 loculated R pleural effusion with pleurx in place with CT imaging with R loculated effusion in two pockets; pleurx not draining well. Started MIST protocol on 3/26 with adequate output.     #Ovarian Ca  #R Pleurx connected to PleuroVac  #L simple pleural effusion  - 3/27 fluid sample with concern for complicated effusion  - Continue Zosyn with total regimen to 4-6 weeks  - Continue MIST for total of 6 doses, then repeat CT Chest  - Would start diuresis to assess response on L side, at the moment seems simple and small to moderate in size  - Continue oxygen supplementation with goal >92%  - Mantain PleurX connected to PleuroVac on suction -20

## 2025-03-28 NOTE — CHART NOTE - NSCHARTNOTEFT_GEN_A_CORE
Tpa 10mg and Dornase 5mg given in R pleurx catheter. Catheter clamped for 1h (11:30AM) then reopened (12:30PM). Catheter connected to PleuroVac and suction [id number]

## 2025-03-28 NOTE — CONSULT NOTE ADULT - SUBJECTIVE AND OBJECTIVE BOX
HPI:    63 year old female PMH:Anxiety Chronic back pain, Depression, Ovarian ca with malignant effusion R s/p R Pleurx ([place]) and Vertigo mass effect adjacent to distal colon w/o obstruction, mass effect on distal R ureter s/p ureter stent now s/p colostomy c/b bleeding now off AC,  Iliac vein occlusion status post stent presents for desensitization of carboplatin. Patient endorses fatigue. She denies fevers but endorses chills. Also endorses abdominal pain and some colostomy leakage. Pt denies any bleeding currently. Endorses difficulty with BMs and urinating as well, states she feels burning when she goes. Overall also endorses sob and has a lot of difficulty walking. Has noted worsening SOB over the past few weeks. Admitted to MICU for carboplatin desensitization, completed successfully on 3/26. Patient developed worsening dyspnea 2/2 loculated R pleural effusion with pleurx in place with CT imaging with R loculated effusion in two pockets; pleurx not draining well. Started MIST protocol on 3/26 with adequate output.       PAST MEDICAL & SURGICAL HISTORY:  Anxiety      Vertigo      Lower back pain      Ovarian ca  continues target chemotherapy at this time       Cancer of fallopian tube      Major depression      S/P   x 3 , ,       S/P bunionectomy  bilateral      S/P D&C (status post dilation and curettage)      S/P abdominoplasty      H/O total hysterectomy      S/P colostomy  2024      H/O ureteroscopy  B/L stent placement          FAMILY HISTORY:  History of hypertension (Mother)        SOCIAL HISTORY:  Smoking: [ ] Never Smoked [ ] Former Smoker (__ packs x ___ years) [ ] Current Smoker  (__ packs x ___ years)  Substance Use: [ ] Never Used [ ] Used ____  EtOH Use:  Marital Status: [ ] Single [ ]  [ ]  [ ]   Sexual History:   Occupation:  Recent Travel:  Country of Birth:  Advance Directives:    Allergies    IV Contrast (Rash)  platinum containing compounds (Anaphylaxis)    Intolerances        HOME MEDICATIONS:  Home Medications:  cyanocobalamin 1000 mcg oral tablet: 1 tab(s) orally once a day (25 Mar 2025 08:15)   50 mcg (2000 intl units) oral capsule: 1 cap(s) orally once a day (25 Mar 2025 08:14)  Dilaudid 4 mg oral tablet: 1 tab(s) orally every 4 hours (25 Mar 2025 08:15)  escitalopram 10 mg oral tablet: 1 tab(s) orally once a day (25 Mar 2025 12:38)  fentanyl topical: every 3 days (25 Mar 2025 08:15)  ondansetron 4 mg oral tablet: 1 tab(s) orally once a day (25 Mar 2025 08:15)      REVIEW OF SYSTEMS:  All systems negative except as documented above.    OBJECTIVE:  ICU Vital Signs Last 24 Hrs  T(C): 36.3 (28 Mar 2025 18:37), Max: 37.1 (28 Mar 2025 05:35)  T(F): 97.4 (28 Mar 2025 18:37), Max: 98.8 (28 Mar 2025 05:35)  HR: 86 (28 Mar 2025 18:37) (85 - 90)  BP: 96/64 (28 Mar 2025 18:37) (96/64 - 121/76)  BP(mean): 93 (27 Mar 2025 21:00) (87 - 93)  ABP: --  ABP(mean): --  RR: 18 (28 Mar 2025 18:37) (13 - 18)  SpO2: 93% (28 Mar 2025 18:37) (92% - 95%)    O2 Parameters below as of 28 Mar 2025 18:37  Patient On (Oxygen Delivery Method): nasal cannula  O2 Flow (L/min): 3             @ 07:  -   @ 07:00  --------------------------------------------------------  IN: 150 mL / OUT: 1350 mL / NET: -1200 mL     @ 07:  -   @ 18:49  --------------------------------------------------------  IN: 400 mL / OUT: 1160 mL / NET: -760 mL      CAPILLARY BLOOD GLUCOSE          PHYSICAL EXAM:  General: NAD  HEENT: EOMI, sclera anicteric  Neck: supple  Cardiovascular: RR  Respiratory: bilateral decreased breath sounds  Abdomen: soft  Extremities: warm and well perfused, no edema, no clubbing  Skin: no rashes  Neurological: AOx3, no focal deficits    HOSPITAL MEDICATIONS:  Standing Meds:  alteplase  Injectable for Pleural Effusion 10 milliGRAM(s) IntraPleural. every 12 hours  chlorhexidine 2% Cloths 1 Application(s) Topical <User Schedule>  cyanocobalamin 1000 MICROGram(s) Oral daily  dornase nidhi Solution for Pleural Effusion 5 milliGRAM(s) IntraPleural. every 12 hours  escitalopram 10 milliGRAM(s) Oral daily  fentaNYL   Patch 100 MICROgram(s)/Hr 1 Patch Transdermal every 72 hours  fluticasone propionate 50 MICROgram(s)/spray Nasal Spray 1 Spray(s) Both Nostrils two times a day  HYDROmorphone   Tablet 4 milliGRAM(s) Oral every 4 hours  influenza   Vaccine 0.5 milliLiter(s) IntraMuscular once  piperacillin/tazobactam IVPB.. 4.5 Gram(s) IV Intermittent every 8 hours  simethicone 80 milliGRAM(s) Chew three times a day  sodium chloride 0.9% Solution for Pleural Effusion 30 milliLiter(s) IntraPleural. every 12 hours  vancomycin  IVPB 1000 milliGRAM(s) IV Intermittent every 12 hours      PRN Meds:  acetaminophen   Oral Liquid .. 1000 milliGRAM(s) Oral every 8 hours PRN  albuterol    0.083%. 2.5 milliGRAM(s) Nebulizer once PRN  diphenhydrAMINE 50 milliGRAM(s) Oral once PRN  diphenhydrAMINE Injectable 50 milliGRAM(s) IntraMuscular once PRN  diphenhydrAMINE Injectable 50 milliGRAM(s) IV Push once PRN  EPINEPHrine     1 mG/mL Injectable 0.3 milliGRAM(s) IntraMuscular once PRN  glucagon  Injectable 1 milliGRAM(s) IV Push once PRN  glucagon  Injectable 2 milliGRAM(s) IV Push once PRN  LORazepam     Tablet 1 milliGRAM(s) Oral once PRN  pseudoephedrine 60 milliGRAM(s) Oral every 6 hours PRN      LABS:                        9.4    10.63 )-----------( 417      ( 28 Mar 2025 06:00 )             31.3     Hgb Trend: 9.4<--, 8.9<--, 9.1<--, 8.7<--      138  |  105  |  22  ----------------------------<  91  3.6   |  22  |  0.92    Ca    8.4      28 Mar 2025 06:00  Phos  3.1       Mg     2.00         TPro  4.8[L]  /  Alb  2.1[L]  /  TBili  <0.2  /  DBili  x   /  AST  31  /  ALT  14  /  AlkPhos  156[H]      Creatinine Trend: 0.92<--, 1.00<--, 1.08<--, 1.18<--  PT/INR - ( 28 Mar 2025 06:00 )   PT: 11.2 sec;   INR: 0.94 ratio         PTT - ( 28 Mar 2025 06:00 )  PTT:25.1 sec  Urinalysis Basic - ( 28 Mar 2025 06:00 )    Color: x / Appearance: x / SG: x / pH: x  Gluc: 91 mg/dL / Ketone: x  / Bili: x / Urobili: x   Blood: x / Protein: x / Nitrite: x   Leuk Esterase: x / RBC: x / WBC x   Sq Epi: x / Non Sq Epi: x / Bacteria: x        Venous Blood Gas:   @ 00:33  7.34/47/50/25/76.7  VBG Lactate: 1.9      MICROBIOLOGY:       RADIOLOGY:  [x] Reviewed and interpreted by me

## 2025-03-28 NOTE — PROGRESS NOTE ADULT - ASSESSMENT
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 On admission patient also c/o dyspnea over the past few weeks. Further eval revealed loculated R pleural effusion with pleurx in place; pleurx not draining well. Started MIST protocol on 3/26    Fallopian tube cancer  S/P multiple lines of therapy, now on Paclitaxel. Admitted for carboplatin desensitization   Now complete, when able to be discharged will follow up with Dr. Hughes.     Loculated Effusion  Acute hypoxic respiratory failure  Admitted to MICU for carboplatin desensitization, completed successfully on 3/26. Patient developed worsening dyspnea 2/2 loculated R pleural effusion with pleurx in place; pleurx not draining well. Started MIST protocol on 3/26, completed 1 session.  Follow up pulmonary  O2 supplement as needed    Case discussed with patient and her  at bedside.

## 2025-03-28 NOTE — CONSULT NOTE ADULT - ATTENDING COMMENTS
64 yo F w/ PMHx Anxiety Chronic back pain, Depression, Ovarian ca and Vertigo mass effect adjacent to distal colon w/o obstruction, mass effect on distal R ureter s/p ureteral stent now s/p colostomy c/b bleeding now off AC,  Iliac vein occlusion status post stent presented initially for desensitization to carboplatin. Reportedly SOB for the last several weeks. Has a R sided PLEURX which has not been draining well and was pending removal. S/P desensitization without any issues. S/p TPA/DNase via PleurX for complex loculated malignant effusion w/ likely infectious component.      # Chemodesensitization  # Metastatic Ovarian CA  # Chronic LE edema 2/2 to malignant obstruction  # acute hypoxemic respiratory failure  # Loculated pleural effusion  #Left effusion, simple appearing  #Volume overloaded    Recommend:  - Complete MIST protocol, still with good drainage and persistent fluid collection on POCUS today, additional TPA/DNase dose administered   - If fails mist will need CT surgery vs IR CT guided chest tube   - Zosyn/Vanc per ID  - trial of diuresis for volume overload/anasarca (aware of chronic LE edema, however also with worsening abd distention, pitting abd edema and left effusion)  - Requiring O2 prior to start of chemo. Will wean as tolerated. Will likely need O2 on discharge.   - Dispo full code.

## 2025-03-29 LAB
ALBUMIN SERPL ELPH-MCNC: 2.3 G/DL — LOW (ref 3.3–5)
ALP SERPL-CCNC: 145 U/L — HIGH (ref 40–120)
ALT FLD-CCNC: 15 U/L — SIGNIFICANT CHANGE UP (ref 4–33)
ANION GAP SERPL CALC-SCNC: 12 MMOL/L — SIGNIFICANT CHANGE UP (ref 7–14)
AST SERPL-CCNC: 29 U/L — SIGNIFICANT CHANGE UP (ref 4–32)
BASOPHILS # BLD AUTO: 0.1 K/UL — SIGNIFICANT CHANGE UP (ref 0–0.2)
BASOPHILS NFR BLD AUTO: 0.8 % — SIGNIFICANT CHANGE UP (ref 0–2)
BILIRUB SERPL-MCNC: <0.2 MG/DL — SIGNIFICANT CHANGE UP (ref 0.2–1.2)
BUN SERPL-MCNC: 23 MG/DL — SIGNIFICANT CHANGE UP (ref 7–23)
CALCIUM SERPL-MCNC: 8.4 MG/DL — SIGNIFICANT CHANGE UP (ref 8.4–10.5)
CHLORIDE SERPL-SCNC: 100 MMOL/L — SIGNIFICANT CHANGE UP (ref 98–107)
CO2 SERPL-SCNC: 23 MMOL/L — SIGNIFICANT CHANGE UP (ref 22–31)
CREAT SERPL-MCNC: 1.19 MG/DL — SIGNIFICANT CHANGE UP (ref 0.5–1.3)
EGFR: 52 ML/MIN/1.73M2 — LOW
EGFR: 52 ML/MIN/1.73M2 — LOW
EOSINOPHIL # BLD AUTO: 0.41 K/UL — SIGNIFICANT CHANGE UP (ref 0–0.5)
EOSINOPHIL NFR BLD AUTO: 3.4 % — SIGNIFICANT CHANGE UP (ref 0–6)
GLUCOSE SERPL-MCNC: 99 MG/DL — SIGNIFICANT CHANGE UP (ref 70–99)
HCT VFR BLD CALC: 31.6 % — LOW (ref 34.5–45)
HGB BLD-MCNC: 9.3 G/DL — LOW (ref 11.5–15.5)
IANC: 10.12 K/UL — HIGH (ref 1.8–7.4)
IMM GRANULOCYTES NFR BLD AUTO: 1.1 % — HIGH (ref 0–0.9)
LYMPHOCYTES # BLD AUTO: 0.65 K/UL — LOW (ref 1–3.3)
LYMPHOCYTES # BLD AUTO: 5.3 % — LOW (ref 13–44)
MAGNESIUM SERPL-MCNC: 1.8 MG/DL — SIGNIFICANT CHANGE UP (ref 1.6–2.6)
MCHC RBC-ENTMCNC: 28.3 PG — SIGNIFICANT CHANGE UP (ref 27–34)
MCHC RBC-ENTMCNC: 29.4 G/DL — LOW (ref 32–36)
MCV RBC AUTO: 96 FL — SIGNIFICANT CHANGE UP (ref 80–100)
MONOCYTES # BLD AUTO: 0.77 K/UL — SIGNIFICANT CHANGE UP (ref 0–0.9)
MONOCYTES NFR BLD AUTO: 6.3 % — SIGNIFICANT CHANGE UP (ref 2–14)
NEUTROPHILS # BLD AUTO: 10.12 K/UL — HIGH (ref 1.8–7.4)
NEUTROPHILS NFR BLD AUTO: 83.1 % — HIGH (ref 43–77)
NRBC # BLD AUTO: 0 K/UL — SIGNIFICANT CHANGE UP (ref 0–0)
NRBC # FLD: 0 K/UL — SIGNIFICANT CHANGE UP (ref 0–0)
NRBC BLD AUTO-RTO: 0 /100 WBCS — SIGNIFICANT CHANGE UP (ref 0–0)
PHOSPHATE SERPL-MCNC: 3.8 MG/DL — SIGNIFICANT CHANGE UP (ref 2.5–4.5)
PLATELET # BLD AUTO: 374 K/UL — SIGNIFICANT CHANGE UP (ref 150–400)
POTASSIUM SERPL-MCNC: 3.4 MMOL/L — LOW (ref 3.5–5.3)
POTASSIUM SERPL-SCNC: 3.4 MMOL/L — LOW (ref 3.5–5.3)
PROT SERPL-MCNC: 4.9 G/DL — LOW (ref 6–8.3)
RBC # BLD: 3.29 M/UL — LOW (ref 3.8–5.2)
RBC # FLD: 18.3 % — HIGH (ref 10.3–14.5)
SODIUM SERPL-SCNC: 135 MMOL/L — SIGNIFICANT CHANGE UP (ref 135–145)
VANCOMYCIN TROUGH SERPL-MCNC: 19.9 UG/ML — SIGNIFICANT CHANGE UP (ref 10–20)
WBC # BLD: 12.19 K/UL — HIGH (ref 3.8–10.5)
WBC # FLD AUTO: 12.19 K/UL — HIGH (ref 3.8–10.5)

## 2025-03-29 PROCEDURE — 99232 SBSQ HOSP IP/OBS MODERATE 35: CPT

## 2025-03-29 PROCEDURE — G0545: CPT

## 2025-03-29 PROCEDURE — 99223 1ST HOSP IP/OBS HIGH 75: CPT | Mod: GC

## 2025-03-29 RX ORDER — ONDANSETRON HCL/PF 4 MG/2 ML
8 VIAL (ML) INJECTION EVERY 8 HOURS
Refills: 0 | Status: DISCONTINUED | OUTPATIENT
Start: 2025-03-29 | End: 2025-04-08

## 2025-03-29 RX ORDER — HYDROMORPHONE/SOD CHLOR,ISO/PF 2 MG/10 ML
0.25 SYRINGE (ML) INJECTION ONCE
Refills: 0 | Status: DISCONTINUED | OUTPATIENT
Start: 2025-03-29 | End: 2025-03-29

## 2025-03-29 RX ORDER — LORAZEPAM 4 MG/ML
0.5 VIAL (ML) INJECTION ONCE
Refills: 0 | Status: DISCONTINUED | OUTPATIENT
Start: 2025-03-29 | End: 2025-03-29

## 2025-03-29 RX ADMIN — Medication 40 MILLIEQUIVALENT(S): at 07:09

## 2025-03-29 RX ADMIN — Medication 4 MILLIGRAM(S): at 19:13

## 2025-03-29 RX ADMIN — Medication 25 GRAM(S): at 18:43

## 2025-03-29 RX ADMIN — Medication 4 MILLIGRAM(S): at 23:16

## 2025-03-29 RX ADMIN — Medication 4 MILLIGRAM(S): at 09:36

## 2025-03-29 RX ADMIN — Medication 4 MILLIGRAM(S): at 01:52

## 2025-03-29 RX ADMIN — Medication 4 MILLIGRAM(S): at 02:52

## 2025-03-29 RX ADMIN — Medication 25 GRAM(S): at 09:26

## 2025-03-29 RX ADMIN — Medication 0.25 MILLIGRAM(S): at 21:50

## 2025-03-29 RX ADMIN — Medication 4 MILLIGRAM(S): at 14:36

## 2025-03-29 RX ADMIN — Medication 4 MILLIGRAM(S): at 09:06

## 2025-03-29 RX ADMIN — FLUTICASONE PROPIONATE 1 SPRAY(S): 50 SPRAY, METERED NASAL at 06:18

## 2025-03-29 RX ADMIN — Medication 4 MILLIGRAM(S): at 14:06

## 2025-03-29 RX ADMIN — Medication 250 MILLIGRAM(S): at 06:15

## 2025-03-29 RX ADMIN — CYANOCOBALAMIN 1000 MICROGRAM(S): 1000 INJECTION INTRAMUSCULAR; SUBCUTANEOUS at 14:12

## 2025-03-29 RX ADMIN — Medication 4 MILLIGRAM(S): at 06:12

## 2025-03-29 RX ADMIN — Medication 4 MILLIGRAM(S): at 22:16

## 2025-03-29 RX ADMIN — Medication 4 MILLIGRAM(S): at 07:12

## 2025-03-29 RX ADMIN — Medication 25 GRAM(S): at 03:02

## 2025-03-29 RX ADMIN — Medication 8 MILLIGRAM(S): at 14:07

## 2025-03-29 RX ADMIN — Medication 0.5 MILLIGRAM(S): at 10:38

## 2025-03-29 RX ADMIN — Medication 0.25 MILLIGRAM(S): at 21:21

## 2025-03-29 RX ADMIN — Medication 1 APPLICATION(S): at 06:13

## 2025-03-29 RX ADMIN — FLUTICASONE PROPIONATE 1 SPRAY(S): 50 SPRAY, METERED NASAL at 18:43

## 2025-03-29 RX ADMIN — ESCITALOPRAM OXALATE 10 MILLIGRAM(S): 20 TABLET ORAL at 07:43

## 2025-03-29 RX ADMIN — Medication 4 MILLIGRAM(S): at 18:43

## 2025-03-29 NOTE — PROGRESS NOTE ADULT - SUBJECTIVE AND OBJECTIVE BOX
CHIEF COMPLAINT:Patient is a 64y old  Female who presents with a chief complaint of desensitization of carboplatin. (29 Mar 2025 14:09)      Interval Events: still fatigued, and short of breath on exertion, however no increased cough or hemopytsis    REVIEW OF SYSTEMS:  [x] All other systems negative except per HPI   [ ] Unable to assess ROS because ________    OBJECTIVE:  ICU Vital Signs Last 24 Hrs  T(C): 36.8 (29 Mar 2025 21:43), Max: 36.8 (29 Mar 2025 21:43)  T(F): 98.3 (29 Mar 2025 21:43), Max: 98.3 (29 Mar 2025 21:43)  HR: 94 (29 Mar 2025 21:43) (82 - 94)  BP: 108/73 (29 Mar 2025 21:43) (94/63 - 108/73)  BP(mean): --  ABP: --  ABP(mean): --  RR: 18 (29 Mar 2025 21:43) (16 - 18)  SpO2: 94% (29 Mar 2025 21:43) (93% - 96%)    O2 Parameters below as of 29 Mar 2025 21:43  Patient On (Oxygen Delivery Method): nasal cannula  O2 Flow (L/min): 3            03-28 @ 07:01 - 03-29 @ 07:00  --------------------------------------------------------  IN: 900 mL / OUT: 2660 mL / NET: -1760 mL    03-29 @ 07:01 - 03-29 @ 21:58  --------------------------------------------------------  IN: 600 mL / OUT: 0 mL / NET: 600 mL        PHYSICAL EXAM:  GENERAL: NAD, well-groomed, well-developed  HEAD:  Atraumatic, Normocephalic  EYES: EOMI, PERRLA, conjunctiva and sclera clear  ENMT: No tonsillar erythema, exudates, or enlargement; Moist mucous membranes, Good dentition, No lesions  NECK: Supple, No JVD, Normal thyroid  CHEST/LUNG: Clear to auscultation bilaterally; No rales, rhonchi, wheezing, or rubs  HEART: Regular rate and rhythm; No murmurs, rubs, or gallops  ABDOMEN: Soft, Nontender, Nondistended; Bowel sounds present  VASCULAR:  2+ Peripheral Pulses, No clubbing, cyanosis, or edema  LYMPH: No lymphadenopathy noted  SKIN: No rashes or lesions  NERVOUS SYSTEM:  Alert & Oriented X3, Good concentration; Motor Strength 5/5 B/L upper and lower extremities; DTRs 2+ intact and symmetric  right pleurx    HOSPITAL MEDICATIONS:  MEDICATIONS  (STANDING):  alteplase  Injectable for Pleural Effusion 10 milliGRAM(s) IntraPleural. every 12 hours  chlorhexidine 2% Cloths 1 Application(s) Topical <User Schedule>  cyanocobalamin 1000 MICROGram(s) Oral daily  dornase nidhi Solution for Pleural Effusion 5 milliGRAM(s) IntraPleural. every 12 hours  escitalopram 10 milliGRAM(s) Oral daily  fentaNYL   Patch 100 MICROgram(s)/Hr 1 Patch Transdermal every 72 hours  fluticasone propionate 50 MICROgram(s)/spray Nasal Spray 1 Spray(s) Both Nostrils two times a day  HYDROmorphone   Tablet 4 milliGRAM(s) Oral every 4 hours  influenza   Vaccine 0.5 milliLiter(s) IntraMuscular once  piperacillin/tazobactam IVPB.. 4.5 Gram(s) IV Intermittent every 8 hours  simethicone 80 milliGRAM(s) Chew three times a day  sodium chloride 0.9% Solution for Pleural Effusion 30 milliLiter(s) IntraPleural. every 12 hours    MEDICATIONS  (PRN):  acetaminophen   Oral Liquid .. 1000 milliGRAM(s) Oral every 8 hours PRN Temp greater or equal to 38.5C (101.3F), Mild Pain (1 - 3), Moderate Pain (4 - 6)  albuterol    0.083%. 2.5 milliGRAM(s) Nebulizer once PRN Wheezing  diphenhydrAMINE 50 milliGRAM(s) Oral once PRN MILD ALERGIC REACTION  diphenhydrAMINE Injectable 50 milliGRAM(s) IntraMuscular once PRN MILD ALLERGIC REACTION  diphenhydrAMINE Injectable 50 milliGRAM(s) IV Push once PRN MILD ALLERGIC REACTION  EPINEPHrine     1 mG/mL Injectable 0.3 milliGRAM(s) IntraMuscular once PRN ANAPHYLAXIS  glucagon  Injectable 1 milliGRAM(s) IV Push once PRN REFRACTORY CASES AND/OR ON BETA BLOCKERS  glucagon  Injectable 2 milliGRAM(s) IV Push once PRN REFRACTORY CASES AND/ OR ON BETA BLOCKERS  LORazepam     Tablet 1 milliGRAM(s) Oral once PRN Anxiety  ondansetron Injectable 8 milliGRAM(s) IV Push every 8 hours PRN Nausea  pseudoephedrine 60 milliGRAM(s) Oral every 6 hours PRN nasal congesiton  sodium chloride 0.65% Nasal 1 Spray(s) Both Nostrils two times a day PRN Congestion      LABS:    The Labs were reviewed by me   The Radiology was reviewed by me    EKG tracing reviewed by me    03-29    135  |  100  |  23  ----------------------------<  99  3.4[L]   |  23  |  1.19  03-28    138  |  105  |  22  ----------------------------<  91  3.6   |  22  |  0.92  03-27    136  |  103  |  26[H]  ----------------------------<  107[H]  4.4   |  22  |  1.00    Ca    8.4      29 Mar 2025 04:52  Ca    8.4      28 Mar 2025 06:00  Ca    8.5      27 Mar 2025 00:33  Phos  3.8     03-29  Mg     1.80     03-29    TPro  4.9[L]  /  Alb  2.3[L]  /  TBili  <0.2  /  DBili  x   /  AST  29  /  ALT  15  /  AlkPhos  145[H]  03-29  TPro  4.8[L]  /  Alb  2.1[L]  /  TBili  <0.2  /  DBili  x   /  AST  31  /  ALT  14  /  AlkPhos  156[H]  03-28  TPro  5.2[L]  /  Alb  2.5[L]  /  TBili  <0.2  /  DBili  x   /  AST  33[H]  /  ALT  13  /  AlkPhos  166[H]  03-27    Magnesium: 1.80 mg/dL (03-29-25 @ 04:52)  Magnesium: 2.00 mg/dL (03-27-25 @ 00:33)    Phosphorus: 3.8 mg/dL (03-29-25 @ 04:52)  Phosphorus: 3.1 mg/dL (03-27-25 @ 00:33)      PT/INR - ( 28 Mar 2025 06:00 )   PT: 11.2 sec;   INR: 0.94 ratio         PTT - ( 28 Mar 2025 06:00 )  PTT:25.1 sec              Urinalysis Basic - ( 29 Mar 2025 04:52 )    Color: x / Appearance: x / SG: x / pH: x  Gluc: 99 mg/dL / Ketone: x  / Bili: x / Urobili: x   Blood: x / Protein: x / Nitrite: x   Leuk Esterase: x / RBC: x / WBC x   Sq Epi: x / Non Sq Epi: x / Bacteria: x                              9.3    12.19 )-----------( 374      ( 29 Mar 2025 04:52 )             31.6                         9.4    10.63 )-----------( 417      ( 28 Mar 2025 06:00 )             31.3                         8.9    12.32 )-----------( 435      ( 27 Mar 2025 00:33 )             29.5     CAPILLARY BLOOD GLUCOSE            MICROBIOLOGY:     RADIOLOGY:  [ ] Reviewed and interpreted by me    Point of Care Ultrasound Findings:    PFT:    EKG:

## 2025-03-29 NOTE — PROGRESS NOTE ADULT - SUBJECTIVE AND OBJECTIVE BOX
Keron John MD  [employer]  Pager [phone]  Email: [email or web address]  Available on [employer] Teams        PROGRESS NOTE:     Patient is a 64y old  Female who presents with a chief complaint of desensitization of carboplatin. (28 Mar 2025 18:13)      SUBJECTIVE / OVERNIGHT EVENTS:  Patient seen and examined this morning. Patient expressed anxiety related to her cancer and her ongoing hospitalizations. She is sad that she missed out on her son's wedding who is now on honeymoon and also upset that she will miss the birth of a grandchild and not see her other child getting .     MEDICATIONS  (STANDING):  alteplase  Injectable for Pleural Effusion 10 milliGRAM(s) IntraPleural. every 12 hours  chlorhexidine 2% Cloths 1 Application(s) Topical <User Schedule>  cyanocobalamin 1000 MICROGram(s) Oral daily  dornase nidhi Solution for Pleural Effusion 5 milliGRAM(s) IntraPleural. every 12 hours  escitalopram 10 milliGRAM(s) Oral daily  fentaNYL   Patch 100 MICROgram(s)/Hr 1 Patch Transdermal every 72 hours  fluticasone propionate 50 MICROgram(s)/spray Nasal Spray 1 Spray(s) Both Nostrils two times a day  HYDROmorphone   Tablet 4 milliGRAM(s) Oral every 4 hours  influenza   Vaccine 0.5 milliLiter(s) IntraMuscular once  piperacillin/tazobactam IVPB.. 4.5 Gram(s) IV Intermittent every 8 hours  simethicone 80 milliGRAM(s) Chew three times a day  sodium chloride 0.9% Solution for Pleural Effusion 30 milliLiter(s) IntraPleural. every 12 hours  vancomycin  IVPB 1000 milliGRAM(s) IV Intermittent every 12 hours    MEDICATIONS  (PRN):  acetaminophen   Oral Liquid .. 1000 milliGRAM(s) Oral every 8 hours PRN Temp greater or equal to 38.5C (101.3F), Mild Pain (1 - 3), Moderate Pain (4 - 6)  albuterol    0.083%. 2.5 milliGRAM(s) Nebulizer once PRN Wheezing  diphenhydrAMINE 50 milliGRAM(s) Oral once PRN MILD ALERGIC REACTION  diphenhydrAMINE Injectable 50 milliGRAM(s) IntraMuscular once PRN MILD ALLERGIC REACTION  diphenhydrAMINE Injectable 50 milliGRAM(s) IV Push once PRN MILD ALLERGIC REACTION  EPINEPHrine     1 mG/mL Injectable 0.3 milliGRAM(s) IntraMuscular once PRN ANAPHYLAXIS  glucagon  Injectable 1 milliGRAM(s) IV Push once PRN REFRACTORY CASES AND/OR ON BETA BLOCKERS  glucagon  Injectable 2 milliGRAM(s) IV Push once PRN REFRACTORY CASES AND/ OR ON BETA BLOCKERS  LORazepam     Tablet 1 milliGRAM(s) Oral once PRN Anxiety  ondansetron Injectable 8 milliGRAM(s) IV Push every 8 hours PRN Nausea  pseudoephedrine 60 milliGRAM(s) Oral every 6 hours PRN nasal congesiton  sodium chloride 0.65% Nasal 1 Spray(s) Both Nostrils two times a day PRN Congestion      CAPILLARY BLOOD GLUCOSE        I&O's Summary    28 Mar 2025 07:01  -  29 Mar 2025 07:00  --------------------------------------------------------  IN: 900 mL / OUT: 2660 mL / NET: -1760 mL        PHYSICAL EXAM:  Vital Signs Last 24 Hrs  T(C): 36.4 (29 Mar 2025 06:15), Max: 36.5 (29 Mar 2025 01:17)  T(F): 97.5 (29 Mar 2025 06:15), Max: 97.7 (29 Mar 2025 01:17)  HR: 91 (29 Mar 2025 06:15) (82 - 91)  BP: 107/68 (29 Mar 2025 06:15) (94/63 - 108/64)  BP(mean): --  RR: 18 (29 Mar 2025 06:15) (16 - 18)  SpO2: 94% (29 Mar 2025 06:15) (92% - 95%)    Parameters below as of 29 Mar 2025 06:15  Patient On (Oxygen Delivery Method): nasal cannula    CONSTITUTIONAL: Sad and upset, thin  RESPIRATORY: Normal respiratory effort; no respiratory distress, CTAB  CARDIOVASCULAR: No visible JVD, No lower extremity edema; S1S2, no m,r,g  ABDOMEN: Not guarding, distended, TTP, BS+  MUSCLOSKELETAL: no clubbing or cyanosis of digits; no joint swelling   PSYCH: Sad and upset      LABS:                        9.3    12.19 )-----------( 374      ( 29 Mar 2025 04:52 )             31.6     03-29    135  |  100  |  23  ----------------------------<  99  3.4[L]   |  23  |  1.19    Ca    8.4      29 Mar 2025 04:52  Phos  3.8     03-29  Mg     1.80     03-29    TPro  4.9[L]  /  Alb  2.3[L]  /  TBili  <0.2  /  DBili  x   /  AST  29  /  ALT  15  /  AlkPhos  145[H]  03-29    PT/INR - ( 28 Mar 2025 06:00 )   PT: 11.2 sec;   INR: 0.94 ratio         PTT - ( 28 Mar 2025 06:00 )  PTT:25.1 sec      Urinalysis Basic - ( 29 Mar 2025 04:52 )    Color: x / Appearance: x / SG: x / pH: x  Gluc: 99 mg/dL / Ketone: x  / Bili: x / Urobili: x   Blood: x / Protein: x / Nitrite: x   Leuk Esterase: x / RBC: x / WBC x   Sq Epi: x / Non Sq Epi: x / Bacteria: x        Culture - Blood (collected 27 Mar 2025 14:35)  Source: Blood Blood-Peripheral  Preliminary Report (28 Mar 2025 20:01):    No growth at 24 hours        RADIOLOGY & ADDITIONAL TESTS:  Results Reviewed:   Imaging Personally Reviewed:  Electrocardiogram Personally Reviewed:    COORDINATION OF CARE:  Care Discussed with Consultants/Other Providers [Y/N]:  Prior or Outpatient Records Reviewed [Y/N]:

## 2025-03-29 NOTE — CONSULT NOTE ADULT - SUBJECTIVE AND OBJECTIVE BOX
Patient is a 64y old  Female who presents with a chief complaint of desensitization of carboplatin. (29 Mar 2025 14:09)    HPI:  63 year old female PMH:Anxiety Chronic back pain, Depression, Ovarian ca with malignant effusion R s/p R Pleurx ([place]) and Vertigo mass effect adjacent to distal colon w/o obstruction, mass effect on distal R ureter s/p ureter stent now s/p colostomy c/b bleeding now off AC,  Iliac vein occlusion status post stent presents for desensitization of carboplatin. Patient endorses fatigue. She denies fevers but endorses chills. Also endorses abdominal pain and some colostomy leakage. Pt denies any bleeding currently. Endorses difficulty with BMs and urinating as well, states she feels burning when she goes. Overall also endorses sob and has a lot of difficulty walking. Has noted worsening SOB over the past few weeks. Admitted to MICU for carboplatin desensitization, completed successfully on 3/26. Patient developed worsening dyspnea 2/2 loculated R pleural effusion with pleurx in place with CT chest showing 5mm LLL nodule, R lung loculated pockets of luid, compressive atelectasis of RML, RLL and LLL, R pleural effusion multiloculated with pleural catheter tip in positive though the distal end not communicating with the pockets of fluid, enlarged mediastinal lymph nodes, number hypoattenuating massess with in the partially imaged liver, b/l hydronephrosis R>, R>L retroperitoneal lymph nodes and soft tissue nodule posterior the IVC and extensive anasarca. Started MIST protocol on 3/26 with adequate output. On 3/27 pleural fluid was sent for analysis after 2nd dose of TPA via the pleurex catheter which showed 480 nucleated cells with 69028 nucleated cells, pH 8.1, Albumen fluid 0.7, albumen serum 2.5, protein fluid 1.6, protein serum 5.2, glucose fluid 33 and LDH fluid 1133 without culture sent and one set of blood culture sent which is negative to date.      REVIEW OF SYSTEMS  pending full examination    prior hospital charts reviewed [V]  primary team notes reviewed [V]  other consultant notes reviewed [V]    PAST MEDICAL & SURGICAL HISTORY:  Anxiety      Vertigo      Lower back pain      Ovarian ca  continues target chemotherapy at this time       Cancer of fallopian tube      Major depression      S/P   x 3 , ,       S/P bunionectomy  bilateral      S/P D&C (status post dilation and curettage)      S/P abdominoplasty      H/O total hysterectomy      S/P colostomy  2024      H/O ureteroscopy  B/L stent placement          SOCIAL HISTORY:  Denied smoking/vaping/alcohol/recreational drug use    FAMILY HISTORY:  History of hypertension (Mother)        Allergies  IV Contrast (Rash)  platinum containing compounds (Anaphylaxis)        ANTIMICROBIALS:  piperacillin/tazobactam IVPB.. 4.5 every 8 hours  vancomycin  IVPB 1000 every 12 hours      ANTIMICROBIALS (past 90 days):  MEDICATIONS  (STANDING):  piperacillin/tazobactam IVPB.   200 mL/Hr IV Intermittent (25 @ 12:20)    piperacillin/tazobactam IVPB.-   25 mL/Hr IV Intermittent (25 @ 18:08)    piperacillin/tazobactam IVPB..   25 mL/Hr IV Intermittent (25 @ 09:26)   25 mL/Hr IV Intermittent (25 @ 03:02)   25 mL/Hr IV Intermittent (25 @ 18:23)   25 mL/Hr IV Intermittent (25 @ 10:07)   25 mL/Hr IV Intermittent (25 @ 02:10)    vancomycin  IVPB   250 mL/Hr IV Intermittent (25 @ 06:15)   250 mL/Hr IV Intermittent (25 @ 17:47)   250 mL/Hr IV Intermittent (25 @ 05:44)   250 mL/Hr IV Intermittent (25 @ 16:40)        OTHER MEDS:   MEDICATIONS  (STANDING):  acetaminophen   Oral Liquid .. 1000 every 8 hours PRN  albuterol    0.083%. 2.5 once PRN  alteplase  Injectable for Pleural Effusion 10 every 12 hours  diphenhydrAMINE 50 once PRN  diphenhydrAMINE Injectable 50 once PRN  diphenhydrAMINE Injectable 50 once PRN  dornase nidhi Solution for Pleural Effusion 5 every 12 hours  EPINEPHrine     1 mG/mL Injectable 0.3 once PRN  escitalopram 10 daily  fentaNYL   Patch 100 MICROgram(s)/Hr 1 every 72 hours  glucagon  Injectable 1 once PRN  glucagon  Injectable 2 once PRN  HYDROmorphone   Tablet 4 every 4 hours  influenza   Vaccine 0.5 once  LORazepam     Tablet 1 once PRN  ondansetron Injectable 8 every 8 hours PRN  pseudoephedrine 60 every 6 hours PRN  simethicone 80 three times a day      VITALS:  Vital Signs Last 24 Hrs  T(F): 97.5 (25 @ 06:15), Max: 98.8 (25 @ 05:35)    Vital Signs Last 24 Hrs  HR: 91 (25 @ 06:15) (82 - 91)  BP: 107/68 (25 @ 06:15) (94/63 - 108/64)  RR: 18 (25 @ 06:15)  SpO2: 94% (25 @ 06:15) (92% - 95%)  Wt(kg): --    EXAM:  pending full examination      Labs:                        9.3    12. )-----------( 374      ( 29 Mar 2025 04:52 )             31.6         135  |  100  |  23  ----------------------------<  99  3.4[L]   |  23  |  1.19    Ca    8.4      29 Mar 2025 04:52  Phos  3.8       Mg     1.80         TPro  4.9[L]  /  Alb  2.3[L]  /  TBili  <0.2  /  DBili  x   /  AST  29  /  ALT  15  /  AlkPhos  145[H]        WBC Trend:  WBC Count: 12.19 (25 @ 04:52)  WBC Count: 10.63 (25 @ 06:00)  WBC Count: 12.32 (25 @ 00:33)  WBC Count: 9.41 (25 @ 02:50)          Creatine Trend:  Creatinine: 1.19 ()  Creatinine: 0.92 ()  Creatinine: 1.00 ()  Creatinine: 1.08 ()      Liver Biochemical Testing Trend:  Alanine Aminotransferase (ALT/SGPT): 15 ()  Alanine Aminotransferase (ALT/SGPT): 14 ()  Alanine Aminotransferase (ALT/SGPT): 13 ()  Alanine Aminotransferase (ALT/SGPT): 7 ()  Alanine Aminotransferase (ALT/SGPT): 7 ()  Aspartate Aminotransferase (AST/SGOT): 29 (25 @ 04:52)  Aspartate Aminotransferase (AST/SGOT): 31 (25 @ 06:00)  Aspartate Aminotransferase (AST/SGOT): 33 (25 @ 00:33)  Aspartate Aminotransferase (AST/SGOT): 22 (25 @ 02:50)  Aspartate Aminotransferase (AST/SGOT): 23 (25 @ 11:00)  Bilirubin Total: <0.2 ()  Bilirubin Total: <0.2 ()  Bilirubin Total: <0.2 ()  Bilirubin Total: <0.2 ()  Bilirubin Total: <0.2 ()    MICROBIOLOGY:  Vancomycin Level, Trough: 19.9 ( @ 04:52)    MRSA PCR Result.: NotDetec (25 @ 14:35)    Culture - Blood (collected 27 Mar 2025 14:35)  Source: Blood Blood-Peripheral  Preliminary Report:    No growth at 24 hours    Culture - Urine (collected 2024 20:50)  Source: Clean Catch Clean Catch (Midstream)  Final Report:    <10,000 CFU/mL Normal Urogenital Tracee    Procalcitonin: 0.24 ()    Blood Gas Venous - Lactate: 1.9 ( @ 00:33)    RADIOLOGY:  < from: CT Chest No Cont (25 @ 22:03) >    FINDINGS:    LUNGS AND LARGE AIRWAYS: Patent central airways. 5 mm juxtafissural left   lower lobe nodule, new since 2023 (2-80). Right lung interlobular   septal thickening related to the loculated pockets of fluid. Right   fissural nodularity. Partial compressive atelectasis of the right middle,   right lower, and left lower lobes adjacent to the pleural effusions.  PLEURA: Moderate bilateral pleural effusions. The right effusion is   multiloculated with a pleural catheter in position. The distal end of the   catheter or catheter tip is along the medial aspect of the right   hemithorax abutting the azygos vein and is separate and does not   communicate with the pockets of pleural fluid. Marked nodularity along   the right pleura and and theright fissures representing metastatic   disease.  VESSELS: Chest port catheter tip in the SVC. Mild aortic calcifications.  HEART: Heart size is normal. Trace pericardial effusion.  MEDIASTINUM AND OLIVIA: A few enlarged mediastinal lymph nodes, the largest   in the subcarinal station measuring up to 2.8 cm (2-75).  CHEST WALL AND LOWER NECK: Marked anasarca.  VISUALIZED UPPER ABDOMEN: Numerous hypoattenuating masses within the   partially imaged liver, new since 2024. Partially imaged bilateral   hydronephrosis, right greater than left. 2.5 cm retroperitoneal lymph   nodes/soft tissue nodule posterior to the IVC, new since 2024   (2-146). Diffusely increased liver density, may be related to multiple   blood transfusions.  BONES: Mild degenerative changes.    IMPRESSION:  Moderate sized multiloculated right pleural effusion with a right pleural   catheter. The catheter tip does not communicate the pockets of pleural   fluid as described above.    Moderate-sized left pleural effusion.    Right pleural and fissural nodularity due to metastatic disease.    Partially imaged bilateral hydronephrosis, right greater than left not   well evaluated on this chest CT. Recommend correlation with prior outside   imaging or consider CT of the abdomen and pelvis for further evaluation.    Multiple hypodense lesions within the partially imaged liver new since   2024 representing metastatic disease. Upper retroperitoneal enlarged   lymph node.    Extensive anasarca.    < end of copied text >     Patient is a 64y old  Female who presents with a chief complaint of desensitization of carboplatin. (29 Mar 2025 14:09)    HPI:  63 year old female PMH:Anxiety Chronic back pain, Depression, Ovarian ca with malignant effusion R s/p R Pleurx ([place]) and Vertigo mass effect adjacent to distal colon w/o obstruction, mass effect on distal R ureter s/p ureter stent now s/p colostomy c/b bleeding now off AC,  Iliac vein occlusion status post stent presents for desensitization of carboplatin. Patient endorses fatigue. She denies fevers but endorses chills. Also endorses abdominal pain and some colostomy leakage. Pt denies any bleeding currently. Endorses difficulty with BMs and urinating as well, states she feels burning when she goes. Overall also endorses sob and has a lot of difficulty walking. Has noted worsening SOB over the past few weeks. Admitted to MICU for carboplatin desensitization, completed successfully on 3/26. Patient developed worsening dyspnea 2/2 loculated R pleural effusion with pleurx in place with CT chest showing 5mm LLL nodule, R lung loculated pockets of luid, compressive atelectasis of RML, RLL and LLL, R pleural effusion multiloculated with pleural catheter tip in positive though the distal end not communicating with the pockets of fluid, enlarged mediastinal lymph nodes, number hypoattenuating masses with in the partially imaged liver, b/l hydronephrosis R>, R>L retroperitoneal lymph nodes and soft tissue nodule posterior the IVC and extensive anasarca. Started MIST protocol on 3/26 with adequate output. On 3/27 pleural fluid was sent for analysis after 2nd dose of TPA via the pleurex catheter which showed 480 nucleated cells with 19305 nucleated cells, pH 8.1, Albumen fluid 0.7, albumen serum 2.5, protein fluid 1.6, protein serum 5.2, glucose fluid 33 and LDH fluid 1133 without culture sent and one set of blood culture sent which is negative to date. Patient states she feels about the same as when she first came to the hospital. She admits to a dry cough but no fevers or chills and denies chest pain. She admits to some SOB but unchanged from her usual pain.      REVIEW OF SYSTEMS  Constitutional: No fevers, No chills, No weight loss, positive fatigue   Skin: No rash, no phlebitis	  Eyes: No discharge, No change in vision	  ENMT: No sore throat, No ulcers  Respiratory: positive dry cough and SOB  Cardiovascular:  No chest pain, No palpitations   Gastrointestinal: No pain, No nausea, No vomiting, No diarrhea, No constipation	  Genitourinary: No dysuria, No frequency, No hesitancy, No flank pain  MSK: No Joint pain, No back pain, No edema  Neurological: No HA, no weakness, no seizures, no AMS     prior hospital charts reviewed [V]  primary team notes reviewed [V]  other consultant notes reviewed [V]    PAST MEDICAL & SURGICAL HISTORY:  Anxiety      Vertigo      Lower back pain      Ovarian ca  continues target chemotherapy at this time       Cancer of fallopian tube      Major depression      S/P   x 3 , ,       S/P bunionectomy  bilateral      S/P D&C (status post dilation and curettage)      S/P abdominoplasty      H/O total hysterectomy      S/P colostomy  2024      H/O ureteroscopy  B/L stent placement          SOCIAL HISTORY:  Denied smoking/vaping/alcohol/recreational drug use currently, born in [facility] came to the  in , last travel  to [place], no sick contacts, no pets    FAMILY HISTORY:  History of hypertension (Mother)        Allergies  IV Contrast (Rash)  platinum containing compounds (Anaphylaxis)        ANTIMICROBIALS:  piperacillin/tazobactam IVPB.. 4.5 every 8 hours  vancomycin  IVPB 1000 every 12 hours      ANTIMICROBIALS (past 90 days):  MEDICATIONS  (STANDING):  piperacillin/tazobactam IVPB.   200 mL/Hr IV Intermittent (25 @ 12:20)    piperacillin/tazobactam IVPB.-   25 mL/Hr IV Intermittent (25 @ 18:08)    piperacillin/tazobactam IVPB..   25 mL/Hr IV Intermittent (25 @ 09:26)   25 mL/Hr IV Intermittent (25 @ 03:02)   25 mL/Hr IV Intermittent (25 @ 18:23)   25 mL/Hr IV Intermittent (25 @ 10:07)   25 mL/Hr IV Intermittent (25 @ 02:10)    vancomycin  IVPB   250 mL/Hr IV Intermittent (25 @ 06:15)   250 mL/Hr IV Intermittent (25 @ 17:47)   250 mL/Hr IV Intermittent (25 @ 05:44)   250 mL/Hr IV Intermittent (25 @ 16:40)        OTHER MEDS:   MEDICATIONS  (STANDING):  acetaminophen   Oral Liquid .. 1000 every 8 hours PRN  albuterol    0.083%. 2.5 once PRN  alteplase  Injectable for Pleural Effusion 10 every 12 hours  diphenhydrAMINE 50 once PRN  diphenhydrAMINE Injectable 50 once PRN  diphenhydrAMINE Injectable 50 once PRN  dornase nidhi Solution for Pleural Effusion 5 every 12 hours  EPINEPHrine     1 mG/mL Injectable 0.3 once PRN  escitalopram 10 daily  fentaNYL   Patch 100 MICROgram(s)/Hr 1 every 72 hours  glucagon  Injectable 1 once PRN  glucagon  Injectable 2 once PRN  HYDROmorphone   Tablet 4 every 4 hours  influenza   Vaccine 0.5 once  LORazepam     Tablet 1 once PRN  ondansetron Injectable 8 every 8 hours PRN  pseudoephedrine 60 every 6 hours PRN  simethicone 80 three times a day      VITALS:  Vital Signs Last 24 Hrs  T(F): 97.5 (25 @ 06:15), Max: 98.8 (25 @ 05:35)    Vital Signs Last 24 Hrs  HR: 91 (25 @ 06:15) (82 - 91)  BP: 107/68 (25 @ 06:15) (94/63 - 108/64)  RR: 18 (25 @ 06:15)  SpO2: 94% (25 @ 06:15) (92% - 95%)  Wt(kg): --    EXAM:  General: Patient appears comfortable, fatigued, frail  HEENT: NCAT, anicteric sclera, mucous membranes slightly dry   Neck: No lymphadenopathy  CV: +S1/S2, RRR, no M/R/G, R chest port without pain to palpation, R pleurx without pain to palpation  Lungs: No respiratory distress, Rhonchi in the R lung diffusely occasional coarse rales, L lung CTA  Abd:  BS4+, distended and slightly firm, LLQ colostomy  : No suprapubic tenderness  Neuro: AAOx3.   Ext: No cyanosis, 2+ edema in b/l LE edema  Skin: No rash, no phlebitis, No erythema       Labs:                        9.3    12.19 )-----------( 374      ( 29 Mar 2025 04:52 )             31.6         135  |  100  |  23  ----------------------------<  99  3.4[L]   |  23  |  1.19    Ca    8.4      29 Mar 2025 04:52  Phos  3.8       Mg     1.80         TPro  4.9[L]  /  Alb  2.3[L]  /  TBili  <0.2  /  DBili  x   /  AST  29  /  ALT  15  /  AlkPhos  145[H]        WBC Trend:  WBC Count: 12.19 (25 @ 04:52)  WBC Count: 10.63 (25 @ 06:00)  WBC Count: 12.32 (25 @ 00:33)  WBC Count: 9.41 (25 @ 02:50)          Creatine Trend:  Creatinine: 1.19 ()  Creatinine: 0.92 ()  Creatinine: 1.00 ()  Creatinine: 1.08 ()      Liver Biochemical Testing Trend:  Alanine Aminotransferase (ALT/SGPT): 15 ()  Alanine Aminotransferase (ALT/SGPT): 14 ()  Alanine Aminotransferase (ALT/SGPT): 13 ()  Alanine Aminotransferase (ALT/SGPT): 7 ()  Alanine Aminotransferase (ALT/SGPT): 7 ()  Aspartate Aminotransferase (AST/SGOT): 29 (25 @ 04:52)  Aspartate Aminotransferase (AST/SGOT): 31 (25 @ 06:00)  Aspartate Aminotransferase (AST/SGOT): 33 (25 @ 00:33)  Aspartate Aminotransferase (AST/SGOT): 22 (25 @ 02:50)  Aspartate Aminotransferase (AST/SGOT): 23 (25 @ 11:00)  Bilirubin Total: <0.2 ()  Bilirubin Total: <0.2 ()  Bilirubin Total: <0.2 ()  Bilirubin Total: <0.2 ()  Bilirubin Total: <0.2 ()    MICROBIOLOGY:  Vancomycin Level, Trough: 19.9 ( @ 04:52)    MRSA PCR Result.: NotDetec (25 @ 14:35)    Culture - Blood (collected 27 Mar 2025 14:35)  Source: Blood Blood-Peripheral  Preliminary Report:    No growth at 24 hours    Culture - Urine (collected 2024 20:50)  Source: Clean Catch Clean Catch (Midstream)  Final Report:    <10,000 CFU/mL Normal Urogenital Tracee    Procalcitonin: 0.24 ()    Blood Gas Venous - Lactate: 1.9 ( @ 00:33)    RADIOLOGY:  < from: CT Chest No Cont (25 @ 22:03) >    FINDINGS:    LUNGS AND LARGE AIRWAYS: Patent central airways. 5 mm juxtafissural left   lower lobe nodule, new since 2023 (2-80). Right lung interlobular   septal thickening related to the loculated pockets of fluid. Right   fissural nodularity. Partial compressive atelectasis of the right middle,   right lower, and left lower lobes adjacent to the pleural effusions.  PLEURA: Moderate bilateral pleural effusions. The right effusion is   multiloculated with a pleural catheter in position. The distal end of the   catheter or catheter tip is along the medial aspect of the right   hemithorax abutting the azygos vein and is separate and does not   communicate with the pockets of pleural fluid. Marked nodularity along   the right pleura and and theright fissures representing metastatic   disease.  VESSELS: Chest port catheter tip in the SVC. Mild aortic calcifications.  HEART: Heart size is normal. Trace pericardial effusion.  MEDIASTINUM AND OLIVIA: A few enlarged mediastinal lymph nodes, the largest   in the subcarinal station measuring up to 2.8 cm (2-75).  CHEST WALL AND LOWER NECK: Marked anasarca.  VISUALIZED UPPER ABDOMEN: Numerous hypoattenuating masses within the   partially imaged liver, new since 2024. Partially imaged bilateral   hydronephrosis, right greater than left. 2.5 cm retroperitoneal lymph   nodes/soft tissue nodule posterior to the IVC, new since 2024   (2-146). Diffusely increased liver density, may be related to multiple   blood transfusions.  BONES: Mild degenerative changes.    IMPRESSION:  Moderate sized multiloculated right pleural effusion with a right pleural   catheter. The catheter tip does not communicate the pockets of pleural   fluid as described above.    Moderate-sized left pleural effusion.    Right pleural and fissural nodularity due to metastatic disease.    Partially imaged bilateral hydronephrosis, right greater than left not   well evaluated on this chest CT. Recommend correlation with prior outside   imaging or consider CT of the abdomen and pelvis for further evaluation.    Multiple hypodense lesions within the partially imaged liver new since   2024 representing metastatic disease. Upper retroperitoneal enlarged   lymph node.    Extensive anasarca.    < end of copied text >

## 2025-03-29 NOTE — CONSULT NOTE ADULT - ASSESSMENT
Impression/Hospital Course:  63 year old female PMH:Anxiety Chronic back pain, Depression, Ovarian ca with malignant effusion R s/p R Pleurx ([place]) and Vertigo mass effect adjacent to distal colon w/o obstruction, mass effect on distal R ureter s/p ureter stent now s/p colostomy c/b bleeding now off AC,  Iliac vein occlusion status post stent presents for desensitization of carboplatin. Patient endorses fatigue. She denies fevers but endorses chills. Also endorses abdominal pain and some colostomy leakage. Pt denies any bleeding currently. Endorses difficulty with BMs and urinating as well, states she feels burning when she goes. Overall also endorses sob and has a lot of difficulty walking. Has noted worsening SOB over the past few weeks. Admitted to MICU for carboplatin desensitization, completed successfully on 3/26. Patient developed worsening dyspnea 2/2 loculated R pleural effusion with pleurx in place with CT chest showing 5mm LLL nodule, R lung loculated pockets of fluid, compressive atelectasis of RML, RLL and LLL, R pleural effusion multiloculated with pleural catheter tip in positive though the distal end not communicating with the pockets of fluid, enlarged mediastinal lymph nodes, number hypoattenuating masses with in the partially imaged liver, b/l hydronephrosis R>L, R>L retroperitoneal lymph nodes and soft tissue nodule posterior the IVC and extensive anasarca. Started MIST protocol on 3/26 with adequate output. On 3/27 pleural fluid was sent for analysis after 2nd dose of TPA via the pleurex catheter which showed 480 nucleated cells with 44869 nucleated cells, pH 8.1, Albumen fluid 0.7, albumen serum 2.5, protein fluid 1.6, protein serum 5.2, glucose fluid 33 and LDH fluid 1133 without culture sent and one set of blood culture sent which is negative to date.     Antimicrobials:  vancomycin 3/27 - current  piperacillin/tazobactam 3/27 - current    Assessment:  *Loculated pleural effusions, likely paramalignant, though it is possible there is an aspect of infection. Fluid analysis shows high pH and fluid/serum protein/albumen gradients that do not support exudative effusion though LDH is elevated and glucose is low, unclear how pleurex catheter and recent TPA could influence fluid analysis. Patient also afebrile and only mildly elevated procal. negative MRSA PCR  *Leukocytosis uptrending  *procal 0.24 may be falsely elevated in malgnancy   *new 5mm LLL nodule   *number hypoattenuating masses with in the partially imaged liver likely metastatic   *b/l hydronephrosis R>L with UA not indicative of infection    *R>L retroperitoneal lymph nodes  *2.5 cm retroperitoneal lymph nodes/soft tissue nodule posterior to the IVC  *Thrombocytosis   *Ovarian ca with malignant effusion R s/p R Pleurx ([place]) and R chemoport    Recommendations: PLEASE DEFER ALL CHANGES IN PLAN UNTIL SIGNED BY ATTENDING. All recommendations are tentative pending Attending Attestation.  - d/c vancomycin  - can continue piperacillin/tazobactam for now  - would consider repeat thoracentesis with fluid analysis and culture to assess for true infection, would not send pleural fluid for culture through pleurex as it is likely going to be contaminated   - trend temperature, PLTs and WBC     Calixto Roberts DO, PGY-5   Infectious Disease Fellow  [facility] Teams Preferred  After 5pm/weekends call [phone]  No Impression/Hospital Course:  63 year old female PMH:Anxiety Chronic back pain, Depression, Ovarian ca with malignant effusion R s/p R Pleurx ([place]) and Vertigo mass effect adjacent to distal colon w/o obstruction, mass effect on distal R ureter s/p ureter stent now s/p colostomy c/b bleeding now off AC,  Iliac vein occlusion status post stent presents for desensitization of carboplatin. Patient endorses fatigue. She denies fevers but endorses chills. Also endorses abdominal pain and some colostomy leakage. Pt denies any bleeding currently. Endorses difficulty with BMs and urinating as well, states she feels burning when she goes. Overall also endorses sob and has a lot of difficulty walking. Has noted worsening SOB over the past few weeks. Admitted to MICU for carboplatin desensitization, completed successfully on 3/26. Patient developed worsening dyspnea 2/2 loculated R pleural effusion with pleurx in place with CT chest showing 5mm LLL nodule, R lung loculated pockets of fluid, compressive atelectasis of RML, RLL and LLL, R pleural effusion multiloculated with pleural catheter tip in positive though the distal end not communicating with the pockets of fluid, enlarged mediastinal lymph nodes, number hypoattenuating masses with in the partially imaged liver, b/l hydronephrosis R>L, R>L retroperitoneal lymph nodes and soft tissue nodule posterior the IVC and extensive anasarca. Started MIST protocol on 3/26 with adequate output. On 3/27 pleural fluid was sent for analysis after 2nd dose of TPA via the pleurex catheter which showed 480 nucleated cells with 03258 nucleated cells, pH 8.1, Albumen fluid 0.7, albumen serum 2.5, protein fluid 1.6, protein serum 5.2, glucose fluid 33 and LDH fluid 1133 without culture sent and one set of blood culture sent which is negative to date.     Antimicrobials:  vancomycin 3/27 - current  piperacillin/tazobactam 3/27 - current    Assessment:  *Loculated pleural effusions, likely paramalignant, though it is possible there is an aspect of infection but this is less likely. Fluid analysis shows high pH and fluid/serum protein/albumen gradients that do not support exudative effusion though LDH is >2/3 upper limit supported exudative effusion and glucose is low, unclear how pleurex catheter and recent TPA could influence fluid analysis. Patient also afebrile and only mildly elevated procal. negative MRSA PCR  *Leukocytosis uptrending  *procal 0.24 may be falsely elevated in malignancy   *new 5mm LLL nodule   *number hypoattenuating masses with in the partially imaged liver likely metastatic   *b/l hydronephrosis R>L with UA not indicative of infection    *R>L retroperitoneal lymph nodes  *2.5 cm retroperitoneal lymph nodes/soft tissue nodule posterior to the IVC  *Thrombocytosis   *Ovarian ca with malignant effusion R s/p R Pleurx ([place]) and R chemoport    Recommendations:   - d/c vancomycin  - can continue piperacillin/tazobactam for now  - if concerned for infection would consider repeat thoracentesis with fluid analysis and culture to assess for true infection, would not send pleural fluid for culture through pleurex as it is likely going to be contaminated   - trend temperature, PLTs and WBC     Calixto Roberts DO, PGY-5   Infectious Disease Fellow  [facility] Teams Preferred  After 5pm/weekends call [phone]

## 2025-03-29 NOTE — CONSULT NOTE ADULT - ATTENDING COMMENTS
This is a 62 y/o F w/ PMHx ovarian ca (possible liver, lung mets) with malignant R effusion s/p R Pleurx ([place], 1/2025), mass effect adjacent to distal colon w/o obstruction, mass effect on distal R ureter s/p ureter stent now s/p colostomy c/b bleeding now off AC,  Iliac vein occlusion s/p stent who was admitted to [facility] MICU on 3/25/25 for desensitization of carboplatin, noted to have R sided PLEURX not draining well.  CT Chest w/ multiloculated R pleural effusion, some pockets of pleural fluid not communicated w/ catheter tip. Mod L PLEFF. Pt started on MIST protocol w/ TPA with more output.   Studies sent on 3/27 after TPA, with , RBC 29k, 59% neutrophils,  pH 8.1, glucose 33, LDH 1133, protein 1.6. No Cx sent.   Pt started on Vancomycin/Zosyn.     #R multiloculated pleural effusion, s/p MIST protocol through PLEURX,   #C/f infected loculated pleural effusion?  #Mild leukocytosis  #Ovarian CA w/ likely lung, liver mets    Overall, a 62 y/o F w/ PMHx ovarian ca (possible liver, lung mets) with malignant R effusion s/p R Pleurx ([place], 1/2025), mass effect adjacent to distal colon w/o obstruction, mass effect on distal R ureter s/p ureter stent now s/p colostomy c/b bleeding now off AC, Iliac vein occlusion s/p stent admitted for chemo sensitization, noted to have non draining R PLEURX, s/p CT w/ multiloculated R pleural effusion, now on MIST protocol.   ID consulted for possible infected R loculated pleural effusion. Pt is currently afebrile, mild leukocytosis 12, procal low, BCx 1 set 3/27 NG, MRSA PCR neg.   Studies taken from existing PLEURX: (after TPA)  , RBC 29k, 59% neutrophils,  pH 8.1, glucose 33, LDH 1133, protein 1.6.  Difficult to interpret studies take from existing PLEURX and after TPA.  Given low nucleated cell count (~364 after correcting RBCs), high pH, and lack of other infectious findings (BCx NGTD, low PCT, no fevers, no PNA on CT), concern for infection is quite low.     Recommendations:  1. D/c Vancomycin, c/w Zosyn for now, studies are not consistent with empyema   2. If there is a concern for infected loculated effusion, would recommend repeat thoracentesis (IR guided?) and sending cell count w/ diff, pH, glucose, protein, LDH, and Cx  3. Will d/w pulmonary re: duration of abx     Thank you for consulting us and involving us in the management of this patient's case. In addition to reviewing history, imaging, documents, labs, microbiology, and infection control strategies and potential issues.     ID will continue to follow    John Perez M.D.  Attending Physician  Division of Infectious Diseases  Department of Medicine    Please contact through MS Teams message.  Office: [phone] (after 5 PM or weekend)

## 2025-03-29 NOTE — PROGRESS NOTE ADULT - ASSESSMENT
63 year old female PMH:Anxiety Chronic back pain, Depression, Ovarian ca with malignant effusion R s/p R Pleurx ([place]) and Vertigo mass effect adjacent to distal colon w/o obstruction, mass effect on distal R ureter s/p ureter stent now s/p colostomy c/b bleeding now off AC,  Iliac vein occlusion status post stent presents for desensitization of carboplatin. Patient endorses fatigue. She denies fevers but endorses chills. Also endorses abdominal pain and some colostomy leakage. Pt denies any bleeding currently. Endorses difficulty with BMs and urinating as well, states she feels burning when she goes. Overall also endorses sob and has a lot of difficulty walking. Has noted worsening SOB over the past few weeks. Admitted to MICU for carboplatin desensitization, completed successfully on 3/26. Patient developed worsening dyspnea 2/2 loculated R pleural effusion with pleurx in place with CT imaging with R loculated effusion in two pockets; pleurx not draining well. Started MIST protocol on 3/26 with adequate output.     #Ovarian Ca  #R Pleurx connected to PleuroVac  #L simple pleural effusion  - 3/27 fluid sample with concern for complicated effusion  - Continue Zosyn with total regimen to 4-6 weeks, will defer to ID  - s/p MIST for total of 6 doses (last dose given 3/29), please repeat CT Chest non contrast  - if still with loculated pleff will be considering possible seperate thora, TBD  - Would start diuresis to assess response on L side, at the moment seems simple and small to moderate in size  - Continue oxygen supplementation with goal >92%  - [place] PleurX connected to PleuroVac on suction -20

## 2025-03-29 NOTE — CHART NOTE - NSCHARTNOTEFT_GEN_A_CORE
Tpa 10mg and Dornase 5mg given in R pleurx catheter. Catheter clamped for 1h (11:40 PM) then reopened (12:40AM). Catheter connected to PleuroVac and suction -[id number]. Patient tolerated procedure well.

## 2025-03-29 NOTE — CONSULT NOTE ADULT - TIME BILLING
Review of medical records, labs, imaging, coordination of care and did not include time spent teaching.
Reviewing the chart, interpreting lab data, discussing case with fellow, patient's family, medicine team, interview and examination of patient, and documentation. Pt is at high risk of mortality due to her disease.

## 2025-03-29 NOTE — PROGRESS NOTE ADULT - ASSESSMENT
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 On admission patient also c/o dyspnea over the past few weeks. Further eval revealed loculated R pleural effusion with pleurx in place; pleurx not draining well. Started MIST protocol on 3/26    Fallopian tube cancer  S/P multiple lines of therapy, now on Paclitaxel. Admitted for carboplatin desensitization   Now complete, when able to be discharged will follow up with Dr. Hughes.     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 Patient developed worsening dyspnea 2/2 loculated R pleural effusion with pleurx in place; pleurx not draining well. Started MIST protocol on 3/26, completed 1 session.  Pulmonary stating that this may be empyema. D/W ID, not entirely clear. No cxs obtained.   Loculated effusion may be malignant and related to cancer  Follow up pulmonary  O2 supplement as needed  Case discussed with patient and her  at bedside.     Anxiety  Depression  Monitor, if worsening will consider  consult early this week

## 2025-03-30 LAB
-  BLOOD PCR PANEL: SIGNIFICANT CHANGE UP
ALBUMIN SERPL ELPH-MCNC: 2.1 G/DL — LOW (ref 3.3–5)
ALP SERPL-CCNC: 126 U/L — HIGH (ref 40–120)
ALT FLD-CCNC: 10 U/L — SIGNIFICANT CHANGE UP (ref 4–33)
ANION GAP SERPL CALC-SCNC: 11 MMOL/L — SIGNIFICANT CHANGE UP (ref 7–14)
AST SERPL-CCNC: 24 U/L — SIGNIFICANT CHANGE UP (ref 4–32)
BASOPHILS # BLD AUTO: 0.07 K/UL — SIGNIFICANT CHANGE UP (ref 0–0.2)
BASOPHILS NFR BLD AUTO: 0.6 % — SIGNIFICANT CHANGE UP (ref 0–2)
BILIRUB SERPL-MCNC: 0.2 MG/DL — SIGNIFICANT CHANGE UP (ref 0.2–1.2)
BUN SERPL-MCNC: 19 MG/DL — SIGNIFICANT CHANGE UP (ref 7–23)
CALCIUM SERPL-MCNC: 8.2 MG/DL — LOW (ref 8.4–10.5)
CHLORIDE SERPL-SCNC: 101 MMOL/L — SIGNIFICANT CHANGE UP (ref 98–107)
CO2 SERPL-SCNC: 23 MMOL/L — SIGNIFICANT CHANGE UP (ref 22–31)
CREAT SERPL-MCNC: 1.13 MG/DL — SIGNIFICANT CHANGE UP (ref 0.5–1.3)
EGFR: 55 ML/MIN/1.73M2 — LOW
EGFR: 55 ML/MIN/1.73M2 — LOW
EOSINOPHIL # BLD AUTO: 0.29 K/UL — SIGNIFICANT CHANGE UP (ref 0–0.5)
EOSINOPHIL NFR BLD AUTO: 2.7 % — SIGNIFICANT CHANGE UP (ref 0–6)
GLUCOSE SERPL-MCNC: 92 MG/DL — SIGNIFICANT CHANGE UP (ref 70–99)
GRAM STN FLD: ABNORMAL
HCT VFR BLD CALC: 31.8 % — LOW (ref 34.5–45)
HGB BLD-MCNC: 9.5 G/DL — LOW (ref 11.5–15.5)
IANC: 8.85 K/UL — HIGH (ref 1.8–7.4)
IMM GRANULOCYTES NFR BLD AUTO: 1.2 % — HIGH (ref 0–0.9)
LYMPHOCYTES # BLD AUTO: 0.8 K/UL — LOW (ref 1–3.3)
LYMPHOCYTES # BLD AUTO: 7.3 % — LOW (ref 13–44)
MAGNESIUM SERPL-MCNC: 1.7 MG/DL — SIGNIFICANT CHANGE UP (ref 1.6–2.6)
MCHC RBC-ENTMCNC: 28.9 PG — SIGNIFICANT CHANGE UP (ref 27–34)
MCHC RBC-ENTMCNC: 29.9 G/DL — LOW (ref 32–36)
MCV RBC AUTO: 96.7 FL — SIGNIFICANT CHANGE UP (ref 80–100)
METHOD TYPE: SIGNIFICANT CHANGE UP
MONOCYTES # BLD AUTO: 0.77 K/UL — SIGNIFICANT CHANGE UP (ref 0–0.9)
MONOCYTES NFR BLD AUTO: 7.1 % — SIGNIFICANT CHANGE UP (ref 2–14)
NEUTROPHILS # BLD AUTO: 8.85 K/UL — HIGH (ref 1.8–7.4)
NEUTROPHILS NFR BLD AUTO: 81.1 % — HIGH (ref 43–77)
NRBC # BLD AUTO: 0 K/UL — SIGNIFICANT CHANGE UP (ref 0–0)
NRBC # FLD: 0 K/UL — SIGNIFICANT CHANGE UP (ref 0–0)
NRBC BLD AUTO-RTO: 0 /100 WBCS — SIGNIFICANT CHANGE UP (ref 0–0)
PHOSPHATE SERPL-MCNC: 3.5 MG/DL — SIGNIFICANT CHANGE UP (ref 2.5–4.5)
PLATELET # BLD AUTO: 376 K/UL — SIGNIFICANT CHANGE UP (ref 150–400)
POTASSIUM SERPL-MCNC: 3.1 MMOL/L — LOW (ref 3.5–5.3)
POTASSIUM SERPL-SCNC: 3.1 MMOL/L — LOW (ref 3.5–5.3)
PROT SERPL-MCNC: 4.5 G/DL — LOW (ref 6–8.3)
RBC # BLD: 3.29 M/UL — LOW (ref 3.8–5.2)
RBC # FLD: 17.8 % — HIGH (ref 10.3–14.5)
SODIUM SERPL-SCNC: 135 MMOL/L — SIGNIFICANT CHANGE UP (ref 135–145)
WBC # BLD: 10.91 K/UL — HIGH (ref 3.8–10.5)
WBC # FLD AUTO: 10.91 K/UL — HIGH (ref 3.8–10.5)

## 2025-03-30 PROCEDURE — 99232 SBSQ HOSP IP/OBS MODERATE 35: CPT

## 2025-03-30 PROCEDURE — 71250 CT THORAX DX C-: CPT | Mod: 26

## 2025-03-30 RX ORDER — ACETAMINOPHEN 500 MG/5ML
1000 LIQUID (ML) ORAL ONCE
Refills: 0 | Status: COMPLETED | OUTPATIENT
Start: 2025-03-30 | End: 2025-03-30

## 2025-03-30 RX ORDER — HYDROMORPHONE/SOD CHLOR,ISO/PF 2 MG/10 ML
1 SYRINGE (ML) INJECTION EVERY 4 HOURS
Refills: 0 | Status: DISCONTINUED | OUTPATIENT
Start: 2025-03-30 | End: 2025-04-03

## 2025-03-30 RX ORDER — HYDROMORPHONE/SOD CHLOR,ISO/PF 2 MG/10 ML
0.8 SYRINGE (ML) INJECTION EVERY 4 HOURS
Refills: 0 | Status: DISCONTINUED | OUTPATIENT
Start: 2025-03-30 | End: 2025-03-30

## 2025-03-30 RX ORDER — HYDROMORPHONE/SOD CHLOR,ISO/PF 2 MG/10 ML
6 SYRINGE (ML) INJECTION EVERY 4 HOURS
Refills: 0 | Status: DISCONTINUED | OUTPATIENT
Start: 2025-03-30 | End: 2025-03-31

## 2025-03-30 RX ORDER — HYDROMORPHONE/SOD CHLOR,ISO/PF 2 MG/10 ML
0.8 SYRINGE (ML) INJECTION ONCE
Refills: 0 | Status: DISCONTINUED | OUTPATIENT
Start: 2025-03-30 | End: 2025-03-30

## 2025-03-30 RX ADMIN — Medication 1 MILLIGRAM(S): at 16:57

## 2025-03-30 RX ADMIN — Medication 1 APPLICATION(S): at 06:33

## 2025-03-30 RX ADMIN — Medication 1000 MILLIGRAM(S): at 02:34

## 2025-03-30 RX ADMIN — Medication 4 MILLIGRAM(S): at 07:31

## 2025-03-30 RX ADMIN — Medication 4 MILLIGRAM(S): at 09:38

## 2025-03-30 RX ADMIN — ESCITALOPRAM OXALATE 10 MILLIGRAM(S): 20 TABLET ORAL at 11:36

## 2025-03-30 RX ADMIN — Medication 0.8 MILLIGRAM(S): at 14:16

## 2025-03-30 RX ADMIN — Medication 4 MILLIGRAM(S): at 02:35

## 2025-03-30 RX ADMIN — Medication 6 MILLIGRAM(S): at 22:50

## 2025-03-30 RX ADMIN — Medication 0.8 MILLIGRAM(S): at 13:46

## 2025-03-30 RX ADMIN — Medication 400 MILLIGRAM(S): at 23:51

## 2025-03-30 RX ADMIN — Medication 400 MILLIGRAM(S): at 01:39

## 2025-03-30 RX ADMIN — Medication 0.8 MILLIGRAM(S): at 11:29

## 2025-03-30 RX ADMIN — Medication 25 GRAM(S): at 09:43

## 2025-03-30 RX ADMIN — Medication 4 MILLIGRAM(S): at 10:08

## 2025-03-30 RX ADMIN — Medication 1 MILLIGRAM(S): at 20:32

## 2025-03-30 RX ADMIN — Medication 6 MILLIGRAM(S): at 18:56

## 2025-03-30 RX ADMIN — Medication 4 MILLIGRAM(S): at 03:35

## 2025-03-30 RX ADMIN — Medication 4 MILLIGRAM(S): at 06:31

## 2025-03-30 RX ADMIN — Medication 6 MILLIGRAM(S): at 22:10

## 2025-03-30 RX ADMIN — FLUTICASONE PROPIONATE 1 SPRAY(S): 50 SPRAY, METERED NASAL at 06:32

## 2025-03-30 RX ADMIN — FLUTICASONE PROPIONATE 1 SPRAY(S): 50 SPRAY, METERED NASAL at 18:30

## 2025-03-30 RX ADMIN — Medication 1 MILLIGRAM(S): at 21:30

## 2025-03-30 RX ADMIN — Medication 40 MILLIEQUIVALENT(S): at 09:43

## 2025-03-30 RX ADMIN — Medication 0.8 MILLIGRAM(S): at 12:29

## 2025-03-30 RX ADMIN — Medication 1 MILLIGRAM(S): at 16:27

## 2025-03-30 RX ADMIN — Medication 6 MILLIGRAM(S): at 18:26

## 2025-03-30 RX ADMIN — CYANOCOBALAMIN 1000 MICROGRAM(S): 1000 INJECTION INTRAMUSCULAR; SUBCUTANEOUS at 18:28

## 2025-03-30 RX ADMIN — Medication 25 GRAM(S): at 02:38

## 2025-03-30 RX ADMIN — Medication 25 GRAM(S): at 18:29

## 2025-03-30 NOTE — PROGRESS NOTE ADULT - SUBJECTIVE AND OBJECTIVE BOX
Keron John MD  Academic Hospitalist  Pager [phone]  Email: [email or web address]  Available on [employer] Teams        PROGRESS NOTE:     Patient is a 64y old  Female who presents with a chief complaint of desensitization of carboplatin. (29 Mar 2025 14:09)      SUBJECTIVE / OVERNIGHT EVENTS:  Patient seen and examined this morning. Patient somewhat withdrawn, complaining of pain. Discussed with the patient's sister via patient's cell phone.   ADDITIONAL REVIEW OF SYSTEMS:    MEDICATIONS  (STANDING):  alteplase  Injectable for Pleural Effusion 10 milliGRAM(s) IntraPleural. every 12 hours  chlorhexidine 2% Cloths 1 Application(s) Topical <User Schedule>  cyanocobalamin 1000 MICROGram(s) Oral daily  dornase nidhi Solution for Pleural Effusion 5 milliGRAM(s) IntraPleural. every 12 hours  escitalopram 10 milliGRAM(s) Oral daily  fentaNYL   Patch 100 MICROgram(s)/Hr 1 Patch Transdermal every 72 hours  fluticasone propionate 50 MICROgram(s)/spray Nasal Spray 1 Spray(s) Both Nostrils two times a day  influenza   Vaccine 0.5 milliLiter(s) IntraMuscular once  piperacillin/tazobactam IVPB.. 4.5 Gram(s) IV Intermittent every 8 hours  simethicone 80 milliGRAM(s) Chew three times a day  sodium chloride 0.9% Solution for Pleural Effusion 30 milliLiter(s) IntraPleural. every 12 hours    MEDICATIONS  (PRN):  albuterol    0.083%. 2.5 milliGRAM(s) Nebulizer once PRN Wheezing  diphenhydrAMINE 50 milliGRAM(s) Oral once PRN MILD ALERGIC REACTION  diphenhydrAMINE Injectable 50 milliGRAM(s) IntraMuscular once PRN MILD ALLERGIC REACTION  diphenhydrAMINE Injectable 50 milliGRAM(s) IV Push once PRN MILD ALLERGIC REACTION  EPINEPHrine     1 mG/mL Injectable 0.3 milliGRAM(s) IntraMuscular once PRN ANAPHYLAXIS  glucagon  Injectable 1 milliGRAM(s) IV Push once PRN REFRACTORY CASES AND/OR ON BETA BLOCKERS  glucagon  Injectable 2 milliGRAM(s) IV Push once PRN REFRACTORY CASES AND/ OR ON BETA BLOCKERS  HYDROmorphone  Injectable 0.8 milliGRAM(s) IV Push every 4 hours PRN Moderate Pain (4 - 6)  LORazepam     Tablet 1 milliGRAM(s) Oral once PRN Anxiety  ondansetron Injectable 8 milliGRAM(s) IV Push every 8 hours PRN Nausea  pseudoephedrine 60 milliGRAM(s) Oral every 6 hours PRN nasal congesiton  sodium chloride 0.65% Nasal 1 Spray(s) Both Nostrils two times a day PRN Congestion      CAPILLARY BLOOD GLUCOSE        I&O's Summary    29 Mar 2025 07:01  -  30 Mar 2025 07:00  --------------------------------------------------------  IN: 1100 mL / OUT: 1500 mL / NET: -400 mL        PHYSICAL EXAM:  Vital Signs Last 24 Hrs  T(C): 36.4 (30 Mar 2025 11:17), Max: 36.8 (29 Mar 2025 21:43)  T(F): 97.6 (30 Mar 2025 11:17), Max: 98.3 (29 Mar 2025 21:43)  HR: 84 (30 Mar 2025 11:17) (77 - 94)  BP: 100/64 (30 Mar 2025 11:17) (97/62 - 108/73)  BP(mean): --  RR: 17 (30 Mar 2025 11:17) (17 - 18)  SpO2: 95% (30 Mar 2025 11:17) (94% - 98%)    Parameters below as of 30 Mar 2025 11:17  Patient On (Oxygen Delivery Method): nasal cannula  O2 Flow (L/min): 3      CONSTITUTIONAL: Sad and upset, thin  RESPIRATORY: Normal respiratory effort; no respiratory distress, CTAB  CARDIOVASCULAR: No visible JVD, No lower extremity edema; S1S2, no m,r,g  ABDOMEN: Not guarding, distended, TTP, BS+  MUSCLOSKELETAL: no clubbing or cyanosis of digits; no joint swelling   PSYCH: Sad and upset    LABS:                        9.5    10.91 )-----------( 376      ( 30 Mar 2025 06:37 )             31.8     03-30    135  |  101  |  19  ----------------------------<  92  3.1[L]   |  23  |  1.13    Ca    8.2[L]      30 Mar 2025 06:37  Phos  3.5     03-30  Mg     1.70     03-30    TPro  4.5[L]  /  Alb  2.1[L]  /  TBili  0.2  /  DBili  x   /  AST  24  /  ALT  10  /  AlkPhos  126[H]  03-30          Urinalysis Basic - ( 30 Mar 2025 06:37 )    Color: x / Appearance: x / SG: x / pH: x  Gluc: 92 mg/dL / Ketone: x  / Bili: x / Urobili: x   Blood: x / Protein: x / Nitrite: x   Leuk Esterase: x / RBC: x / WBC x   Sq Epi: x / Non Sq Epi: x / Bacteria: x        Culture - Blood (collected 27 Mar 2025 14:35)  Source: Blood Blood-Peripheral  Preliminary Report (29 Mar 2025 20:00):    No growth at 48 Hours        RADIOLOGY & ADDITIONAL TESTS:  Results Reviewed:   Imaging Personally Reviewed:  Electrocardiogram Personally Reviewed:    COORDINATION OF CARE:  Care Discussed with Consultants/Other Providers [Y/N]:  Prior or Outpatient Records Reviewed [Y/N]:

## 2025-03-30 NOTE — PROGRESS NOTE ADULT - ASSESSMENT
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 On admission patient also c/o dyspnea over the past few weeks. Further eval revealed loculated R pleural effusion with pleurx in place; pleurx not draining well. Started MIST protocol on 3/26    Fallopian tube cancer  S/P multiple lines of therapy, now on Paclitaxel. Admitted for carboplatin desensitization   Now complete, when able to be discharged will follow up with Dr. Hughes.   Overall performance status of patient is poor.   Will discuss further with Dr. Hughes    Loculated Effusion  Empyema?   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 Patient developed worsening dyspnea 2/2 loculated R pleural effusion with pleurx in place; pleurx not draining well. Started MIST protocol on 3/26,  Pulmonary stating that this may be empyema. D/W ID, not entirely clear. No cxs obtained. Procal 0.24, not entirely + for PNA. Continue Zosyn for now.   Loculated effusion may be malignant more so than infectious  Follow up pulmonary  O2 supplement as needed  Case discussed with patient and her sister via patient's cell phone.     Cancer related pain  Patient in s on dilaudid 0.8 mg PRN  Fentanyl patch 100mcg   Follow up with Palliative care tomorrow    Anxiety  Depression  Monitor, if worsening will consider  consult early this week no Clear

## 2025-03-31 DIAGNOSIS — R53.81 OTHER MALAISE: ICD-10-CM

## 2025-03-31 DIAGNOSIS — C57.00 MALIGNANT NEOPLASM OF UNSPECIFIED FALLOPIAN TUBE: ICD-10-CM

## 2025-03-31 DIAGNOSIS — R11.2 NAUSEA WITH VOMITING, UNSPECIFIED: ICD-10-CM

## 2025-03-31 DIAGNOSIS — G89.3 NEOPLASM RELATED PAIN (ACUTE) (CHRONIC): ICD-10-CM

## 2025-03-31 DIAGNOSIS — J90 PLEURAL EFFUSION, NOT ELSEWHERE CLASSIFIED: ICD-10-CM

## 2025-03-31 DIAGNOSIS — Z51.5 ENCOUNTER FOR PALLIATIVE CARE: ICD-10-CM

## 2025-03-31 DIAGNOSIS — Z71.89 OTHER SPECIFIED COUNSELING: ICD-10-CM

## 2025-03-31 LAB
ALBUMIN SERPL ELPH-MCNC: 2.2 G/DL — LOW (ref 3.3–5)
ALP SERPL-CCNC: 129 U/L — HIGH (ref 40–120)
ALT FLD-CCNC: 11 U/L — SIGNIFICANT CHANGE UP (ref 4–33)
ANION GAP SERPL CALC-SCNC: 11 MMOL/L — SIGNIFICANT CHANGE UP (ref 7–14)
AST SERPL-CCNC: 28 U/L — SIGNIFICANT CHANGE UP (ref 4–32)
BASOPHILS # BLD AUTO: 0.04 K/UL — SIGNIFICANT CHANGE UP (ref 0–0.2)
BASOPHILS NFR BLD AUTO: 0.4 % — SIGNIFICANT CHANGE UP (ref 0–2)
BILIRUB SERPL-MCNC: 0.3 MG/DL — SIGNIFICANT CHANGE UP (ref 0.2–1.2)
BUN SERPL-MCNC: 20 MG/DL — SIGNIFICANT CHANGE UP (ref 7–23)
CALCIUM SERPL-MCNC: 8.2 MG/DL — LOW (ref 8.4–10.5)
CHLORIDE SERPL-SCNC: 102 MMOL/L — SIGNIFICANT CHANGE UP (ref 98–107)
CO2 SERPL-SCNC: 23 MMOL/L — SIGNIFICANT CHANGE UP (ref 22–31)
CREAT SERPL-MCNC: 1.11 MG/DL — SIGNIFICANT CHANGE UP (ref 0.5–1.3)
CULTURE RESULTS: ABNORMAL
EGFR: 56 ML/MIN/1.73M2 — LOW
EGFR: 56 ML/MIN/1.73M2 — LOW
EOSINOPHIL # BLD AUTO: 0.2 K/UL — SIGNIFICANT CHANGE UP (ref 0–0.5)
EOSINOPHIL NFR BLD AUTO: 1.8 % — SIGNIFICANT CHANGE UP (ref 0–6)
GLUCOSE SERPL-MCNC: 85 MG/DL — SIGNIFICANT CHANGE UP (ref 70–99)
HCT VFR BLD CALC: 31.5 % — LOW (ref 34.5–45)
HGB BLD-MCNC: 9.4 G/DL — LOW (ref 11.5–15.5)
IANC: 9.29 K/UL — HIGH (ref 1.8–7.4)
IMM GRANULOCYTES NFR BLD AUTO: 1 % — HIGH (ref 0–0.9)
LYMPHOCYTES # BLD AUTO: 0.76 K/UL — LOW (ref 1–3.3)
LYMPHOCYTES # BLD AUTO: 6.8 % — LOW (ref 13–44)
MAGNESIUM SERPL-MCNC: 1.8 MG/DL — SIGNIFICANT CHANGE UP (ref 1.6–2.6)
MCHC RBC-ENTMCNC: 28.7 PG — SIGNIFICANT CHANGE UP (ref 27–34)
MCHC RBC-ENTMCNC: 29.8 G/DL — LOW (ref 32–36)
MCV RBC AUTO: 96 FL — SIGNIFICANT CHANGE UP (ref 80–100)
MONOCYTES # BLD AUTO: 0.8 K/UL — SIGNIFICANT CHANGE UP (ref 0–0.9)
MONOCYTES NFR BLD AUTO: 7.1 % — SIGNIFICANT CHANGE UP (ref 2–14)
NEUTROPHILS # BLD AUTO: 9.29 K/UL — HIGH (ref 1.8–7.4)
NEUTROPHILS NFR BLD AUTO: 82.9 % — HIGH (ref 43–77)
NRBC # BLD AUTO: 0 K/UL — SIGNIFICANT CHANGE UP (ref 0–0)
NRBC # FLD: 0 K/UL — SIGNIFICANT CHANGE UP (ref 0–0)
NRBC BLD AUTO-RTO: 0 /100 WBCS — SIGNIFICANT CHANGE UP (ref 0–0)
ORGANISM # SPEC MICROSCOPIC CNT: ABNORMAL
ORGANISM # SPEC MICROSCOPIC CNT: ABNORMAL
PHOSPHATE SERPL-MCNC: 3.7 MG/DL — SIGNIFICANT CHANGE UP (ref 2.5–4.5)
PLATELET # BLD AUTO: 373 K/UL — SIGNIFICANT CHANGE UP (ref 150–400)
POTASSIUM SERPL-MCNC: 3.3 MMOL/L — LOW (ref 3.5–5.3)
POTASSIUM SERPL-SCNC: 3.3 MMOL/L — LOW (ref 3.5–5.3)
PROT SERPL-MCNC: 4.8 G/DL — LOW (ref 6–8.3)
RBC # BLD: 3.28 M/UL — LOW (ref 3.8–5.2)
RBC # FLD: 18 % — HIGH (ref 10.3–14.5)
SODIUM SERPL-SCNC: 136 MMOL/L — SIGNIFICANT CHANGE UP (ref 135–145)
SPECIMEN SOURCE: SIGNIFICANT CHANGE UP
WBC # BLD: 11.2 K/UL — HIGH (ref 3.8–10.5)
WBC # FLD AUTO: 11.2 K/UL — HIGH (ref 3.8–10.5)

## 2025-03-31 PROCEDURE — 99233 SBSQ HOSP IP/OBS HIGH 50: CPT

## 2025-03-31 PROCEDURE — G0545: CPT

## 2025-03-31 PROCEDURE — 99497 ADVNCD CARE PLAN 30 MIN: CPT | Mod: 25

## 2025-03-31 PROCEDURE — 99223 1ST HOSP IP/OBS HIGH 75: CPT | Mod: 25

## 2025-03-31 PROCEDURE — 99233 SBSQ HOSP IP/OBS HIGH 50: CPT | Mod: GC

## 2025-03-31 PROCEDURE — 99232 SBSQ HOSP IP/OBS MODERATE 35: CPT

## 2025-03-31 RX ORDER — FENTANYL CITRATE-0.9 % NACL/PF 100MCG/2ML
1 SYRINGE (ML) INTRAVENOUS
Refills: 0 | Status: DISCONTINUED | OUTPATIENT
Start: 2025-04-03 | End: 2025-04-03

## 2025-03-31 RX ORDER — LORAZEPAM 4 MG/ML
0.5 VIAL (ML) INJECTION ONCE
Refills: 0 | Status: DISCONTINUED | OUTPATIENT
Start: 2025-03-31 | End: 2025-03-31

## 2025-03-31 RX ORDER — PIPERACILLIN-TAZO-DEXTROSE,ISO 3.375G/5
3.38 IV SOLUTION, PIGGYBACK PREMIX FROZEN(ML) INTRAVENOUS EVERY 12 HOURS
Refills: 0 | Status: DISCONTINUED | OUTPATIENT
Start: 2025-03-31 | End: 2025-03-31

## 2025-03-31 RX ORDER — FENTANYL CITRATE-0.9 % NACL/PF 100MCG/2ML
1 SYRINGE (ML) INTRAVENOUS
Refills: 0 | Status: DISCONTINUED | OUTPATIENT
Start: 2025-03-31 | End: 2025-03-31

## 2025-03-31 RX ORDER — POLYETHYLENE GLYCOL 3350 17 G/17G
17 POWDER, FOR SOLUTION ORAL DAILY
Refills: 0 | Status: DISCONTINUED | OUTPATIENT
Start: 2025-03-31 | End: 2025-04-08

## 2025-03-31 RX ORDER — MAGNESIUM OXIDE 400 MG
400 TABLET ORAL
Refills: 0 | Status: COMPLETED | OUTPATIENT
Start: 2025-03-31 | End: 2025-04-01

## 2025-03-31 RX ORDER — HYDROMORPHONE/SOD CHLOR,ISO/PF 2 MG/10 ML
6 SYRINGE (ML) INJECTION EVERY 4 HOURS
Refills: 0 | Status: DISCONTINUED | OUTPATIENT
Start: 2025-03-31 | End: 2025-04-03

## 2025-03-31 RX ORDER — FUROSEMIDE 10 MG/ML
20 INJECTION INTRAMUSCULAR; INTRAVENOUS DAILY
Refills: 0 | Status: COMPLETED | OUTPATIENT
Start: 2025-03-31 | End: 2025-04-03

## 2025-03-31 RX ORDER — PIPERACILLIN-TAZO-DEXTROSE,ISO 3.375G/5
3.38 IV SOLUTION, PIGGYBACK PREMIX FROZEN(ML) INTRAVENOUS EVERY 8 HOURS
Refills: 0 | Status: DISCONTINUED | OUTPATIENT
Start: 2025-03-31 | End: 2025-04-01

## 2025-03-31 RX ORDER — SENNA 187 MG
2 TABLET ORAL AT BEDTIME
Refills: 0 | Status: DISCONTINUED | OUTPATIENT
Start: 2025-03-31 | End: 2025-04-08

## 2025-03-31 RX ADMIN — Medication 6 MILLIGRAM(S): at 03:27

## 2025-03-31 RX ADMIN — Medication 1 MILLIGRAM(S): at 20:27

## 2025-03-31 RX ADMIN — Medication 6 MILLIGRAM(S): at 10:00

## 2025-03-31 RX ADMIN — Medication 1000 MILLIGRAM(S): at 00:30

## 2025-03-31 RX ADMIN — Medication 1 MILLIGRAM(S): at 00:33

## 2025-03-31 RX ADMIN — Medication 6 MILLIGRAM(S): at 09:29

## 2025-03-31 RX ADMIN — Medication 6 MILLIGRAM(S): at 06:50

## 2025-03-31 RX ADMIN — Medication 1 MILLIGRAM(S): at 21:27

## 2025-03-31 RX ADMIN — CYANOCOBALAMIN 1000 MICROGRAM(S): 1000 INJECTION INTRAMUSCULAR; SUBCUTANEOUS at 12:46

## 2025-03-31 RX ADMIN — Medication 1 MILLIGRAM(S): at 01:33

## 2025-03-31 RX ADMIN — Medication 6 MILLIGRAM(S): at 19:00

## 2025-03-31 RX ADMIN — Medication 25 GRAM(S): at 02:28

## 2025-03-31 RX ADMIN — Medication 1 MILLIGRAM(S): at 05:06

## 2025-03-31 RX ADMIN — Medication 6 MILLIGRAM(S): at 16:00

## 2025-03-31 RX ADMIN — Medication 25 GRAM(S): at 10:55

## 2025-03-31 RX ADMIN — Medication 0.5 MILLIGRAM(S): at 06:21

## 2025-03-31 RX ADMIN — FLUTICASONE PROPIONATE 1 SPRAY(S): 50 SPRAY, METERED NASAL at 18:15

## 2025-03-31 RX ADMIN — ESCITALOPRAM OXALATE 10 MILLIGRAM(S): 20 TABLET ORAL at 12:47

## 2025-03-31 RX ADMIN — Medication 6 MILLIGRAM(S): at 02:27

## 2025-03-31 RX ADMIN — Medication 6 MILLIGRAM(S): at 18:12

## 2025-03-31 RX ADMIN — Medication 2 TABLET(S): at 22:34

## 2025-03-31 RX ADMIN — Medication 400 MILLIGRAM(S): at 18:15

## 2025-03-31 RX ADMIN — Medication 1 MILLIGRAM(S): at 13:20

## 2025-03-31 RX ADMIN — Medication 1 MILLIGRAM(S): at 12:48

## 2025-03-31 RX ADMIN — Medication 6 MILLIGRAM(S): at 15:15

## 2025-03-31 RX ADMIN — FLUTICASONE PROPIONATE 1 SPRAY(S): 50 SPRAY, METERED NASAL at 06:22

## 2025-03-31 RX ADMIN — Medication 25 GRAM(S): at 18:15

## 2025-03-31 RX ADMIN — Medication 400 MILLIGRAM(S): at 12:46

## 2025-03-31 RX ADMIN — Medication 1 APPLICATION(S): at 06:22

## 2025-03-31 RX ADMIN — Medication 1 MILLIGRAM(S): at 05:52

## 2025-03-31 RX ADMIN — Medication 6 MILLIGRAM(S): at 06:20

## 2025-03-31 RX ADMIN — Medication 40 MILLIEQUIVALENT(S): at 10:56

## 2025-03-31 NOTE — CONSULT NOTE ADULT - SUBJECTIVE AND OBJECTIVE BOX
Date of Service 25 @ 09:46     Reason for Consultation:	[] Pain		[] Goals of Care		[] Non-pain symptoms    [] End of life discussion		[] Other:    HPI:  63 year old female PMH: Anxiety Chronic back pain, Depression, Ovarian ca and Vertigo mass effect adjacent to distal colon w/o obstruction, mass effect on distal R ureter s/p ureterl stent now s/p colostomy c/b bleeding now off AC,  Iliac vein occlusion status post stent presents for desensitization of carboplatin. Patient endorses fatigue. She denies fevers but endorses chills. Also endorses abdominal pain and some colostomy leakage. Pt denies any bleeding currently. Endorses difficulty with BMs and urinating as well, states she feels burning when she goes. Overall also endorses sob and has a lot of difficulty walking. Has noted worsening SOB over the past few weeks. She had a pleurx placed and has it drained every other day.  Patient presented to the MICU and required supplemental O2 as she was noted to have low O2 sats (25 Mar 2025 09:19)      Interval History:       PERTINENT PM/SXH:   No pertinent past medical history    Anxiety    Vertigo    Lower back pain    Ovarian ca    Cancer of fallopian tube    Major depression      S/P     S/P bunionectomy    S/P D&C (status post dilation and curettage)    S/P abdominoplasty    H/O total hysterectomy    S/P colostomy    H/O ureteroscopy      FAMILY HISTORY:  History of hypertension (Mother)        Family Hx substance abuse [ ]yes [x ]no    ITEMS NOT CHECKED ARE NOT PRESENT    SOCIAL HISTORY:   Significant other/partner[ ]  Children[ ]  Pentecostal/Spirituality:  Substance hx:  [ ]   Tobacco hx:  [ ]   Alcohol hx: [ ]   Home Opioid hx:  [ ] I-Stop Reference No:  Living Situation: [ ]Home  [ ]Long term care  [ ]Rehab [ ]Other  Home Services: [ ] HHA [ ] Visting RN [ ] Hospice      ADVANCE DIRECTIVES:    MOLST  [ ]  Living Will  [ ]   DECISION MAKER(s):  [ ] Health Care Proxy(s)  [ ] Surrogate(s)  [ ] Guardian           Name(s): Phone Number(s):    BASELINE (I)ADL(s) (prior to admission):  [place]: [ ]Total  [ ] Moderate [ ]Dependent    Allergies    carboplatin (Unknown)  oxaliplatin (Unknown)  IV Contrast (Rash)  cisplatin (Unknown)  platinum containing compounds (Anaphylaxis)    Intolerances    MEDICATIONS  (STANDING):  alteplase  Injectable for Pleural Effusion 10 milliGRAM(s) IntraPleural. every 12 hours  chlorhexidine 2% Cloths 1 Application(s) Topical <User Schedule>  cyanocobalamin 1000 MICROGram(s) Oral daily  dornase nidhi Solution for Pleural Effusion 5 milliGRAM(s) IntraPleural. every 12 hours  escitalopram 10 milliGRAM(s) Oral daily  fluticasone propionate 50 MICROgram(s)/spray Nasal Spray 1 Spray(s) Both Nostrils two times a day  HYDROmorphone   Tablet 6 milliGRAM(s) Oral every 4 hours  influenza   Vaccine 0.5 milliLiter(s) IntraMuscular once  magnesium oxide 400 milliGRAM(s) Oral three times a day with meals  piperacillin/tazobactam IVPB.. 4.5 Gram(s) IV Intermittent every 8 hours  potassium chloride   Powder 40 milliEquivalent(s) Oral once  simethicone 80 milliGRAM(s) Chew three times a day  sodium chloride 0.9% Solution for Pleural Effusion 30 milliLiter(s) IntraPleural. every 12 hours    MEDICATIONS  (PRN):  albuterol    0.083%. 2.5 milliGRAM(s) Nebulizer once PRN Wheezing  diphenhydrAMINE 50 milliGRAM(s) Oral once PRN MILD ALERGIC REACTION  diphenhydrAMINE Injectable 50 milliGRAM(s) IntraMuscular once PRN MILD ALLERGIC REACTION  diphenhydrAMINE Injectable 50 milliGRAM(s) IV Push once PRN MILD ALLERGIC REACTION  EPINEPHrine     1 mG/mL Injectable 0.3 milliGRAM(s) IntraMuscular once PRN ANAPHYLAXIS  glucagon  Injectable 1 milliGRAM(s) IV Push once PRN REFRACTORY CASES AND/OR ON BETA BLOCKERS  glucagon  Injectable 2 milliGRAM(s) IV Push once PRN REFRACTORY CASES AND/ OR ON BETA BLOCKERS  HYDROmorphone  Injectable 1 milliGRAM(s) IV Push every 4 hours PRN Severe Pain (7 - 10)  LORazepam     Tablet 1 milliGRAM(s) Oral once PRN Anxiety  ondansetron Injectable 8 milliGRAM(s) IV Push every 8 hours PRN Nausea  pseudoephedrine 60 milliGRAM(s) Oral every 6 hours PRN nasal congesiton  sodium chloride 0.65% Nasal 1 Spray(s) Both Nostrils two times a day PRN Congestion        ITEMS UNCHECKED ARE NOT PRESENT     PRESENT SYMPTOMS: [ ]Unable to self-report due to altered mental status  [ ] CPOT [ ] PAINADs [ ] RDOS  Source if other than patient:  [ ]Family   [ ]Team     Pain: [ ]yes [ ]no  QOL impact -   Location -                    Aggravating factors -  Quality -  Radiation -  Timing-  Severity (0-10 scale):  Minimal acceptable level / Pain goal (0-10 scale):     CPOT:    https://www.[facility].org/getattachment/[email or web address]/Critical-Care-Pain-Observation-Tool-(CPOT)    Dyspnea:                           [ ]Mild [ ]Moderate [ ]Severe  Anxiety:                             [ ]Mild [ ]Moderate [ ]Severe  Agitation:                          [ ]Mild [ ]Moderate [ ]Severe  Fatigue:                             [ ]Mild [ ]Moderate [ ]Severe  Nausea:                             [ ]Mild [ ]Moderate [ ]Severe  Loss of appetite:              [ ]Mild [ ]Moderate [ ]Severe  Constipation:                   [ ]Mild [ ]Moderate [ ]Severe  Diarrhea:                          [ ]Mild [ ]Moderate [ ]Severe  Pruritus:                            [ ]Mild [ ]Moderate [ ]Severe  Depression:                      [ ]Mild [ ]Moderate [ ]Severe    PCSSQ[Palliative Care Spiritual Screening Question]   Severity (0-10):  Score of 4 or > indicate consideration of Chaplaincy referral.  Chaplaincy Referral: [ ] yes [ ] refused [ ] following [ x] deferred    Caregiver [place]? : [ ] yes [ ] no [ ] Declined   [x ] Deferred            Social work referral [ ] Patient & Family Centered Care Referral [ ]     Anticipatory Grief present?:  [ ] yes [ ] no  [x ] Deferred                  Social work referral [ ] Chaplaincy Referral[ ] Caregiver Support Referral [ ]     Other Symptoms:  [ ]All other review of systems negative   [ ] Unable to obtain due to poor mentation     PHYSICAL EXAM:  Vital Signs Last 24 Hrs  T(C): 36.7 (31 Mar 2025 06:20), Max: 36.8 (30 Mar 2025 13:45)  T(F): 98 (31 Mar 2025 06:20), Max: 98.2 (30 Mar 2025 13:45)  HR: 83 (31 Mar 2025 06:20) (83 - 90)  BP: 103/69 (31 Mar 2025 06:20) (95/65 - 107/73)  BP(mean): 77 (31 Mar 2025 01:44) (77 - 77)  RR: 18 (31 Mar 2025 06:20) (16 - 18)  SpO2: 95% (31 Mar 2025 06:20) (94% - 96%)    Parameters below as of 31 Mar 2025 06:20  Patient On (Oxygen Delivery Method): room air         I&O's Summary    30 Mar 2025 07:01  -  31 Mar 2025 07:00  --------------------------------------------------------  IN: 950 mL / OUT: 835 mL / NET: 115 mL        GENERAL:  [ ]Alert  [ ]Oriented x   [ ]Lethargic  [ ]Cachexia  [ ]Unarousable  [ ]Verbal  [ ]Non-Verbal  [ ] No Distress  Behavioral:   [ ] Anxiety  [ ] Delirium [ ] Agitation [ ] Calm  [ ] Other  HEENT:  [ ]Normal  [ ] Temporal Wasting  [ ]Dry mouth   [ ]ET Tube/Trach  [ ]Oral lesions  [ ] Mucositis  PULMONARY:   [ ]Clear [ ]Tachypnea  [ ]Audible excessive secretions   [ ]Rhonchi        [ ]Right [ ]Left [ ]Bilateral  [ ]Crackles        [ ]Right [ ]Left [ ]Bilateral  [ ]Wheezing     [ ]Right [ ]Left [ ]Bilateral  [ ]Diminished breath sounds [ ]right [ ]left [ ]bilateral  CARDIOVASCULAR:    [ ]Regular [ ]Irregular [ ]Tachy  [ ]Rios [ ]Murmur [ ]Other  GASTROINTESTINAL:  [ ]Soft  [ ]Distended   [ ]+BS  [ ]Non tender [ ]Tender  [ ]PEG [ ]OGT/ NGT  Last BM:   GENITOURINARY:  [ ]Normal [ ] Incontinent   [ ]Oliguria/Anuria   [ ]Smiley  MUSCULOSKELETAL:   [ ]Normal   [ ]Weakness  [ ]Bed/Wheelchair bound [ ]Edema  [  ] amputation  [  ] contraction  NEUROLOGIC:   [ ]No focal deficits  [ ]Cognitive impairment  [ ]Dysphagia [ ]Dysarthria [ ]Paresis [ ]Other   SKIN: See Nursing Skin Assessment for further details  [ ]Normal    [ ]Rash  [ ]Pressure ulcer(s)       Present on admission [ ]y [ ]n   [  ]  Wound    [  ] hyperpigmentation    CRITICAL CARE:  [ ] Shock Present  [ ]Septic [ ]Cardiogenic [ ]Neurologic [ ]Hypovolemic  [ ]  Vasopressors [ ]  Inotropes   [ ]Respiratory failure present [ ]Mechanical ventilation [ ]Non-invasive ventilatory support [ ]High flow    [ ]Acute  [ ]Chronic [ ]Hypoxic  [ ]Hypercarbic [ ]Other  [ ]Other organ failure     LABS:  reviewed                         9.4    11.20 )-----------( 373      ( 31 Mar 2025 06:27 )             31.5       136  |  102  |  20  ----------------------------<  85  3.3[L]   |  23  |  1.11    Ca    8.2[L]      31 Mar 2025 06:27  Phos  3.7       Mg     1.80         TPro  4.8[L]  /  Alb  2.2[L]  /  TBili  0.3  /  DBili  x   /  AST  28  /  ALT  11  /  AlkPhos  129[H]      Urinalysis Basic - ( 31 Mar 2025 06:27 )    Color: x / Appearance: x / SG: x / pH: x  Gluc: 85 mg/dL / Ketone: x  / Bili: x / Urobili: x   Blood: x / Protein: x / Nitrite: x   Leuk Esterase: x / RBC: x / WBC x   Sq Epi: x / Non Sq Epi: x / Bacteria: x      CAPILLARY BLOOD GLUCOSE          RADIOLOGY & ADDITIONAL STUDIES: reviewed     PROTEIN CALORIE MALNUTRITION PRESENT: [ ]mild [ ]moderate [ ]severe [ ]underweight [ ]morbid obesity  https://www.andeal.org/vault/2440/web/files/ONC/Table_Clinical%20Characteristics%20to%20Document%20Malnutrition-White%20JV%20et%20al%2020.pdf    Height (cm): 154.9 (25 @ 08:00), 153.01 (25 @ 10:00), 154.9 (24 @ 10:00)  Weight (kg): 67.8 (25 @ 08:00), 58.96 (25 @ 09:00), 53.5 (24 @ 10:00)  BMI (kg/m2): 28.3 (25 @ 08:00), 25.2 (25 @ 09:00), 22.9 (25 @ 10:00)    [ ]PPSV2 < or = to 30% [ ]significant weight loss  [ ]poor nutritional intake  [ ]anasarca [ ]Artificial Nutrition      REFERRALS:   [ ]Chaplaincy  [ ]Hospice  [ ]Child Life  [ ]Social Work  [ ]Case management [ ]Holistic Therapy     Goals of Care Document:  Date of Service 25 @ 09:46     Reason for Consultation:	[] Pain		[] Goals of Care		[] Non-pain symptoms    [] End of life discussion		[] Other:    HPI:  63 year old female PMH: Anxiety Chronic back pain, Depression, Ovarian ca and Vertigo mass effect adjacent to distal colon w/o obstruction, mass effect on distal R ureter s/p ureterl stent now s/p colostomy c/b bleeding now off AC,  Iliac vein occlusion status post stent presents for desensitization of carboplatin. Patient endorses fatigue. She denies fevers but endorses chills. Also endorses abdominal pain and some colostomy leakage. Pt denies any bleeding currently. Endorses difficulty with BMs and urinating as well, states she feels burning when she goes. Overall also endorses sob and has a lot of difficulty walking. Has noted worsening SOB over the past few weeks. She had a pleurx placed and has it drained every other day.  Patient presented to the MICU and required supplemental O2 as she was noted to have low O2 sats (25 Mar 2025 09:19)      Interval History:       PERTINENT PM/SXH:   No pertinent past medical history    Anxiety    Vertigo    Lower back pain    Ovarian ca    Cancer of fallopian tube    Major depression      S/P     S/P bunionectomy    S/P D&C (status post dilation and curettage)    S/P abdominoplasty    H/O total hysterectomy    S/P colostomy    H/O ureteroscopy      FAMILY HISTORY:  History of hypertension (Mother)        Family Hx substance abuse [ ]yes [x ]no    ITEMS NOT CHECKED ARE NOT PRESENT    SOCIAL HISTORY:   Significant other/partner[ ]  Children[ ]  Mandaen/Spirituality:  Substance hx:  [ ]   Tobacco hx:  [ ]   Alcohol hx: [ ]   Home Opioid hx:  [ ] I-Stop Reference No:  Reference #: [id number]    Practitioner Count: 3  Pharmacy Count: 3  Current Opioid Prescriptions: 2  Current Benzodiazepine Prescriptions: 0  Current Stimulant Prescriptions: 0      Patient Demographic Information (PDI)       PDI	First Name	Last Name	Birth Date	Gender	Street Address	City	State	Zip Code  ANUSHKA Haque	1960	Female	[address]	[facility]	[address]    Prescription Information      PDI Filter:    PDI	Current Rx	Drug Type	Rx Written	Rx Dispensed	Drug	Quantity	Days Supply	Prescriber Name	Prescriber GEORGINA #	Payment Method	Dispenser  A	Y	O	2025	2025	hydromorphone 3 mg suppos	88	22	Madelyn Martinez S	[id number]	Medicare	[facility]  A	N	O	03/10/2025	2025	hydromorphone 8 mg tablet	84	14	Madelyn Martinez	[id number]	Insurance	[facility] [id number]  A	Y	O	2025	fentanyl 100 mcg/hr patch	10	30	Madelyn Martinez	[id number]	Insurance	[facility] [id number]  A	N	O	2025	hydromorphone 8 mg tablet	126	14	Ino Lazaro MD	[id number]	Insurance	[facility] [id number]  A	N	O	2025	hydromorphone 4 mg tablet	60	4	Chago Joseph MD	[id number]	Insurance	[facility]  A	N	O	2025	fentanyl 75 mcg/hr patch	5	15	Chago Joseph MD	[id number]	Insurance	[facility]  A	N	O	2024	fentanyl 100 mcg/hr patch	10	30	Madelyn Martinez S	[id number]	Insurance	[facility] [id number]  A	N	O	2024	fentanyl 50 mcg/hr patch	10	30	Madelyn Martinez S	[id number]	Insurance	[facility] [id number]  A	N	O	2024	hydromorphone 8 mg tablet	126	14	Madelyn Martinez	[id number]	Insurance	[facility] [id number]  A	N	O	2024	methadone hcl 5 mg tablet	30	30	Madelyn Martinez S	[id number]	Insurance	[facility] [id number]  A	N	O	2024	hydromorphone 3 mg suppos	56	14	Madelyn Martinez S	[id number]	Medicare	[facility]  A	N	O	12/10/2024	2024	hydromorphone 8 mg tablet	126	14	Madleyn Martinez S	[id number]	Insurance	[facility] [id number]  A	N	O	12/10/2024	2024	fentanyl 50 mcg/hr patch	10	30	Madelyn Martinez	[id number]	Insurance	[facility] [id number]  A	N	O	12/10/2024	2024	fentanyl 100 mcg/hr patch	10	30	Madelyn Martinez	[id number]	Insurance	[facility] [id number]  A	N	O	2024	hydromorphone 3 mg suppos	24	6	Trevin Lott	[id number]	Medicare	[facility]  A	N	O	2024	fentanyl 50 mcg/hr patch	5	15	Trevin Lott	[id number]	Medicare	[facility]  A	N	O	2024	fentanyl 25 mcg/hr patch	5	15	Galina Vann [id number]	Medicare	[facility]  A	N	O	2024	fentanyl 100 mcg/hr patch	5	15	Galina Vann [id number]	Medicare	[facility]  A	N	O	2024	hydromorphone 8 mg tablet	126	14	Galina Vann [id number]	Medicare	[facility]  A	N	O	2024	hydromorphone 2 mg tablet	180	22	Cecile Hurd	[id number]	Insurance	[facility] [id number]  A	N	O	2024	hydromorphone 2 mg tablet	12	2	Cecile Hurd	[id number]	[facility]  A	N	O	10/18/2024	10/22/2024	fentanyl 75 mcg/hr patch	10	30	Ino Lazaro MD	[id number]	Insurance	[facility] [id number]  A	N	O	10/08/2024	10/11/2024	hydromorphone 2 mg tablet	180	30	Ino Lazaro MD	[id number]	Insurance	[facility] [id number]  A	N	O	2024	fentanyl 50 mcg/hr patch	10	30	Ino Lazaro MD	[id number]	Insurance	[facility] [id number]  A	N	O	2024	hydromorphone 2 mg tablet	60	15	Cecile Hurd	[id number]	Insurance	[facility] [id number]  A	N	O	2024	fentanyl 50 mcg/hr patch	1	3	[employer]	[id number]	Insurance	[facility] [id number]  A	N	O	2024	hydromorphone 2 mg tablet	18	1	[employer]	[id number]	Insurance	[facility] [id number]  A	N	O	2024	fentanyl 12 mcg/hr patch	10	30	Ino Lazaro MD	[id number]	Insurance	[facility] [id number]  A	N	O	2024	oxycodone hcl (ir) 5 mg tablet	120	24	Lisa Stover)	[id number]	Insurance	[facility] [id number]  A	N	O	2024	oxycodone hcl (ir) 5 mg tablet	120	30	Sahara Hanson	[id number]	Insurance	[facility] [id number]  A	N		2024	pregabalin 100 mg capsule	60	30	Akhil Calvillo	[id number]	Insurance	[facility] [id number]      Living Situation: [ ]Home  [ ]Long term care  [ ]Rehab [ ]Other  Home Services: [ ] HHA [ ] Visting RN [ ] Hospice      ADVANCE DIRECTIVES:    MOLST  [ ]  Living Will  [ ]   DECISION MAKER(s):  [ ] Health Care Proxy(s)  [ ] Surrogate(s)  [ ] Guardian           Name(s): Phone Number(s):    BASELINE (I)ADL(s) (prior to admission):  [place]: [ ]Total  [ ] Moderate [ ]Dependent    Allergies    carboplatin (Unknown)  oxaliplatin (Unknown)  IV Contrast (Rash)  cisplatin (Unknown)  platinum containing compounds (Anaphylaxis)    Intolerances    MEDICATIONS  (STANDING):  alteplase  Injectable for Pleural Effusion 10 milliGRAM(s) IntraPleural. every 12 hours  chlorhexidine 2% Cloths 1 Application(s) Topical <User Schedule>  cyanocobalamin 1000 MICROGram(s) Oral daily  dornase nidhi Solution for Pleural Effusion 5 milliGRAM(s) IntraPleural. every 12 hours  escitalopram 10 milliGRAM(s) Oral daily  fluticasone propionate 50 MICROgram(s)/spray Nasal Spray 1 Spray(s) Both Nostrils two times a day  HYDROmorphone   Tablet 6 milliGRAM(s) Oral every 4 hours  influenza   Vaccine 0.5 milliLiter(s) IntraMuscular once  magnesium oxide 400 milliGRAM(s) Oral three times a day with meals  piperacillin/tazobactam IVPB.. 4.5 Gram(s) IV Intermittent every 8 hours  potassium chloride   Powder 40 milliEquivalent(s) Oral once  simethicone 80 milliGRAM(s) Chew three times a day  sodium chloride 0.9% Solution for Pleural Effusion 30 milliLiter(s) IntraPleural. every 12 hours    MEDICATIONS  (PRN):  albuterol    0.083%. 2.5 milliGRAM(s) Nebulizer once PRN Wheezing  diphenhydrAMINE 50 milliGRAM(s) Oral once PRN MILD ALERGIC REACTION  diphenhydrAMINE Injectable 50 milliGRAM(s) IntraMuscular once PRN MILD ALLERGIC REACTION  diphenhydrAMINE Injectable 50 milliGRAM(s) IV Push once PRN MILD ALLERGIC REACTION  EPINEPHrine     1 mG/mL Injectable 0.3 milliGRAM(s) IntraMuscular once PRN ANAPHYLAXIS  glucagon  Injectable 1 milliGRAM(s) IV Push once PRN REFRACTORY CASES AND/OR ON BETA BLOCKERS  glucagon  Injectable 2 milliGRAM(s) IV Push once PRN REFRACTORY CASES AND/ OR ON BETA BLOCKERS  HYDROmorphone  Injectable 1 milliGRAM(s) IV Push every 4 hours PRN Severe Pain (7 - 10)  LORazepam     Tablet 1 milliGRAM(s) Oral once PRN Anxiety  ondansetron Injectable 8 milliGRAM(s) IV Push every 8 hours PRN Nausea  pseudoephedrine 60 milliGRAM(s) Oral every 6 hours PRN nasal congesiton  sodium chloride 0.65% Nasal 1 Spray(s) Both Nostrils two times a day PRN Congestion        ITEMS UNCHECKED ARE NOT PRESENT     PRESENT SYMPTOMS: [ ]Unable to self-report due to altered mental status  [ ] CPOT [ ] PAINADs [ ] RDOS  Source if other than patient:  [ ]Family   [ ]Team     Pain: [ ]yes [ ]no  QOL impact -   Location -                    Aggravating factors -  Quality -  Radiation -  Timing-  Severity (0-10 scale):  Minimal acceptable level / Pain goal (0-10 scale):     CPOT:    https://www.[facility].org/getattachment/[email or web address]/Critical-Care-Pain-Observation-Tool-(CPOT)    Dyspnea:                           [ ]Mild [ ]Moderate [ ]Severe  Anxiety:                             [ ]Mild [ ]Moderate [ ]Severe  Agitation:                          [ ]Mild [ ]Moderate [ ]Severe  Fatigue:                             [ ]Mild [ ]Moderate [ ]Severe  Nausea:                             [ ]Mild [ ]Moderate [ ]Severe  Loss of appetite:              [ ]Mild [ ]Moderate [ ]Severe  Constipation:                   [ ]Mild [ ]Moderate [ ]Severe  Diarrhea:                          [ ]Mild [ ]Moderate [ ]Severe  Pruritus:                            [ ]Mild [ ]Moderate [ ]Severe  Depression:                      [ ]Mild [ ]Moderate [ ]Severe    PCSSQ[Palliative Care Spiritual Screening Question]   Severity (0-10):  Score of 4 or > indicate consideration of Chaplaincy referral.  Chaplaincy Referral: [ ] yes [ ] refused [ ] following [ x] deferred    Caregiver [place]? : [ ] yes [ ] no [ ] Declined   [x ] Deferred            Social work referral [ ] Patient & Family Centered Care Referral [ ]     Anticipatory Grief present?:  [ ] yes [ ] no  [x ] Deferred                  Social work referral [ ] Chaplaincy Referral[ ] Caregiver Support Referral [ ]     Other Symptoms:  [ ]All other review of systems negative   [ ] Unable to obtain due to poor mentation     PHYSICAL EXAM:  Vital Signs Last 24 Hrs  T(C): 36.7 (31 Mar 2025 06:20), Max: 36.8 (30 Mar 2025 13:45)  T(F): 98 (31 Mar 2025 06:20), Max: 98.2 (30 Mar 2025 13:45)  HR: 83 (31 Mar 2025 06:20) (83 - 90)  BP: 103/69 (31 Mar 2025 06:20) (95/65 - 107/73)  BP(mean): 77 (31 Mar 2025 01:44) (77 - 77)  RR: 18 (31 Mar 2025 06:20) (16 - 18)  SpO2: 95% (31 Mar 2025 06:20) (94% - 96%)    Parameters below as of 31 Mar 2025 06:20  Patient On (Oxygen Delivery Method): room air         I&O's Summary    30 Mar 2025 07:01  -  31 Mar 2025 07:00  --------------------------------------------------------  IN: 950 mL / OUT: 835 mL / NET: 115 mL        GENERAL:  [ ]Alert  [ ]Oriented x   [ ]Lethargic  [ ]Cachexia  [ ]Unarousable  [ ]Verbal  [ ]Non-Verbal  [ ] No Distress  Behavioral:   [ ] Anxiety  [ ] Delirium [ ] Agitation [ ] Calm  [ ] Other  HEENT:  [ ]Normal  [ ] Temporal Wasting  [ ]Dry mouth   [ ]ET Tube/Trach  [ ]Oral lesions  [ ] Mucositis  PULMONARY:   [ ]Clear [ ]Tachypnea  [ ]Audible excessive secretions   [ ]Rhonchi        [ ]Right [ ]Left [ ]Bilateral  [ ]Crackles        [ ]Right [ ]Left [ ]Bilateral  [ ]Wheezing     [ ]Right [ ]Left [ ]Bilateral  [ ]Diminished breath sounds [ ]right [ ]left [ ]bilateral  CARDIOVASCULAR:    [ ]Regular [ ]Irregular [ ]Tachy  [ ]Rios [ ]Murmur [ ]Other  GASTROINTESTINAL:  [ ]Soft  [ ]Distended   [ ]+BS  [ ]Non tender [ ]Tender  [ ]PEG [ ]OGT/ NGT  Last BM:   GENITOURINARY:  [ ]Normal [ ] Incontinent   [ ]Oliguria/Anuria   [ ]Smiley  MUSCULOSKELETAL:   [ ]Normal   [ ]Weakness  [ ]Bed/Wheelchair bound [ ]Edema  [  ] amputation  [  ] contraction  NEUROLOGIC:   [ ]No focal deficits  [ ]Cognitive impairment  [ ]Dysphagia [ ]Dysarthria [ ]Paresis [ ]Other   SKIN: See Nursing Skin Assessment for further details  [ ]Normal    [ ]Rash  [ ]Pressure ulcer(s)       Present on admission [ ]y [ ]n   [  ]  Wound    [  ] hyperpigmentation    CRITICAL CARE:  [ ] Shock Present  [ ]Septic [ ]Cardiogenic [ ]Neurologic [ ]Hypovolemic  [ ]  Vasopressors [ ]  Inotropes   [ ]Respiratory failure present [ ]Mechanical ventilation [ ]Non-invasive ventilatory support [ ]High flow    [ ]Acute  [ ]Chronic [ ]Hypoxic  [ ]Hypercarbic [ ]Other  [ ]Other organ failure     LABS:  reviewed                         9.    11.20 )-----------( 373      ( 31 Mar 2025 06:27 )             31.5       136  |  102  |  20  ----------------------------<  85  3.3[L]   |  23  |  1.11    Ca    8.2[L]      31 Mar 2025 06:27  Phos  3.7       Mg     1.80         TPro  4.8[L]  /  Alb  2.2[L]  /  TBili  0.3  /  DBili  x   /  AST  28  /  ALT  11  /  AlkPhos  129[H]      Urinalysis Basic - ( 31 Mar 2025 06:27 )    Color: x / Appearance: x / SG: x / pH: x  Gluc: 85 mg/dL / Ketone: x  / Bili: x / Urobili: x   Blood: x / Protein: x / Nitrite: x   Leuk Esterase: x / RBC: x / WBC x   Sq Epi: x / Non Sq Epi: x / Bacteria: x      CAPILLARY BLOOD GLUCOSE          RADIOLOGY & ADDITIONAL STUDIES: reviewed     PROTEIN CALORIE MALNUTRITION PRESENT: [ ]mild [ ]moderate [ ]severe [ ]underweight [ ]morbid obesity  https://www.andeal.org/vault/2440/web/files/ONC/Table_Clinical%20Characteristics%20to%20Document%20Malnutrition-White%20JV%20et%20al%2020.pdf    Height (cm): 154.9 (25 @ 08:00), 153.01 (25 @ 10:00), 154.9 (24 @ 10:00)  Weight (kg): 67.8 (25 @ 08:00), 58.96 (25 @ 09:00), 53.5 (24 @ 10:00)  BMI (kg/m2): 28.3 (25 @ 08:00), 25.2 (25 @ 09:00), 22.9 (25 @ 10:00)    [ ]PPSV2 < or = to 30% [ ]significant weight loss  [ ]poor nutritional intake  [ ]anasarca [ ]Artificial Nutrition      REFERRALS:   [ ]Chaplaincy  [ ]Hospice  [ ]Child Life  [ ]Social Work  [ ]Case management [ ]Holistic Therapy     Goals of Care Document:  Date of Service 25 @ 09:46     Reason for Consultation:	[x] Pain		[x] Goals of Care		[] Non-pain symptoms    [] End of life discussion		[] Other:    HPI:  63 year old female PMH: Anxiety Chronic back pain, Depression, Ovarian ca and Vertigo mass effect adjacent to distal colon w/o obstruction, mass effect on distal R ureter s/p ureterl stent now s/p colostomy c/b bleeding now off AC,  Iliac vein occlusion status post stent presents for desensitization of carboplatin. Patient endorses fatigue. She denies fevers but endorses chills. Also endorses abdominal pain and some colostomy leakage. Pt denies any bleeding currently. Endorses difficulty with BMs and urinating as well, states she feels burning when she goes. Overall also endorses sob and has a lot of difficulty walking. Has noted worsening SOB over the past few weeks. She had a pleurx placed and has it drained every other day.  Patient presented to the MICU and required supplemental O2 as she was noted to have low O2 sats (25 Mar 2025 09:19)      Interval History: Patient seen and examined at bedside, complaining of pain and feeling tired. She states the pain is primarily in the rectum, and she also endorses nausea and anxiety. Over the last 24 hrs, she received Dilaudid po 28mg and iv 2.6mg. She states her pain is well controlled on the current regimen. She is agreeable to change her Dilaudid to long acting. She did not receive any Zofran PRN. Patient feels she is suffering and would like to talk further about hospice.       PERTINENT PM/SXH:   No pertinent past medical history    Anxiety    Vertigo    Lower back pain    Ovarian ca    Cancer of fallopian tube    Major depression      S/P     S/P bunionectomy    S/P D&C (status post dilation and curettage)    S/P abdominoplasty    H/O total hysterectomy    S/P colostomy    H/O ureteroscopy      FAMILY HISTORY:  History of hypertension (Mother)      Family Hx substance abuse [ ]yes [x ]no    ITEMS NOT CHECKED ARE NOT PRESENT    SOCIAL HISTORY:   Significant other/partner[x]  Children[x]  Gnosticism/Spirituality:  Substance hx:  [ ]   Tobacco hx:  [ ]   Alcohol hx: [ ]   Home Opioid hx:  [x] I-Stop Reference No:  Reference #: [id number]    Practitioner Count: 3  Pharmacy Count: 3  Current Opioid Prescriptions: 2  Current Benzodiazepine Prescriptions: 0  Current Stimulant Prescriptions: 0      Patient Demographic Information (PDI)       PDI	First Name	Last Name	Birth Date	Gender	Street Address	City	State	Zip Code  ANUSHKA Haque	1960	Female	[address]	[place]	[address]    Prescription Information      PDI Filter:    PDI	Current Rx	Drug Type	Rx Written	Rx Dispensed	Drug	Quantity	Days Supply	Prescriber Name	Prescriber GEORGINA #	Payment Method	Dispenser  A	Y	O	2025	hydromorphone 3 mg suppos	88	22	Madelyn Martinez S	[id number]	Medicare	[facility]  A	N	O	03/10/2025	2025	hydromorphone 8 mg tablet	84	14	Madelyn Martinez S	[id number]	Insurance	[facility] [id number]  A	Y	O	2025	fentanyl 100 mcg/hr patch	10	30	Johnny Martineza S	[id number]	Insurance	[facility] [id number]  A	N	O	2025	hydromorphone 8 mg tablet	126	14	Ino Lazaro MD	[id number]	Insurance	[facility] [id number]  A	N	O	2025	hydromorphone 4 mg tablet	60	4	Chago Joseph MD	[id number]	Insurance	[facility]  A	N	O	2025	fentanyl 75 mcg/hr patch	5	15	Chago Joseph MD	[id number]	Insurance	[facility]  A	N	O	2024	fentanyl 100 mcg/hr patch	10	30	Madelyn Martinez S	[id number]	Insurance	[facility] [id number]  A	N	O	2024	fentanyl 50 mcg/hr patch	10	30	Michelle Madelyn S	[id number]	Insurance	[facility] [id number]  A	N	O	2024	hydromorphone 8 mg tablet	126	14	Johnny Martineza S	[id number]	Insurance	[facility] [id number]  A	N	O	2024	methadone hcl 5 mg tablet	30	30	Madelyn Martinez	[id number]	Insurance	[facility] [id number]  A	N	O	2024	hydromorphone 3 mg suppos	56	14	Madelyn Martinez S	[id number]	Medicare	[facility]  A	N	O	12/10/2024	2024	hydromorphone 8 mg tablet	126	14	Madelyn Martinez	[id number]	Insurance	[facility] [id number]  A	N	O	12/10/2024	2024	fentanyl 50 mcg/hr patch	10	30	Madelyn Martinez S	[id number]	Insurance	[facility] [id number]  A	N	O	12/10/2024	2024	fentanyl 100 mcg/hr patch	10	30	Madelyn Martinez S	[id number]	Insurance	[facility] [id number]  A	N	O	2024	hydromorphone 3 mg suppos	24	6	Trevin Lott	[id number]	Medicare	[facility]  A	N	O	2024	fentanyl 50 mcg/hr patch	5	15	Trevin Lott	[id number]	Medicare	[facility]  A	N	O	2024	fentanyl 25 mcg/hr patch	5	15	Galina Vann [id number]	Medicare	[facility]  A	N	O	2024	fentanyl 100 mcg/hr patch	5	15	Galina Vann [id number]	Medicare	[facility]  A	N	O	2024	hydromorphone 8 mg tablet	126	14	Galina Vann [id number]	Medicare	[facility]  A	N	O	2024	hydromorphone 2 mg tablet	180	22	Cecile Hurd	[id number]	Insurance	[facility] [id number]  A	N	O	2024	hydromorphone 2 mg tablet	12	2	Cecile Hurd	[id number]	[facility]  A	N	O	10/18/2024	10/22/2024	fentanyl 75 mcg/hr patch	10	30	Ino Lazaro MD	[id number]	Insurance	[facility] [id number]  A	N	O	10/08/2024	10/11/2024	hydromorphone 2 mg tablet	180	30	Ino Lazaro MD	[id number]	Insurance	[facility] [id number]  A	N	O	2024	fentanyl 50 mcg/hr patch	10	30	Ino Lazaro MD	[id number]	Insurance	[facility] [id number]  A	N	O	2024	hydromorphone 2 mg tablet	60	15	Cecile Hurd	[id number]	Insurance	[facility] [id number]  A	N	O	2024	fentanyl 50 mcg/hr patch	1	3	[facility]	[id number]	Insurance	[facility] [id number]  A	N	O	2024	hydromorphone 2 mg tablet	18	1	[facility]	[id number]	Insurance	[facility] [id number]  A	N	O	2024	fentanyl 12 mcg/hr patch	10	30	Ino Lazaro MD	[id number]	Insurance	[facility] [id number]  A	N	O	2024	oxycodone hcl (ir) 5 mg tablet	120	24	Lisa Stover)	[id number]	Insurance	[facility] [id number]  A	N	O	2024	oxycodone hcl (ir) 5 mg tablet	120	30	Sahara Hanson	[id number]	Insurance	[facility] [id number]  A	N		2024	pregabalin 100 mg capsule	60	30	RajinderTieramame	[id number]	Insurance	[facility] [id number]      Living Situation: [ ]Home  [ ]Long term care  [ ]Rehab [ ]Other  Home Services: [ ] HHA [ ] Visting RN [ ] Hospice      ADVANCE DIRECTIVES:    MOLST  [ ]  Living Will  [ ]   DECISION MAKER(s):  [ ] Health Care Proxy(s)  [x] Surrogate(s)  [ ] Guardian           Name(s): Phone Number(s): Steve Ross [phone]    BASELINE (I)ADL(s) (prior to admission):  Stanley: [ ]Total  [ ] Moderate [ ]Dependent    Allergies    carboplatin (Unknown)  oxaliplatin (Unknown)  IV Contrast (Rash)  cisplatin (Unknown)  platinum containing compounds (Anaphylaxis)    Intolerances    MEDICATIONS  (STANDING):  alteplase  Injectable for Pleural Effusion 10 milliGRAM(s) IntraPleural. every 12 hours  chlorhexidine 2% Cloths 1 Application(s) Topical <User Schedule>  cyanocobalamin 1000 MICROGram(s) Oral daily  dornase nidhi Solution for Pleural Effusion 5 milliGRAM(s) IntraPleural. every 12 hours  escitalopram 10 milliGRAM(s) Oral daily  fluticasone propionate 50 MICROgram(s)/spray Nasal Spray 1 Spray(s) Both Nostrils two times a day  HYDROmorphone   Tablet 6 milliGRAM(s) Oral every 4 hours  influenza   Vaccine 0.5 milliLiter(s) IntraMuscular once  magnesium oxide 400 milliGRAM(s) Oral three times a day with meals  piperacillin/tazobactam IVPB.. 4.5 Gram(s) IV Intermittent every 8 hours  potassium chloride   Powder 40 milliEquivalent(s) Oral once  simethicone 80 milliGRAM(s) Chew three times a day  sodium chloride 0.9% Solution for Pleural Effusion 30 milliLiter(s) IntraPleural. every 12 hours    MEDICATIONS  (PRN):  albuterol    0.083%. 2.5 milliGRAM(s) Nebulizer once PRN Wheezing  diphenhydrAMINE 50 milliGRAM(s) Oral once PRN MILD ALERGIC REACTION  diphenhydrAMINE Injectable 50 milliGRAM(s) IntraMuscular once PRN MILD ALLERGIC REACTION  diphenhydrAMINE Injectable 50 milliGRAM(s) IV Push once PRN MILD ALLERGIC REACTION  EPINEPHrine     1 mG/mL Injectable 0.3 milliGRAM(s) IntraMuscular once PRN ANAPHYLAXIS  glucagon  Injectable 1 milliGRAM(s) IV Push once PRN REFRACTORY CASES AND/OR ON BETA BLOCKERS  glucagon  Injectable 2 milliGRAM(s) IV Push once PRN REFRACTORY CASES AND/ OR ON BETA BLOCKERS  HYDROmorphone  Injectable 1 milliGRAM(s) IV Push every 4 hours PRN Severe Pain (7 - 10)  LORazepam     Tablet 1 milliGRAM(s) Oral once PRN Anxiety  ondansetron Injectable 8 milliGRAM(s) IV Push every 8 hours PRN Nausea  pseudoephedrine 60 milliGRAM(s) Oral every 6 hours PRN nasal congesiton  sodium chloride 0.65% Nasal 1 Spray(s) Both Nostrils two times a day PRN Congestion        ITEMS UNCHECKED ARE NOT PRESENT     PRESENT SYMPTOMS: [ ]Unable to self-report due to altered mental status  [ ] CPOT [ ] PAINADs [ ] RDOS  Source if other than patient:  [ ]Family   [ ]Team     Pain: [x]yes [ ]no  QOL impact - debilitating   Location - rectum            Aggravating factors -  Quality -  Radiation -  Timing- constant   Severity (0-10 scale):   Minimal acceptable level / Pain goal (0-10 scale):     CPOT:    https://[email or web address]/getattachment/[email or web address]/Critical-Care-Pain-Observation-Tool-(CPOT)    Dyspnea:                           [ ]Mild [ ]Moderate [ ]Severe  Anxiety:                             [x]Mild [ ]Moderate [ ]Severe  Agitation:                          [ ]Mild [ ]Moderate [ ]Severe  Fatigue:                             [x]Mild [ ]Moderate [ ]Severe  Nausea:                             [x]Mild [ ]Moderate [ ]Severe  Loss of appetite:              [ ]Mild [x]Moderate [ ]Severe  Constipation:                   [ ]Mild [ ]Moderate [ ]Severe  Diarrhea:                          [ ]Mild [ ]Moderate [ ]Severe  Pruritus:                            [ ]Mild [ ]Moderate [ ]Severe  Depression:                      [ ]Mild [ ]Moderate [ ]Severe    PCSSQ[Palliative Care Spiritual Screening Question]   Severity (0-10):  Score of 4 or > indicate consideration of Chaplaincy referral.  Chaplaincy Referral: [ ] yes [ ] refused [ ] following [ x] deferred    Caregiver [place]? : [ ] yes [ ] no [ ] Declined   [x ] Deferred            Social work referral [ ] Patient & Family Centered Care Referral [ ]     Anticipatory Grief present?:  [ ] yes [ ] no  [x ] Deferred                  Social work referral [ ] Chaplaincy Referral[ ] Caregiver Support Referral [ ]     Other Symptoms:  [x]All other review of systems negative   [ ] Unable to obtain due to poor mentation     PHYSICAL EXAM:  Vital Signs Last 24 Hrs  T(C): 36.7 (31 Mar 2025 06:20), Max: 36.8 (30 Mar 2025 13:45)  T(F): 98 (31 Mar 2025 06:20), Max: 98.2 (30 Mar 2025 13:45)  HR: 83 (31 Mar 2025 06:20) (83 - 90)  BP: 103/69 (31 Mar 2025 06:20) (95/65 - 107/73)  BP(mean): 77 (31 Mar 2025 01:44) (77 - 77)  RR: 18 (31 Mar 2025 06:20) (16 - 18)  SpO2: 95% (31 Mar 2025 06:20) (94% - 96%)    Parameters below as of 31 Mar 2025 06:20  Patient On (Oxygen Delivery Method): room air         I&O's Summary    30 Mar 2025 07:01  -  31 Mar 2025 07:00  --------------------------------------------------------  IN: 950 mL / OUT: 835 mL / NET: 115 mL        GENERAL:  [x]Alert  [x]Oriented x   [ ]Lethargic  [x]Cachexia  [ ]Unarousable  [x]Verbal  [ ]Non-Verbal  [x] No Distress  Behavioral:   [ ] Anxiety  [ ] Delirium [ ] Agitation [x] Calm  [ ] Other  HEENT:  [x]Normal  [ ] Temporal Wasting  [ ]Dry mouth   [ ]ET Tube/Trach  [ ]Oral lesions  [ ] Mucositis  PULMONARY:   [x]Clear [ ]Tachypnea  [ ]Audible excessive secretions   [ ]Rhonchi        [ ]Right [ ]Left [ ]Bilateral  [ ]Crackles        [ ]Right [ ]Left [ ]Bilateral  [ ]Wheezing     [ ]Right [ ]Left [ ]Bilateral  [ ]Diminished breath sounds [ ]right [ ]left [ ]bilateral  CARDIOVASCULAR:    [x]Regular [ ]Irregular [ ]Tachy  [ ]Rios [ ]Murmur [ ]Other  GASTROINTESTINAL:  [x]Soft  [ ]Distended   [x]+BS  [ ]Non tender [ ]Tender  [ ]PEG [ ]OGT/ NGT  Last BM:   GENITOURINARY:  [x]Normal [ ] Incontinent   [ ]Oliguria/Anuria   [ ]Smiley  MUSCULOSKELETAL:   [ ]Normal   [x]Weakness  [x]Bed/Wheelchair bound [ ]Edema  [  ] amputation  [  ] contraction  NEUROLOGIC:   [x]No focal deficits  [ ]Cognitive impairment  [ ]Dysphagia [ ]Dysarthria [ ]Paresis [ ]Other   SKIN: See Nursing Skin Assessment for further details  [ ]Normal    [ ]Rash  [ ]Pressure ulcer(s)       Present on admission [ ]y [ ]n   [  ]  Wound    [  ] hyperpigmentation    CRITICAL CARE:  [ ] Shock Present  [ ]Septic [ ]Cardiogenic [ ]Neurologic [ ]Hypovolemic  [ ]  Vasopressors [ ]  Inotropes   [ ]Respiratory failure present [ ]Mechanical ventilation [ ]Non-invasive ventilatory support [ ]High flow    [ ]Acute  [ ]Chronic [ ]Hypoxic  [ ]Hypercarbic [ ]Other  [ ]Other organ failure     LABS:  reviewed                         9.4    11.20 )-----------( 373      ( 31 Mar 2025 06:27 )             31.5       136  |  102  |  20  ----------------------------<  85  3.3[L]   |  23  |  1.11    Ca    8.2[L]      31 Mar 2025 06:27  Phos  3.7       Mg     1.80         TPro  4.8[L]  /  Alb  2.2[L]  /  TBili  0.3  /  DBili  x   /  AST  28  /  ALT  11  /  AlkPhos  129[H]      Urinalysis Basic - ( 31 Mar 2025 06:27 )    Color: x / Appearance: x / SG: x / pH: x  Gluc: 85 mg/dL / Ketone: x  / Bili: x / Urobili: x   Blood: x / Protein: x / Nitrite: x   Leuk Esterase: x / RBC: x / WBC x   Sq Epi: x / Non Sq Epi: x / Bacteria: x      CAPILLARY BLOOD GLUCOSE    RADIOLOGY & ADDITIONAL STUDIES: reviewed     PROTEIN CALORIE MALNUTRITION PRESENT: [ ]mild [ ]moderate [ ]severe [ ]underweight [ ]morbid obesity  https://www.andeal.org/vault/9590/web/files/ONC/Table_Clinical%20Characteristics%20to%20Document%20Malnutrition-White%20JV%20et%20al%2020.pdf    Height (cm): 154.9 (25 @ 08:00), 153.01 (25 @ 10:00), 154.9 (24 @ 10:00)  Weight (kg): 67.8 (25 @ 08:00), 58.96 (25 @ 09:00), 53.5 (24 @ 10:00)  BMI (kg/m2): 28.3 (25 @ 08:00), 25.2 (25 @ 09:00), 22.9 (25 @ 10:00)    [ ]PPSV2 < or = to 30% [x]significant weight loss  [x]poor nutritional intake  [ ]anasarca [ ]Artificial Nutrition      REFERRALS:   [ ]Chaplaincy  [ ]Hospice  [ ]Child Life  [ ]Social Work  [ ]Case management [ ]Holistic Therapy     Goals of Care Document:  Date of Service 25 @ 09:46     Reason for Consultation:	[x] Pain		[x] Goals of Care		[] Non-pain symptoms    [] End of life discussion		[] Other:    HPI:  63 year old female PMH: Anxiety Chronic back pain, Depression, Ovarian ca and Vertigo mass effect adjacent to distal colon w/o obstruction, mass effect on distal R ureter s/p ureterl stent now s/p colostomy c/b bleeding now off AC,  Iliac vein occlusion status post stent presents for desensitization of carboplatin. Patient endorses fatigue. She denies fevers but endorses chills. Also endorses abdominal pain and some colostomy leakage. Pt denies any bleeding currently. Endorses difficulty with BMs and urinating as well, states she feels burning when she goes. Overall also endorses sob and has a lot of difficulty walking. Has noted worsening SOB over the past few weeks. She had a pleurx placed and has it drained every other day.  Patient presented to the MICU and required supplemental O2 as she was noted to have low O2 sats (25 Mar 2025 09:19)      Interval History: Patient seen and examined at bedside, complaining of pain and feeling tired. She states the pain is primarily in the rectum, and she also endorses nausea and anxiety. Over the last 24 hrs, she received Dilaudid po 28mg and iv 2.6mg. She states her pain is well controlled on the current regimen. She is agreeable to change her Dilaudid to long acting. She did not receive any Zofran PRN. Patient feels she is suffering and would like to talk further about hospice.       PERTINENT PM/SXH:   Anxiety  Vertigo  Lower back pain  Ovarian ca  Cancer of fallopian tube  Major depression  S/P   S/P bunionectomy  S/P D&C (status post dilation and curettage)  S/P abdominoplasty  H/O total hysterectomy  S/P colostomy  H/O ureteroscopy    FAMILY HISTORY:  History of hypertension (Mother)      Family Hx substance abuse [ ]yes [x ]no    ITEMS NOT CHECKED ARE NOT PRESENT    SOCIAL HISTORY:   Significant other/partner[x]  Children[x-2 sons, 1 daughter]  Pentecostalism/Spirituality:   Substance hx:  [ ]   Tobacco hx:  [ ]   Alcohol hx: [ ]   Home Opioid hx:  [x] I-Stop Reference No:  Reference #: [id number]    Practitioner Count: 3  Pharmacy Count: 3  Current Opioid Prescriptions: 2  Current Benzodiazepine Prescriptions: 0  Current Stimulant Prescriptions: 0      Patient Demographic Information (PDI)       PDI	First Name	Last Name	Birth Date	Gender	Street Address	City	State	Zip Code  ANUSHKA Haque	1960	Female	[address]	[facility]	[address]    Prescription Information      PDI Filter:    PDI	Current Rx	Drug Type	Rx Written	Rx Dispensed	Drug	Quantity	Days Supply	Prescriber Name	Prescriber GEORGINA #	Payment Method	Dispenser  A	Y	O	2025	hydromorphone 3 mg suppos	88	22	Johnny Martineza S	[id number]	Medicare	[facility]  A	N	O	03/10/2025	2025	hydromorphone 8 mg tablet	84	14	Johnny Martineza S	[id number]	Insurance	[facility] [id number]  A	Y	O	2025	fentanyl 100 mcg/hr patch	10	30	Michelle Madelyn S	[id number]	Insurance	[facility] [id number]  A	N	O	2025	hydromorphone 8 mg tablet	126	14	Ino Lazaro MD	[id number]	Insurance	[facility] [id number]  A	N	O	2025	hydromorphone 4 mg tablet	60	4	Chago Joseph MD	[id number]	Insurance	[facility]  A	N	O	2025	fentanyl 75 mcg/hr patch	5	15	Chago Joseph MD	[id number]	Insurance	[facility]  A	N	O	2024	fentanyl 100 mcg/hr patch	10	30	Johnny Martineza S	[id number]	Insurance	[facility] [id number]  A	N	O	2024	fentanyl 50 mcg/hr patch	10	30	Michelle Madelyn S	[id number]	Insurance	[facility] [id number]  A	N	O	2024	hydromorphone 8 mg tablet	126	14	Michelle Madelyn S	[id number]	Insurance	[facility] [id number]  A	N	O	2024	methadone hcl 5 mg tablet	30	30	Michelle Madelyn S	[id number]	Insurance	[facility] [id number]  A	N	O	2024	hydromorphone 3 mg suppos	56	14	Madelyn Martinez	[id number]	Medicare	[facility]  A	N	O	12/10/2024	2024	hydromorphone 8 mg tablet	126	14	Madelyn Martinez	[id number]	Insurance	[facility] [id number]  A	N	O	12/10/2024	2024	fentanyl 50 mcg/hr patch	10	30	Madelyn Martinez S	[id number]	Insurance	[facility] [id number]  A	N	O	12/10/2024	2024	fentanyl 100 mcg/hr patch	10	30	Madelyn Martinez S	[id number]	Insurance	[facility] [id number]  A	N	O	2024	hydromorphone 3 mg suppos	24	6	Trevin Lott	[id number]	Medicare	[facility]  A	N	O	2024	fentanyl 50 mcg/hr patch	5	15	Trevin Lott	[id number]	Medicare	[facility]  A	N	O	2024	fentanyl 25 mcg/hr patch	5	15	Galina Vann [id number]	Medicare	[facility]  A	N	O	2024	fentanyl 100 mcg/hr patch	5	15	Galina Vann [id number]	Medicare	[facility]  A	N	O	2024	hydromorphone 8 mg tablet	126	14	Galina Vann [id number]	Medicare	[facility]  A	N	O	2024	hydromorphone 2 mg tablet	180	22	Cecile Hurd	[id number]	Insurance	[facility] [id number]  A	N	O	2024	hydromorphone 2 mg tablet	12	2	Cecile Hurd	[id number]	[facility]  A	N	O	10/18/2024	10/22/2024	fentanyl 75 mcg/hr patch	10	30	Ino Lazaro MD	[id number]	Insurance	[facility] [id number]  A	N	O	10/08/2024	10/11/2024	hydromorphone 2 mg tablet	180	30	Ino Lazaro MD	[id number]	Insurance	[facility] [id number]  A	N	O	2024	fentanyl 50 mcg/hr patch	10	30	Ino Lazaro MD	[id number]	Insurance	[facility] [id number]  A	N	O	2024	hydromorphone 2 mg tablet	60	15	Cecile Hurd	[id number]	Insurance	[facility] [id number]  A	N	O	2024	fentanyl 50 mcg/hr patch	1	3	[facility]	[id number]	Insurance	[facility] [id number]  A	N	O	2024	hydromorphone 2 mg tablet	18	1	[facility]	[id number]	Insurance	[facility] [id number]  A	N	O	2024	fentanyl 12 mcg/hr patch	10	30	ScottIno sweet MD	[id number]	Insurance	[facility] [id number]  A	N	O	2024	oxycodone hcl (ir) 5 mg tablet	120	24	Lisa Stover)	[id number]	Insurance	[facility] [id number]  A	N	O	2024	oxycodone hcl (ir) 5 mg tablet	120	30	Sahara Hanson	[id number]	Insurance	[facility] [id number]  A	N		2024	pregabalin 100 mg capsule	60	30	RajinderTieramame	[id number]	Insurance	[facility] [id number]      Living Situation: [x ]Home  [ ]Long term care  [ ]Rehab [ ]Other  Home Services: [ ] HHA [ ] Visting RN [ ] Hospice      ADVANCE DIRECTIVES:    MOLST  [ ]  Living Will  [ ]   DECISION MAKER(s):  [ ] Health Care Proxy(s)  [x] Surrogate(s)  [ ] Guardian           Name(s): Phone Number(s): Steve Ross [phone]    BASELINE (I)ADL(s) (prior to admission):   Carbon: [ ]Total  [x ] Moderate [ ]Dependent    Allergies    carboplatin (Unknown)  oxaliplatin (Unknown)  IV Contrast (Rash)  cisplatin (Unknown)  platinum containing compounds (Anaphylaxis)    Intolerances    MEDICATIONS  (STANDING):  alteplase  Injectable for Pleural Effusion 10 milliGRAM(s) IntraPleural. every 12 hours  chlorhexidine 2% Cloths 1 Application(s) Topical <User Schedule>  cyanocobalamin 1000 MICROGram(s) Oral daily  dornase nidhi Solution for Pleural Effusion 5 milliGRAM(s) IntraPleural. every 12 hours  escitalopram 10 milliGRAM(s) Oral daily  fluticasone propionate 50 MICROgram(s)/spray Nasal Spray 1 Spray(s) Both Nostrils two times a day  HYDROmorphone   Tablet 6 milliGRAM(s) Oral every 4 hours  influenza   Vaccine 0.5 milliLiter(s) IntraMuscular once  magnesium oxide 400 milliGRAM(s) Oral three times a day with meals  piperacillin/tazobactam IVPB.. 4.5 Gram(s) IV Intermittent every 8 hours  potassium chloride   Powder 40 milliEquivalent(s) Oral once  simethicone 80 milliGRAM(s) Chew three times a day  sodium chloride 0.9% Solution for Pleural Effusion 30 milliLiter(s) IntraPleural. every 12 hours    MEDICATIONS  (PRN):  albuterol    0.083%. 2.5 milliGRAM(s) Nebulizer once PRN Wheezing  diphenhydrAMINE 50 milliGRAM(s) Oral once PRN MILD ALERGIC REACTION  diphenhydrAMINE Injectable 50 milliGRAM(s) IntraMuscular once PRN MILD ALLERGIC REACTION  diphenhydrAMINE Injectable 50 milliGRAM(s) IV Push once PRN MILD ALLERGIC REACTION  EPINEPHrine     1 mG/mL Injectable 0.3 milliGRAM(s) IntraMuscular once PRN ANAPHYLAXIS  glucagon  Injectable 1 milliGRAM(s) IV Push once PRN REFRACTORY CASES AND/OR ON BETA BLOCKERS  glucagon  Injectable 2 milliGRAM(s) IV Push once PRN REFRACTORY CASES AND/ OR ON BETA BLOCKERS  HYDROmorphone  Injectable 1 milliGRAM(s) IV Push every 4 hours PRN Severe Pain (7 - 10)  LORazepam     Tablet 1 milliGRAM(s) Oral once PRN Anxiety  ondansetron Injectable 8 milliGRAM(s) IV Push every 8 hours PRN Nausea  pseudoephedrine 60 milliGRAM(s) Oral every 6 hours PRN nasal congesiton  sodium chloride 0.65% Nasal 1 Spray(s) Both Nostrils two times a day PRN Congestion        ITEMS UNCHECKED ARE NOT PRESENT     PRESENT SYMPTOMS: [ ]Unable to self-report due to altered mental status  [ ] CPOT [ ] PAINADs [ ] RDOS  Source if other than patient:  [ ]Family   [ ]Team     Pain: [x]yes [ ]no  QOL impact - debilitating   Location - rectum            Aggravating factors - progression of disease   Quality - ache  Radiation -  denies   Timing- constant   Severity (0-10 scale): 10  Minimal acceptable level / Pain goal (0-10 scale): 2    CPOT:    https://www.[email or web address]/getattachment/[email or web address]/Critical-Care-Pain-Observation-Tool-(CPOT)    Dyspnea:                           [ ]Mild [ ]Moderate [ ]Severe  Anxiety:                             [x]Mild [ ]Moderate [ ]Severe  Agitation:                          [ ]Mild [ ]Moderate [ ]Severe  Fatigue:                             [x]Mild [ ]Moderate [ ]Severe  Nausea:                             [x]Mild [ ]Moderate [ ]Severe  Loss of appetite:              [ ]Mild [x]Moderate [ ]Severe  Constipation:                   [ ]Mild [ ]Moderate [ ]Severe  Diarrhea:                          [ ]Mild [ ]Moderate [ ]Severe  Pruritus:                            [ ]Mild [ ]Moderate [ ]Severe  Depression:                      [ ]Mild [ ]Moderate [ ]Severe    PCSSQ[Palliative Care Spiritual Screening Question]   Severity (0-10):  Score of 4 or > indicate consideration of Chaplaincy referral.  Chaplaincy Referral: [ ] yes [ ] refused [ ] following [ x] deferred    Caregiver [place]? : [ ] yes [ ] no [ ] Declined   [x ] Deferred            Social work referral [ ] Patient & Family Centered Care Referral [ ]     Anticipatory Grief present?:  [ ] yes [ ] no  [x ] Deferred                  Social work referral [ ] Chaplaincy Referral[ ] Caregiver Support Referral [ ]     Other Symptoms:  [x]All other review of systems negative   [ ] Unable to obtain due to poor mentation     PHYSICAL EXAM:  Vital Signs Last 24 Hrs  T(C): 36.7 (31 Mar 2025 06:20), Max: 36.8 (30 Mar 2025 13:45)  T(F): 98 (31 Mar 2025 06:20), Max: 98.2 (30 Mar 2025 13:45)  HR: 83 (31 Mar 2025 06:20) (83 - 90)  BP: 103/69 (31 Mar 2025 06:20) (95/65 - 107/73)  BP(mean): 77 (31 Mar 2025 01:44) (77 - 77)  RR: 18 (31 Mar 2025 06:20) (16 - 18)  SpO2: 95% (31 Mar 2025 06:20) (94% - 96%)    Parameters below as of 31 Mar 2025 06:20  Patient On (Oxygen Delivery Method): room air         I&O's Summary    30 Mar 2025 07:01  -  31 Mar 2025 07:00  --------------------------------------------------------  IN: 950 mL / OUT: 835 mL / NET: 115 mL        GENERAL:  [x]Alert  [x]Oriented x3   [ ]Lethargic  [x]Cachexia  [ ]Unarousable  [x]Verbal  [ ]Non-Verbal  [x] No Distress  Behavioral:   [ ] Anxiety  [ ] Delirium [ ] Agitation [x] Calm  [ ] Other  HEENT:  [x]Normal  [ ] Temporal Wasting  [ ]Dry mouth   [ ]ET Tube/Trach  [ ]Oral lesions  [ ] Mucositis  PULMONARY:   [x]Clear [ ]Tachypnea  [ ]Audible excessive secretions   [ ]Rhonchi        [ ]Right [ ]Left [ ]Bilateral  [ ]Crackles        [ ]Right [ ]Left [ ]Bilateral  [ ]Wheezing     [ ]Right [ ]Left [ ]Bilateral  [ ]Diminished breath sounds [ ]right [ ]left [ ]bilateral  CARDIOVASCULAR:    [x]Regular [ ]Irregular [ ]Tachy  [ ]Rios [ ]Murmur [ ]Other  GASTROINTESTINAL:  [x]Soft  [ ]Distended   [x]+BS  [ ]Non tender [ ]Tender  [ ]PEG [ ]OGT/ NGT  Last BM: +colostomy   GENITOURINARY:  [x]Normal [ ] Incontinent   [ ]Oliguria/Anuria   [ ]Smiley  MUSCULOSKELETAL:   [ ]Normal   [x]Weakness  [x]Bed/Wheelchair bound [x ]Edema  [  ] amputation  [  ] contraction  NEUROLOGIC:   [x]No focal deficits  [ ]Cognitive impairment  [ ]Dysphagia [ ]Dysarthria [ ]Paresis [ ]Other   SKIN: See Nursing Skin Assessment for further details  [ ]Normal    [ ]Rash  [ ]Pressure ulcer(s)       Present on admission [ ]y [ ]n   [  ]  Wound    [  ] hyperpigmentation    CRITICAL CARE:  [ ] Shock Present  [ ]Septic [ ]Cardiogenic [ ]Neurologic [ ]Hypovolemic  [ ]  Vasopressors [ ]  Inotropes   [ ]Respiratory failure present [ ]Mechanical ventilation [ ]Non-invasive ventilatory support [ ]High flow    [ ]Acute  [ ]Chronic [ ]Hypoxic  [ ]Hypercarbic [ ]Other  [ ]Other organ failure     LABS:  reviewed                         9.4    11.20 )-----------( 373      ( 31 Mar 2025 06:27 )             31.5       136  |  102  |  20  ----------------------------<  85  3.3[L]   |  23  |  1.11    Ca    8.2[L]      31 Mar 2025 06:27  Phos  3.7       Mg     1.80         TPro  4.8[L]  /  Alb  2.2[L]  /  TBili  0.3  /  DBili  x   /  AST  28  /  ALT  11  /  AlkPhos  129[H]      Urinalysis Basic - ( 31 Mar 2025 06:27 )    Color: x / Appearance: x / SG: x / pH: x  Gluc: 85 mg/dL / Ketone: x  / Bili: x / Urobili: x   Blood: x / Protein: x / Nitrite: x   Leuk Esterase: x / RBC: x / WBC x   Sq Epi: x / Non Sq Epi: x / Bacteria: x      CAPILLARY BLOOD GLUCOSE    RADIOLOGY & ADDITIONAL STUDIES: reviewed     PROTEIN CALORIE MALNUTRITION PRESENT: [ ]mild [ ]moderate [ ]severe [ ]underweight [ ]morbid obesity  https://www.andeal.org/vault/2440/web/files/ONC/Table_Clinical%20Characteristics%20to%20Document%20Malnutrition-White%20JV%20et%20al%2020.pdf    Height (cm): 154.9 (25 @ 08:00), 153.01 (25 @ 10:00), 154.9 (24 @ 10:00)  Weight (kg): 67.8 (25 @ 08:00), 58.96 (25 @ 09:00), 53.5 (24 @ 10:00)  BMI (kg/m2): 28.3 (25 @ 08:00), 25.2 (25 @ 09:00), 22.9 (25 @ 10:00)    [ ]PPSV2 < or = to 30% [x]significant weight loss  [x]poor nutritional intake  [ ]anasarca [ ]Artificial Nutrition      REFERRALS:   [ ]Chaplaincy  [ ]Hospice  [ ]Child Life  [ ]Social Work  [ ]Case management [ ]Holistic Therapy

## 2025-03-31 NOTE — CONSULT NOTE ADULT - NS ATTEST RISK PROBLEM GEN_ALL_CORE FT
1. Number and complexity of problems addressed for this patient:    1.1 Moderate (At least 1)  [ ] 1 or more chronic illnesses with exacerbation, progression, or side effects of treatment  [ ] 2 or more stable chronic illnesses  [ ] 1 undiagnosed new problem with uncertain prognosis  [ ] 1 acute illness with systemic symptoms  [ ] 1 acute complicated injury  1.2 High (At least 1)   [ ] 1 or more chronic illnesses with severe exacerbation, progression, or side effects of treatment  [ x] 1 acute or chronic illnesses or injuries that may pose a threat to life or bodily function    2. Amount and/or Complexity of Data that was Reviewed and Analyzed for this case:       Moderate (1 out of 3)       High (2 out of 3)  2.1. (Any combination of 3 of the following)   [x ] Prior External notes were reviewed  [ ] Each test result was reviewed (see "LABS" and "RADIOLOGY & ADDITIONAL STUDIES" above)  [ ] The following tests were ordered and/or reviewed (Only count 1 point for ordering or reviewing a unique test):  	[ ]CBC  	[ ] Chemistry   	[ ] Imaging   	[ ] Other:   [ ] Assessment requiring an independent historian   		Name of historian and relationship:   2.2  [ ] Personally review and interpretation of  image or testing   2.3  [x ] Discussion of management or test interpretation with external physician/other qualified health care professional\appropriate source (not separately reported)    3. Risk of Complications and/or Morbidity or Mortality of for this Patient’s Management:  3.1 Moderate risk of morbidity from additional diagnostic testing or treatment (At least 1):   [ ] Prescription drug management   [ ] Decision regarding minor surgery, treatment, or procedure with identified patient or procedure risk factors  [ ] Decision regarding elective major surgery, treatment, or procedure without identified patient or procedure risk factors   [ ] Diagnosis or treatment significantly limited by social determinants of health   [ ] Other:   3.2 High risk of morbidity from additional diagnostic testing or treatment (At least 1):   [ ] Drug therapy requiring intensive monitoring for toxicity   [ ] Decision regarding elective major surgery, treatment, or procedure with identified patient or procedure risk factors   [ ] Decision regarding emergency major surgery, treatment, or procedure   [ ] Decision regarding hospitalization or escalation of hospital-level of care  [ ] Decision not to resuscitate, not to intubate, or to de-escalate care because of poor prognosis   [ ] Decision to proceed or not with artificial nutrition   [ x] Parenteral controlled substance  [ ] Other:     [x] 16 mins spent discussing goc

## 2025-03-31 NOTE — CONSULT NOTE ADULT - CONVERSATION DETAILS
Palliative team met with patient and patients family at bedside. Patient complains of severe rectal pain, along with fatigue, nausea and anxiety. According to her son, she has been battling cancer for the last 9 years. He states her mentality has changed while in the hospital. Patient states she is suffering and wants to be "let go". She states there is no cure and would like to be in hospice. She reports some pain control with her current medication regimen. She is open to trying long acting pain meds. She also reports anxiety.  states they are waiting for Dr. Hughes to come speak with family in regards to patients on-going treatment plan and what the next steps are. Family would like to wait to speak with him, until making any further decisions in regards to patients care. Her other two children are coming today. Team answered all questions, and advised we would continue to follow up for her symptoms and wishes for her care. Palliative team met with patient and patients family at bedside. Patient complains of severe rectal pain, along with fatigue, nausea and anxiety. According to her son, she has been battling cancer for the last 9 years. Patient states she is suffering and wants to be "let go". She states "there is no cure and would like to be in hospice". Upon exploring her request for hospice, she does state that "doctors have shared this recommendation" with her in the past.  states they are waiting for Dr. Hughes to come speak with family in regards to patients on-going treatment plan and what the next steps are. We explained that the palliative care team will be available to help with ongoing goc discussions and can provide more information regarding hospice services. Family states that 2 of her children are out of state and are planning to be present tomorrow for further discussions. Emotional support provided. Palliative team will remain available for symptom management and ongoing goc.

## 2025-03-31 NOTE — PROGRESS NOTE ADULT - SUBJECTIVE AND OBJECTIVE BOX
Interval Events:      REVIEW OF SYSTEMS:  Negative except as documented above.      OBJECTIVE:  ICU Vital Signs Last 24 Hrs  T(C): 36.9 (31 Mar 2025 13:31), Max: 36.9 (31 Mar 2025 13:31)  T(F): 98.4 (31 Mar 2025 13:31), Max: 98.4 (31 Mar 2025 13:31)  HR: 90 (31 Mar 2025 13:31) (83 - 90)  BP: 104/68 (31 Mar 2025 13:31) (95/65 - 107/73)  BP(mean): 77 (31 Mar 2025 01:44) (77 - 77)  ABP: --  ABP(mean): --  RR: 18 (31 Mar 2025 13:31) (16 - 18)  SpO2: 96% (31 Mar 2025 13:31) (94% - 96%)    O2 Parameters below as of 31 Mar 2025 13:31  Patient On (Oxygen Delivery Method): nasal cannula              03-30 @ 07:01 - 03-31 @ 07:00  --------------------------------------------------------  IN: 950 mL / OUT: 835 mL / NET: 115 mL    03-31 @ 07:01  -  03-31 @ 17:43  --------------------------------------------------------  IN: 300 mL / OUT: 0 mL / NET: 300 mL      CAPILLARY BLOOD GLUCOSE          PHYSICAL EXAM:0  General: Lethargic  HEENT:  EOMI, sclera anicteric, moist mucus membranes  Neck: supple  Cardiovascular: RRR  Respiratory: CTAB, no wheezes, crackles, or rhonci - CT to suction with no air leak, serous fluid draning   Abdomen: soft, nontender  Extremities: warm and well perfused, no edema, no clubbing  Skin: no rashes  Neurological: no focal deficits    HOSPITAL MEDICATIONS:  MEDICATIONS  (STANDING):  alteplase  Injectable for Pleural Effusion 10 milliGRAM(s) IntraPleural. every 12 hours  chlorhexidine 2% Cloths 1 Application(s) Topical <User Schedule>  cyanocobalamin 1000 MICROGram(s) Oral daily  dornase nidhi Solution for Pleural Effusion 5 milliGRAM(s) IntraPleural. every 12 hours  escitalopram 10 milliGRAM(s) Oral daily  fentaNYL   Patch 100 MICROgram(s)/Hr 1 Patch Transdermal every 72 hours  fluticasone propionate 50 MICROgram(s)/spray Nasal Spray 1 Spray(s) Both Nostrils two times a day  HYDROmorphone   Tablet 6 milliGRAM(s) Oral every 4 hours  influenza   Vaccine 0.5 milliLiter(s) IntraMuscular once  magnesium oxide 400 milliGRAM(s) Oral three times a day with meals  piperacillin/tazobactam IVPB.. 3.375 Gram(s) IV Intermittent every 8 hours  simethicone 80 milliGRAM(s) Chew three times a day  sodium chloride 0.9% Solution for Pleural Effusion 30 milliLiter(s) IntraPleural. every 12 hours    MEDICATIONS  (PRN):  albuterol    0.083%. 2.5 milliGRAM(s) Nebulizer once PRN Wheezing  diphenhydrAMINE 50 milliGRAM(s) Oral once PRN MILD ALERGIC REACTION  diphenhydrAMINE Injectable 50 milliGRAM(s) IntraMuscular once PRN MILD ALLERGIC REACTION  diphenhydrAMINE Injectable 50 milliGRAM(s) IV Push once PRN MILD ALLERGIC REACTION  EPINEPHrine     1 mG/mL Injectable 0.3 milliGRAM(s) IntraMuscular once PRN ANAPHYLAXIS  glucagon  Injectable 1 milliGRAM(s) IV Push once PRN REFRACTORY CASES AND/OR ON BETA BLOCKERS  glucagon  Injectable 2 milliGRAM(s) IV Push once PRN REFRACTORY CASES AND/ OR ON BETA BLOCKERS  HYDROmorphone  Injectable 1 milliGRAM(s) IV Push every 4 hours PRN Severe Pain (7 - 10)  LORazepam     Tablet 1 milliGRAM(s) Oral once PRN Anxiety  ondansetron Injectable 8 milliGRAM(s) IV Push every 8 hours PRN Nausea  pseudoephedrine 60 milliGRAM(s) Oral every 6 hours PRN nasal congesiton  sodium chloride 0.65% Nasal 1 Spray(s) Both Nostrils two times a day PRN Congestion      LABS:                        9.4    11.20 )-----------( 373      ( 31 Mar 2025 06:27 )             31.5     Hgb Trend: 9.4<--, 9.5<--, 9.3<--, 9.4<--, 8.9<--  03-31    136  |  102  |  20  ----------------------------<  85  3.3[L]   |  23  |  1.11    Ca    8.2[L]      31 Mar 2025 06:27  Phos  3.7     03-31  Mg     1.80     03-31    TPro  4.8[L]  /  Alb  2.2[L]  /  TBili  0.3  /  DBili  x   /  AST  28  /  ALT  11  /  AlkPhos  129[H]  03-31    Creatinine Trend: 1.11<--, 1.13<--, 1.19<--, 0.92<--, 1.00<--, 1.08<--    Urinalysis Basic - ( 31 Mar 2025 06:27 )    Color: x / Appearance: x / SG: x / pH: x  Gluc: 85 mg/dL / Ketone: x  / Bili: x / Urobili: x   Blood: x / Protein: x / Nitrite: x   Leuk Esterase: x / RBC: x / WBC x   Sq Epi: x / Non Sq Epi: x / Bacteria: x            MICROBIOLOGY:       RADIOLOGY:  [x] Reviewed and interpreted by me

## 2025-03-31 NOTE — CONSULT NOTE ADULT - PROBLEM SELECTOR RECOMMENDATION 3
Over the last 24 hrs, she received Dilaudid po 28mg and iv 2.6mg. She states her pain is well controlled on the current regimen. She is agreeable to change her Dilaudid to long acting. She did not receive any Zofran PRN. Over the last 24 hrs, she received Dilaudid po 28mg and iv 2.6mg. She states her pain is well controlled on the current regimen. She is agreeable to change her Dilaudid to long acting. She did not receive any Zofran PRN.  - Start 150 mcg Fentanyl patch   - Change Dilaudid ATC 6mg q4 to PRN for moderate pain   - Continue Dilaudid 1mg q4 PRN for severe pain   - Bowel regimen while on opiates  - Continue Zofran PRN for nausea   - Continue Ativan PRN for anxiety Over the last 24 hrs, she received Dilaudid po 28mg and iv 2.6mg.  She is agreeable to adjustments of her long acting pain medications. She did not receive any Zofran PRN.    Recommendations:   - [id number] mcg Fentanyl patch (3/31)   - Discontinue Dilaudid ATC 6mg q4 and change to dilaudid 6mg PO q4 PRN for moderate pain   - Continue Dilaudid 1mg q4 PRN for severe pain   - Bowel regimen while on opiates  - Narcan prn

## 2025-03-31 NOTE — PROGRESS NOTE ADULT - ASSESSMENT
63 year old female PMH:Anxiety Chronic back pain, Depression, Ovarian ca with malignant effusion R s/p R Pleurx ([place]) and Vertigo mass effect adjacent to distal colon w/o obstruction, mass effect on distal R ureter s/p ureter stent now s/p colostomy c/b bleeding now off AC,  Iliac vein occlusion status post stent presents for desensitization of carboplatin. Patient endorses fatigue. She denies fevers but endorses chills. Also endorses abdominal pain and some colostomy leakage. Pt denies any bleeding currently. Endorses difficulty with BMs and urinating as well, states she feels burning when she goes. Overall also endorses sob and has a lot of difficulty walking. Has noted worsening SOB over the past few weeks. Admitted to MICU for carboplatin desensitization, completed successfully on 3/26. Patient developed worsening dyspnea 2/2 loculated R pleural effusion with pleurx in place with CT imaging with R loculated effusion in two pockets; pleurx not draining well. Started MIST protocol on 3/26 with adequate output.     #Ovarian Ca  #R Pleurx connected to PleuroVac  #L simple pleural effusion  - 3/27 fluid sample with concern for complicated effusion  - Continue Zosyn with total regimen to 4-6 weeks, would agree to transition to oral Augmentin since no clinical evidence of empyema that needs IV aside features on imaging of organization   - s/p MIST for total of 6 doses (last dose given 3/29),  - Would start diuresis to assess response on L side, at the moment seems simple and small to moderate in size  - Continue oxygen supplementation with goal >92%  - [place] PleurX connected to PleuroVac on suction -20  - At this time repeat CT chest with no interval change, discussed with team to pursue GOC since further interventions would require potential invasive measures that should be discussed with family and Thoracic consult. Will defer management to Primary team

## 2025-03-31 NOTE — CONSULT NOTE ADULT - PROBLEM SELECTOR RECOMMENDATION 2
Patient developed worsening dyspnea 2/2 loculated R pleural effusion with pleurx in place; pleurx not draining well. Started MIST protocol on 3/26. Pulmonology and ID following.   - Continue Zosyn as per ID  - F/u pulm and ID recs   - Oxygen supplementation PRN  - Rest of care as per primary team Patient developed worsening dyspnea 2/2 loculated R pleural effusion with pleurx in place; pleurx not draining well. Started MIST protocol on 3/26. Pulmonology and ID following.   - Continue Zosyn as per ID  - F/u pulm and ID recs   - Oxygen supplementation PRN- wean as tolerated   - Rest of care as per primary team

## 2025-03-31 NOTE — PROGRESS NOTE ADULT - ASSESSMENT
This is a 64 y/o F w/ PMHx ovarian ca (possible liver, lung mets) with malignant R effusion s/p R Pleurx ([place], 1/2025), mass effect adjacent to distal colon w/o obstruction, mass effect on distal R ureter s/p ureter stent now s/p colostomy c/b bleeding now off AC,  Iliac vein occlusion s/p stent who was admitted to [facility] MICU on 3/25/25 for desensitization of carboplatin, noted to have R sided PLEURX not draining well.  CT Chest w/ multiloculated R pleural effusion, some pockets of pleural fluid not communicated w/ catheter tip. Mod L PLEFF. Pt started on MIST protocol w/ TPA with more output.   Studies sent on 3/27 after TPA, with , RBC 29k, 59% neutrophils,  pH 8.1, glucose 33, LDH 1133, protein 1.6. No Cx sent.   Pt started on Vancomycin/Zosyn.     #Bacteroides bacteremia   #R multiloculated pleural effusion, s/p MIST protocol through PLEURX,   #C/f infected loculated pleural effusion?  #Mild leukocytosis  #Ovarian CA w/ likely lung, liver mets    Overall, a 64 y/o F w/ PMHx ovarian ca (possible liver, lung mets) with malignant R effusion s/p R Pleurx ([place], 1/2025), mass effect adjacent to distal colon w/o obstruction, mass effect on distal R ureter s/p ureter stent now s/p colostomy c/b bleeding now off AC, Iliac vein occlusion s/p stent admitted for chemo sensitization, noted to have non draining R PLEURX, s/p CT w/ multiloculated R pleural effusion, now on MIST protocol.   ID consulted for possible infected R loculated pleural effusion. Pt is currently afebrile, mild leukocytosis 12, procal low, BCx 1 set 3/27 NG, MRSA PCR neg.   Studies taken from existing PLEURX: (after TPA)  , RBC 29k, 59% neutrophils,  pH 8.1, glucose 33, LDH 1133, protein 1.6.  Difficult to interpret studies take from existing PLEURX and after TPA.  Given low nucleated cell count (~364 after correcting RBCs), high pH, and lack of other infectious findings (BCx NGTD, low PCT, no fevers, no PNA on CT), concern for empyema/parapneumonic effusion is quite low.     BCx from 3/27 now 1/2 positive for bacteroides, almost certainly a GI source. Pending GOC, would need CT A/P if no plans for hospice.     Recommendations:  1. C/w Zosyn 3.375 g q8 given bacteroides bacteremia   2. If there is a concern for infected loculated effusion, would recommend repeat thoracentesis (IR guided?) and sending cell count w/ diff, pH, glucose, protein, LDH, and Cx  3. F/u repeat BCx sent on 3/30   4. Pending GOC, if not planned for hospice/comfort care, would obtain CT A/P     Thank you for consulting us and involving us in the management of this patient's case. In addition to reviewing history, imaging, documents, labs, microbiology, and infection control strategies and potential issues.     ID will continue to follow    John Perez M.D.  Attending Physician  Division of Infectious Diseases  Department of Medicine    Please contact through MS Teams message.  Office: [phone] (after 5 PM or weekend).  Time-based billing (NON-critical care).

## 2025-03-31 NOTE — CONSULT NOTE ADULT - PROBLEM SELECTOR RECOMMENDATION 9
S/P multiple lines of therapy, now on Paclitaxel. Admitted for carboplatin desensitization - completed. Patient follows with Dr. Hughes outpatient. Currently awaiting discussion with him in regards to treatment plan and prognosis.   - F/u Dr. Hughes outpatient   - Rest of care as per primary team Pt diagnosed 9 years ago S/P multiple lines of therapy, now on Paclitaxel. Admitted for carboplatin desensitization - completed. Patient follows with Dr. Villavicencio outpatient.   - Appreciate inpt heme/onc recs- case discussed with Dr. John who is in communication with Dr. Villavicencio. Dr. Villavicencio is not recommending further DMT- patient is eligible for hospice services.   - Rest of care as per primary team

## 2025-03-31 NOTE — CONSULT NOTE ADULT - RESPIRATORY DISTRESS OBSERVATION: RESTLESSNESS
OBSTETRICAL HISTORY [facility]    Date: 2022       Time: 4:11 AM   Patient Name: Alphonse Young     Patient : 2005  [facility]    Admission Date/Time: 2022  4:05 AM      CC: Leakage of fluid    HPI: Alphonse Young is a 15 y.o.  at 39w1d who presents complaining of leakage of fluid. Patient states that 0 320 she stood up and had copious amount of green-yellow discharge from the vagina. Reports she thinks she felt 1 contraction while here in recovery. Patient is accompanied by her parents. The patient reports fetal movement is present, complains of contractions, complains of loss of fluid, denies vaginal bleeding. Pregnancy is complicated by late to care and suboptimal dating by 34 week US, teen pregnancy, Chlamydia and Trichomonas infections. DATING:  LMP: No LMP recorded. Patient is pregnant.   Estimated Date of Delivery: 22   Based on: consistent with late ultrasound, at 29 4/7 weeks GA    PREGNANCY RISK FACTORS:  Patient Active Problem List   Diagnosis    High risk teen pregnancy in third trimester    Late prenatal care affecting pregnancy in third trimester    Sub Optimal Dating    No prenatal care in current pregnancy in third trimester    Chlamydia     Trichomonal cervicitis (TOR neg)    37 weeks gestation of pregnancy    UTI         Steroids Given In This Pregnancy:  no     REVIEW OF SYSTEMS:   Constitutional: negative fever, negative chills, negative weight changes   HEENT: negative visual disturbances, negative headaches, negative dizziness, negative hearing loss  Breast: Negative breast abnormalities, negative breast lumps, negative nipple discharge  Respiratory: negative dyspnea, negative cough, negative SOB  Cardiovascular: negative chest pain,  negative palpitations, negative arrhythmia, negative syncope   Gastrointestinal: negative abdominal pain, negative RUQ pain, negative N/V, negative diarrhea, negative constipation, negative bowel changes, negative heartburn   Genitourinary: negative dysuria, negative hematuria, negative urinary incontinence, + vaginal discharge, negative vaginal bleeding or spotting  Dermatological: negative rash, negative pruritis, negative mole or other skin changes  Hematologic: negative bruising  Immunologic/Lymphatic: negative recent illness, negative recent sick contact  Musculoskeletal: negative back pain, negative myalgias, negative arthralgias  Neurological:  negative dizziness, negative migraines, negative seizures, negative weakness  Behavior/Psych: negative depression, negative anxiety, negative SI, negative HI    OBSTETRICAL HISTORY:   OB History    Para Term  AB Living   1 0 0 0 0 0   SAB IAB Ectopic Molar Multiple Live Births   0 0 0 0 0 0      # Outcome Date GA Lbr Vignesh/2nd Weight Sex Delivery Anes PTL Lv   1 Current                PAST MEDICAL HISTORY:   has a past medical history of UTI . PAST SURGICAL HISTORY:   has no past surgical history on file. ALLERGIES:  has No Known Allergies. MEDICATIONS:  Prior to Admission medications    Medication Sig Start Date End Date Taking? Authorizing Provider   amoxicillin (AMOXIL) 500 mg capsule Take 1 capsule by mouth 2 times daily for 10 days 22  Melissa Hollis, DO   aspirin EC 81 MG EC tablet Take 1 tablet by mouth daily  Patient not taking: Reported on 2022   Daysi Sears, DO   Prenatal Vit-Fe Fumarate-FA (PRENATAL VITAMIN) 27-0.8 MG TABS Take 1 tablet by mouth daily Pharmacy may substitute for any prenatal vitamin 4/3/22 6/9/22  John Roe, DO   acetaminophen (TYLENOL) 325 MG tablet Take 2 tablets by mouth every 6 hours as needed for Pain 18   Boaz Nose, DO       FAMILY HISTORY:  family history includes Asthma in her maternal aunt and mother; Diabetes in her maternal aunt. SOCIAL HISTORY:   reports that she has never smoked.  She has never used smokeless tobacco. She reports that she does not drink alcohol and does not use drugs.     VITALS:  Vitals:    06/18/22 0422   BP: (!) 143/83   Pulse: 84       PHYSICAL EXAM:  Fetal Heart Monitor:  Baseline Heart Rate 125, moderate variability, present accelerations, absent decelerations  Kirkman: contractions, irregular, every 4-7 minutes    General appearance:  no apparent distress, alert and cooperative  HEENT: head atraumatic, normocephalic, moist mucous membranes, trachea midline  Neurologic:  alert, oriented, normal speech, no focal findings or movement disorder noted  Lungs:  No increased work of breathing, good air exchange, clear to auscultation bilaterally, no crackles or wheezing  Heart:  regular rate and rhythm and no murmur, rubs, gallops  Abdomen:  soft, gravid, non-tender, no rebound, guarding, or rigidity, no RUQ or epigastric tenderness, no signs or symptoms of abruption, no signs or symptoms of chorioamnionitis  Extremities:  no calf tenderness, non edematous, no varicosities, full range of motion in all four extremities  Musculoskeletal: Gross strength equal and intact throughout, no gross abnormalities, range of motion normal in hips, knees, shoulders and spine, CVA tenderness: none  Psychiatric: Mood appropriate, normal affect   Rectal Exam: not indicated  Pelvic Exam:   Chaperone for Intimate Exam: Chaperone was present for entire exam, Chaperone Name: Dick Varghese RN  Sterile Speculum Exam: patient unable to exam  Sterile Vaginal Exam: patient unable to tolerate exam; blind swab for rupture of membranes obtained with gross meconium       DATA:  Membranes Ruptured: Yes: meconium  Fern: negative  Nitrazine: Positive      LIMITED BEDSIDE US:  Position: Cephalic  Placental Location: anterior  Fetal Heart Tones: Present  Fetal Movement: Present  Amniotic Fluid Index/Volume: adequate 2x2 cm fluid pocket    PRENATAL LAB RESULTS:   Blood Type/Rh: A pos  Antibody Screen: negative  Hemoglobin, Hematocrit, Platelets: Hgb 56.8/QZG 31.8/Plt 250  Rubella: immune  T. Pallidum, IgG: non-reactive  Hepatitis B Surface Antigen: non-reactive   Hepatitis C Antibody: non-reactive   HIV: non-reactive   Sickle Cell Screen: negative  Gonorrhea: negative  Chlamydia: positive 4/3/2022  Urine culture: negative, date: 4/3/22     positive Group C strep >[id number] on 2022    Patient did not perform any glucose tolerance testing    Group B Strep: negative RV culture on 22  Cystic Fibrosis Screen: not done by patient  First Trimester Screen: not available  MSAFP/Multiple Markers: not available  Non-Invasive Prenatal Testing: not available  Anatomy US: Anterior placenta, 3VC, Female gender, Normal anatomy    ASSESSMENT & PLAN:  Rose Gaytan is a 15 y.o. female  at 39w1d SROM (meconium) at 80 on 2022   - GBS negative / Rh positive / R immune   - No indication for GBS prophylaxis   - Verified with Dr. Cortez Records no treatment in labor for Group C strep bacteruria   - admit to L&D under service of Dr. Shannon Jo   - CBC, CMP, T&S, T.pal, P:C, UA w/ reflex, UDS pending     - IV fluids: LR @ 125 cc/hr   - Induction method: cytotec 25 mcg PO   - CEFM/ TOCO   - Diet: general  - UDS ordered, Informed consent obtained. Discussed R/B/A. All questions and concerns answered. Patient verbalized understanding and agreement to plan. -The patient is unable to tolerate any form of pelvic or cervical exam.  She is grossly ruptured with meconium fluid continuing to leak. Discussed admission to the hospital for induction of labor. Discussed with the patient and her family highly recommended early epidural as the patient is unable to tolerate any exam.  Will start induction with Cytotec. Cephalic confirmed by ultrasound.     Elevated BP   - Elevated blood pressure in triage   - Denies s/s PreE   - PreE labs ordered on admission    Chlamydia 4/3/2022   - Negative TOR 22    Trichomonas 2022   - Negative TOR 22    Suboptimal Dating   - Dated off 29 week ultrasound    Limited and Late Prenatal Care   - Did not seek prenatal care until third trimester   - SW consult pp     Teen Pregnancy   - Both parents present with the teenage patient   - SW consult PP    BMI 32.75    Patient Active Problem List    Diagnosis Date Noted    37 weeks gestation of pregnancy 06/06/2022     Priority: Medium    UTI  06/09/2022 6/7/22 Group C strep UTI       Trichomonal cervicitis (TOR neg) 05/17/2022     +5/2/22  Negative 6/6/22      Chlamydia  04/05/2022     Chlamydia 4/3/22. Patient treated 4/5/22  Negative 5/2/22      High risk teen pregnancy in third trimester 04/03/2022    Late prenatal care affecting pregnancy in third trimester 04/03/2022     Initial prenatal at [address] Optimal Dating 04/03/2022     Dating based on 29w4d US (4/12/22)      No prenatal care in current pregnancy in third trimester      4/8/22- M referral placed for anatomy scan. Pt is schedule with our office for dating u/s 4/12/22. Plan discussed with Dr. Tab George, who is agreeable. Steroids given this admission: No    Risks, benefits, alternatives and possible complications have been discussed in detail with the patient. Admission, and post admission procedures and expectations were discussed in detail. All questions were answered.     Attending's Name: Dr. Mariajose Kilgore DO  Ob/Gyn Resident  6/18/2022, 4:11 AM None

## 2025-03-31 NOTE — CONSULT NOTE ADULT - PROBLEM SELECTOR RECOMMENDATION 5
Palliative Team consulted for uncontrolled pain, will recommend changes as above. Team will continue to follow for symptom management and on-going GOC discussion. - Continue Zofran PRN for nausea

## 2025-03-31 NOTE — CONSULT NOTE ADULT - PROBLEM SELECTOR RECOMMENDATION 6
Pt reports declining functional status, gets very dyspneic with long periods of exertion.   PT recs- Outpt PT   PPSV 50%   Please assist with ADLs

## 2025-03-31 NOTE — CONSULT NOTE ADULT - ASSESSMENT
63 year old female PMH: Anxiety Chronic back pain, Depression, Ovarian ca and Vertigo mass effect adjacent to distal colon w/o obstruction, mass effect on distal R ureter s/p ureterl stent now s/p colostomy c/b bleeding now off AC,  Iliac vein occlusion status post stent presents for desensitization of carboplatin. Palliative Team consulted for uncontrolled pain and GOC discussion.  64 year old female PMH: Anxiety Chronic back pain, Depression, Ovarian ca and Vertigo mass effect adjacent to distal colon w/o obstruction, mass effect on distal R ureter s/p ureterl stent now s/p colostomy c/b bleeding now off AC,  Iliac vein occlusion status post stent presents for desensitization of carboplatin. Palliative Team consulted for uncontrolled pain and GOC discussion.

## 2025-03-31 NOTE — CONSULT NOTE ADULT - PROBLEM SELECTOR RECOMMENDATION 7
See [facility] note above with patient, spouse and 1 son. Pt's 2 other children will be arriving tomorrow and will plan to coordinate meeting with Onc-Hos team to speak with patient and her family all together.

## 2025-03-31 NOTE — CONSULT NOTE ADULT - ATTENDING COMMENTS
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 On admission patient also c/o dyspnea over the past few weeks. Further eval revealed loculated R pleural effusion with pleurx in place; pleurx not draining well. Started MIST protocol on 3/26. Palliative team consulted for symptom management and goc in setting of advanced malignancy.     Patient seen this AM with spouse and 1 son, Mich, at bedside. See goc note above.   > Pain: Increase fentanyl patch 150mcg q72 hours, Adjust PRNs to 6mg PO dilaudid q4h prn moderate pain, 1mg IV dilaudid q4h prn severe pain   > bowel regimen while on opioids- monitor colostomy output   > Narcan prn     > Fallopian tube cancer- s/p multiple lines of DMT  > Outpt onc- Dr. 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 On admission patient also c/o dyspnea over the past few weeks. Further eval revealed loculated R pleural effusion with pleurx in place; pleurx not draining well. Started MIST protocol on 3/26. Palliative team consulted for symptom management and goc in setting of advanced malignancy.     Patient seen this AM with spouse and 1 son, Mich, at bedside. See goc note above.   > Pain: Increase fentanyl patch 150mcg q72 hours, Adjust PRNs to 6mg PO dilaudid q4h prn moderate pain, 1mg IV dilaudid q4h prn severe pain   > bowel regimen while on opioids- monitor colostomy output   > Narcan prn     > Anxiety- add PO ativan 0.5mg bid prn     > N/v- continue with anti-emetics     > Fallopian tube cancer- s/p multiple lines of DMT  > Outpt onc- Dr. Hughes at [facility]   > Appreciate goc discussions with inpt Onc-Hos team     Debility  > PPSV 50%     > Appreciate medical management by Onc-Hospitalist team   > Appreciate Pulyadira bradley for chest tube management     Ongoing goc discussions once family all arrives

## 2025-03-31 NOTE — PROGRESS NOTE ADULT - ASSESSMENT
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 On admission patient also c/o dyspnea over the past few weeks. Further eval revealed loculated R pleural effusion with pleurx in place; pleurx not draining well. Started MIST protocol on 3/26    Fallopian tube cancer  S/P multiple lines of therapy, now on Paclitaxel. Admitted for carboplatin desensitization   Now complete, when able to be discharged will follow up with Dr. Hughes.   Overall performance status of patient is poor. D/W Dr. Hughes, patient's disease is very advanced. She is not recommending further cancer therapy. Patient is currently a candidate for hospice.   Attempted to discuss with the patient, but she had too much pain. Discussed with patient's son,  and sister. Awaiting arrival of patient's kids to address GOC.    Loculated Effusion  Empyema?   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 Patient developed worsening dyspnea 2/2 loculated R pleural effusion with pleurx in place; pleurx not draining well. Started MIST protocol on 3/26,  Pulmonary stating that this may be empyema. D/W ID, not entirely clear. No cxs obtained. Procal 0.24, not entirely + for PNA. Continue Zosyn for now.   Loculated effusion may be malignant more so than infectious  Follow up pulmonary  O2 supplement as needed    Cancer related pain  Patient in s on dilaudid 0.8 mg PRN  Fentanyl patch 100mcg   Follow up with Palliative care tomorrow    Anxiety  Depression  Monitor, if worsening will consider  consult early this week

## 2025-03-31 NOTE — CONSULT NOTE ADULT - PROBLEM SELECTOR RECOMMENDATION 4
On going GOC discussion with family and patient. Please see above full GOC conversation. Patient may opt for hospice if minimal treatment options available, family awaiting discussion with Dr. Hughes. - Continue Ativan PRN for anxiety - continue with ativan prn

## 2025-03-31 NOTE — PROGRESS NOTE ADULT - SUBJECTIVE AND OBJECTIVE BOX
Infectious Diseases Follow Up:    Patient is a 64y old  Female who presents with a chief complaint of desensitization of carboplatin. (30 Mar 2025 13:18)      Interval History/ROS:  No acute events noted, BCx with bacteroides     Allergies  carboplatin (Unknown)  oxaliplatin (Unknown)  IV Contrast (Rash)  cisplatin (Unknown)  platinum containing compounds (Anaphylaxis)        ANTIMICROBIALS:  piperacillin/tazobactam IVPB.. 4.5 every 8 hours      Current Abx:     Previous Abx     OTHER MEDS:  MEDICATIONS  (STANDING):  albuterol    0.083%. 2.5 once PRN  alteplase  Injectable for Pleural Effusion 10 every 12 hours  diphenhydrAMINE 50 once PRN  diphenhydrAMINE Injectable 50 once PRN  diphenhydrAMINE Injectable 50 once PRN  dornase nidhi Solution for Pleural Effusion 5 every 12 hours  EPINEPHrine     1 mG/mL Injectable 0.3 once PRN  escitalopram 10 daily  fentaNYL   Patch 100 MICROgram(s)/Hr 1 every 72 hours  glucagon  Injectable 1 once PRN  glucagon  Injectable 2 once PRN  HYDROmorphone   Tablet 6 every 4 hours  HYDROmorphone  Injectable 1 every 4 hours PRN  influenza   Vaccine 0.5 once  LORazepam     Tablet 1 once PRN  ondansetron Injectable 8 every 8 hours PRN  pseudoephedrine 60 every 6 hours PRN  simethicone 80 three times a day      Vital Signs Last 24 Hrs  T(C): 36.9 (31 Mar 2025 13:31), Max: 36.9 (31 Mar 2025 13:31)  T(F): 98.4 (31 Mar 2025 13:31), Max: 98.4 (31 Mar 2025 13:31)  HR: 90 (31 Mar 2025 13:31) (83 - 90)  BP: 104/68 (31 Mar 2025 13:31) (95/65 - 107/73)  BP(mean): 77 (31 Mar 2025 01:44) (77 - 77)  RR: 18 (31 Mar 2025 13:31) (16 - 18)  SpO2: 96% (31 Mar 2025 13:31) (94% - 96%)    Parameters below as of 31 Mar 2025 13:31  Patient On (Oxygen Delivery Method): nasal cannula        PHYSICAL EXAM:  GENERAL: NAD, thin  HEAD:  Atraumatic, Normocephalic  EYES: EOMI, conjunctiva and sclera clear  CHEST/LUNG: On NC, Clear to auscultation bilaterally; R PLEURX   HEART: Regular rate and rhythm;   ABDOMEN: Soft, NT, ND  PSYCH: AAOx3                          9.4    11.20 )-----------( 373      ( 31 Mar 2025 06:27 )             31.5       03-31    136  |  102  |  20  ----------------------------<  85  3.3[L]   |  23  |  1.11    Ca    8.2[L]      31 Mar 2025 06:27  Phos  3.7     03-31  Mg     1.80     03-31    TPro  4.8[L]  /  Alb  2.2[L]  /  TBili  0.3  /  DBili  x   /  AST  28  /  ALT  11  /  AlkPhos  129[H]  03-31      Urinalysis Basic - ( 31 Mar 2025 06:27 )    Color: x / Appearance: x / SG: x / pH: x  Gluc: 85 mg/dL / Ketone: x  / Bili: x / Urobili: x   Blood: x / Protein: x / Nitrite: x   Leuk Esterase: x / RBC: x / WBC x   Sq Epi: x / Non Sq Epi: x / Bacteria: x        MICROBIOLOGY:  v  Blood Blood-Peripheral  03-27-25   Growth in anaerobic bottle: Bacteroides thetaiotaomicron group  "Susceptibilities not performed"  Direct identification is available within approximately 3-5  hours either by Blood Panel Multiplexed PCR or Direct  MALDI-TOF. Details: [email or web address]  --  Blood Culture PCR                RADIOLOGY:

## 2025-03-31 NOTE — CONSULT NOTE ADULT - PROBLEM SELECTOR RECOMMENDATION 8
Palliative Team consulted for uncontrolled pain, will recommend changes as above. Team will continue to follow for symptom management and on-going GOC discussion.    In the event of newly developing, evolving, or worsening symptoms, please contact the Palliative Medicine team via pager (if the patient is at [facility] #[phone] or if the patient is at [facility] #[phone]) The Geriatric and Palliative Medicine service has coverage 24 hours a day/ 7 days a week to provide medical recommendations regarding symptom management needs via telephone.

## 2025-03-31 NOTE — PROGRESS NOTE ADULT - SUBJECTIVE AND OBJECTIVE BOX
Keron John MD  Academic Hospitalist  Pager [phone]  Email: [email or web address]  Available on Microsoft Teams        PROGRESS NOTE:     Patient is a 64y old  Female who presents with a chief complaint of desensitization of carboplatin. (30 Mar 2025 13:18)      SUBJECTIVE / OVERNIGHT EVENTS:  Patient seen and examined this morning. Patient does not want to talk a lot or engage b/o pain. Discussed extensively with patient's son, Mich,  and sister (via phone). Addressed GOC with family, patient's daughter and son are away and on a flight back. Awaiting the arrival of family members before moving forward before with GOC discussion.       MEDICATIONS  (STANDING):  alteplase  Injectable for Pleural Effusion 10 milliGRAM(s) IntraPleural. every 12 hours  chlorhexidine 2% Cloths 1 Application(s) Topical <User Schedule>  cyanocobalamin 1000 MICROGram(s) Oral daily  dornase nidhi Solution for Pleural Effusion 5 milliGRAM(s) IntraPleural. every 12 hours  escitalopram 10 milliGRAM(s) Oral daily  fentaNYL   Patch 100 MICROgram(s)/Hr 1 Patch Transdermal every 72 hours  fluticasone propionate 50 MICROgram(s)/spray Nasal Spray 1 Spray(s) Both Nostrils two times a day  HYDROmorphone   Tablet 6 milliGRAM(s) Oral every 4 hours  influenza   Vaccine 0.5 milliLiter(s) IntraMuscular once  magnesium oxide 400 milliGRAM(s) Oral three times a day with meals  piperacillin/tazobactam IVPB.. 3.375 Gram(s) IV Intermittent every 8 hours  simethicone 80 milliGRAM(s) Chew three times a day  sodium chloride 0.9% Solution for Pleural Effusion 30 milliLiter(s) IntraPleural. every 12 hours    MEDICATIONS  (PRN):  albuterol    0.083%. 2.5 milliGRAM(s) Nebulizer once PRN Wheezing  diphenhydrAMINE 50 milliGRAM(s) Oral once PRN MILD ALERGIC REACTION  diphenhydrAMINE Injectable 50 milliGRAM(s) IntraMuscular once PRN MILD ALLERGIC REACTION  diphenhydrAMINE Injectable 50 milliGRAM(s) IV Push once PRN MILD ALLERGIC REACTION  EPINEPHrine     1 mG/mL Injectable 0.3 milliGRAM(s) IntraMuscular once PRN ANAPHYLAXIS  glucagon  Injectable 1 milliGRAM(s) IV Push once PRN REFRACTORY CASES AND/OR ON BETA BLOCKERS  glucagon  Injectable 2 milliGRAM(s) IV Push once PRN REFRACTORY CASES AND/ OR ON BETA BLOCKERS  HYDROmorphone  Injectable 1 milliGRAM(s) IV Push every 4 hours PRN Severe Pain (7 - 10)  LORazepam     Tablet 1 milliGRAM(s) Oral once PRN Anxiety  ondansetron Injectable 8 milliGRAM(s) IV Push every 8 hours PRN Nausea  pseudoephedrine 60 milliGRAM(s) Oral every 6 hours PRN nasal congesiton  sodium chloride 0.65% Nasal 1 Spray(s) Both Nostrils two times a day PRN Congestion      CAPILLARY BLOOD GLUCOSE        I&O's Summary    30 Mar 2025 07:01  -  31 Mar 2025 07:00  --------------------------------------------------------  IN: 950 mL / OUT: 835 mL / NET: 115 mL    31 Mar 2025 07:01  -  31 Mar 2025 16:24  --------------------------------------------------------  IN: 300 mL / OUT: 0 mL / NET: 300 mL        PHYSICAL EXAM:  Vital Signs Last 24 Hrs  T(C): 36.9 (31 Mar 2025 13:31), Max: 36.9 (31 Mar 2025 13:31)  T(F): 98.4 (31 Mar 2025 13:31), Max: 98.4 (31 Mar 2025 13:31)  HR: 90 (31 Mar 2025 13:31) (83 - 90)  BP: 104/68 (31 Mar 2025 13:31) (95/65 - 107/73)  BP(mean): 77 (31 Mar 2025 01:44) (77 - 77)  RR: 18 (31 Mar 2025 13:31) (16 - 18)  SpO2: 96% (31 Mar 2025 13:31) (94% - 96%)    Parameters below as of 31 Mar 2025 13:31  Patient On (Oxygen Delivery Method): nasal cannula        CONSTITUTIONAL: Sad and upset, thin, in pain.  RESPIRATORY: Normal respiratory effort; no respiratory distress, CTAB  CARDIOVASCULAR: No visible JVD, No lower extremity edema; S1S2, no m,r,g  ABDOMEN: Not guarding, distended, TTP, BS+  MUSCLOSKELETAL: no clubbing or cyanosis of digits; no joint swelling   PSYCH: Sad and upset    LABS:                        9.4    11.20 )-----------( 373      ( 31 Mar 2025 06:27 )             31.5     03-31    136  |  102  |  20  ----------------------------<  85  3.3[L]   |  23  |  1.11    Ca    8.2[L]      31 Mar 2025 06:27  Phos  3.7     03-31  Mg     1.80     03-31    TPro  4.8[L]  /  Alb  2.2[L]  /  TBili  0.3  /  DBili  x   /  AST  28  /  ALT  11  /  AlkPhos  129[H]  03-31          Urinalysis Basic - ( 31 Mar 2025 06:27 )    Color: x / Appearance: x / SG: x / pH: x  Gluc: 85 mg/dL / Ketone: x  / Bili: x / Urobili: x   Blood: x / Protein: x / Nitrite: x   Leuk Esterase: x / RBC: x / WBC x   Sq Epi: x / Non Sq Epi: x / Bacteria: x          RADIOLOGY & ADDITIONAL TESTS:  Results Reviewed:   Imaging Personally Reviewed:  Electrocardiogram Personally Reviewed:    COORDINATION OF CARE:  Care Discussed with Consultants/Other Providers [Y/N]:  Prior or Outpatient Records Reviewed [Y/N]:

## 2025-04-01 LAB
ALBUMIN SERPL ELPH-MCNC: 2.3 G/DL — LOW (ref 3.3–5)
ALP SERPL-CCNC: 148 U/L — HIGH (ref 40–120)
ALT FLD-CCNC: 10 U/L — SIGNIFICANT CHANGE UP (ref 4–33)
ANION GAP SERPL CALC-SCNC: 14 MMOL/L — SIGNIFICANT CHANGE UP (ref 7–14)
AST SERPL-CCNC: 26 U/L — SIGNIFICANT CHANGE UP (ref 4–32)
BASOPHILS # BLD AUTO: 0.1 K/UL — SIGNIFICANT CHANGE UP (ref 0–0.2)
BASOPHILS NFR BLD AUTO: 0.9 % — SIGNIFICANT CHANGE UP (ref 0–2)
BILIRUB SERPL-MCNC: 0.2 MG/DL — SIGNIFICANT CHANGE UP (ref 0.2–1.2)
BUN SERPL-MCNC: 18 MG/DL — SIGNIFICANT CHANGE UP (ref 7–23)
CALCIUM SERPL-MCNC: 8.4 MG/DL — SIGNIFICANT CHANGE UP (ref 8.4–10.5)
CHLORIDE SERPL-SCNC: 100 MMOL/L — SIGNIFICANT CHANGE UP (ref 98–107)
CO2 SERPL-SCNC: 22 MMOL/L — SIGNIFICANT CHANGE UP (ref 22–31)
CREAT SERPL-MCNC: 1.06 MG/DL — SIGNIFICANT CHANGE UP (ref 0.5–1.3)
EGFR: 59 ML/MIN/1.73M2 — LOW
EGFR: 59 ML/MIN/1.73M2 — LOW
EOSINOPHIL # BLD AUTO: 0.19 K/UL — SIGNIFICANT CHANGE UP (ref 0–0.5)
EOSINOPHIL NFR BLD AUTO: 1.7 % — SIGNIFICANT CHANGE UP (ref 0–6)
GLUCOSE SERPL-MCNC: 84 MG/DL — SIGNIFICANT CHANGE UP (ref 70–99)
HCT VFR BLD CALC: 31.3 % — LOW (ref 34.5–45)
HGB BLD-MCNC: 9.6 G/DL — LOW (ref 11.5–15.5)
IANC: 8.66 K/UL — HIGH (ref 1.8–7.4)
IMM GRANULOCYTES NFR BLD AUTO: 1.1 % — HIGH (ref 0–0.9)
LYMPHOCYTES # BLD AUTO: 0.97 K/UL — LOW (ref 1–3.3)
LYMPHOCYTES # BLD AUTO: 8.9 % — LOW (ref 13–44)
MAGNESIUM SERPL-MCNC: 1.8 MG/DL — SIGNIFICANT CHANGE UP (ref 1.6–2.6)
MCHC RBC-ENTMCNC: 28.7 PG — SIGNIFICANT CHANGE UP (ref 27–34)
MCHC RBC-ENTMCNC: 30.7 G/DL — LOW (ref 32–36)
MCV RBC AUTO: 93.4 FL — SIGNIFICANT CHANGE UP (ref 80–100)
MONOCYTES # BLD AUTO: 0.91 K/UL — HIGH (ref 0–0.9)
MONOCYTES NFR BLD AUTO: 8.3 % — SIGNIFICANT CHANGE UP (ref 2–14)
NEUTROPHILS # BLD AUTO: 8.66 K/UL — HIGH (ref 1.8–7.4)
NEUTROPHILS NFR BLD AUTO: 79.1 % — HIGH (ref 43–77)
NRBC # BLD AUTO: 0 K/UL — SIGNIFICANT CHANGE UP (ref 0–0)
NRBC # FLD: 0 K/UL — SIGNIFICANT CHANGE UP (ref 0–0)
NRBC BLD AUTO-RTO: 0 /100 WBCS — SIGNIFICANT CHANGE UP (ref 0–0)
PHOSPHATE SERPL-MCNC: 3.2 MG/DL — SIGNIFICANT CHANGE UP (ref 2.5–4.5)
PLATELET # BLD AUTO: 370 K/UL — SIGNIFICANT CHANGE UP (ref 150–400)
POTASSIUM SERPL-MCNC: 3.4 MMOL/L — LOW (ref 3.5–5.3)
POTASSIUM SERPL-SCNC: 3.4 MMOL/L — LOW (ref 3.5–5.3)
PROT SERPL-MCNC: 5.1 G/DL — LOW (ref 6–8.3)
RBC # BLD: 3.35 M/UL — LOW (ref 3.8–5.2)
RBC # FLD: 18 % — HIGH (ref 10.3–14.5)
SODIUM SERPL-SCNC: 136 MMOL/L — SIGNIFICANT CHANGE UP (ref 135–145)
WBC # BLD: 10.95 K/UL — HIGH (ref 3.8–10.5)
WBC # FLD AUTO: 10.95 K/UL — HIGH (ref 3.8–10.5)

## 2025-04-01 PROCEDURE — 99233 SBSQ HOSP IP/OBS HIGH 50: CPT | Mod: GC

## 2025-04-01 PROCEDURE — 99233 SBSQ HOSP IP/OBS HIGH 50: CPT

## 2025-04-01 PROCEDURE — 99232 SBSQ HOSP IP/OBS MODERATE 35: CPT

## 2025-04-01 PROCEDURE — 99497 ADVNCD CARE PLAN 30 MIN: CPT | Mod: 25

## 2025-04-01 PROCEDURE — G0545: CPT

## 2025-04-01 RX ORDER — LORAZEPAM 4 MG/ML
0.5 VIAL (ML) INJECTION ONCE
Refills: 0 | Status: DISCONTINUED | OUTPATIENT
Start: 2025-04-01 | End: 2025-04-01

## 2025-04-01 RX ORDER — LORAZEPAM 4 MG/ML
0.5 VIAL (ML) INJECTION
Refills: 0 | Status: DISCONTINUED | OUTPATIENT
Start: 2025-04-01 | End: 2025-04-03

## 2025-04-01 RX ORDER — PIPERACILLIN-TAZO-DEXTROSE,ISO 3.375G/5
3.38 IV SOLUTION, PIGGYBACK PREMIX FROZEN(ML) INTRAVENOUS EVERY 8 HOURS
Refills: 0 | Status: COMPLETED | OUTPATIENT
Start: 2025-04-01 | End: 2025-04-08

## 2025-04-01 RX ADMIN — CYANOCOBALAMIN 1000 MICROGRAM(S): 1000 INJECTION INTRAMUSCULAR; SUBCUTANEOUS at 18:54

## 2025-04-01 RX ADMIN — Medication 1 MILLIGRAM(S): at 22:13

## 2025-04-01 RX ADMIN — Medication 6 MILLIGRAM(S): at 00:07

## 2025-04-01 RX ADMIN — Medication 1 APPLICATION(S): at 07:10

## 2025-04-01 RX ADMIN — ESCITALOPRAM OXALATE 10 MILLIGRAM(S): 20 TABLET ORAL at 11:47

## 2025-04-01 RX ADMIN — FUROSEMIDE 20 MILLIGRAM(S): 10 INJECTION INTRAMUSCULAR; INTRAVENOUS at 07:12

## 2025-04-01 RX ADMIN — Medication 25 GRAM(S): at 18:54

## 2025-04-01 RX ADMIN — Medication 80 MILLIGRAM(S): at 18:55

## 2025-04-01 RX ADMIN — Medication 40 MILLIEQUIVALENT(S): at 11:47

## 2025-04-01 RX ADMIN — Medication 6 MILLIGRAM(S): at 11:51

## 2025-04-01 RX ADMIN — Medication 6 MILLIGRAM(S): at 05:35

## 2025-04-01 RX ADMIN — FLUTICASONE PROPIONATE 1 SPRAY(S): 50 SPRAY, METERED NASAL at 07:09

## 2025-04-01 RX ADMIN — Medication 6 MILLIGRAM(S): at 23:52

## 2025-04-01 RX ADMIN — Medication 400 MILLIGRAM(S): at 09:02

## 2025-04-01 RX ADMIN — Medication 1 MILLIGRAM(S): at 21:13

## 2025-04-01 RX ADMIN — Medication 6 MILLIGRAM(S): at 04:35

## 2025-04-01 RX ADMIN — Medication 80 MILLIGRAM(S): at 23:19

## 2025-04-01 RX ADMIN — Medication 25 GRAM(S): at 02:06

## 2025-04-01 RX ADMIN — Medication 1 MILLIGRAM(S): at 15:53

## 2025-04-01 RX ADMIN — FLUTICASONE PROPIONATE 1 SPRAY(S): 50 SPRAY, METERED NASAL at 18:54

## 2025-04-01 RX ADMIN — Medication 1 MILLIGRAM(S): at 16:08

## 2025-04-01 RX ADMIN — Medication 6 MILLIGRAM(S): at 01:07

## 2025-04-01 RX ADMIN — Medication 0.5 MILLIGRAM(S): at 07:08

## 2025-04-01 RX ADMIN — Medication 6 MILLIGRAM(S): at 12:51

## 2025-04-01 RX ADMIN — Medication 25 GRAM(S): at 09:02

## 2025-04-01 NOTE — CHART NOTE - NSCHARTNOTEFT_GEN_A_CORE
NUTRITION FOLLOW UP NOTE    Patient is seen for a follow-up nutrition assessment for severe malnutrition.    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 Course complicated by R pleural effusion requiring NC. Pending GOC discussions.    Diet Order: Regular (03-25-25 @ 12:20)      Nutrition Course:  - Patient seen at bedside this morning by writer with patient's  present. Patient reports a very poor appetite during course of admission exacerbated by nausea. Taking only a few bites at meals. States navigaya Supplement is "too sweet," however wishes to continue with it. Writer offered trial of Orgain Vegan 1x daily as it has a lower CHO content; patient agreeable to plan.  - Per chart, palliative care is following, GOC discussions ongoing. Nutrition plan of care to be in alignment with patient/family goals of care.    PO Intake per RN flowsheet: 0-50%  GI Distress: Nausea per patient. No report of vomiting/diarrhea/constipation.   Bowel Movement: Colostomy per RN flowsheet  Chewing / Swallowing Difficulty: None reported      Pertinent Medications: MEDICATIONS  (STANDING):  alteplase  Injectable for Pleural Effusion 10 milliGRAM(s) IntraPleural. every 12 hours  chlorhexidine 2% Cloths 1 Application(s) Topical <User Schedule>  cyanocobalamin 1000 MICROGram(s) Oral daily  dornase nidhi Solution for Pleural Effusion 5 milliGRAM(s) IntraPleural. every 12 hours  escitalopram 10 milliGRAM(s) Oral daily  fentaNYL   Patch  50 MICROgram(s)/Hr 1 Patch Transdermal every 72 hours  fentaNYL   Patch 100 MICROgram(s)/Hr 1 Patch Transdermal every 72 hours  fluticasone propionate 50 MICROgram(s)/spray Nasal Spray 1 Spray(s) Both Nostrils two times a day  furosemide   Injectable 20 milliGRAM(s) IV Push daily  piperacillin/tazobactam IVPB.. 3.375 Gram(s) IV Intermittent every 8 hours  polyethylene glycol 3350 17 Gram(s) Oral daily  potassium chloride    Tablet ER 40 milliEquivalent(s) Oral once  senna 2 Tablet(s) Oral at bedtime  simethicone 80 milliGRAM(s) Chew three times a day  sodium chloride 0.9% Solution for Pleural Effusion 30 milliLiter(s) IntraPleural. every 12 hours    MEDICATIONS  (PRN):  albuterol    0.083%. 2.5 milliGRAM(s) Nebulizer once PRN Wheezing  diphenhydrAMINE 50 milliGRAM(s) Oral once PRN MILD ALERGIC REACTION  glucagon  Injectable 1 milliGRAM(s) IV Push once PRN REFRACTORY CASES AND/OR ON BETA BLOCKERS  glucagon  Injectable 2 milliGRAM(s) IV Push once PRN REFRACTORY CASES AND/ OR ON BETA BLOCKERS  HYDROmorphone   Tablet 6 milliGRAM(s) Oral every 4 hours PRN Moderate Pain (4 - 6)  HYDROmorphone  Injectable 1 milliGRAM(s) IV Push every 4 hours PRN Severe Pain (7 - 10)  LORazepam     Tablet 0.5 milliGRAM(s) Oral two times a day PRN Anxiety  ondansetron Injectable 8 milliGRAM(s) IV Push every 8 hours PRN Nausea  pseudoephedrine 60 milliGRAM(s) Oral every 6 hours PRN nasal congesiton  sodium chloride 0.65% Nasal 1 Spray(s) Both Nostrils two times a day PRN Congestion      Pertinent Labs: 04-01 Na136 mmol/L Glu 84 mg/dL K+ 3.4 mmol/L[L] Cr  1.06 mg/dL BUN 18 mg/dL 04-01 Phos 3.2 mg/dL 04-01 Alb 2.3 g/dL[L]      Anthropometrics  Weights per RN flowsheet: 68.3kg (3/26), 67.8kg (3/25)  Weight Assessment: +0.5kg (<1%) weight gain x1 day period of time    Physical Assessment, per flowsheets:  Edema: 1+ (left leg, left foot, left ankle), 2+ (right leg, right knee, right ankle)  Pressure Injury: None    Estimated Needs:   [X] No change since previous assessment  Weight Used: Ideal Body Weight 105lb / 47.6kg  Energy Needs: 1428-1666kcal (based on 30-35kcal/kg)  Protein Needs: 71.4-95.2gms (based on 1.5-2gms/kg)    Previous Nutrition Diagnosis: [X] Severe Malnutrition    Nutrition Diagnosis is [X] ongoing    New Nutrition Diagnosis: [X] not applicable     Education: [X] Provided on previous assessment by RD    Interventions:   - Continue current diet order, as tolerated  - Continue navigaya Standard 1.4 Reuben (provides 455kcal, 20g protein per serving) once daily  - Recommend Orgain Vegan (provides 220kcal, 16gms protein per serving) once daily  --> Food and Nutrition Department will provide   - Defer fluid management as per medical team  - Please document % PO intake on flowsheets  - Nutrition care to be aligned with patient/family goals of care    Monitor & Evaluate:  PO intake, tolerance to diet/supplement, nutrition related lab values, weight trends, BMs/GI distress, hydration status, skin integrity.    RD remains available, please consult as needed.    Katina Mcpherson MS RDN CDN  Available on Microsoft Teams (Preferred) or Pager #[phone]

## 2025-04-01 NOTE — PROGRESS NOTE ADULT - PROBLEM SELECTOR PLAN 3
Over the last 24 hrs, she received Dilaudid po 28mg and iv 2.6mg.  She is agreeable to adjustments of her long acting pain medications. She did not receive any Zofran PRN.    Recommendations:   - Continue 150 mcg Fentanyl patch (3/31)   - Continue dilaudid 6mg PO q4 PRN for moderate pain   - Continue Dilaudid 1mg IV q4 PRN for severe pain   - Bowel regimen while on opiates  - Narcan prn. Recommendations:   - Continue 150 mcg Fentanyl patch (3/31)   - Continue dilaudid 6mg PO q4 PRN for moderate pain   - Continue Dilaudid 1mg IV q4 PRN for severe pain   - Bowel regimen while on opiates  - Narcan prn.

## 2025-04-01 NOTE — PROGRESS NOTE ADULT - SUBJECTIVE AND OBJECTIVE BOX
Infectious Diseases Follow Up:    Patient is a 64y old  Female who presents with a chief complaint of carbo desensitization (31 Mar 2025 16:23)      Interval History/ROS:  Afebrile ON, pt feeling weak, having anxiety, no appetite. Loose stools, no diarrhea     Allergies  carboplatin (Unknown)  oxaliplatin (Unknown)  IV Contrast (Rash)  cisplatin (Unknown)  platinum containing compounds (Anaphylaxis)        ANTIMICROBIALS:  piperacillin/tazobactam IVPB.. 3.375 every 8 hours      Current Abx:     Previous Abx     OTHER MEDS:  MEDICATIONS  (STANDING):  albuterol    0.083%. 2.5 once PRN  alteplase  Injectable for Pleural Effusion 10 every 12 hours  diphenhydrAMINE 50 once PRN  dornase nidhi Solution for Pleural Effusion 5 every 12 hours  escitalopram 10 daily  fentaNYL   Patch  50 MICROgram(s)/Hr 1 every 72 hours  fentaNYL   Patch 100 MICROgram(s)/Hr 1 every 72 hours  furosemide   Injectable 20 daily  glucagon  Injectable 1 once PRN  glucagon  Injectable 2 once PRN  HYDROmorphone   Tablet 6 every 4 hours PRN  HYDROmorphone  Injectable 1 every 4 hours PRN  LORazepam     Tablet 0.5 two times a day PRN  ondansetron Injectable 8 every 8 hours PRN  polyethylene glycol 3350 17 daily  pseudoephedrine 60 every 6 hours PRN  senna 2 at bedtime  simethicone 80 three times a day      Vital Signs Last 24 Hrs  T(C): 36.8 (01 Apr 2025 09:30), Max: 36.9 (31 Mar 2025 13:31)  T(F): 98.3 (01 Apr 2025 09:30), Max: 98.5 (31 Mar 2025 19:31)  HR: 101 (01 Apr 2025 09:30) (90 - 101)  BP: 97/61 (01 Apr 2025 09:30) (97/61 - 105/74)  BP(mean): --  RR: 18 (01 Apr 2025 09:30) (18 - 18)  SpO2: 95% (01 Apr 2025 11:32) (92% - 96%)    Parameters below as of 01 Apr 2025 09:30  Patient On (Oxygen Delivery Method): nasal cannula  O2 Flow (L/min): 3    PHYSICAL EXAM:  GENERAL: NAD, thin  HEAD:  Atraumatic, Normocephalic  EYES: EOMI, conjunctiva and sclera clear  CHEST/LUNG: On NC, Clear to auscultation bilaterally; R PLEURX   HEART: Regular rate and rhythm;   ABDOMEN: Soft, NT, ND  PSYCH: AAOx3                        9.6    10.95 )-----------( 370      ( 01 Apr 2025 06:03 )             31.3       04-01    136  |  100  |  18  ----------------------------<  84  3.4[L]   |  22  |  1.06    Ca    8.4      01 Apr 2025 06:03  Phos  3.2     04-01  Mg     1.80     04-01    TPro  5.1[L]  /  Alb  2.3[L]  /  TBili  0.2  /  DBili  x   /  AST  26  /  ALT  10  /  AlkPhos  148[H]  04-01      Urinalysis Basic - ( 01 Apr 2025 06:03 )    Color: x / Appearance: x / SG: x / pH: x  Gluc: 84 mg/dL / Ketone: x  / Bili: x / Urobili: x   Blood: x / Protein: x / Nitrite: x   Leuk Esterase: x / RBC: x / WBC x   Sq Epi: x / Non Sq Epi: x / Bacteria: x        MICROBIOLOGY:  v  Blood Blood-Peripheral  03-30-25   No growth at 24 hours  --  --      Blood Blood-Peripheral  03-27-25   Growth in anaerobic bottle: Bacteroides thetaiotaomicron group  "Susceptibilities not performed"  Direct identification is available within approximately 3-5  hours either by Blood Panel Multiplexed PCR or Direct  MALDI-TOF. Details: [email or web address]  --  Blood Culture PCR                RADIOLOGY:

## 2025-04-01 NOTE — PROGRESS NOTE ADULT - ASSESSMENT
This is a 64 y/o F w/ PMHx ovarian ca (possible liver, lung mets) with malignant R effusion s/p R Pleurx ([place], 1/2025), mass effect adjacent to distal colon w/o obstruction, mass effect on distal R ureter s/p ureter stent now s/p colostomy c/b bleeding now off AC,  Iliac vein occlusion s/p stent who was admitted to [facility] MICU on 3/25/25 for desensitization of carboplatin, noted to have R sided PLEURX not draining well.  CT Chest w/ multiloculated R pleural effusion, some pockets of pleural fluid not communicated w/ catheter tip. Mod L PLEFF. Pt started on MIST protocol w/ TPA with more output.   Studies sent on 3/27 after TPA, with , RBC 29k, 59% neutrophils,  pH 8.1, glucose 33, LDH 1133, protein 1.6. No Cx sent.   Pt started on Vancomycin/Zosyn.     #Bacteroides bacteremia   #R multiloculated pleural effusion, s/p MIST protocol through PLEURX,   #C/f infected loculated pleural effusion?  #Mild leukocytosis  #Ovarian CA w/ likely lung, liver mets    Overall, a 64 y/o F w/ PMHx ovarian ca (possible liver, lung mets) with malignant R effusion s/p R Pleurx ([place], 1/2025), mass effect adjacent to distal colon w/o obstruction, mass effect on distal R ureter s/p ureter stent now s/p colostomy c/b bleeding now off AC, Iliac vein occlusion s/p stent admitted for chemo sensitization, noted to have non draining R PLEURX, s/p CT w/ multiloculated R pleural effusion, now on MIST protocol.   ID consulted for possible infected R loculated pleural effusion. Pt is currently afebrile, mild leukocytosis 12, procal low, BCx 1 set 3/27 NG, MRSA PCR neg.   Studies taken from existing PLEURX: (after TPA)  , RBC 29k, 59% neutrophils,  pH 8.1, glucose 33, LDH 1133, protein 1.6.  Difficult to interpret studies take from existing PLEURX and after TPA.  Given low nucleated cell count (~364 after correcting RBCs), high pH, and lack of other infectious findings (BCx NGTD, low PCT, no fevers, no PNA on CT), concern for empyema/parapneumonic effusion is quite low.     BCx from 3/27 now 1/2 positive for bacteroides, almost certainly a GI source. Pending GOC, would need CT A/P if no plans for hospice.     Recommendations:  1. C/w Zosyn 3.375 g q8 given bacteroides bacteremia   2. If there is a concern for infected loculated effusion, would recommend repeat thoracentesis (IR guided?) and sending cell count w/ diff, pH, glucose, protein, LDH, and Cx  3. F/u repeat BCx sent on 3/30   4. Pending GOC, if not planned for hospice/comfort care, would obtain CT A/P     Thank you for consulting us and involving us in the management of this patient's case. In addition to reviewing history, imaging, documents, labs, microbiology, and infection control strategies and potential issues.     ID will continue to follow    oJhn Perez M.D.  Attending Physician  Division of Infectious Diseases  Department of Medicine    Please contact through MS Teams message.  Office: [phone] (after 5 PM or weekend).  Time-based billing (NON-critical care). This is a 62 y/o F w/ PMHx ovarian ca (possible liver, lung mets) with malignant R effusion s/p R Pleurx ([place], 1/2025), mass effect adjacent to distal colon w/o obstruction, mass effect on distal R ureter s/p ureter stent now s/p colostomy c/b bleeding now off AC,  Iliac vein occlusion s/p stent who was admitted to [facility] MICU on 3/25/25 for desensitization of carboplatin, noted to have R sided PLEURX not draining well.  CT Chest w/ multiloculated R pleural effusion, some pockets of pleural fluid not communicated w/ catheter tip. Mod L PLEFF. Pt started on MIST protocol w/ TPA with more output.   Studies sent on 3/27 after TPA, with , RBC 29k, 59% neutrophils,  pH 8.1, glucose 33, LDH 1133, protein 1.6. No Cx sent.   Pt started on Vancomycin/Zosyn.     #Bacteroides bacteremia   #R multiloculated pleural effusion, s/p MIST protocol through PLEURX,   #C/f infected loculated pleural effusion?  #Mild leukocytosis  #Ovarian CA w/ likely lung, liver mets    Overall, a 62 y/o F w/ PMHx ovarian ca (possible liver, lung mets) with malignant R effusion s/p R Pleurx ([place], 1/2025), mass effect adjacent to distal colon w/o obstruction, mass effect on distal R ureter s/p ureter stent now s/p colostomy c/b bleeding now off AC, Iliac vein occlusion s/p stent admitted for chemo sensitization, noted to have non draining R PLEURX, s/p CT w/ multiloculated R pleural effusion, now on MIST protocol.   ID consulted for possible infected R loculated pleural effusion. Pt is currently afebrile, mild leukocytosis 12, procal low, BCx 1 set 3/27 NG, MRSA PCR neg.   Studies taken from existing PLEURX: (after TPA)  , RBC 29k, 59% neutrophils,  pH 8.1, glucose 33, LDH 1133, protein 1.6.  Difficult to interpret studies take from existing PLEURX and after TPA.  Given low nucleated cell count (~364 after correcting RBCs), high pH, and lack of other infectious findings (BCx NGTD, low PCT, no fevers, no PNA on CT), concern for empyema/parapneumonic effusion is quite low.     BCx from 3/27 now 1/2 positive for bacteroides, certainly a GI source. Given ovarian CA w/ mets, pt may have translocation. This would not be associated with a central line/port infection or PLEURX infection.   Pending GOC, would need CT A/P if no plans for hospice.     Recommendations:  1. C/w Zosyn 3.375 g q8 given bacteroides bacteremia   2. If there is a concern for infected loculated effusion, would recommend repeat thoracentesis (IR guided?) and sending cell count w/ diff, pH, glucose, protein, LDH, and Cx  3. F/u repeat BCx sent on 3/30   4. Pending GOC, if not planned for hospice/comfort care, would obtain CT A/P     Thank you for consulting us and involving us in the management of this patient's case. In addition to reviewing history, imaging, documents, labs, microbiology, and infection control strategies and potential issues.     ID will continue to follow    John Perez M.D.  Attending Physician  Division of Infectious Diseases  Department of Medicine    Please contact through [employer].  Office: [phone] (after 5 PM or weekend).  Time-based billing (NON-critical care). This is a 64 y/o F w/ PMHx ovarian ca (possible liver, lung mets) with malignant R effusion s/p R Pleurx ([place], 1/2025), mass effect adjacent to distal colon w/o obstruction, mass effect on distal R ureter s/p ureter stent now s/p colostomy c/b bleeding now off AC,  Iliac vein occlusion s/p stent who was admitted to [facility] MICU on 3/25/25 for desensitization of carboplatin, noted to have R sided PLEURX not draining well.  CT Chest w/ multiloculated R pleural effusion, some pockets of pleural fluid not communicated w/ catheter tip. Mod L PLEFF. Pt started on MIST protocol w/ TPA with more output.   Studies sent on 3/27 after TPA, with , RBC 29k, 59% neutrophils,  pH 8.1, glucose 33, LDH 1133, protein 1.6. No Cx sent.   Pt started on Vancomycin/Zosyn.     #Bacteroides bacteremia   #R multiloculated pleural effusion, s/p MIST protocol through PLEURX,   #C/f infected loculated pleural effusion?  #Mild leukocytosis  #Ovarian CA w/ likely lung, liver mets    Overall, a 64 y/o F w/ PMHx ovarian ca (possible liver, lung mets) with malignant R effusion s/p R Pleurx ([place], 1/2025), mass effect adjacent to distal colon w/o obstruction, mass effect on distal R ureter s/p ureter stent now s/p colostomy c/b bleeding now off AC, Iliac vein occlusion s/p stent admitted for chemo sensitization, noted to have non draining R PLEURX, s/p CT w/ multiloculated R pleural effusion, now on MIST protocol.   ID consulted for possible infected R loculated pleural effusion. Pt is currently afebrile, mild leukocytosis 12, procal low, BCx 1 set 3/27 NG, MRSA PCR neg.   Studies taken from existing PLEURX: (after TPA)  , RBC 29k, 59% neutrophils,  pH 8.1, glucose 33, LDH 1133, protein 1.6.  Difficult to interpret studies take from existing PLEURX and after TPA.  Given low nucleated cell count (~364 after correcting RBCs), high pH, and lack of other infectious findings (BCx NGTD, low PCT, no fevers, no PNA on CT), concern for empyema/parapneumonic effusion is quite low.     BCx from 3/27 now 1/2 positive for bacteroides, certainly a GI source. Given ovarian CA w/ mets, pt may have translocation. This would not be associated with a central line/port infection or PLEURX infection.   Pending GOC, would need CT A/P if no plans for hospice.     Recommendations:  1. C/w Zosyn 3.375 g q8 given bacteroides bacteremia and likely GI source of infection   2. If there is a concern for infected loculated effusion, would recommend repeat thoracentesis (IR guided?) and sending cell count w/ diff, pH, glucose, protein, LDH, and Cx  3. F/u repeat BCx sent on 3/30   4. Pending GOC, if not planned for hospice/comfort care, would obtain CT A/P     Thank you for consulting us and involving us in the management of this patient's case. In addition to reviewing history, imaging, documents, labs, microbiology, and infection control strategies and potential issues.     ID will continue to follow    John Perez M.D.  Attending Physician  Division of Infectious Diseases  Department of Medicine    Please contact through MS Teams message.  Office: [phone] (after 5 PM or weekend).  Time-based billing (NON-critical care).

## 2025-04-01 NOTE — PROGRESS NOTE ADULT - SUBJECTIVE AND OBJECTIVE BOX
Keron John MD  Academic Hospitalist  Pager [phone]  Email: [email or web address]  Available on Microsoft Teams        PROGRESS NOTE:     Patient is a 64y old  Female who presents with a chief complaint of carbo desensitization (31 Mar 2025 16:23)      SUBJECTIVE / OVERNIGHT EVENTS:  Patient seen and examined this morning. Patient more engaged today. Son who just arrived from [facility] at bedside. GOC conversation in the presence of the palliative care doctor, Dr. Lamin isaac. Patient had stated earlier on the weekend multiple times that she is would like to go under hospice, this was also recommended by her oncologist Dr. Hughes, however as per patient and family now, they would like to discuss this further.      MEDICATIONS  (STANDING):  alteplase  Injectable for Pleural Effusion 10 milliGRAM(s) IntraPleural. every 12 hours  chlorhexidine 2% Cloths 1 Application(s) Topical <User Schedule>  cyanocobalamin 1000 MICROGram(s) Oral daily  dornase nidhi Solution for Pleural Effusion 5 milliGRAM(s) IntraPleural. every 12 hours  escitalopram 10 milliGRAM(s) Oral daily  fentaNYL   Patch  50 MICROgram(s)/Hr 1 Patch Transdermal every 72 hours  fentaNYL   Patch 100 MICROgram(s)/Hr 1 Patch Transdermal every 72 hours  fluticasone propionate 50 MICROgram(s)/spray Nasal Spray 1 Spray(s) Both Nostrils two times a day  furosemide   Injectable 20 milliGRAM(s) IV Push daily  piperacillin/tazobactam IVPB.. 3.375 Gram(s) IV Intermittent every 8 hours  polyethylene glycol 3350 17 Gram(s) Oral daily  senna 2 Tablet(s) Oral at bedtime  simethicone 80 milliGRAM(s) Chew three times a day  sodium chloride 0.9% Solution for Pleural Effusion 30 milliLiter(s) IntraPleural. every 12 hours    MEDICATIONS  (PRN):  albuterol    0.083%. 2.5 milliGRAM(s) Nebulizer once PRN Wheezing  diphenhydrAMINE 50 milliGRAM(s) Oral once PRN MILD ALERGIC REACTION  glucagon  Injectable 1 milliGRAM(s) IV Push once PRN REFRACTORY CASES AND/OR ON BETA BLOCKERS  glucagon  Injectable 2 milliGRAM(s) IV Push once PRN REFRACTORY CASES AND/ OR ON BETA BLOCKERS  HYDROmorphone   Tablet 6 milliGRAM(s) Oral every 4 hours PRN Moderate Pain (4 - 6)  HYDROmorphone  Injectable 1 milliGRAM(s) IV Push every 4 hours PRN Severe Pain (7 - 10)  LORazepam     Tablet 0.5 milliGRAM(s) Oral two times a day PRN Anxiety  ondansetron Injectable 8 milliGRAM(s) IV Push every 8 hours PRN Nausea  pseudoephedrine 60 milliGRAM(s) Oral every 6 hours PRN nasal congesiton  sodium chloride 0.65% Nasal 1 Spray(s) Both Nostrils two times a day PRN Congestion      CAPILLARY BLOOD GLUCOSE        I&O's Summary    31 Mar 2025 07:01  -  01 Apr 2025 07:00  --------------------------------------------------------  IN: 450 mL / OUT: 0 mL / NET: 450 mL    01 Apr 2025 07:01  -  01 Apr 2025 15:51  --------------------------------------------------------  IN: 0 mL / OUT: 118 mL / NET: -118 mL        PHYSICAL EXAM:  Vital Signs Last 24 Hrs  T(C): 36.8 (01 Apr 2025 09:30), Max: 36.9 (31 Mar 2025 19:31)  T(F): 98.3 (01 Apr 2025 09:30), Max: 98.5 (31 Mar 2025 19:31)  HR: 101 (01 Apr 2025 09:30) (93 - 101)  BP: 97/61 (01 Apr 2025 09:30) (97/61 - 105/74)  BP(mean): --  RR: 18 (01 Apr 2025 09:30) (18 - 18)  SpO2: 95% (01 Apr 2025 11:32) (92% - 95%)    Parameters below as of 01 Apr 2025 09:30  Patient On (Oxygen Delivery Method): nasal cannula  O2 Flow (L/min): 3      CONSTITUTIONAL: cahcectic  RESPIRATORY: Normal respiratory effort; no respiratory distress, CTAB, on NC for O2 supplementation  CARDIOVASCULAR: No visible JVD, No lower extremity edema; S1S2, no m,r,g  ABDOMEN: Not guarding, distended, TTP, BS+  MUSCLOSKELETAL: no clubbing or cyanosis of digits; no joint swelling       LABS:                        9.6    10.95 )-----------( 370      ( 01 Apr 2025 06:03 )             31.3     04-01    136  |  100  |  18  ----------------------------<  84  3.4[L]   |  22  |  1.06    Ca    8.4      01 Apr 2025 06:03  Phos  3.2     04-01  Mg     1.80     04-01    TPro  5.1[L]  /  Alb  2.3[L]  /  TBili  0.2  /  DBili  x   /  AST  26  /  ALT  10  /  AlkPhos  148[H]  04-01          Urinalysis Basic - ( 01 Apr 2025 06:03 )    Color: x / Appearance: x / SG: x / pH: x  Gluc: 84 mg/dL / Ketone: x  / Bili: x / Urobili: x   Blood: x / Protein: x / Nitrite: x   Leuk Esterase: x / RBC: x / WBC x   Sq Epi: x / Non Sq Epi: x / Bacteria: x        Culture - Blood (collected 30 Mar 2025 15:55)  Source: Blood Blood-Peripheral  Preliminary Report (31 Mar 2025 19:01):    No growth at 24 hours        RADIOLOGY & ADDITIONAL TESTS:  Results Reviewed:   Imaging Personally Reviewed:  Electrocardiogram Personally Reviewed:    COORDINATION OF CARE:  Care Discussed with Consultants/Other Providers [Y/N]: D/W pall care, Dr. Kimble, Dr. Gage (pul) and outpatient onc. Dr. Hughes  Prior or Outpatient Records Reviewed [Y/N]:

## 2025-04-01 NOTE — PROGRESS NOTE ADULT - ASSESSMENT
64 year old female PMH: Anxiety Chronic back pain, Depression, Ovarian ca and Vertigo mass effect adjacent to distal colon w/o obstruction, mass effect on distal R ureter s/p ureterl stent now s/p colostomy c/b bleeding now off AC,  Iliac vein occlusion status post stent presents for desensitization of carboplatin. Palliative Team consulted for uncontrolled pain and GOC discussion.

## 2025-04-01 NOTE — PROGRESS NOTE ADULT - CONVERSATION DETAILS
Met with patient and her son, Geoffrey, at bedside this AM with medical attending, Dr. John. Dr. John shared his discussion with patient's outpt oncologist, Dr. Hughes, sharing that patient's cancer has progressed despite multiple lines of DMT and the risk of offering further DMT would outweigh the benefits. Recommended a transition of care to aggressive symptom management and ushering in hospice philosophy of care with elevation of quality of life. Patient shared that she feels that she doesn't tolerate chemo well as she suffers from significant side effects from the chemo. I did explain that the benefit of hospice is that they would minimize symptoms that patient is experiencing as a consequence of her cancer. Family needs more time to discuss as a family the option of hospice.     Dr. John also discussed CT findings and potential to consult CT surgery for more invasive interventions. Patient shared that she does NOT want further surgeries as she feels she would not be able to tolerate another surgery. Discussed advanced directives including CPR and mechanical ventilation in setting of malignancy. Attempted to review the risks and benefits of these interventions at the EOL in setting of malignancy sharing that these interventions might pose more burden than benefit. Encouraged patient to discuss with her family her wishes for cpr and mechanical ventilation.

## 2025-04-01 NOTE — PROGRESS NOTE ADULT - PROBLEM SELECTOR PLAN 2
Patient developed worsening dyspnea 2/2 loculated R pleural effusion with pleurx in place; pleurx not draining well. Started MIST protocol on 3/26. Pulmonology and ID following.   - Continue Zosyn as per ID  - F/u pulm and ID recs   - Oxygen supplementation PRN- wean as tolerated   - Rest of care as per primary team.  - Patient is NOT interested in further surgical interventions

## 2025-04-01 NOTE — PROGRESS NOTE ADULT - SUBJECTIVE AND OBJECTIVE BOX
[facility] Geriatrics and Palliative Care  Cecile Kimble Palliative Care Attending  Contact Info: Page [phone] (including Nights/Weekends), message on Microsoft Teams (Cecile Kimble), or leave  at Palliative Office [phone] (non-urgent)   Date of [id number] @ 15:38    SUBJECTIVE AND OBJECTIVE:    Indication for Geriatrics and Palliative Care Services/INTERVAL HPI: sx management and goc in setting of advanced malignancy     OVERNIGHT EVENTS:  > 4/1: Over the past 24 hours, patient required PRNs of 1mg IV dilaudid x2, 6mg PO dilaudid x2 (after ATC dilaudid was discontinued), 0.5mg PO ativan x2.     DNR on chart:  Allergies    carboplatin (Unknown)  oxaliplatin (Unknown)  IV Contrast (Rash)  cisplatin (Unknown)  platinum containing compounds (Anaphylaxis)    Intolerances    MEDICATIONS  (STANDING):  alteplase  Injectable for Pleural Effusion 10 milliGRAM(s) IntraPleural. every 12 hours  chlorhexidine 2% Cloths 1 Application(s) Topical <User Schedule>  cyanocobalamin 1000 MICROGram(s) Oral daily  dornase nidhi Solution for Pleural Effusion 5 milliGRAM(s) IntraPleural. every 12 hours  escitalopram 10 milliGRAM(s) Oral daily  fentaNYL   Patch  50 MICROgram(s)/Hr 1 Patch Transdermal every 72 hours  fentaNYL   Patch 100 MICROgram(s)/Hr 1 Patch Transdermal every 72 hours  fluticasone propionate 50 MICROgram(s)/spray Nasal Spray 1 Spray(s) Both Nostrils two times a day  furosemide   Injectable 20 milliGRAM(s) IV Push daily  piperacillin/tazobactam IVPB.. 3.375 Gram(s) IV Intermittent every 8 hours  polyethylene glycol 3350 17 Gram(s) Oral daily  senna 2 Tablet(s) Oral at bedtime  simethicone 80 milliGRAM(s) Chew three times a day  sodium chloride 0.9% Solution for Pleural Effusion 30 milliLiter(s) IntraPleural. every 12 hours    MEDICATIONS  (PRN):  albuterol    0.083%. 2.5 milliGRAM(s) Nebulizer once PRN Wheezing  diphenhydrAMINE 50 milliGRAM(s) Oral once PRN MILD ALERGIC REACTION  glucagon  Injectable 1 milliGRAM(s) IV Push once PRN REFRACTORY CASES AND/OR ON BETA BLOCKERS  glucagon  Injectable 2 milliGRAM(s) IV Push once PRN REFRACTORY CASES AND/ OR ON BETA BLOCKERS  HYDROmorphone   Tablet 6 milliGRAM(s) Oral every 4 hours PRN Moderate Pain (4 - 6)  HYDROmorphone  Injectable 1 milliGRAM(s) IV Push every 4 hours PRN Severe Pain (7 - 10)  LORazepam     Tablet 0.5 milliGRAM(s) Oral two times a day PRN Anxiety  ondansetron Injectable 8 milliGRAM(s) IV Push every 8 hours PRN Nausea  pseudoephedrine 60 milliGRAM(s) Oral every 6 hours PRN nasal congesiton  sodium chloride 0.65% Nasal 1 Spray(s) Both Nostrils two times a day PRN Congestion      ITEMS UNCHECKED ARE NOT PRESENT    PRESENT SYMPTOMS: [ ]Unable to self-report - see [ ] CPOT [ ] PAINADS [ ] RDOS  Source if other than patient:  [ ]Family   [ ]Team     Pain: [x]yes [ ]no  QOL impact - debilitating   Location - rectum            Aggravating factors - progression of disease   Quality - ache  Radiation -  denies   Timing- constant   Severity (0-10 scale): 10  Minimal acceptable level / Pain goal (0-10 scale): 2    CPOT:    https://www.[facility].org/getattachment/[email or web address]-[email or web address]-5071w9174i9r/Critical-Care-Pain-Observation-Tool-(CPOT)    Dyspnea:                           [ ]Mild [ ]Moderate [ ]Severe  Anxiety:                             [ ]Mild [x ]Moderate [ ]Severe  Fatigue:                             [ ]Mild [ x]Moderate [ ]Severe  Nausea:                             [ ]Mild [ ]Moderate [ ]Severe  Loss of appetite:              [ ]Mild [ x]Moderate [ ]Severe  Constipation:                    [ ]Mild [ ]Moderate [ ]Severe  Other Symptoms:  [ x]All other review of systems negative     PCSSQ[Palliative Care Spiritual Screening Question]   Severity (0-10):  Score of 4 or > indicate consideration of Chaplaincy referral.  Chaplaincy Referral: [ ] yes [ ] refused [ ] following [x ] deferred    Caregiver [place]? : [ ] yes [ ] no [x ] Deferred [ ] Declined             Social work referral [ ] Patient & Family Centered Care Referral [ ]  Anticipatory Grief present?:  [ ] yes [ ] no  [x ] Deferred                  Social work referral [ ] Patient & Family Centered Care Referral [ ]      PHYSICAL EXAM:  Vital Signs Last 24 Hrs  T(C): 36.8 (01 Apr 2025 09:30), Max: 36.9 (31 Mar 2025 19:31)  T(F): 98.3 (01 Apr 2025 09:30), Max: 98.5 (31 Mar 2025 19:31)  HR: 101 (01 Apr 2025 09:30) (93 - 101)  BP: 97/61 (01 Apr 2025 09:30) (97/61 - 105/74)  BP(mean): --  RR: 18 (01 Apr 2025 09:30) (18 - 18)  SpO2: 95% (01 Apr 2025 11:32) (92% - 95%)    Parameters below as of 01 Apr 2025 09:30  Patient On (Oxygen Delivery Method): nasal cannula  O2 Flow (L/min): 3   I&O's Summary    31 Mar 2025 07:01  -  01 Apr 2025 07:00  --------------------------------------------------------  IN: 450 mL / OUT: 0 mL / NET: 450 mL    01 Apr 2025 07:01  -  01 Apr 2025 15:38  --------------------------------------------------------  IN: 0 mL / OUT: 118 mL / NET: -118 mL       GENERAL:  [x]Alert  [x]Oriented x3   [ ]Lethargic  [x]Cachexia  [ ]Unarousable  [x]Verbal  [ ]Non-Verbal  [x] No Distress  Behavioral:   [ ] Anxiety  [ ] Delirium [ ] Agitation [x] Calm  [ ] Other  HEENT:  [x]Normal  [ ] Temporal Wasting  [ ]Dry mouth   [ ]ET Tube/Trach  [ ]Oral lesions  [ ] Mucositis  PULMONARY:   [x]Clear [ ]Tachypnea  [ ]Audible excessive secretions   [ ]Rhonchi        [ ]Right [ ]Left [ ]Bilateral  [ ]Crackles        [ ]Right [ ]Left [ ]Bilateral  [ ]Wheezing     [ ]Right [ ]Left [ ]Bilateral  [ ]Diminished breath sounds [ ]right [ ]left [ ]bilateral  CARDIOVASCULAR:    [x]Regular [ ]Irregular [ ]Tachy  [ ]Rios [ ]Murmur [ ]Other  GASTROINTESTINAL:  [x]Soft  [ ]Distended   [x]+BS  [ ]Non tender [ ]Tender  [ ]PEG [ ]OGT/ NGT  Last BM: +colostomy   GENITOURINARY:  [x]Normal [ ] Incontinent   [ ]Oliguria/Anuria   [ ]Smiley  MUSCULOSKELETAL:   [ ]Normal   [x]Weakness  [x]Bed/Wheelchair bound [x ]Edema  [  ] amputation  [  ] contraction  NEUROLOGIC:   [x]No focal deficits  [ ]Cognitive impairment  [ ]Dysphagia [ ]Dysarthria [ ]Paresis [ ]Other   SKIN: See Nursing Skin Assessment for further details  [ ]Normal    [ ]Rash  [ ]Pressure ulcer(s)       Present on admission [ ]y [ ]n   [  ]  Wound    [  ] hyperpigmentation      CRITICAL CARE:  [ ]Shock Present  [ ]Septic [ ]Cardiogenic [ ]Neurologic [ ]Hypovolemic  [ ]Vasopressors [ ]Inotropes  [ ]Respiratory failure present [ ]Mechanical Ventilation [ ]Non-invasive ventilatory support [ ]High-Flow   [ ]Acute  [ ]Chronic [ ]Hypoxic  [ ]Hypercarbic [ ]Other  [ ]Other organ failure     LABS:                        9.6    10.95 )-----------( 370      ( 01 Apr 2025 06:03 )             31.3   04-01    136  |  100  |  18  ----------------------------<  84  3.4[L]   |  22  |  1.06    Ca    8.4      01 Apr 2025 06:03  Phos  3.2     04-01  Mg     1.80     04-01    TPro  5.1[L]  /  Alb  2.3[L]  /  TBili  0.2  /  DBili  x   /  AST  26  /  ALT  10  /  AlkPhos  148[H]  04-01      Urinalysis Basic - ( 01 Apr 2025 06:03 )    Color: x / Appearance: x / SG: x / pH: x  Gluc: 84 mg/dL / Ketone: x  / Bili: x / Urobili: x   Blood: x / Protein: x / Nitrite: x   Leuk Esterase: x / RBC: x / WBC x   Sq Epi: x / Non Sq Epi: x / Bacteria: x      RADIOLOGY & ADDITIONAL STUDIES: no new     Protein Calorie Malnutrition Present: [ ]mild [ ]moderate [ ]severe [ ]underweight [ ]morbid obesity  https://www.andeal.org/vault/2440/web/files/ONC/Table_Clinical%20Characteristics%20to%20Document%20Malnutrition-White%20JV%20et%20al%100198.pdf    Height (cm): 154.9 (03-25-25 @ 08:00), 153.01 (02-21-25 @ 10:00), 154.9 (04-19-24 @ 10:00)  Weight (kg): 67.8 (03-25-25 @ 08:00), 58.96 (03-12-25 @ 09:00), 53.5 (04-19-24 @ 10:00)  BMI (kg/m2): 28.3 (03-25-25 @ 08:00), 25.2 (03-12-25 @ 09:00), 22.9 (02-21-25 @ 10:00)    [ ]PPSV2 < or = 30%  [ ]significant weight loss [ ]poor nutritional intake [ ]anasarca[ ]Artificial Nutrition    Other REFERRALS:  [ ]Hospice  [ ]Child Life  [ ]Social Work  [ ]Case management [ ]Holistic Therapy    [facility] Geriatrics and Palliative Care  Cecile Kimble Palliative Care Attending  Contact Info: Page [phone] (including Nights/Weekends), message on Microsoft Teams (Cecile Kimble), or leave  at Palliative Office [phone] (non-urgent)   Date of [id number] @ 15:38    SUBJECTIVE AND OBJECTIVE: Patient reports pain is better controlled with current regimen. SonGeoffrey, at bedside. See extensive goc note.     Indication for Geriatrics and Palliative Care Services/INTERVAL HPI: sx management and goc in setting of advanced malignancy     OVERNIGHT EVENTS:  > 4/1: Over the past 24 hours, patient required PRNs of 1mg IV dilaudid x2, 6mg PO dilaudid x2 (after ATC dilaudid was discontinued), 0.5mg PO ativan x2.     DNR on chart:  Allergies    carboplatin (Unknown)  oxaliplatin (Unknown)  IV Contrast (Rash)  cisplatin (Unknown)  platinum containing compounds (Anaphylaxis)    Intolerances    MEDICATIONS  (STANDING):  alteplase  Injectable for Pleural Effusion 10 milliGRAM(s) IntraPleural. every 12 hours  chlorhexidine 2% Cloths 1 Application(s) Topical <User Schedule>  cyanocobalamin 1000 MICROGram(s) Oral daily  dornase nidhi Solution for Pleural Effusion 5 milliGRAM(s) IntraPleural. every 12 hours  escitalopram 10 milliGRAM(s) Oral daily  fentaNYL   Patch  50 MICROgram(s)/Hr 1 Patch Transdermal every 72 hours  fentaNYL   Patch 100 MICROgram(s)/Hr 1 Patch Transdermal every 72 hours  fluticasone propionate 50 MICROgram(s)/spray Nasal Spray 1 Spray(s) Both Nostrils two times a day  furosemide   Injectable 20 milliGRAM(s) IV Push daily  piperacillin/tazobactam IVPB.. 3.375 Gram(s) IV Intermittent every 8 hours  polyethylene glycol 3350 17 Gram(s) Oral daily  senna 2 Tablet(s) Oral at bedtime  simethicone 80 milliGRAM(s) Chew three times a day  sodium chloride 0.9% Solution for Pleural Effusion 30 milliLiter(s) IntraPleural. every 12 hours    MEDICATIONS  (PRN):  albuterol    0.083%. 2.5 milliGRAM(s) Nebulizer once PRN Wheezing  diphenhydrAMINE 50 milliGRAM(s) Oral once PRN MILD ALERGIC REACTION  glucagon  Injectable 1 milliGRAM(s) IV Push once PRN REFRACTORY CASES AND/OR ON BETA BLOCKERS  glucagon  Injectable 2 milliGRAM(s) IV Push once PRN REFRACTORY CASES AND/ OR ON BETA BLOCKERS  HYDROmorphone   Tablet 6 milliGRAM(s) Oral every 4 hours PRN Moderate Pain (4 - 6)  HYDROmorphone  Injectable 1 milliGRAM(s) IV Push every 4 hours PRN Severe Pain (7 - 10)  LORazepam     Tablet 0.5 milliGRAM(s) Oral two times a day PRN Anxiety  ondansetron Injectable 8 milliGRAM(s) IV Push every 8 hours PRN Nausea  pseudoephedrine 60 milliGRAM(s) Oral every 6 hours PRN nasal congesiton  sodium chloride 0.65% Nasal 1 Spray(s) Both Nostrils two times a day PRN Congestion      ITEMS UNCHECKED ARE NOT PRESENT    PRESENT SYMPTOMS: [ ]Unable to self-report - see [ ] CPOT [ ] PAINADS [ ] RDOS  Source if other than patient:  [ ]Family   [ ]Team     Pain: [x]yes [ ]no  QOL impact - debilitating   Location - rectum            Aggravating factors - progression of disease   Quality - ache  Radiation -  denies   Timing- constant   Severity (0-10 scale): 10  Minimal acceptable level / Pain goal (0-10 scale): 2    CPOT:    https://www.[facility].org/getattachment/[email or web address]/Critical-Care-Pain-Observation-Tool-(CPOT)    Dyspnea:                           [ ]Mild [ ]Moderate [ ]Severe  Anxiety:                             [ ]Mild [x ]Moderate [ ]Severe  Fatigue:                             [ ]Mild [ x]Moderate [ ]Severe  Nausea:                             [ ]Mild [ ]Moderate [ ]Severe  Loss of appetite:              [ ]Mild [ x]Moderate [ ]Severe  Constipation:                    [ ]Mild [ ]Moderate [ ]Severe  Other Symptoms:  [ x]All other review of systems negative     PCSSQ[Palliative Care Spiritual Screening Question]   Severity (0-10):  Score of 4 or > indicate consideration of Chaplaincy referral.  Chaplaincy Referral: [ ] yes [ ] refused [ ] following [x ] deferred    Caregiver [place]? : [ ] yes [ ] no [x ] Deferred [ ] Declined             Social work referral [ ] Patient & Family Centered Care Referral [ ]  Anticipatory Grief present?:  [ ] yes [ ] no  [x ] Deferred                  Social work referral [ ] Patient & Family Centered Care Referral [ ]      PHYSICAL EXAM:  Vital Signs Last 24 Hrs  T(C): 36.8 (01 Apr 2025 09:30), Max: 36.9 (31 Mar 2025 19:31)  T(F): 98.3 (01 Apr 2025 09:30), Max: 98.5 (31 Mar 2025 19:31)  HR: 101 (01 Apr 2025 09:30) (93 - 101)  BP: 97/61 (01 Apr 2025 09:30) (97/61 - 105/74)  BP(mean): --  RR: 18 (01 Apr 2025 09:30) (18 - 18)  SpO2: 95% (01 Apr 2025 11:32) (92% - 95%)    Parameters below as of 01 Apr 2025 09:30  Patient On (Oxygen Delivery Method): nasal cannula  O2 Flow (L/min): 3   I&O's Summary    31 Mar 2025 07:01  -  01 Apr 2025 07:00  --------------------------------------------------------  IN: 450 mL / OUT: 0 mL / NET: 450 mL    01 Apr 2025 07:01  -  01 Apr 2025 15:38  --------------------------------------------------------  IN: 0 mL / OUT: 118 mL / NET: -118 mL       GENERAL:  [x]Alert  [x]Oriented x3   [ ]Lethargic  [x]Cachexia  [ ]Unarousable  [x]Verbal  [ ]Non-Verbal  [x] No Distress  Behavioral:   [ ] Anxiety  [ ] Delirium [ ] Agitation [x] Calm  [ ] Other  HEENT:  [x]Normal  [ ] Temporal Wasting  [ ]Dry mouth   [ ]ET Tube/Trach  [ ]Oral lesions  [ ] Mucositis  PULMONARY:   [x]Clear [ ]Tachypnea  [ ]Audible excessive secretions   [ ]Rhonchi        [ ]Right [ ]Left [ ]Bilateral  [ ]Crackles        [ ]Right [ ]Left [ ]Bilateral  [ ]Wheezing     [ ]Right [ ]Left [ ]Bilateral  [ ]Diminished breath sounds [ ]right [ ]left [ ]bilateral  CARDIOVASCULAR:    [x]Regular [ ]Irregular [ ]Tachy  [ ]Rios [ ]Murmur [ ]Other  GASTROINTESTINAL:  [x]Soft  [ ]Distended   [x]+BS  [ ]Non tender [ ]Tender  [ ]PEG [ ]OGT/ NGT  Last BM: +colostomy   GENITOURINARY:  [x]Normal [ ] Incontinent   [ ]Oliguria/Anuria   [ ]Smiley  MUSCULOSKELETAL:   [ ]Normal   [x]Weakness  [x]Bed/Wheelchair bound [x ]Edema  [  ] amputation  [  ] contraction  NEUROLOGIC:   [x]No focal deficits  [ ]Cognitive impairment  [ ]Dysphagia [ ]Dysarthria [ ]Paresis [ ]Other   SKIN: See Nursing Skin Assessment for further details  [ ]Normal    [ ]Rash  [ ]Pressure ulcer(s)       Present on admission [ ]y [ ]n   [  ]  Wound    [  ] hyperpigmentation      CRITICAL CARE:  [ ]Shock Present  [ ]Septic [ ]Cardiogenic [ ]Neurologic [ ]Hypovolemic  [ ]Vasopressors [ ]Inotropes  [ ]Respiratory failure present [ ]Mechanical Ventilation [ ]Non-invasive ventilatory support [ ]High-Flow   [ ]Acute  [ ]Chronic [ ]Hypoxic  [ ]Hypercarbic [ ]Other  [ ]Other organ failure     LABS:                        9.6    10.95 )-----------( 370      ( 01 Apr 2025 06:03 )             31.3   04-01    136  |  100  |  18  ----------------------------<  84  3.4[L]   |  22  |  1.06    Ca    8.4      01 Apr 2025 06:03  Phos  3.2     04-01  Mg     1.80     04-01    TPro  5.1[L]  /  Alb  2.3[L]  /  TBili  0.2  /  DBili  x   /  AST  26  /  ALT  10  /  AlkPhos  148[H]  04-01      Urinalysis Basic - ( 01 Apr 2025 06:03 )    Color: x / Appearance: x / SG: x / pH: x  Gluc: 84 mg/dL / Ketone: x  / Bili: x / Urobili: x   Blood: x / Protein: x / Nitrite: x   Leuk Esterase: x / RBC: x / WBC x   Sq Epi: x / Non Sq Epi: x / Bacteria: x      RADIOLOGY & ADDITIONAL STUDIES: no new     Protein Calorie Malnutrition Present: [ ]mild [ ]moderate [ ]severe [ ]underweight [ ]morbid obesity  https://www.andeal.org/vault/2440/web/files/ONC/Table_Clinical%20Characteristics%20to%20Document%20Malnutrition-White%20JV%20et%20al%202012.pdf    Height (cm): 154.9 (03-25-25 @ 08:00), 153.01 (02-21-25 @ 10:00), 154.9 (04-19-24 @ 10:00)  Weight (kg): 67.8 (03-25-25 @ 08:00), 58.96 (03-12-25 @ 09:00), 53.5 (04-19-24 @ 10:00)  BMI (kg/m2): 28.3 (03-25-25 @ 08:00), 25.2 (03-12-25 @ 09:00), 22.9 (02-21-25 @ 10:00)    [ ]PPSV2 < or = 30%  [ ]significant weight loss [ ]poor nutritional intake [ ]anasarca[ ]Artificial Nutrition    Other REFERRALS:  [ ]Hospice  [ ]Child Life  [ ]Social Work  [ ]Case management [ ]Holistic Therapy

## 2025-04-01 NOTE — PROGRESS NOTE ADULT - SUBJECTIVE AND OBJECTIVE BOX
Interval Events: NAEON, ~80 ml output thru chest tube, GOC discussions ongoing    PHYSICAL EXAM:  General: NAD  HEENT: MMM, PERRLA  Cardiovascular: RRR  Respiratory: CTAB, no wheezes, crackles, or rhonci - CT to suction with no air leak, serous fluid draning   Abdomen: large, soft, nontender  Extremities: warm and well perfused, no edema, no clubbing  Skin: no rashes  Neurological: no focal deficits    REVIEW OF SYSTEMS:  All systems negative unless described below:    OBJECTIVE:  ICU Vital Signs Last 24 Hrs  T(C): 36.8 (01 Apr 2025 09:30), Max: 36.9 (31 Mar 2025 13:31)  T(F): 98.3 (01 Apr 2025 09:30), Max: 98.5 (31 Mar 2025 19:31)  HR: 101 (01 Apr 2025 09:30) (90 - 101)  BP: 97/61 (01 Apr 2025 09:30) (97/61 - 105/74)  BP(mean): --  ABP: --  ABP(mean): --  RR: 18 (01 Apr 2025 09:30) (18 - 18)  SpO2: 95% (01 Apr 2025 11:32) (92% - 96%)    O2 Parameters below as of 01 Apr 2025 09:30  Patient On (Oxygen Delivery Method): nasal cannula  O2 Flow (L/min): 3            03-31 @ 07:01 - 04-01 @ 07:00  --------------------------------------------------------  IN: 450 mL / OUT: 0 mL / NET: 450 mL    04-01 @ 07:01 - 04-01 @ 12:36  --------------------------------------------------------  IN: 0 mL / OUT: 118 mL / NET: -118 mL      CAPILLARY BLOOD GLUCOSE              HOSPITAL MEDICATIONS:  alteplase  Injectable for Pleural Effusion 10 milliGRAM(s) IntraPleural. every 12 hours    piperacillin/tazobactam IVPB.. 3.375 Gram(s) IV Intermittent every 8 hours    furosemide   Injectable 20 milliGRAM(s) IV Push daily    glucagon  Injectable 1 milliGRAM(s) IV Push once PRN  glucagon  Injectable 2 milliGRAM(s) IV Push once PRN    albuterol    0.083%. 2.5 milliGRAM(s) Nebulizer once PRN  dornase nidhi Solution for Pleural Effusion 5 milliGRAM(s) IntraPleural. every 12 hours  pseudoephedrine 60 milliGRAM(s) Oral every 6 hours PRN    diphenhydrAMINE 50 milliGRAM(s) Oral once PRN  escitalopram 10 milliGRAM(s) Oral daily  fentaNYL   Patch  50 MICROgram(s)/Hr 1 Patch Transdermal every 72 hours  fentaNYL   Patch 100 MICROgram(s)/Hr 1 Patch Transdermal every 72 hours  HYDROmorphone   Tablet 6 milliGRAM(s) Oral every 4 hours PRN  HYDROmorphone  Injectable 1 milliGRAM(s) IV Push every 4 hours PRN  LORazepam     Tablet 0.5 milliGRAM(s) Oral two times a day PRN  ondansetron Injectable 8 milliGRAM(s) IV Push every 8 hours PRN    polyethylene glycol 3350 17 Gram(s) Oral daily  senna 2 Tablet(s) Oral at bedtime  simethicone 80 milliGRAM(s) Chew three times a day        cyanocobalamin 1000 MICROGram(s) Oral daily  sodium chloride 0.9% Solution for Pleural Effusion 30 milliLiter(s) IntraPleural. every 12 hours      chlorhexidine 2% Cloths 1 Application(s) Topical <User Schedule>  fluticasone propionate 50 MICROgram(s)/spray Nasal Spray 1 Spray(s) Both Nostrils two times a day  sodium chloride 0.65% Nasal 1 Spray(s) Both Nostrils two times a day PRN        LABS:                        9.6    10.95 )-----------( 370      ( 01 Apr 2025 06:03 )             31.3     Hgb Trend: 9.6<--, 9.4<--, 9.5<--, 9.3<--, 9.4<--  04-01    136  |  100  |  18  ----------------------------<  84  3.4[L]   |  22  |  1.06    Ca    8.4      01 Apr 2025 06:03  Phos  3.2     04-01  Mg     1.80     04-01    TPro  5.1[L]  /  Alb  2.3[L]  /  TBili  0.2  /  DBili  x   /  AST  26  /  ALT  10  /  AlkPhos  148[H]  04-01    Creatinine Trend: 1.06<--, 1.11<--, 1.13<--, 1.19<--, 0.92<--, 1.00<--    Urinalysis Basic - ( 01 Apr 2025 06:03 )    Color: x / Appearance: x / SG: x / pH: x  Gluc: 84 mg/dL / Ketone: x  / Bili: x / Urobili: x   Blood: x / Protein: x / Nitrite: x   Leuk Esterase: x / RBC: x / WBC x   Sq Epi: x / Non Sq Epi: x / Bacteria: x            MICROBIOLOGY:     RADIOLOGY:  [x] Reviewed and interpreted by me

## 2025-04-01 NOTE — PROGRESS NOTE ADULT - ASSESSMENT
63 year old female PMH:Anxiety Chronic back pain, Depression, Ovarian ca with malignant effusion R s/p R Pleurx ([place]) and Vertigo mass effect adjacent to distal colon w/o obstruction, mass effect on distal R ureter s/p ureter stent now s/p colostomy c/b bleeding now off AC,  Iliac vein occlusion status post stent presents for desensitization of carboplatin. Patient endorses fatigue. She denies fevers but endorses chills. Also endorses abdominal pain and some colostomy leakage. Pt denies any bleeding currently. Endorses difficulty with BMs and urinating as well, states she feels burning when she goes. Overall also endorses sob and has a lot of difficulty walking. Has noted worsening SOB over the past few weeks. Admitted to MICU for carboplatin desensitization, completed successfully on 3/26. Patient developed worsening dyspnea 2/2 loculated R pleural effusion with pleurx in place with CT imaging with R loculated effusion in two pockets; pleurx not draining well. Started MIST protocol on 3/26 with adequate output.     #Ovarian Ca, stage 4  #R Pleurx connected to PleuroVac  #L simple pleural effusion    - 3/27 fluid sample with concern for complicated effusion  - Continue Zosyn with total regimen to 4-6 weeks, would agree to transition to oral Augmentin since no clinical evidence of empyema that needs IV aside features on imaging of organization  - bacteroides bacteremia  - s/p MIST for total of 6 doses (last dose given 3/29),  - Diurese and assess response with pleural effusion/ascites  - Continue oxygen supplementation with goal >92%  - [place] PleurX connected to PleuroVac on suction -20  - At this time repeat CT chest with no interval change, discussed with team to pursue GOC since further interventions would require potential invasive measures that should be discussed with family and Thoracic consult. Will defer management to Primary team   - pending further GOC and potential hospice, pall care onboard

## 2025-04-01 NOTE — PROGRESS NOTE ADULT - ASSESSMENT
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 On admission patient also c/o dyspnea over the past few weeks. Further eval revealed loculated R pleural effusion with pleurx in place; pleurx not draining well. Started MIST protocol on 3/26    Fallopian tube cancer  S/P multiple lines of therapy, now on Paclitaxel. Admitted for carboplatin desensitization   Now complete, when able to be discharged will follow up with Dr. Hughes.   Overall performance status of patient is poor. D/W Dr. Hughes, patient's disease is very advanced. She is not recommending further cancer therapy. Patient is currently a candidate for hospice.   Attempted to discuss with the patient and son, it appears like more time is needed to process hospice recommendations.    Loculated Effusion  Empyema?   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 Patient developed worsening dyspnea 2/2 loculated R pleural effusion with pleurx in place; pleurx not draining well. Started MIST protocol on 3/26,  Pulmonary stating that this may be empyema. D/W ID, not entirely clear. No cxs obtained. Procal 0.24, not entirely + for PNA. Continue Zosyn for now.   Loculated effusion may be malignant more so than infectious  Follow up pulmonary  O2 supplement as needed  Patient expressed today that she is not interested in further interventions from CT surgery for loculated effusions    Cancer related pain  Palliative care consulted, pain is now better controlled.    Anxiety  Depression  Palliative care  Continue current meds

## 2025-04-02 DIAGNOSIS — R78.81 BACTEREMIA: ICD-10-CM

## 2025-04-02 LAB
ALBUMIN SERPL ELPH-MCNC: 2.3 G/DL — LOW (ref 3.3–5)
ALP SERPL-CCNC: 177 U/L — HIGH (ref 40–120)
ALT FLD-CCNC: 10 U/L — SIGNIFICANT CHANGE UP (ref 4–33)
ANION GAP SERPL CALC-SCNC: 12 MMOL/L — SIGNIFICANT CHANGE UP (ref 7–14)
AST SERPL-CCNC: 25 U/L — SIGNIFICANT CHANGE UP (ref 4–32)
BASOPHILS # BLD AUTO: 0.09 K/UL — SIGNIFICANT CHANGE UP (ref 0–0.2)
BASOPHILS NFR BLD AUTO: 0.9 % — SIGNIFICANT CHANGE UP (ref 0–2)
BILIRUB SERPL-MCNC: <0.2 MG/DL — SIGNIFICANT CHANGE UP (ref 0.2–1.2)
BUN SERPL-MCNC: 20 MG/DL — SIGNIFICANT CHANGE UP (ref 7–23)
CALCIUM SERPL-MCNC: 8.2 MG/DL — LOW (ref 8.4–10.5)
CHLORIDE SERPL-SCNC: 98 MMOL/L — SIGNIFICANT CHANGE UP (ref 98–107)
CO2 SERPL-SCNC: 22 MMOL/L — SIGNIFICANT CHANGE UP (ref 22–31)
CREAT SERPL-MCNC: 0.98 MG/DL — SIGNIFICANT CHANGE UP (ref 0.5–1.3)
EGFR: 65 ML/MIN/1.73M2 — SIGNIFICANT CHANGE UP
EGFR: 65 ML/MIN/1.73M2 — SIGNIFICANT CHANGE UP
EOSINOPHIL # BLD AUTO: 0.3 K/UL — SIGNIFICANT CHANGE UP (ref 0–0.5)
EOSINOPHIL NFR BLD AUTO: 2.8 % — SIGNIFICANT CHANGE UP (ref 0–6)
GLUCOSE SERPL-MCNC: 104 MG/DL — HIGH (ref 70–99)
HCT VFR BLD CALC: 31.4 % — LOW (ref 34.5–45)
HGB BLD-MCNC: 9.5 G/DL — LOW (ref 11.5–15.5)
IANC: 8.15 K/UL — HIGH (ref 1.8–7.4)
IMM GRANULOCYTES NFR BLD AUTO: 1.3 % — HIGH (ref 0–0.9)
LYMPHOCYTES # BLD AUTO: 0.93 K/UL — LOW (ref 1–3.3)
LYMPHOCYTES # BLD AUTO: 8.8 % — LOW (ref 13–44)
MAGNESIUM SERPL-MCNC: 1.6 MG/DL — SIGNIFICANT CHANGE UP (ref 1.6–2.6)
MCHC RBC-ENTMCNC: 28.9 PG — SIGNIFICANT CHANGE UP (ref 27–34)
MCHC RBC-ENTMCNC: 30.3 G/DL — LOW (ref 32–36)
MCV RBC AUTO: 95.4 FL — SIGNIFICANT CHANGE UP (ref 80–100)
MONOCYTES # BLD AUTO: 0.92 K/UL — HIGH (ref 0–0.9)
MONOCYTES NFR BLD AUTO: 8.7 % — SIGNIFICANT CHANGE UP (ref 2–14)
NEUTROPHILS # BLD AUTO: 8.15 K/UL — HIGH (ref 1.8–7.4)
NEUTROPHILS NFR BLD AUTO: 77.5 % — HIGH (ref 43–77)
NRBC # BLD AUTO: 0 K/UL — SIGNIFICANT CHANGE UP (ref 0–0)
NRBC # FLD: 0 K/UL — SIGNIFICANT CHANGE UP (ref 0–0)
NRBC BLD AUTO-RTO: 0 /100 WBCS — SIGNIFICANT CHANGE UP (ref 0–0)
PHOSPHATE SERPL-MCNC: 2.4 MG/DL — LOW (ref 2.5–4.5)
PLATELET # BLD AUTO: 322 K/UL — SIGNIFICANT CHANGE UP (ref 150–400)
POTASSIUM SERPL-MCNC: 3.8 MMOL/L — SIGNIFICANT CHANGE UP (ref 3.5–5.3)
POTASSIUM SERPL-SCNC: 3.8 MMOL/L — SIGNIFICANT CHANGE UP (ref 3.5–5.3)
PROT SERPL-MCNC: 5.1 G/DL — LOW (ref 6–8.3)
RBC # BLD: 3.29 M/UL — LOW (ref 3.8–5.2)
RBC # FLD: 17.8 % — HIGH (ref 10.3–14.5)
SODIUM SERPL-SCNC: 132 MMOL/L — LOW (ref 135–145)
WBC # BLD: 10.53 K/UL — HIGH (ref 3.8–10.5)
WBC # FLD AUTO: 10.53 K/UL — HIGH (ref 3.8–10.5)

## 2025-04-02 PROCEDURE — 99233 SBSQ HOSP IP/OBS HIGH 50: CPT

## 2025-04-02 PROCEDURE — G0545: CPT

## 2025-04-02 PROCEDURE — 74176 CT ABD & PELVIS W/O CONTRAST: CPT | Mod: 26

## 2025-04-02 RX ORDER — SOD PHOS DI, MONO/K PHOS MONO 250 MG
1 TABLET ORAL ONCE
Refills: 0 | Status: COMPLETED | OUTPATIENT
Start: 2025-04-02 | End: 2025-04-02

## 2025-04-02 RX ORDER — MAGNESIUM SULFATE 500 MG/ML
2 SYRINGE (ML) INJECTION ONCE
Refills: 0 | Status: COMPLETED | OUTPATIENT
Start: 2025-04-02 | End: 2025-04-02

## 2025-04-02 RX ORDER — ACETAMINOPHEN 500 MG/5ML
1000 LIQUID (ML) ORAL ONCE
Refills: 0 | Status: COMPLETED | OUTPATIENT
Start: 2025-04-02 | End: 2025-04-02

## 2025-04-02 RX ADMIN — Medication 1 MILLIGRAM(S): at 18:07

## 2025-04-02 RX ADMIN — Medication 1 MILLIGRAM(S): at 17:07

## 2025-04-02 RX ADMIN — Medication 0.5 MILLIGRAM(S): at 10:27

## 2025-04-02 RX ADMIN — Medication 1 MILLIGRAM(S): at 21:25

## 2025-04-02 RX ADMIN — Medication 80 MILLIGRAM(S): at 10:27

## 2025-04-02 RX ADMIN — Medication 400 MILLIGRAM(S): at 22:30

## 2025-04-02 RX ADMIN — Medication 1 MILLIGRAM(S): at 22:25

## 2025-04-02 RX ADMIN — Medication 1 MILLIGRAM(S): at 09:39

## 2025-04-02 RX ADMIN — Medication 25 GRAM(S): at 10:28

## 2025-04-02 RX ADMIN — Medication 25 GRAM(S): at 01:54

## 2025-04-02 RX ADMIN — CYANOCOBALAMIN 1000 MICROGRAM(S): 1000 INJECTION INTRAMUSCULAR; SUBCUTANEOUS at 17:06

## 2025-04-02 RX ADMIN — Medication 1000 MILLIGRAM(S): at 23:30

## 2025-04-02 RX ADMIN — Medication 25 GRAM(S): at 17:06

## 2025-04-02 RX ADMIN — Medication 6 MILLIGRAM(S): at 00:52

## 2025-04-02 RX ADMIN — ESCITALOPRAM OXALATE 10 MILLIGRAM(S): 20 TABLET ORAL at 12:53

## 2025-04-02 RX ADMIN — Medication 1 MILLIGRAM(S): at 12:51

## 2025-04-02 RX ADMIN — Medication 25 GRAM(S): at 08:41

## 2025-04-02 RX ADMIN — Medication 1 MILLIGRAM(S): at 08:39

## 2025-04-02 RX ADMIN — FLUTICASONE PROPIONATE 1 SPRAY(S): 50 SPRAY, METERED NASAL at 06:22

## 2025-04-02 RX ADMIN — Medication 80 MILLIGRAM(S): at 17:07

## 2025-04-02 RX ADMIN — Medication 1 MILLIGRAM(S): at 13:51

## 2025-04-02 RX ADMIN — Medication 1 APPLICATION(S): at 06:25

## 2025-04-02 RX ADMIN — FUROSEMIDE 20 MILLIGRAM(S): 10 INJECTION INTRAMUSCULAR; INTRAVENOUS at 06:22

## 2025-04-02 RX ADMIN — FLUTICASONE PROPIONATE 1 SPRAY(S): 50 SPRAY, METERED NASAL at 17:07

## 2025-04-02 NOTE — PROGRESS NOTE ADULT - PROBLEM SELECTOR PLAN 9
In the event of newly developing, evolving, or worsening symptoms, please contact the Palliative Medicine team via pager (if the patient is at [facility] #[phone] or if the patient is at [facility] #[phone]) The Geriatric and Palliative Medicine service has coverage 24 hours a day/ 7 days a week to provide medical recommendations regarding symptom management needs via telephone.

## 2025-04-02 NOTE — PROGRESS NOTE ADULT - ASSESSMENT
This is a 62 y/o F w/ PMHx ovarian ca (possible liver, lung mets) with malignant R effusion s/p R Pleurx ([place], 1/2025), mass effect adjacent to distal colon w/o obstruction, mass effect on distal R ureter s/p ureter stent now s/p colostomy c/b bleeding now off AC,  Iliac vein occlusion s/p stent who was admitted to [facility] MICU on 3/25/25 for desensitization of carboplatin, noted to have R sided PLEURX not draining well.  CT Chest w/ multiloculated R pleural effusion, some pockets of pleural fluid not communicated w/ catheter tip. Mod L PLEFF. Pt started on MIST protocol w/ TPA with more output.   Studies sent on 3/27 after TPA, with , RBC 29k, 59% neutrophils,  pH 8.1, glucose 33, LDH 1133, protein 1.6. No Cx sent.   Pt started on Vancomycin/Zosyn.     #Bacteroides bacteremia   #R multiloculated pleural effusion, s/p MIST protocol through PLEURX,   #C/f infected loculated pleural effusion?  #Mild leukocytosis  #Ovarian CA w/ likely lung, liver mets    Overall, a 62 y/o F w/ PMHx ovarian ca (possible liver, lung mets) with malignant R effusion s/p R Pleurx ([place], 1/2025), mass effect adjacent to distal colon w/o obstruction, mass effect on distal R ureter s/p ureter stent now s/p colostomy c/b bleeding now off AC, Iliac vein occlusion s/p stent admitted for chemo sensitization, noted to have non draining R PLEURX, s/p CT w/ multiloculated R pleural effusion, now on MIST protocol.   ID consulted for possible infected R loculated pleural effusion. Pt is currently afebrile, mild leukocytosis 12, procal low, BCx 1 set 3/27 NG, MRSA PCR neg.   Studies taken from existing PLEURX: (after TPA)  , RBC 29k, 59% neutrophils,  pH 8.1, glucose 33, LDH 1133, protein 1.6.  Difficult to interpret studies take from existing PLEURX and after TPA.  Given low nucleated cell count (~364 after correcting RBCs), high pH, and lack of other infectious findings (BCx NGTD, low PCT, no fevers, no PNA on CT), concern for empyema/parapneumonic effusion is quite low.     BCx from 3/27 now 1/2 positive for bacteroides, certainly a GI source. Given ovarian CA w/ mets, pt may have translocation. This would not be associated with a central line/port infection or PLEURX infection.   Pending GOC, would need CT A/P if no plans for hospice.     Recommendations:  1. C/w Zosyn 3.375 g q8 given bacteroides bacteremia and likely GI source of infection   2. F/u repeat BCx sent on 3/30   3. Pending GOC, if not planned for hospice/comfort care, would obtain CT A/P     Thank you for consulting us and involving us in the management of this patient's case. In addition to reviewing history, imaging, documents, labs, microbiology, and infection control strategies and potential issues.     ID will continue to follow    John Perez M.D.  Attending Physician  Division of Infectious Diseases  Department of Medicine    Please contact through MS Teams message.  Office: [phone] (after 5 PM or weekend).  Time-based billing (NON-critical care).

## 2025-04-02 NOTE — PROGRESS NOTE ADULT - SUBJECTIVE AND OBJECTIVE BOX
Infectious Diseases Follow Up:    Patient is a 64y old  Female who presents with a chief complaint of Desensitization of carboplatin. (01 Apr 2025 15:49)      Interval History/ROS:  Pt seen at bedside w/ daughter, denies F/C, has R sided near chest     Allergies  carboplatin (Unknown)  oxaliplatin (Unknown)  IV Contrast (Rash)  cisplatin (Unknown)  platinum containing compounds (Anaphylaxis)        ANTIMICROBIALS:  piperacillin/tazobactam IVPB.. 3.375 every 8 hours      Current Abx:     Previous Abx     OTHER MEDS:  MEDICATIONS  (STANDING):  albuterol    0.083%. 2.5 once PRN  alteplase  Injectable for Pleural Effusion 10 every 12 hours  diphenhydrAMINE 50 once PRN  dornase nidhi Solution for Pleural Effusion 5 every 12 hours  escitalopram 10 daily  fentaNYL   Patch  50 MICROgram(s)/Hr 1 every 72 hours  fentaNYL   Patch 100 MICROgram(s)/Hr 1 every 72 hours  furosemide   Injectable 20 daily  glucagon  Injectable 1 once PRN  glucagon  Injectable 2 once PRN  HYDROmorphone   Tablet 6 every 4 hours PRN  HYDROmorphone  Injectable 1 every 4 hours PRN  LORazepam     Tablet 0.5 two times a day PRN  ondansetron Injectable 8 every 8 hours PRN  polyethylene glycol 3350 17 daily  pseudoephedrine 60 every 6 hours PRN  senna 2 at bedtime  simethicone 80 three times a day      Vital Signs Last 24 Hrs  T(C): 36.4 (02 Apr 2025 05:35), Max: 36.8 (01 Apr 2025 13:30)  T(F): 97.6 (02 Apr 2025 05:35), Max: 98.3 (01 Apr 2025 13:30)  HR: 92 (02 Apr 2025 05:35) (92 - 101)  BP: 111/82 (02 Apr 2025 05:35) (101/72 - 122/82)  BP(mean): --  RR: 18 (02 Apr 2025 05:35) (18 - 18)  SpO2: 96% (02 Apr 2025 05:35) (93% - 97%)    Parameters below as of 02 Apr 2025 05:35  Patient On (Oxygen Delivery Method): nasal cannula  O2 Flow (L/min): 3    PHYSICAL EXAM:  GENERAL: NAD, thin  HEAD:  Atraumatic, Normocephalic  EYES: EOMI, conjunctiva and sclera clear  CHEST/LUNG: On NC, Clear to auscultation bilaterally; R PLEURX   HEART: Regular rate and rhythm;   ABDOMEN: Soft, NT, ND, +ostomy   PSYCH: AAOx3                                     9.5    10.53 )-----------( 322      ( 02 Apr 2025 06:17 )             31.4       04-02    132[L]  |  98  |  20  ----------------------------<  104[H]  3.8   |  22  |  0.98    Ca    8.2[L]      02 Apr 2025 06:17  Phos  2.4     04-02  Mg     1.60     04-02    TPro  5.1[L]  /  Alb  2.3[L]  /  TBili  <0.2  /  DBili  x   /  AST  25  /  ALT  10  /  AlkPhos  177[H]  04-02      Urinalysis Basic - ( 02 Apr 2025 06:17 )    Color: x / Appearance: x / SG: x / pH: x  Gluc: 104 mg/dL / Ketone: x  / Bili: x / Urobili: x   Blood: x / Protein: x / Nitrite: x   Leuk Esterase: x / RBC: x / WBC x   Sq Epi: x / Non Sq Epi: x / Bacteria: x        MICROBIOLOGY:  v  Blood Blood-Peripheral  04-01-25   No growth at 24 hours  --  --      Blood Blood-Peripheral  03-30-25   No growth at 48 Hours  --  --      Blood Blood-Peripheral  03-27-25   Growth in anaerobic bottle: Bacteroides thetaiotaomicron group  "Susceptibilities not performed"  Direct identification is available within approximately 3-5  hours either by Blood Panel Multiplexed PCR or Direct  MALDI-TOF. Details: [email or web address]  --  Blood Culture PCR                RADIOLOGY:

## 2025-04-02 NOTE — PROGRESS NOTE ADULT - SUBJECTIVE AND OBJECTIVE BOX
[facility]  Cecile Kimble Palliative Care Attending  Contact Info: Page [phone] (including Nights/Weekends), message on Microsoft Teams (Cecile Kimble), or leave  at Palliative Office [phone] (non-urgent)   Date of [id number] @ 12:26    SUBJECTIVE AND OBJECTIVE: Patient seen this AM with Medical attending. Daughter, Alejandra, is at bedside. Patient endorsing anxiety.     Indication for Geriatrics and Palliative Care Services/INTERVAL HPI:    OVERNIGHT EVENTS:    DNR on chart:  Allergies    carboplatin (Unknown)  oxaliplatin (Unknown)  IV Contrast (Rash)  cisplatin (Unknown)  platinum containing compounds (Anaphylaxis)    Intolerances    MEDICATIONS  (STANDING):  alteplase  Injectable for Pleural Effusion 10 milliGRAM(s) IntraPleural. every 12 hours  chlorhexidine 2% Cloths 1 Application(s) Topical <User Schedule>  cyanocobalamin 1000 MICROGram(s) Oral daily  dornase nidhi Solution for Pleural Effusion 5 milliGRAM(s) IntraPleural. every 12 hours  escitalopram 10 milliGRAM(s) Oral daily  fentaNYL   Patch  50 MICROgram(s)/Hr 1 Patch Transdermal every 72 hours  fentaNYL   Patch 100 MICROgram(s)/Hr 1 Patch Transdermal every 72 hours  fluticasone propionate 50 MICROgram(s)/spray Nasal Spray 1 Spray(s) Both Nostrils two times a day  furosemide   Injectable 20 milliGRAM(s) IV Push daily  piperacillin/tazobactam IVPB.. 3.375 Gram(s) IV Intermittent every 8 hours  polyethylene glycol 3350 17 Gram(s) Oral daily  potassium phosphate / sodium phosphate Powder (PHOS-NaK) 1 Packet(s) Oral once  senna 2 Tablet(s) Oral at bedtime  simethicone 80 milliGRAM(s) Chew three times a day  sodium chloride 0.9% Solution for Pleural Effusion 30 milliLiter(s) IntraPleural. every 12 hours    MEDICATIONS  (PRN):  albuterol    0.083%. 2.5 milliGRAM(s) Nebulizer once PRN Wheezing  diphenhydrAMINE 50 milliGRAM(s) Oral once PRN MILD ALERGIC REACTION  glucagon  Injectable 1 milliGRAM(s) IV Push once PRN REFRACTORY CASES AND/OR ON BETA BLOCKERS  glucagon  Injectable 2 milliGRAM(s) IV Push once PRN REFRACTORY CASES AND/ OR ON BETA BLOCKERS  HYDROmorphone   Tablet 6 milliGRAM(s) Oral every 4 hours PRN Moderate Pain (4 - 6)  HYDROmorphone  Injectable 1 milliGRAM(s) IV Push every 4 hours PRN Severe Pain (7 - 10)  LORazepam     Tablet 0.5 milliGRAM(s) Oral two times a day PRN Anxiety  ondansetron Injectable 8 milliGRAM(s) IV Push every 8 hours PRN Nausea  pseudoephedrine 60 milliGRAM(s) Oral every 6 hours PRN nasal congesiton  sodium chloride 0.65% Nasal 1 Spray(s) Both Nostrils two times a day PRN Congestion      ITEMS UNCHECKED ARE NOT PRESENT    PRESENT SYMPTOMS: [ ]Unable to self-report - see [ ] CPOT [ ] PAINADS [ ] RDOS  Source if other than patient:  [ ]Family   [ ]Team     Pain:  [ ]yes [ ]no  QOL impact -   Location -                    Aggravating factors -  Quality -  Radiation -  Timing-  Severity (0-10 scale):  Minimal acceptable level/ pain goal (0-10 scale):     CPOT:    https://www.[facility].org/getattachment/[email or web address]/Critical-Care-Pain-Observation-Tool-(CPOT)    Dyspnea:                           [ ]Mild [ ]Moderate [ ]Severe  Anxiety:                             [ ]Mild [ ]Moderate [ ]Severe  Fatigue:                             [ ]Mild [ ]Moderate [ ]Severe  Nausea:                             [ ]Mild [ ]Moderate [ ]Severe  Loss of appetite:              [ ]Mild [ ]Moderate [ ]Severe  Constipation:                    [ ]Mild [ ]Moderate [ ]Severe  Other Symptoms:  [ ]All other review of systems negative     PCSSQ[Palliative Care Spiritual Screening Question]   Severity (0-10):  Score of 4 or > indicate consideration of Chaplaincy referral.  Chaplaincy Referral: [ ] yes [ ] refused [ ] following [ ] deferred    Caregiver [place]? : [ ] yes [ ] no [ ] Deferred [ ] Declined             Social work referral [ ] Patient & Family Centered Care Referral [ ]  Anticipatory Grief present?:  [ ] yes [ ] no  [ ] Deferred                  Social work referral [ ] Patient & Family Centered Care Referral [ ]      PHYSICAL EXAM:  Vital Signs Last 24 Hrs  T(C): 36.4 (02 Apr 2025 05:35), Max: 36.8 (01 Apr 2025 13:30)  T(F): 97.6 (02 Apr 2025 05:35), Max: 98.3 (01 Apr 2025 13:30)  HR: 92 (02 Apr 2025 05:35) (92 - 101)  BP: 111/82 (02 Apr 2025 05:35) (101/72 - 122/82)  BP(mean): --  RR: 18 (02 Apr 2025 05:35) (18 - 18)  SpO2: 96% (02 Apr 2025 05:35) (93% - 97%)    Parameters below as of 02 Apr 2025 05:35  Patient On (Oxygen Delivery Method): nasal cannula  O2 Flow (L/min): 3   I&O's Summary    01 Apr 2025 07:01  -  02 Apr 2025 07:00  --------------------------------------------------------  IN: 0 mL / OUT: 1183 mL / NET: -1183 mL       GENERAL: [ ]Cachexia    [ ]Alert  [ ]Oriented x   [ ]Lethargic  [ ]Unarousable  [ ]Verbal  [ ]Non-Verbal  Behavioral:   [ ]Anxiety  [ ]Delirium [ ]Agitation [ ]Other  HEENT:  [ ]Normal   [ ]Dry mouth   [ ]ET Tube/Trach  [ ]Oral lesions  PULMONARY:   [ ]Clear [ ]Tachypnea  [ ]Audible excessive secretions   [ ]Rhonchi        [ ]Right [ ]Left [ ]Bilateral  [ ]Crackles        [ ]Right [ ]Left [ ]Bilateral  [ ]Wheezing     [ ]Right [ ]Left [ ]Bilateral  [ ]Diminished BS [ ] Right [ ]Left [ ]Bilateral  CARDIOVASCULAR:    [ ]Regular [ ]Irregular [ ]Tachy  [ ]Rios [ ]Murmur [ ]Other  GASTROINTESTINAL:  [ ]Soft  [ ]Distended   [ ]+BS  [ ]Non tender [ ]Tender  [ ]Other [ ]PEG [ ]OGT/ NGT   Last BM:   GENITOURINARY:  [ ]Normal [ ]Incontinent   [ ]Oliguria/Anuria   [ ]Smiley  MUSCULOSKELETAL:   [ ]Normal   [ ]Weakness  [ ]Bed/Wheelchair bound [ ]Edema  NEUROLOGIC:   [ ]No focal deficits  [ ] Cognitive impairment  [ ] Dysphagia [ ]Dysarthria [ ] Paresis [ ]Other   SKIN: Please see flowsheets   [ ]Normal  [ ]Rash  [ ]Other  [ ]Pressure ulcer(s) [ ]y [ ]n present on admission    CRITICAL CARE:  [ ]Shock Present  [ ]Septic [ ]Cardiogenic [ ]Neurologic [ ]Hypovolemic  [ ]Vasopressors [ ]Inotropes  [ ]Respiratory failure present [ ]Mechanical Ventilation [ ]Non-invasive ventilatory support [ ]High-Flow   [ ]Acute  [ ]Chronic [ ]Hypoxic  [ ]Hypercarbic [ ]Other  [ ]Other organ failure     LABS:                        9.5    10.53 )-----------( 322      ( 02 Apr 2025 06:17 )             31.4   04-02    132[L]  |  98  |  20  ----------------------------<  104[H]  3.8   |  22  |  0.98    Ca    8.2[L]      02 Apr 2025 06:17  Phos  2.4     04-02  Mg     1.60     04-02    TPro  5.1[L]  /  Alb  2.3[L]  /  TBili  <0.2  /  DBili  x   /  AST  25  /  ALT  10  /  AlkPhos  177[H]  04-02      Urinalysis Basic - ( 02 Apr 2025 06:17 )    Color: x / Appearance: x / SG: x / pH: x  Gluc: 104 mg/dL / Ketone: x  / Bili: x / Urobili: x   Blood: x / Protein: x / Nitrite: x   Leuk Esterase: x / RBC: x / WBC x   Sq Epi: x / Non Sq Epi: x / Bacteria: x      RADIOLOGY & ADDITIONAL STUDIES:    Protein Calorie Malnutrition Present: [ ]mild [ ]moderate [ ]severe [ ]underweight [ ]morbid obesity  https://www.andeal.org/vault/2440/web/files/ONC/Table_Clinical%20Characteristics%20to%20Document%20Malnutrition-White%20JV%20et%20al%202012.pdf    Height (cm): 154.9 (03-25-25 @ 08:00), 153.01 (02-21-25 @ 10:00), 154.9 (04-19-24 @ 10:00)  Weight (kg): 67.8 (03-25-25 @ 08:00), 58.96 (03-12-25 @ 09:00), 53.5 (04-19-24 @ 10:00)  BMI (kg/m2): 28.3 (03-25-25 @ 08:00), 25.2 (03-12-25 @ 09:00), 22.9 (02-21-25 @ 10:00)    [ ]PPSV2 < or = 30%  [ ]significant weight loss [ ]poor nutritional intake [ ]anasarca[ ]Artificial Nutrition    Other REFERRALS:  [ ]Hospice  [ ]Child Life  [ ]Social Work  [ ]Case management [ ]Holistic Therapy     Goals of Care Document: [facility]  Cecile Kimble, Palliative Care Attending  Contact Info: Page [phone] (including Nights/Weekends), message on Microsoft Teams (Cecile Kimble), or leave  at Palliative Office [phone] (non-urgent)   Date of [id number] @ 12:26    SUBJECTIVE AND OBJECTIVE: Patient seen this AM with Medical attending. Daughter, Alejandra, son, Geoffrey and spouse are at bedside. Patient endorsing anxiety. Discussed the option of pursuing CTa/p to determine etiology of bacteremia and to see if potential drainage can be done. Pt having difficulty making decisions and family requesting time to decide what they want to pursue. Patient is stating that she doesn't want to go home and also doesn't want hospice b/c she doesn't want a hospital bed or oxygen in her home. Explained that she does not have to accept hospice services or medical equipment if she is not interested but did encourage her to make medical decisions to help guide the medical care while in the hospital. Of note, she states that she wants to stay in the hospital.     Indication for Geriatrics and Palliative Care Services/INTERVAL HPI: sx management and goc in setting of advanced malignancy     OVERNIGHT EVENTS:  > 4/2: Overnight events noted. Over the past 24 hours, patient required PRNs of 6mg PO dilaudid x2, 1mg IV dilaudid x2.   > 4/1: Over the past 24 hours, patient required PRNs of 1mg IV dilaudid x2, 6mg PO dilaudid x2 (after ATC dilaudid was discontinued), 0.5mg PO ativan x2.     DNR on chart:  Allergies    carboplatin (Unknown)  oxaliplatin (Unknown)  IV Contrast (Rash)  cisplatin (Unknown)  platinum containing compounds (Anaphylaxis)    Intolerances    MEDICATIONS  (STANDING):  alteplase  Injectable for Pleural Effusion 10 milliGRAM(s) IntraPleural. every 12 hours  chlorhexidine 2% Cloths 1 Application(s) Topical <User Schedule>  cyanocobalamin 1000 MICROGram(s) Oral daily  dornase nidhi Solution for Pleural Effusion 5 milliGRAM(s) IntraPleural. every 12 hours  escitalopram 10 milliGRAM(s) Oral daily  fentaNYL   Patch  50 MICROgram(s)/Hr 1 Patch Transdermal every 72 hours  fentaNYL   Patch 100 MICROgram(s)/Hr 1 Patch Transdermal every 72 hours  fluticasone propionate 50 MICROgram(s)/spray Nasal Spray 1 Spray(s) Both Nostrils two times a day  furosemide   Injectable 20 milliGRAM(s) IV Push daily  piperacillin/tazobactam IVPB.. 3.375 Gram(s) IV Intermittent every 8 hours  polyethylene glycol 3350 17 Gram(s) Oral daily  potassium phosphate / sodium phosphate Powder (PHOS-NaK) 1 Packet(s) Oral once  senna 2 Tablet(s) Oral at bedtime  simethicone 80 milliGRAM(s) Chew three times a day  sodium chloride 0.9% Solution for Pleural Effusion 30 milliLiter(s) IntraPleural. every 12 hours    MEDICATIONS  (PRN):  albuterol    0.083%. 2.5 milliGRAM(s) Nebulizer once PRN Wheezing  diphenhydrAMINE 50 milliGRAM(s) Oral once PRN MILD ALERGIC REACTION  glucagon  Injectable 1 milliGRAM(s) IV Push once PRN REFRACTORY CASES AND/OR ON BETA BLOCKERS  glucagon  Injectable 2 milliGRAM(s) IV Push once PRN REFRACTORY CASES AND/ OR ON BETA BLOCKERS  HYDROmorphone   Tablet 6 milliGRAM(s) Oral every 4 hours PRN Moderate Pain (4 - 6)  HYDROmorphone  Injectable 1 milliGRAM(s) IV Push every 4 hours PRN Severe Pain (7 - 10)  LORazepam     Tablet 0.5 milliGRAM(s) Oral two times a day PRN Anxiety  ondansetron Injectable 8 milliGRAM(s) IV Push every 8 hours PRN Nausea  pseudoephedrine 60 milliGRAM(s) Oral every 6 hours PRN nasal congesiton  sodium chloride 0.65% Nasal 1 Spray(s) Both Nostrils two times a day PRN Congestion      ITEMS UNCHECKED ARE NOT PRESENT    PRESENT SYMPTOMS: [ ]Unable to self-report - see [ ] CPOT [ ] PAINADS [ ] RDOS  Source if other than patient:  [ ]Family   [ ]Team     Pain: [x]yes [ ]no  QOL impact - debilitating   Location - rectum            Aggravating factors - progression of disease   Quality - ache  Radiation -  denies   Timing- constant   Severity (0-10 scale): 10  Minimal acceptable level / Pain goal (0-10 scale): 2      CPOT:    https://www.[facility].org/getattachment/[email or web address]/Critical-Care-Pain-Observation-Tool-(CPOT)    Dyspnea:                           [ ]Mild [ ]Moderate [ ]Severe  Anxiety:                             [ ]Mild [x ]Moderate [ ]Severe  Fatigue:                             [ ]Mild [ x]Moderate [ ]Severe  Nausea:                             [ ]Mild [ ]Moderate [ ]Severe  Loss of appetite:              [ ]Mild [ x]Moderate [ ]Severe  Constipation:                    [ ]Mild [ ]Moderate [ ]Severe  Other Symptoms:  [ x]All other review of systems negative     PCSSQ[Palliative Care Spiritual Screening Question]   Severity (0-10):  Score of 4 or > indicate consideration of Chaplaincy referral.  Chaplaincy Referral: [ ] yes [ ] refused [ ] following [x ] deferred    Caregiver [place]? : [ ] yes [ ] no [x ] Deferred [ ] Declined             Social work referral [ ] Patient & Family Centered Care Referral [ ]  Anticipatory Grief present?:  [ ] yes [ ] no  [x ] Deferred                  Social work referral [ ] Patient & Family Centered Care Referral [ ]    PHYSICAL EXAM:  Vital Signs Last 24 Hrs  T(C): 36.4 (02 Apr 2025 05:35), Max: 36.8 (01 Apr 2025 13:30)  T(F): 97.6 (02 Apr 2025 05:35), Max: 98.3 (01 Apr 2025 13:30)  HR: 92 (02 Apr 2025 05:35) (92 - 101)  BP: 111/82 (02 Apr 2025 05:35) (101/72 - 122/82)  BP(mean): --  RR: 18 (02 Apr 2025 05:35) (18 - 18)  SpO2: 96% (02 Apr 2025 05:35) (93% - 97%)    Parameters below as of 02 Apr 2025 05:35  Patient On (Oxygen Delivery Method): nasal cannula  O2 Flow (L/min): 3   I&O's Summary    01 Apr 2025 07:01  -  02 Apr 2025 07:00  --------------------------------------------------------  IN: 0 mL / OUT: 1183 mL / NET: -1183 mL       GENERAL:  [x]Alert  [x]Oriented x3   [ ]Lethargic  [x]Cachexia  [ ]Unarousable  [x]Verbal  [ ]Non-Verbal  [x] No Distress  Behavioral:   [x ] Anxiety  [ ] Delirium [ ] Agitation [] Calm  [ ] Other  HEENT:  [x]Normal  [ ] Temporal Wasting  [ ]Dry mouth   [ ]ET Tube/Trach  [ ]Oral lesions  [ ] Mucositis  PULMONARY:   [x]Clear [ ]Tachypnea  [ ]Audible excessive secretions   [ ]Rhonchi        [ ]Right [ ]Left [ ]Bilateral  [ ]Crackles        [ ]Right [ ]Left [ ]Bilateral  [ ]Wheezing     [ ]Right [ ]Left [ ]Bilateral  [ ]Diminished breath sounds [ ]right [ ]left [ ]bilateral  CARDIOVASCULAR:    [x]Regular [ ]Irregular [ ]Tachy  [ ]Rios [ ]Murmur [ ]Other  GASTROINTESTINAL:  [x]Soft  [ ]Distended   [x]+BS  [ ]Non tender [ ]Tender  [ ]PEG [ ]OGT/ NGT  Last BM: +colostomy   GENITOURINARY:  [x]Normal [ ] Incontinent   [ ]Oliguria/Anuria   [ ]Smiley  MUSCULOSKELETAL:   [ ]Normal   [x]Weakness  [x]Bed/Wheelchair bound [x ]Edema  [  ] amputation  [  ] contraction  NEUROLOGIC:   [x]No focal deficits  [ ]Cognitive impairment  [ ]Dysphagia [ ]Dysarthria [ ]Paresis [ ]Other   SKIN: See Nursing Skin Assessment for further details  [ ]Normal    [ ]Rash  [ ]Pressure ulcer(s)       Present on admission [ ]y [ ]n   [  ]  Wound    [  ] hyperpigmentation    CRITICAL CARE:  [ ]Shock Present  [ ]Septic [ ]Cardiogenic [ ]Neurologic [ ]Hypovolemic  [ ]Vasopressors [ ]Inotropes  [ ]Respiratory failure present [ ]Mechanical Ventilation [ ]Non-invasive ventilatory support [ ]High-Flow   [ ]Acute  [ ]Chronic [ ]Hypoxic  [ ]Hypercarbic [ ]Other  [ ]Other organ failure     LABS:                        9.5    10.53 )-----------( 322      ( 02 Apr 2025 06:17 )             31.4   04-02    132[L]  |  98  |  20  ----------------------------<  104[H]  3.8   |  22  |  0.98    Ca    8.2[L]      02 Apr 2025 06:17  Phos  2.4     04-02  Mg     1.60     04-02    TPro  5.1[L]  /  Alb  2.3[L]  /  TBili  <0.2  /  DBili  x   /  AST  25  /  ALT  10  /  AlkPhos  177[H]  04-02      Urinalysis Basic - ( 02 Apr 2025 06:17 )    Color: x / Appearance: x / SG: x / pH: x  Gluc: 104 mg/dL / Ketone: x  / Bili: x / Urobili: x   Blood: x / Protein: x / Nitrite: x   Leuk Esterase: x / RBC: x / WBC x   Sq Epi: x / Non Sq Epi: x / Bacteria: x      RADIOLOGY & ADDITIONAL STUDIES: Pending CT a/p     Protein Calorie Malnutrition Present: [ ]mild [ ]moderate [ ]severe [ ]underweight [ ]morbid obesity  https://www.andeal.org/vault/2440/web/files/ONC/Table_Clinical%20Characteristics%20to%20Document%20Malnutrition-White%20JV%20et%20al%202012.pdf    Height (cm): 154.9 (03-25-25 @ 08:00), 153.01 (02-21-25 @ 10:00), 154.9 (04-19-24 @ 10:00)  Weight (kg): 67.8 (03-25-25 @ 08:00), 58.96 (03-12-25 @ 09:00), 53.5 (04-19-24 @ 10:00)  BMI (kg/m2): 28.3 (03-25-25 @ 08:00), 25.2 (03-12-25 @ 09:00), 22.9 (02-21-25 @ 10:00)    [ ]PPSV2 < or = 30%  [ ]significant weight loss [ ]poor nutritional intake [ ]anasarca[ ]Artificial Nutrition    Other REFERRALS:  [ ]Hospice  [ ]Child Life  [ ]Social Work  [ ]Case management [ ]Holistic Therapy

## 2025-04-02 NOTE — PROGRESS NOTE ADULT - ASSESSMENT
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 On admission patient also c/o dyspnea over the past few weeks. Further eval revealed loculated R pleural effusion with pleurx in place; pleurx not draining well. Started MIST protocol on 3/26    Fallopian tube cancer  S/P multiple lines of therapy, now on Paclitaxel. Admitted for carboplatin desensitization   Now complete, when able to be discharged will follow up with Dr. Hughes.   Overall performance status of patient is poor. D/W Dr. Hughes, patient's disease is very advanced. She is not recommending further cancer therapy. Patient is currently a candidate for hospice.   Attempted to discuss with the patient and son, it appears like more time is needed to process hospice recommendations.    Loculated Effusion  Empyema?   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 Patient developed worsening dyspnea 2/2 loculated R pleural effusion with pleurx in place; pleurx not draining well. Started MIST protocol on 3/26,  Pulmonary stating that this may be empyema. D/W ID, not entirely clear. No cxs obtained. Procal 0.24, not entirely + for PNA. Continue Zosyn for now.   Loculated effusion may be malignant more so than infectious  Follow up pulmonary  O2 supplement as needed  Patient expressed today that she is not interested in further interventions from CT surgery for loculated effusions    Bacteremia  D/W ID on 4/2 likely intra-abdominal source.   CT a/p pending    Cancer related pain  Palliative care consulted, pain is now better controlled.    GOC  Extensively addressed with patient and her family.   She would like to defer medical decisions to them. Son Mich will be the point of contact for medical team ()  CT chest shown     Anxiety  Depression  Palliative care  Continue current meds

## 2025-04-02 NOTE — PROGRESS NOTE ADULT - PROBLEM SELECTOR PLAN 4
Recommendations:   - Continue 150 mcg Fentanyl patch (3/31)   - Continue dilaudid 6mg PO q4 PRN for moderate pain   - Continue Dilaudid 1mg IV q4 PRN for severe pain   - Bowel regimen while on opiates  - Narcan prn.

## 2025-04-02 NOTE — PROGRESS NOTE ADULT - PROBLEM SELECTOR PLAN 3
Patient developed worsening dyspnea 2/2 loculated R pleural effusion with pleurx in place; pleurx not draining well. Started MIST protocol on 3/26. Pulmonology and ID following.   - Continue Zosyn as per ID  - F/u pulm and ID recs   - Oxygen supplementation PRN- wean as tolerated   - Rest of care as per primary team.  - Patient is NOT interested in further surgical interventions with CT surgery

## 2025-04-02 NOTE — PROGRESS NOTE ADULT - SUBJECTIVE AND OBJECTIVE BOX
Keron John MD  [facility] Hospitalist  Pager [phone]  Email: [email or web address]  Available on [employer] Teams        PROGRESS NOTE:     Patient is a 64y old  Female who presents with a chief complaint of Desensitization of carboplatin. (01 Apr 2025 15:49)      SUBJECTIVE / OVERNIGHT EVENTS:  Patient seen and examined this morning and later this afternoon. Patient continues to have pain and feel uncomfortable. Ongoing GOC discussions.       MEDICATIONS  (STANDING):  alteplase  Injectable for Pleural Effusion 10 milliGRAM(s) IntraPleural. every 12 hours  chlorhexidine 2% Cloths 1 Application(s) Topical <User Schedule>  cyanocobalamin 1000 MICROGram(s) Oral daily  dornase nidhi Solution for Pleural Effusion 5 milliGRAM(s) IntraPleural. every 12 hours  escitalopram 10 milliGRAM(s) Oral daily  fentaNYL   Patch  50 MICROgram(s)/Hr 1 Patch Transdermal every 72 hours  fentaNYL   Patch 100 MICROgram(s)/Hr 1 Patch Transdermal every 72 hours  fluticasone propionate 50 MICROgram(s)/spray Nasal Spray 1 Spray(s) Both Nostrils two times a day  furosemide   Injectable 20 milliGRAM(s) IV Push daily  piperacillin/tazobactam IVPB.. 3.375 Gram(s) IV Intermittent every 8 hours  polyethylene glycol 3350 17 Gram(s) Oral daily  senna 2 Tablet(s) Oral at bedtime  simethicone 80 milliGRAM(s) Chew three times a day  sodium chloride 0.9% Solution for Pleural Effusion 30 milliLiter(s) IntraPleural. every 12 hours    MEDICATIONS  (PRN):  albuterol    0.083%. 2.5 milliGRAM(s) Nebulizer once PRN Wheezing  diphenhydrAMINE 50 milliGRAM(s) Oral once PRN MILD ALERGIC REACTION  glucagon  Injectable 1 milliGRAM(s) IV Push once PRN REFRACTORY CASES AND/OR ON BETA BLOCKERS  glucagon  Injectable 2 milliGRAM(s) IV Push once PRN REFRACTORY CASES AND/ OR ON BETA BLOCKERS  HYDROmorphone   Tablet 6 milliGRAM(s) Oral every 4 hours PRN Moderate Pain (4 - 6)  HYDROmorphone  Injectable 1 milliGRAM(s) IV Push every 4 hours PRN Severe Pain (7 - 10)  LORazepam     Tablet 0.5 milliGRAM(s) Oral two times a day PRN Anxiety  ondansetron Injectable 8 milliGRAM(s) IV Push every 8 hours PRN Nausea  pseudoephedrine 60 milliGRAM(s) Oral every 6 hours PRN nasal congesiton  sodium chloride 0.65% Nasal 1 Spray(s) Both Nostrils two times a day PRN Congestion      CAPILLARY BLOOD GLUCOSE        I&O's Summary    01 Apr 2025 07:01  -  02 Apr 2025 07:00  --------------------------------------------------------  IN: 0 mL / OUT: 1183 mL / NET: -1183 mL        PHYSICAL EXAM:  Vital Signs Last 24 Hrs  T(C): 36.7 (02 Apr 2025 13:12), Max: 36.7 (01 Apr 2025 17:30)  T(F): 98 (02 Apr 2025 13:12), Max: 98.1 (01 Apr 2025 17:30)  HR: 90 (02 Apr 2025 13:12) (90 - 101)  BP: 108/64 (02 Apr 2025 13:12) (108/64 - 122/82)  BP(mean): --  RR: 18 (02 Apr 2025 13:12) (18 - 18)  SpO2: 96% (02 Apr 2025 13:12) (93% - 97%)    Parameters below as of 02 Apr 2025 13:12  Patient On (Oxygen Delivery Method): nasal cannula        CONSTITUTIONAL: cahcectic  RESPIRATORY: Normal respiratory effort; no respiratory distress, CTAB, on NC for O2 supplementation  CARDIOVASCULAR: No visible JVD, No lower extremity edema; S1S2, no m,r,g  ABDOMEN: Not guarding, distended, TTP, BS+  MUSCLOSKELETAL: no clubbing or cyanosis of digits; no joint swelling     LABS:                        9.5    10.53 )-----------( 322      ( 02 Apr 2025 06:17 )             31.4     04-02    132[L]  |  98  |  20  ----------------------------<  104[H]  3.8   |  22  |  0.98    Ca    8.2[L]      02 Apr 2025 06:17  Phos  2.4     04-02  Mg     1.60     04-02    TPro  5.1[L]  /  Alb  2.3[L]  /  TBili  <0.2  /  DBili  x   /  AST  25  /  ALT  10  /  AlkPhos  177[H]  04-02          Urinalysis Basic - ( 02 Apr 2025 06:17 )    Color: x / Appearance: x / SG: x / pH: x  Gluc: 104 mg/dL / Ketone: x  / Bili: x / Urobili: x   Blood: x / Protein: x / Nitrite: x   Leuk Esterase: x / RBC: x / WBC x   Sq Epi: x / Non Sq Epi: x / Bacteria: x        Culture - Blood (collected 01 Apr 2025 06:54)  Source: Blood Blood-Venous  Preliminary Report (02 Apr 2025 13:01):    No growth at 24 hours    Culture - Blood (collected 01 Apr 2025 05:45)  Source: Blood Blood-Peripheral  Preliminary Report (02 Apr 2025 11:01):    No growth at 24 hours        RADIOLOGY & ADDITIONAL TESTS:  Results Reviewed:   Imaging Personally Reviewed:  Electrocardiogram Personally Reviewed:    COORDINATION OF CARE:  Care Discussed with Consultants/Other Providers [Y/N]:  Prior or Outpatient Records Reviewed [Y/N]:

## 2025-04-03 ENCOUNTER — APPOINTMENT (OUTPATIENT)
Dept: HEMATOLOGY ONCOLOGY | Facility: CLINIC | Age: 65
End: 2025-04-03

## 2025-04-03 ENCOUNTER — APPOINTMENT (OUTPATIENT)
Dept: INFUSION THERAPY | Facility: HOSPITAL | Age: 65
End: 2025-04-03

## 2025-04-03 LAB
ALBUMIN SERPL ELPH-MCNC: 2.1 G/DL — LOW (ref 3.3–5)
ALP SERPL-CCNC: 171 U/L — HIGH (ref 40–120)
ALT FLD-CCNC: 11 U/L — SIGNIFICANT CHANGE UP (ref 4–33)
ANION GAP SERPL CALC-SCNC: 10 MMOL/L — SIGNIFICANT CHANGE UP (ref 7–14)
AST SERPL-CCNC: 26 U/L — SIGNIFICANT CHANGE UP (ref 4–32)
BASOPHILS # BLD AUTO: 0.06 K/UL — SIGNIFICANT CHANGE UP (ref 0–0.2)
BASOPHILS NFR BLD AUTO: 0.7 % — SIGNIFICANT CHANGE UP (ref 0–2)
BILIRUB SERPL-MCNC: <0.2 MG/DL — SIGNIFICANT CHANGE UP (ref 0.2–1.2)
BUN SERPL-MCNC: 19 MG/DL — SIGNIFICANT CHANGE UP (ref 7–23)
CALCIUM SERPL-MCNC: 7.9 MG/DL — LOW (ref 8.4–10.5)
CHLORIDE SERPL-SCNC: 99 MMOL/L — SIGNIFICANT CHANGE UP (ref 98–107)
CO2 SERPL-SCNC: 24 MMOL/L — SIGNIFICANT CHANGE UP (ref 22–31)
CREAT SERPL-MCNC: 0.9 MG/DL — SIGNIFICANT CHANGE UP (ref 0.5–1.3)
CULTURE RESULTS: SIGNIFICANT CHANGE UP
EGFR: 72 ML/MIN/1.73M2 — SIGNIFICANT CHANGE UP
EGFR: 72 ML/MIN/1.73M2 — SIGNIFICANT CHANGE UP
EOSINOPHIL # BLD AUTO: 0.24 K/UL — SIGNIFICANT CHANGE UP (ref 0–0.5)
EOSINOPHIL NFR BLD AUTO: 3 % — SIGNIFICANT CHANGE UP (ref 0–6)
GLUCOSE SERPL-MCNC: 95 MG/DL — SIGNIFICANT CHANGE UP (ref 70–99)
HCT VFR BLD CALC: 29.4 % — LOW (ref 34.5–45)
HGB BLD-MCNC: 8.5 G/DL — LOW (ref 11.5–15.5)
IANC: 6.27 K/UL — SIGNIFICANT CHANGE UP (ref 1.8–7.4)
IMM GRANULOCYTES NFR BLD AUTO: 1.6 % — HIGH (ref 0–0.9)
LYMPHOCYTES # BLD AUTO: 0.73 K/UL — LOW (ref 1–3.3)
LYMPHOCYTES # BLD AUTO: 9.1 % — LOW (ref 13–44)
MAGNESIUM SERPL-MCNC: 1.7 MG/DL — SIGNIFICANT CHANGE UP (ref 1.6–2.6)
MCHC RBC-ENTMCNC: 28.2 PG — SIGNIFICANT CHANGE UP (ref 27–34)
MCHC RBC-ENTMCNC: 28.9 G/DL — LOW (ref 32–36)
MCV RBC AUTO: 97.7 FL — SIGNIFICANT CHANGE UP (ref 80–100)
MONOCYTES # BLD AUTO: 0.62 K/UL — SIGNIFICANT CHANGE UP (ref 0–0.9)
MONOCYTES NFR BLD AUTO: 7.7 % — SIGNIFICANT CHANGE UP (ref 2–14)
NEUTROPHILS # BLD AUTO: 6.27 K/UL — SIGNIFICANT CHANGE UP (ref 1.8–7.4)
NEUTROPHILS NFR BLD AUTO: 77.9 % — HIGH (ref 43–77)
NRBC # BLD AUTO: 0 K/UL — SIGNIFICANT CHANGE UP (ref 0–0)
NRBC # FLD: 0 K/UL — SIGNIFICANT CHANGE UP (ref 0–0)
NRBC BLD AUTO-RTO: 0 /100 WBCS — SIGNIFICANT CHANGE UP (ref 0–0)
PHOSPHATE SERPL-MCNC: 2.2 MG/DL — LOW (ref 2.5–4.5)
PLATELET # BLD AUTO: 290 K/UL — SIGNIFICANT CHANGE UP (ref 150–400)
POTASSIUM SERPL-MCNC: 3.4 MMOL/L — LOW (ref 3.5–5.3)
POTASSIUM SERPL-SCNC: 3.4 MMOL/L — LOW (ref 3.5–5.3)
PROT SERPL-MCNC: 4.8 G/DL — LOW (ref 6–8.3)
RBC # BLD: 3.01 M/UL — LOW (ref 3.8–5.2)
RBC # FLD: 18 % — HIGH (ref 10.3–14.5)
SODIUM SERPL-SCNC: 133 MMOL/L — LOW (ref 135–145)
SPECIMEN SOURCE: SIGNIFICANT CHANGE UP
WBC # BLD: 8.05 K/UL — SIGNIFICANT CHANGE UP (ref 3.8–10.5)
WBC # FLD AUTO: 8.05 K/UL — SIGNIFICANT CHANGE UP (ref 3.8–10.5)

## 2025-04-03 PROCEDURE — 99233 SBSQ HOSP IP/OBS HIGH 50: CPT | Mod: GC

## 2025-04-03 PROCEDURE — 99233 SBSQ HOSP IP/OBS HIGH 50: CPT

## 2025-04-03 RX ORDER — LORAZEPAM 4 MG/ML
0.5 VIAL (ML) INJECTION
Refills: 0 | Status: DISCONTINUED | OUTPATIENT
Start: 2025-04-03 | End: 2025-04-08

## 2025-04-03 RX ORDER — SOD PHOS DI, MONO/K PHOS MONO 250 MG
1 TABLET ORAL ONCE
Refills: 0 | Status: COMPLETED | OUTPATIENT
Start: 2025-04-03 | End: 2025-04-03

## 2025-04-03 RX ORDER — HYDROMORPHONE/SOD CHLOR,ISO/PF 2 MG/10 ML
1 SYRINGE (ML) INJECTION EVERY 4 HOURS
Refills: 0 | Status: DISCONTINUED | OUTPATIENT
Start: 2025-04-03 | End: 2025-04-03

## 2025-04-03 RX ORDER — ACETAMINOPHEN 500 MG/5ML
1000 LIQUID (ML) ORAL ONCE
Refills: 0 | Status: COMPLETED | OUTPATIENT
Start: 2025-04-03 | End: 2025-04-03

## 2025-04-03 RX ORDER — HYDROMORPHONE/SOD CHLOR,ISO/PF 2 MG/10 ML
6 SYRINGE (ML) INJECTION EVERY 4 HOURS
Refills: 0 | Status: DISCONTINUED | OUTPATIENT
Start: 2025-04-03 | End: 2025-04-07

## 2025-04-03 RX ORDER — HYDROMORPHONE/SOD CHLOR,ISO/PF 2 MG/10 ML
1.5 SYRINGE (ML) INJECTION EVERY 4 HOURS
Refills: 0 | Status: DISCONTINUED | OUTPATIENT
Start: 2025-04-03 | End: 2025-04-07

## 2025-04-03 RX ADMIN — Medication 25 GRAM(S): at 01:25

## 2025-04-03 RX ADMIN — Medication 1 MILLIGRAM(S): at 06:36

## 2025-04-03 RX ADMIN — Medication 0.5 MILLIGRAM(S): at 23:55

## 2025-04-03 RX ADMIN — Medication 1.5 MILLIGRAM(S): at 22:26

## 2025-04-03 RX ADMIN — Medication 1.5 MILLIGRAM(S): at 16:58

## 2025-04-03 RX ADMIN — ESCITALOPRAM OXALATE 10 MILLIGRAM(S): 20 TABLET ORAL at 12:36

## 2025-04-03 RX ADMIN — CYANOCOBALAMIN 1000 MICROGRAM(S): 1000 INJECTION INTRAMUSCULAR; SUBCUTANEOUS at 12:36

## 2025-04-03 RX ADMIN — Medication 1 MILLIGRAM(S): at 02:26

## 2025-04-03 RX ADMIN — Medication 25 GRAM(S): at 09:45

## 2025-04-03 RX ADMIN — Medication 1.5 MILLIGRAM(S): at 12:36

## 2025-04-03 RX ADMIN — FLUTICASONE PROPIONATE 1 SPRAY(S): 50 SPRAY, METERED NASAL at 05:36

## 2025-04-03 RX ADMIN — Medication 1 APPLICATION(S): at 05:36

## 2025-04-03 RX ADMIN — Medication 80 MILLIGRAM(S): at 19:22

## 2025-04-03 RX ADMIN — Medication 400 MILLIGRAM(S): at 04:15

## 2025-04-03 RX ADMIN — FLUTICASONE PROPIONATE 1 SPRAY(S): 50 SPRAY, METERED NASAL at 19:23

## 2025-04-03 RX ADMIN — Medication 1 MILLIGRAM(S): at 01:26

## 2025-04-03 RX ADMIN — Medication 80 MILLIGRAM(S): at 09:45

## 2025-04-03 RX ADMIN — Medication 1.5 MILLIGRAM(S): at 17:58

## 2025-04-03 RX ADMIN — Medication 25 GRAM(S): at 16:58

## 2025-04-03 RX ADMIN — Medication 1 MILLIGRAM(S): at 05:36

## 2025-04-03 RX ADMIN — Medication 1.5 MILLIGRAM(S): at 21:16

## 2025-04-03 RX ADMIN — Medication 1.5 MILLIGRAM(S): at 13:36

## 2025-04-03 RX ADMIN — POLYETHYLENE GLYCOL 3350 17 GRAM(S): 17 POWDER, FOR SOLUTION ORAL at 12:36

## 2025-04-03 RX ADMIN — Medication 1000 MILLIGRAM(S): at 05:15

## 2025-04-03 NOTE — PROGRESS NOTE ADULT - ASSESSMENT
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 On admission patient also c/o dyspnea over the past few weeks. Further eval revealed loculated R pleural effusion with pleurx in place; pleurx not draining well. Started MIST protocol on 3/26    Fallopian tube cancer  S/P multiple lines of therapy, now on Paclitaxel. Admitted for carboplatin desensitization   Now complete, when able to be discharged will follow up with Dr. Hughes.   Overall performance status of patient is poor. D/W Dr. Hughes, patient's disease is very advanced. She is not recommending further cancer therapy. Patient is currently a candidate for hospice.   Attempted to discuss with the patient and son, it appears like more time is needed to process hospice recommendations.    Loculated Effusion  Empyema?   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 Patient developed worsening dyspnea 2/2 loculated R pleural effusion with pleurx in place; pleurx not draining well. Started MIST protocol on 3/26,  Pulmonary stating that this may be empyema. D/W ID, not entirely clear. No cxs obtained. Procal 0.24, not entirely + for PNA. Continue Zosyn for now.   Loculated effusion may be malignant more so than infectious  Follow up pulmonary  O2 supplement as needed  Patient expressed today that she is not interested in further interventions from CT surgery for loculated effusions    Bacteremia  D/W ID on 4/2 likely intra-abdominal source.   CT a/p with extensive cancer, bacteremia possibly translocation in the setting of advanced malignancy    Cancer related pain  Palliative care consulted, pain is now better controlled.    C  Extensively addressed with her family and outpatient oncologist  She would like to defer medical decisions to them. Son Mich will be the point of contact for medical team ()  CT chest and a/p shown to family, compared [id number] to older scans. Disease progression is extensive.     Anxiety  Depression  Palliative care  Continue current meds

## 2025-04-03 NOTE — PROGRESS NOTE ADULT - PROBLEM SELECTOR PLAN 4
- Continue 150 mcg Fentanyl patch (3/31) Offered adjustments today but declined.   - Continue dilaudid 6mg PO q4 PRN for moderate pain   - Increase Dilaudid to 1.5 mg IV q4 PRN for severe pain   - simethicone tid for bloating/gas pain   - Bowel regimen while on opiates  - Narcan prn.  - Holistic rN referral

## 2025-04-03 NOTE — PROGRESS NOTE ADULT - SUBJECTIVE AND OBJECTIVE BOX
Keron John MD  [employer]ist  Pager [phone]  Email: [email or web address]  Available on [employer] Teams        PROGRESS NOTE:     Patient is a 64y old  Female who presents with a chief complaint of carboplatin desen (02 Apr 2025 16:37)      SUBJECTIVE / OVERNIGHT EVENTS:  Patient seen and examined this morning. Patient does not wish to engage or be examined at this time, would like all medical decisions to defer to family. Once again new CT images were extensively reviewed with the patient's son, Mich. Patient's family requesting further time and discussion regarding GOC and disposition   :    MEDICATIONS  (STANDING):  alteplase  Injectable for Pleural Effusion 10 milliGRAM(s) IntraPleural. every 12 hours  chlorhexidine 2% Cloths 1 Application(s) Topical <User Schedule>  cyanocobalamin 1000 MICROGram(s) Oral daily  dornase nidhi Solution for Pleural Effusion 5 milliGRAM(s) IntraPleural. every 12 hours  escitalopram 10 milliGRAM(s) Oral daily  fentaNYL   Patch  50 MICROgram(s)/Hr 1 Patch Transdermal every 72 hours  fentaNYL   Patch 100 MICROgram(s)/Hr 1 Patch Transdermal every 72 hours  fluticasone propionate 50 MICROgram(s)/spray Nasal Spray 1 Spray(s) Both Nostrils two times a day  piperacillin/tazobactam IVPB.. 3.375 Gram(s) IV Intermittent every 8 hours  polyethylene glycol 3350 17 Gram(s) Oral daily  senna 2 Tablet(s) Oral at bedtime  simethicone 80 milliGRAM(s) Chew three times a day  sodium chloride 0.9% Solution for Pleural Effusion 30 milliLiter(s) IntraPleural. every 12 hours    MEDICATIONS  (PRN):  albuterol    0.083%. 2.5 milliGRAM(s) Nebulizer once PRN Wheezing  diphenhydrAMINE 50 milliGRAM(s) Oral once PRN MILD ALERGIC REACTION  glucagon  Injectable 1 milliGRAM(s) IV Push once PRN REFRACTORY CASES AND/OR ON BETA BLOCKERS  glucagon  Injectable 2 milliGRAM(s) IV Push once PRN REFRACTORY CASES AND/ OR ON BETA BLOCKERS  HYDROmorphone   Tablet 6 milliGRAM(s) Oral every 4 hours PRN Moderate Pain (4 - 6)  HYDROmorphone  Injectable 1.5 milliGRAM(s) IV Push every 4 hours PRN Severe Pain (7 - 10)  LORazepam     Tablet 0.5 milliGRAM(s) Oral two times a day PRN Anxiety  ondansetron Injectable 8 milliGRAM(s) IV Push every 8 hours PRN Nausea  pseudoephedrine 60 milliGRAM(s) Oral every 6 hours PRN nasal congesiton  sodium chloride 0.65% Nasal 1 Spray(s) Both Nostrils two times a day PRN Congestion      CAPILLARY BLOOD GLUCOSE        I&O's Summary    02 Apr 2025 07:01  -  03 Apr 2025 07:00  --------------------------------------------------------  IN: 750 mL / OUT: 0 mL / NET: 750 mL        PHYSICAL EXAM:  Vital Signs Last 24 Hrs  T(C): 36.3 (03 Apr 2025 13:33), Max: 36.8 (02 Apr 2025 17:12)  T(F): 97.4 (03 Apr 2025 13:33), Max: 98.2 (02 Apr 2025 17:12)  HR: 86 (03 Apr 2025 13:33) (83 - 95)  BP: 100/64 (03 Apr 2025 13:33) (92/62 - 103/72)  BP(mean): --  RR: 18 (03 Apr 2025 13:33) (15 - 18)  SpO2: 94% (03 Apr 2025 13:33) (94% - 97%)    Parameters below as of 03 Apr 2025 13:33  Patient On (Oxygen Delivery Method): nasal cannula        CONSTITUTIONAL: cahcectic  RESPIRATORY: NC for O2 supplementation, some heavy breathing  CARDIOVASCULAR: No visible JVD, lower extremity edema;   ABDOMEN: Distended,       LABS:                        8.5    8.05  )-----------( 290      ( 03 Apr 2025 08:52 )             29.4     04-03    133[L]  |  99  |  19  ----------------------------<  95  3.4[L]   |  24  |  0.90    Ca    7.9[L]      03 Apr 2025 08:52  Phos  2.2     04-03  Mg     1.70     04-03    TPro  4.8[L]  /  Alb  2.1[L]  /  TBili  <0.2  /  DBili  x   /  AST  26  /  ALT  11  /  AlkPhos  171[H]  04-03          Urinalysis Basic - ( 03 Apr 2025 08:52 )    Color: x / Appearance: x / SG: x / pH: x  Gluc: 95 mg/dL / Ketone: x  / Bili: x / Urobili: x   Blood: x / Protein: x / Nitrite: x   Leuk Esterase: x / RBC: x / WBC x   Sq Epi: x / Non Sq Epi: x / Bacteria: x        Culture - Wound Aerobic (collected 01 Apr 2025 19:30)  Source: Drainage Drainage  Final Report (03 Apr 2025 16:08):    Commensal brayden consistent with body site    Culture - Blood (collected 01 Apr 2025 06:54)  Source: Blood Blood-Venous  Preliminary Report (03 Apr 2025 13:01):    No growth at 48 Hours    Culture - Blood (collected 01 Apr 2025 05:45)  Source: Blood Blood-Peripheral  Preliminary Report (03 Apr 2025 11:01):    No growth at 48 Hours        RADIOLOGY & ADDITIONAL TESTS:  Results Reviewed:   Imaging Personally Reviewed:  Electrocardiogram Personally Reviewed:    COORDINATION OF CARE:  Care Discussed with Consultants/Other Providers [Y/N]: D/W outpatient oncologist, Dr. Hughes, also discussed with Dr. Kimble ([facility])  Prior or Outpatient Records Reviewed [Y/N]:   Keron John MD  Academic Hospitalist  Pager [phone]  Email: [email or web address]  Available on [employer] Teams        PROGRESS NOTE:     Patient is a 64y old  Female who presents with a chief complaint of carboplatin desen (02 Apr 2025 16:37)      SUBJECTIVE / OVERNIGHT EVENTS:  Patient seen and examined this morning. Patient does not wish to engage or be examined at this time, would like all medical decisions to defer to family. Once again new CT images were extensively reviewed with the patient's son, Mich. Patient's family requesting further time and discussion regarding GOC and disposition   :    MEDICATIONS  (STANDING):  alteplase  Injectable for Pleural Effusion 10 milliGRAM(s) IntraPleural. every 12 hours  chlorhexidine 2% Cloths 1 Application(s) Topical <User Schedule>  cyanocobalamin 1000 MICROGram(s) Oral daily  dornase nidhi Solution for Pleural Effusion 5 milliGRAM(s) IntraPleural. every 12 hours  escitalopram 10 milliGRAM(s) Oral daily  fentaNYL   Patch  50 MICROgram(s)/Hr 1 Patch Transdermal every 72 hours  fentaNYL   Patch 100 MICROgram(s)/Hr 1 Patch Transdermal every 72 hours  fluticasone propionate 50 MICROgram(s)/spray Nasal Spray 1 Spray(s) Both Nostrils two times a day  piperacillin/tazobactam IVPB.. 3.375 Gram(s) IV Intermittent every 8 hours  polyethylene glycol 3350 17 Gram(s) Oral daily  senna 2 Tablet(s) Oral at bedtime  simethicone 80 milliGRAM(s) Chew three times a day  sodium chloride 0.9% Solution for Pleural Effusion 30 milliLiter(s) IntraPleural. every 12 hours    MEDICATIONS  (PRN):  albuterol    0.083%. 2.5 milliGRAM(s) Nebulizer once PRN Wheezing  diphenhydrAMINE 50 milliGRAM(s) Oral once PRN MILD ALERGIC REACTION  glucagon  Injectable 1 milliGRAM(s) IV Push once PRN REFRACTORY CASES AND/OR ON BETA BLOCKERS  glucagon  Injectable 2 milliGRAM(s) IV Push once PRN REFRACTORY CASES AND/ OR ON BETA BLOCKERS  HYDROmorphone   Tablet 6 milliGRAM(s) Oral every 4 hours PRN Moderate Pain (4 - 6)  HYDROmorphone  Injectable 1.5 milliGRAM(s) IV Push every 4 hours PRN Severe Pain (7 - 10)  LORazepam     Tablet 0.5 milliGRAM(s) Oral two times a day PRN Anxiety  ondansetron Injectable 8 milliGRAM(s) IV Push every 8 hours PRN Nausea  pseudoephedrine 60 milliGRAM(s) Oral every 6 hours PRN nasal congesiton  sodium chloride 0.65% Nasal 1 Spray(s) Both Nostrils two times a day PRN Congestion      CAPILLARY BLOOD GLUCOSE        I&O's Summary    02 Apr 2025 07:01  -  03 Apr 2025 07:00  --------------------------------------------------------  IN: 750 mL / OUT: 0 mL / NET: 750 mL        PHYSICAL EXAM:  Vital Signs Last 24 Hrs  T(C): 36.3 (03 Apr 2025 13:33), Max: 36.8 (02 Apr 2025 17:12)  T(F): 97.4 (03 Apr 2025 13:33), Max: 98.2 (02 Apr 2025 17:12)  HR: 86 (03 Apr 2025 13:33) (83 - 95)  BP: 100/64 (03 Apr 2025 13:33) (92/62 - 103/72)  BP(mean): --  RR: 18 (03 Apr 2025 13:33) (15 - 18)  SpO2: 94% (03 Apr 2025 13:33) (94% - 97%)    Parameters below as of 03 Apr 2025 13:33  Patient On (Oxygen Delivery Method): nasal cannula        CONSTITUTIONAL: cahcectic  RESPIRATORY: NC for O2 supplementation, some heavy breathing  CARDIOVASCULAR: No visible JVD, lower extremity edema;   ABDOMEN: Distended,       LABS:                        8.5    8.05  )-----------( 290      ( 03 Apr 2025 08:52 )             29.4     04-03    133[L]  |  99  |  19  ----------------------------<  95  3.4[L]   |  24  |  0.90    Ca    7.9[L]      03 Apr 2025 08:52  Phos  2.2     04-03  Mg     1.70     04-03    TPro  4.8[L]  /  Alb  2.1[L]  /  TBili  <0.2  /  DBili  x   /  AST  26  /  ALT  11  /  AlkPhos  171[H]  04-03          Urinalysis Basic - ( 03 Apr 2025 08:52 )    Color: x / Appearance: x / SG: x / pH: x  Gluc: 95 mg/dL / Ketone: x  / Bili: x / Urobili: x   Blood: x / Protein: x / Nitrite: x   Leuk Esterase: x / RBC: x / WBC x   Sq Epi: x / Non Sq Epi: x / Bacteria: x        Culture - Wound Aerobic (collected 01 Apr 2025 19:30)  Source: Drainage Drainage  Final Report (03 Apr 2025 16:08):    Commensal brayden consistent with body site    Culture - Blood (collected 01 Apr 2025 06:54)  Source: Blood Blood-Venous  Preliminary Report (03 Apr 2025 13:01):    No growth at 48 Hours    Culture - Blood (collected 01 Apr 2025 05:45)  Source: Blood Blood-Peripheral  Preliminary Report (03 Apr 2025 11:01):    No growth at 48 Hours        RADIOLOGY & ADDITIONAL TESTS:  Results Reviewed:   Imaging Personally Reviewed:  Electrocardiogram Personally Reviewed:    COORDINATION OF CARE:  Care Discussed with Consultants/Other Providers [Y/N]: D/W outpatient oncologist, Dr. Hughes, also discussed with Dr. Kimble ([facility]). Dr. Hughes has reached out to multiple family members.   Prior or Outpatient Records Reviewed [Y/N]:

## 2025-04-03 NOTE — PROGRESS NOTE ADULT - SUBJECTIVE AND OBJECTIVE BOX
[facility] Geriatrics and Palliative Care  Cecile Kimble Palliative Care Attending  Contact Info: Page [phone] (including Nights/Weekends), message on Microsoft Teams (Cecile Kimble), or leave  at Palliative Office [phone] (non-urgent)   Date of [id number] @ 11:18    SUBJECTIVE AND OBJECTIVE:    Indication for Geriatrics and Palliative Care Services/INTERVAL HPI: sx management and goc in setting of advanced malignancy     OVERNIGHT EVENTS:  > 4/3: Over the past 24 hours, patient required PRNs of 1mg IV dilaudid x6. 0.5mg PO ativan x1.   > 4/2: Overnight events noted. Over the past 24 hours, patient required PRNs of 6mg PO dilaudid x2, 1mg IV dilaudid x2.   > 4/1: Over the past 24 hours, patient required PRNs of 1mg IV dilaudid x2, 6mg PO dilaudid x2 (after ATC dilaudid was discontinued), 0.5mg PO ativan x2.     DNR on chart:  Allergies    carboplatin (Unknown)  oxaliplatin (Unknown)  IV Contrast (Rash)  cisplatin (Unknown)  platinum containing compounds (Anaphylaxis)    Intolerances    MEDICATIONS  (STANDING):  alteplase  Injectable for Pleural Effusion 10 milliGRAM(s) IntraPleural. every 12 hours  chlorhexidine 2% Cloths 1 Application(s) Topical <User Schedule>  cyanocobalamin 1000 MICROGram(s) Oral daily  dornase nidhi Solution for Pleural Effusion 5 milliGRAM(s) IntraPleural. every 12 hours  escitalopram 10 milliGRAM(s) Oral daily  fentaNYL   Patch  50 MICROgram(s)/Hr 1 Patch Transdermal every 72 hours  fentaNYL   Patch 100 MICROgram(s)/Hr 1 Patch Transdermal every 72 hours  fluticasone propionate 50 MICROgram(s)/spray Nasal Spray 1 Spray(s) Both Nostrils two times a day  furosemide   Injectable 20 milliGRAM(s) IV Push daily  piperacillin/tazobactam IVPB.. 3.375 Gram(s) IV Intermittent every 8 hours  polyethylene glycol 3350 17 Gram(s) Oral daily  potassium phosphate / sodium phosphate Powder (PHOS-NaK) 1 Packet(s) Oral once  senna 2 Tablet(s) Oral at bedtime  simethicone 80 milliGRAM(s) Chew three times a day  sodium chloride 0.9% Solution for Pleural Effusion 30 milliLiter(s) IntraPleural. every 12 hours    MEDICATIONS  (PRN):  albuterol    0.083%. 2.5 milliGRAM(s) Nebulizer once PRN Wheezing  diphenhydrAMINE 50 milliGRAM(s) Oral once PRN MILD ALERGIC REACTION  glucagon  Injectable 1 milliGRAM(s) IV Push once PRN REFRACTORY CASES AND/OR ON BETA BLOCKERS  glucagon  Injectable 2 milliGRAM(s) IV Push once PRN REFRACTORY CASES AND/ OR ON BETA BLOCKERS  HYDROmorphone   Tablet 6 milliGRAM(s) Oral every 4 hours PRN Moderate Pain (4 - 6)  HYDROmorphone  Injectable 1.5 milliGRAM(s) IV Push every 4 hours PRN Severe Pain (7 - 10)  LORazepam     Tablet 0.5 milliGRAM(s) Oral two times a day PRN Anxiety  ondansetron Injectable 8 milliGRAM(s) IV Push every 8 hours PRN Nausea  pseudoephedrine 60 milliGRAM(s) Oral every 6 hours PRN nasal congesiton  sodium chloride 0.65% Nasal 1 Spray(s) Both Nostrils two times a day PRN Congestion      ITEMS UNCHECKED ARE NOT PRESENT    PRESENT SYMPTOMS: [ ]Unable to self-report - see [ ] CPOT [ ] PAINADS [ ] RDOS  Source if other than patient:  [ ]Family   [ ]Team     Pain:  [ ]yes [ ]no  QOL impact -   Location -                    Aggravating factors -  Quality -  Radiation -  Timing-  Severity (0-10 scale):  Minimal acceptable level/ pain goal (0-10 scale):     CPOT:    https://www.[facility].org/getattachment/[email or web address]/Critical-Care-Pain-Observation-Tool-(CPOT)    Dyspnea:                           [ ]Mild [ ]Moderate [ ]Severe  Anxiety:                             [ ]Mild [ ]Moderate [ ]Severe  Fatigue:                             [ ]Mild [ ]Moderate [ ]Severe  Nausea:                             [ ]Mild [ ]Moderate [ ]Severe  Loss of appetite:              [ ]Mild [ ]Moderate [ ]Severe  Constipation:                    [ ]Mild [ ]Moderate [ ]Severe  Other Symptoms:  [ ]All other review of systems negative     PCSSQ[Palliative Care Spiritual Screening Question]   Severity (0-10):  Score of 4 or > indicate consideration of Chaplaincy referral.  Chaplaincy Referral: [ ] yes [ ] refused [ ] following [ ] deferred    Caregiver [place]? : [ ] yes [ ] no [ ] Deferred [ ] Declined             Social work referral [ ] Patient & Family Centered Care Referral [ ]  Anticipatory Grief present?:  [ ] yes [ ] no  [ ] Deferred                  Social work referral [ ] Patient & Family Centered Care Referral [ ]      PHYSICAL EXAM:  Vital Signs Last 24 Hrs  T(C): 36.6 (03 Apr 2025 05:36), Max: 36.8 (02 Apr 2025 17:12)  T(F): 97.9 (03 Apr 2025 05:36), Max: 98.2 (02 Apr 2025 17:12)  HR: 83 (03 Apr 2025 05:36) (83 - 95)  BP: 92/62 (03 Apr 2025 07:00) (92/62 - 108/64)  BP(mean): --  RR: 15 (03 Apr 2025 05:36) (15 - 18)  SpO2: 97% (03 Apr 2025 05:36) (95% - 97%)    Parameters below as of 03 Apr 2025 05:36  Patient On (Oxygen Delivery Method): nasal cannula  O2 Flow (L/min): 3   I&O's Summary    02 Apr 2025 07:01  -  03 Apr 2025 07:00  --------------------------------------------------------  IN: 750 mL / OUT: 0 mL / NET: 750 mL       GENERAL: [ ]Cachexia    [ ]Alert  [ ]Oriented x   [ ]Lethargic  [ ]Unarousable  [ ]Verbal  [ ]Non-Verbal  Behavioral:   [ ]Anxiety  [ ]Delirium [ ]Agitation [ ]Other  HEENT:  [ ]Normal   [ ]Dry mouth   [ ]ET Tube/Trach  [ ]Oral lesions  PULMONARY:   [ ]Clear [ ]Tachypnea  [ ]Audible excessive secretions   [ ]Rhonchi        [ ]Right [ ]Left [ ]Bilateral  [ ]Crackles        [ ]Right [ ]Left [ ]Bilateral  [ ]Wheezing     [ ]Right [ ]Left [ ]Bilateral  [ ]Diminished BS [ ] Right [ ]Left [ ]Bilateral  CARDIOVASCULAR:    [ ]Regular [ ]Irregular [ ]Tachy  [ ]Rios [ ]Murmur [ ]Other  GASTROINTESTINAL:  [ ]Soft  [ ]Distended   [ ]+BS  [ ]Non tender [ ]Tender  [ ]Other [ ]PEG [ ]OGT/ NGT   Last BM:   GENITOURINARY:  [ ]Normal [ ]Incontinent   [ ]Oliguria/Anuria   [ ]Smiley  MUSCULOSKELETAL:   [ ]Normal   [ ]Weakness  [ ]Bed/Wheelchair bound [ ]Edema  NEUROLOGIC:   [ ]No focal deficits  [ ] Cognitive impairment  [ ] Dysphagia [ ]Dysarthria [ ] Paresis [ ]Other   SKIN: Please see flowsheets   [ ]Normal  [ ]Rash  [ ]Other  [ ]Pressure ulcer(s) [ ]y [ ]n present on admission    CRITICAL CARE:  [ ]Shock Present  [ ]Septic [ ]Cardiogenic [ ]Neurologic [ ]Hypovolemic  [ ]Vasopressors [ ]Inotropes  [ ]Respiratory failure present [ ]Mechanical Ventilation [ ]Non-invasive ventilatory support [ ]High-Flow   [ ]Acute  [ ]Chronic [ ]Hypoxic  [ ]Hypercarbic [ ]Other  [ ]Other organ failure     LABS:                        8.5    8.05  )-----------( 290      ( 03 Apr 2025 08:52 )             29.4   04-03    133[L]  |  99  |  19  ----------------------------<  95  3.4[L]   |  24  |  0.90    Ca    7.9[L]      03 Apr 2025 08:52  Phos  2.2     04-03  Mg     1.70     04-03    TPro  4.8[L]  /  Alb  2.1[L]  /  TBili  <0.2  /  DBili  x   /  AST  26  /  ALT  11  /  AlkPhos  171[H]  04-03      Urinalysis Basic - ( 03 Apr 2025 08:52 )    Color: x / Appearance: x / SG: x / pH: x  Gluc: 95 mg/dL / Ketone: x  / Bili: x / Urobili: x   Blood: x / Protein: x / Nitrite: x   Leuk Esterase: x / RBC: x / WBC x   Sq Epi: x / Non Sq Epi: x / Bacteria: x      RADIOLOGY & ADDITIONAL STUDIES:    Protein Calorie Malnutrition Present: [ ]mild [ ]moderate [ ]severe [ ]underweight [ ]morbid obesity  https://www.andeal.org/vault/2440/web/files/ONC/Table_Clinical%20Characteristics%20to%20Document%20Malnutrition-White%20JV%20et%20al%202012.pdf    Height (cm): 154.9 (03-25-25 @ 08:00), 153.01 (02-21-25 @ 10:00), 154.9 (04-19-24 @ 10:00)  Weight (kg): 67.8 (03-25-25 @ 08:00), 58.96 (03-12-25 @ 09:00), 53.5 (04-19-24 @ 10:00)  BMI (kg/m2): 28.3 (03-25-25 @ 08:00), 25.2 (03-12-25 @ 09:00), 22.9 (02-21-25 @ 10:00)    [ ]PPSV2 < or = 30%  [ ]significant weight loss [ ]poor nutritional intake [ ]anasarca[ ]Artificial Nutrition    Other REFERRALS:  [ ]Hospice  [ ]Child Life  [ ]Social Work  [ ]Case management [ ]Holistic Therapy     Goals of Care Document: [facility] Geriatrics and Palliative Care  Cecile Kimble, Palliative Care Attending  Contact Info: Page [phone] (including Nights/Weekends), message on Microsoft Teams (Cecile Kimble), or leave  at Palliative Office [phone] (non-urgent)   Date of [id number] @ 11:18    SUBJECTIVE AND OBJECTIVE: Patient seen this AM states that pain is tolerable but was also amenable to increasing IV dilaudid PRN as the pain medications are not lasting long enough. She reports feeling gassy, for which simethicone helps. She states that she prefers information to be told to her family as she is finding it difficult to receive the information to make next step decisions.     Spoke to patient's, sons, outside of room. Reviewed CTa/p findings. Mich will coordinate family meeting to discuss next steps.     Indication for Geriatrics and Palliative Care Services/INTERVAL HPI: sx management and goc in setting of advanced malignancy     OVERNIGHT EVENTS:  > 4/3: Over the past 24 hours, patient required PRNs of 1mg IV dilaudid x6. 0.5mg PO ativan x1.   > 4/2: Overnight events noted. Over the past 24 hours, patient required PRNs of 6mg PO dilaudid x2, 1mg IV dilaudid x2.   > 4/1: Over the past 24 hours, patient required PRNs of 1mg IV dilaudid x2, 6mg PO dilaudid x2 (after ATC dilaudid was discontinued), 0.5mg PO ativan x2.     DNR on chart:  Allergies    carboplatin (Unknown)  oxaliplatin (Unknown)  IV Contrast (Rash)  cisplatin (Unknown)  platinum containing compounds (Anaphylaxis)    Intolerances    MEDICATIONS  (STANDING):  alteplase  Injectable for Pleural Effusion 10 milliGRAM(s) IntraPleural. every 12 hours  chlorhexidine 2% Cloths 1 Application(s) Topical <User Schedule>  cyanocobalamin 1000 MICROGram(s) Oral daily  dornase nidhi Solution for Pleural Effusion 5 milliGRAM(s) IntraPleural. every 12 hours  escitalopram 10 milliGRAM(s) Oral daily  fentaNYL   Patch  50 MICROgram(s)/Hr 1 Patch Transdermal every 72 hours  fentaNYL   Patch 100 MICROgram(s)/Hr 1 Patch Transdermal every 72 hours  fluticasone propionate 50 MICROgram(s)/spray Nasal Spray 1 Spray(s) Both Nostrils two times a day  furosemide   Injectable 20 milliGRAM(s) IV Push daily  piperacillin/tazobactam IVPB.. 3.375 Gram(s) IV Intermittent every 8 hours  polyethylene glycol 3350 17 Gram(s) Oral daily  potassium phosphate / sodium phosphate Powder (PHOS-NaK) 1 Packet(s) Oral once  senna 2 Tablet(s) Oral at bedtime  simethicone 80 milliGRAM(s) Chew three times a day  sodium chloride 0.9% Solution for Pleural Effusion 30 milliLiter(s) IntraPleural. every 12 hours    MEDICATIONS  (PRN):  albuterol    0.083%. 2.5 milliGRAM(s) Nebulizer once PRN Wheezing  diphenhydrAMINE 50 milliGRAM(s) Oral once PRN MILD ALERGIC REACTION  glucagon  Injectable 1 milliGRAM(s) IV Push once PRN REFRACTORY CASES AND/OR ON BETA BLOCKERS  glucagon  Injectable 2 milliGRAM(s) IV Push once PRN REFRACTORY CASES AND/ OR ON BETA BLOCKERS  HYDROmorphone   Tablet 6 milliGRAM(s) Oral every 4 hours PRN Moderate Pain (4 - 6)  HYDROmorphone  Injectable 1.5 milliGRAM(s) IV Push every 4 hours PRN Severe Pain (7 - 10)  LORazepam     Tablet 0.5 milliGRAM(s) Oral two times a day PRN Anxiety  ondansetron Injectable 8 milliGRAM(s) IV Push every 8 hours PRN Nausea  pseudoephedrine 60 milliGRAM(s) Oral every 6 hours PRN nasal congesiton  sodium chloride 0.65% Nasal 1 Spray(s) Both Nostrils two times a day PRN Congestion      ITEMS UNCHECKED ARE NOT PRESENT    PRESENT SYMPTOMS: [ ]Unable to self-report - see [ ] CPOT [ ] PAINADS [ ] RDOS  Source if other than patient:  [ ]Family   [ ]Team     Pain: [x]yes [ ]no  QOL impact - debilitating   Location - rectum            Aggravating factors - progression of disease   Quality - ache  Radiation -  denies   Timing- constant   Severity (0-10 scale): 10  Minimal acceptable level / Pain goal (0-10 scale): 2    CPOT:    https://www.[facility].org/getattachment/[email or web address]/Critical-Care-Pain-Observation-Tool-(CPOT)    Dyspnea:                           [ ]Mild [ ]Moderate [ ]Severe  Anxiety:                             [ ]Mild [x ]Moderate [ ]Severe  Fatigue:                             [ ]Mild [ x]Moderate [ ]Severe  Nausea:                             [ ]Mild [ ]Moderate [ ]Severe  Loss of appetite:              [ ]Mild [ x]Moderate [ ]Severe  Constipation:                    [ ]Mild [ ]Moderate [ ]Severe  Other Symptoms:  [ x]All other review of systems negative     PCSSQ[Palliative Care Spiritual Screening Question]   Severity (0-10):  Score of 4 or > indicate consideration of Chaplaincy referral.  Chaplaincy Referral: [ ] yes [ ] refused [ ] following [x ] deferred    Caregiver [place]? : [ ] yes [ ] no [x ] Deferred [ ] Declined             Social work referral [ ] Patient & Family Centered Care Referral [ ]  Anticipatory Grief present?:  [ ] yes [ ] no  [x ] Deferred                  Social work referral [ ] Patient & Family Centered Care Referral [ ]      PHYSICAL EXAM:  Vital Signs Last 24 Hrs  T(C): 36.6 (03 Apr 2025 05:36), Max: 36.8 (02 Apr 2025 17:12)  T(F): 97.9 (03 Apr 2025 05:36), Max: 98.2 (02 Apr 2025 17:12)  HR: 83 (03 Apr 2025 05:36) (83 - 95)  BP: 92/62 (03 Apr 2025 07:00) (92/62 - 108/64)  BP(mean): --  RR: 15 (03 Apr 2025 05:36) (15 - 18)  SpO2: 97% (03 Apr 2025 05:36) (95% - 97%)    Parameters below as of 03 Apr 2025 05:36  Patient On (Oxygen Delivery Method): nasal cannula  O2 Flow (L/min): 3   I&O's Summary    02 Apr 2025 07:01  -  03 Apr 2025 07:00  --------------------------------------------------------  IN: 750 mL / OUT: 0 mL / NET: 750 mL       GENERAL:  [x]Alert  [x]Oriented x3   [ ]Lethargic  [x]Cachexia  [ ]Unarousable  [x]Verbal  [ ]Non-Verbal  [x] No Distress  Behavioral:   [x ] Anxiety  [ ] Delirium [ ] Agitation [] Calm  [ ] Other  HEENT:  [x]Normal  [ ] Temporal Wasting  [ ]Dry mouth   [ ]ET Tube/Trach  [ ]Oral lesions  [ ] Mucositis  PULMONARY:   [x]Clear [ ]Tachypnea  [ ]Audible excessive secretions   [ ]Rhonchi        [ ]Right [ ]Left [ ]Bilateral  [ ]Crackles        [ ]Right [ ]Left [ ]Bilateral  [ ]Wheezing     [ ]Right [ ]Left [ ]Bilateral  [ ]Diminished breath sounds [ ]right [ ]left [ ]bilateral  CARDIOVASCULAR:    [x]Regular [ ]Irregular [ ]Tachy  [ ]Rios [ ]Murmur [ ]Other  GASTROINTESTINAL:  [x]Soft  [ ]Distended   [x]+BS  [ ]Non tender [ ]Tender  [ ]PEG [ ]OGT/ NGT  Last BM: +colostomy   GENITOURINARY:  [x]Normal [ ] Incontinent   [ ]Oliguria/Anuria   [ ]Smiley  MUSCULOSKELETAL:   [ ]Normal   [x]Weakness  [x]Bed/Wheelchair bound [x ]Edema  [  ] amputation  [  ] contraction  NEUROLOGIC:   [x]No focal deficits  [ ]Cognitive impairment  [ ]Dysphagia [ ]Dysarthria [ ]Paresis [ ]Other   SKIN: See Nursing Skin Assessment for further details  [ ]Normal    [ ]Rash  [ ]Pressure ulcer(s)       Present on admission [ ]y [ ]n   [  ]  Wound    [  ] hyperpigmentation    CRITICAL CARE:  [ ]Shock Present  [ ]Septic [ ]Cardiogenic [ ]Neurologic [ ]Hypovolemic  [ ]Vasopressors [ ]Inotropes  [ ]Respiratory failure present [ ]Mechanical Ventilation [ ]Non-invasive ventilatory support [ ]High-Flow   [ ]Acute  [ ]Chronic [ ]Hypoxic  [ ]Hypercarbic [ ]Other  [ ]Other organ failure     LABS:                        8.5    8.05  )-----------( 290      ( 03 Apr 2025 08:52 )             29.4   04-03    133[L]  |  99  |  19  ----------------------------<  95  3.4[L]   |  24  |  0.90    Ca    7.9[L]      03 Apr 2025 08:52  Phos  2.2     04-03  Mg     1.70     04-03    TPro  4.8[L]  /  Alb  2.1[L]  /  TBili  <0.2  /  DBili  x   /  AST  26  /  ALT  11  /  AlkPhos  171[H]  04-03      Urinalysis Basic - ( 03 Apr 2025 08:52 )    Color: x / Appearance: x / SG: x / pH: x  Gluc: 95 mg/dL / Ketone: x  / Bili: x / Urobili: x   Blood: x / Protein: x / Nitrite: x   Leuk Esterase: x / RBC: x / WBC x   Sq Epi: x / Non Sq Epi: x / Bacteria: x      RADIOLOGY & ADDITIONAL STUDIES: < from: CT Abdomen and Pelvis No Cont (04.02.25 @ 20:58) >  IMPRESSION:  *  Increase in size of pelvic masses and worsening hepatic metastatic   disease compared to prior examination 2/1/2024.  *  Bilateral pleural effusions, loculated on the right with right-sided   pigtail catheter in situ. Adjacent passive atelectasis. Superimposed   pneumonia is not excluded.  *  Bilateral hydroureteronephrosis. Left hydroureteronephrosis is new   compared to prior examination.  *  Right ureteral stent in appropriate position.    < end of copied text >      Protein Calorie Malnutrition Present: [ ]mild [ ]moderate [ ]severe [ ]underweight [ ]morbid obesity  https://www.andeal.org/vault/2440/web/files/ONC/Table_Clinical%20Characteristics%20to%20Document%20Malnutrition-White%20JV%20et%20al%202012.pdf    Height (cm): 154.9 (03-25-25 @ 08:00), 153.01 (02-21-25 @ 10:00), 154.9 (04-19-24 @ 10:00)  Weight (kg): 67.8 (03-25-25 @ 08:00), 58.96 (03-12-25 @ 09:00), 53.5 (04-19-24 @ 10:00)  BMI (kg/m2): 28.3 (03-25-25 @ 08:00), 25.2 (03-12-25 @ 09:00), 22.9 (02-21-25 @ 10:00)    [ ]PPSV2 < or = 30%  [ ]significant weight loss [ ]poor nutritional intake [ ]anasarca[ ]Artificial Nutrition    Other REFERRALS:  [ ]Hospice  [ ]Child Life  [ ]Social Work  [ ]Case management [ ]Holistic Therapy

## 2025-04-03 NOTE — PROGRESS NOTE ADULT - PROBLEM SELECTOR PLAN 9
In the event of newly developing, evolving, or worsening symptoms, please contact the Palliative Medicine team via pager (if the patient is at [facility] #[phone] or if the patient is at [facility] #[phone]) The Geriatric and Palliative Medicine service has coverage 24 hours a day/ 7 days a week to provide medical recommendations regarding symptom management needs via telephone.

## 2025-04-03 NOTE — PROGRESS NOTE ADULT - ASSESSMENT
63 year old female PMH:Anxiety Chronic back pain, Depression, Ovarian ca with malignant effusion R s/p R Pleurx (Roberts) and Vertigo mass effect adjacent to distal colon w/o obstruction, mass effect on distal R ureter s/p ureter stent now s/p colostomy c/b bleeding now off AC,  Iliac vein occlusion status post stent presents for desensitization of carboplatin. Patient endorses fatigue. She denies fevers but endorses chills. Also endorses abdominal pain and some colostomy leakage. Pt denies any bleeding currently. Endorses difficulty with BMs and urinating as well, states she feels burning when she goes. Overall also endorses sob and has a lot of difficulty walking. Has noted worsening SOB over the past few weeks. Admitted to MICU for carboplatin desensitization, completed successfully on 3/26. Patient developed worsening dyspnea 2/2 loculated R pleural effusion with pleurx in place with CT imaging with R loculated effusion in two pockets; pleurx not draining well. Started MIST protocol on 3/26 with adequate output.     #Ovarian Ca, stage 4  #R Pleurx connected to PleuroVac  #L simple pleural effusion    - 3/27 fluid sample with concern for complicated effusion  - Continue Zosyn with total regimen to 4-6 weeks, would agree to transition to oral Augmentin since no clinical evidence of empyema that needs IV aside features on imaging of organization  - bacteroides bacteremia  - s/p MIST for total of 6 doses (last dose given 3/29) with no interval improvement on imaging   - Continue oxygen supplementation with goal >92%  - Pleurx disconnected from Pleurovac, please continue to drain every other day   - pending further GOC and potential hospice, [facility] care onboard

## 2025-04-03 NOTE — PROGRESS NOTE ADULT - SUBJECTIVE AND OBJECTIVE BOX
Interval Events:      REVIEW OF SYSTEMS:  Negative except as documented above.      OBJECTIVE:  ICU Vital Signs Last 24 Hrs  T(C): 36.3 (03 Apr 2025 13:33), Max: 36.7 (03 Apr 2025 02:22)  T(F): 97.4 (03 Apr 2025 13:33), Max: 98 (03 Apr 2025 02:22)  HR: 86 (03 Apr 2025 13:33) (83 - 95)  BP: 100/64 (03 Apr 2025 13:33) (92/62 - 100/64)  BP(mean): --  ABP: --  ABP(mean): --  RR: 18 (03 Apr 2025 13:33) (15 - 18)  SpO2: 94% (03 Apr 2025 13:33) (94% - 97%)    O2 Parameters below as of 03 Apr 2025 13:33  Patient On (Oxygen Delivery Method): nasal cannula              04-02 @ 07:01  -  04-03 @ 07:00  --------------------------------------------------------  IN: 750 mL / OUT: 0 mL / NET: 750 mL      CAPILLARY BLOOD GLUCOSE          PHYSICAL EXAM:  General: NAD  HEENT:  EOMI, sclera anicteric, moist mucus membranes  Neck: supple  Cardiovascular: RRR  Respiratory: CTAB, no wheezes, crackles, or rhonci  Abdomen: soft, nontender  Extremities: warm and well perfused, no edema, no clubbing  Skin: no rashes  Neurological: no focal deficits    HOSPITAL MEDICATIONS:  MEDICATIONS  (STANDING):  alteplase  Injectable for Pleural Effusion 10 milliGRAM(s) IntraPleural. every 12 hours  chlorhexidine 2% Cloths 1 Application(s) Topical <User Schedule>  cyanocobalamin 1000 MICROGram(s) Oral daily  dornase nidhi Solution for Pleural Effusion 5 milliGRAM(s) IntraPleural. every 12 hours  escitalopram 10 milliGRAM(s) Oral daily  fentaNYL   Patch  50 MICROgram(s)/Hr 1 Patch Transdermal every 72 hours  fentaNYL   Patch 100 MICROgram(s)/Hr 1 Patch Transdermal every 72 hours  fluticasone propionate 50 MICROgram(s)/spray Nasal Spray 1 Spray(s) Both Nostrils two times a day  piperacillin/tazobactam IVPB.. 3.375 Gram(s) IV Intermittent every 8 hours  polyethylene glycol 3350 17 Gram(s) Oral daily  senna 2 Tablet(s) Oral at bedtime  simethicone 80 milliGRAM(s) Chew three times a day  sodium chloride 0.9% Solution for Pleural Effusion 30 milliLiter(s) IntraPleural. every 12 hours    MEDICATIONS  (PRN):  albuterol    0.083%. 2.5 milliGRAM(s) Nebulizer once PRN Wheezing  diphenhydrAMINE 50 milliGRAM(s) Oral once PRN MILD ALERGIC REACTION  glucagon  Injectable 1 milliGRAM(s) IV Push once PRN REFRACTORY CASES AND/OR ON BETA BLOCKERS  glucagon  Injectable 2 milliGRAM(s) IV Push once PRN REFRACTORY CASES AND/ OR ON BETA BLOCKERS  HYDROmorphone   Tablet 6 milliGRAM(s) Oral every 4 hours PRN Moderate Pain (4 - 6)  HYDROmorphone  Injectable 1.5 milliGRAM(s) IV Push every 4 hours PRN Severe Pain (7 - 10)  LORazepam     Tablet 0.5 milliGRAM(s) Oral two times a day PRN Anxiety  ondansetron Injectable 8 milliGRAM(s) IV Push every 8 hours PRN Nausea  pseudoephedrine 60 milliGRAM(s) Oral every 6 hours PRN nasal congesiton  sodium chloride 0.65% Nasal 1 Spray(s) Both Nostrils two times a day PRN Congestion      LABS:                        8.5    8.05  )-----------( 290      ( 03 Apr 2025 08:52 )             29.4     Hgb Trend: 8.5<--, 9.5<--, 9.6<--, 9.4<--, 9.5<--  04-03    133[L]  |  99  |  19  ----------------------------<  95  3.4[L]   |  24  |  0.90    Ca    7.9[L]      03 Apr 2025 08:52  Phos  2.2     04-03  Mg     1.70     04-03    TPro  4.8[L]  /  Alb  2.1[L]  /  TBili  <0.2  /  DBili  x   /  AST  26  /  ALT  11  /  AlkPhos  171[H]  04-03    Creatinine Trend: 0.90<--, 0.98<--, 1.06<--, 1.11<--, 1.13<--, 1.19<--    Urinalysis Basic - ( 03 Apr 2025 08:52 )    Color: x / Appearance: x / SG: x / pH: x  Gluc: 95 mg/dL / Ketone: x  / Bili: x / Urobili: x   Blood: x / Protein: x / Nitrite: x   Leuk Esterase: x / RBC: x / WBC x   Sq Epi: x / Non Sq Epi: x / Bacteria: x            MICROBIOLOGY:     Culture - Wound Aerobic (collected 01 Apr 2025 19:30)  Source: Drainage Drainage  Final Report (03 Apr 2025 16:08):    Commensal brayden consistent with body site    Culture - Blood (collected 01 Apr 2025 06:54)  Source: Blood Blood-Venous  Preliminary Report (03 Apr 2025 13:01):    No growth at 48 Hours    Culture - Blood (collected 01 Apr 2025 05:45)  Source: Blood Blood-Peripheral  Preliminary Report (03 Apr 2025 11:01):    No growth at 48 Hours        RADIOLOGY:  [x] Reviewed and interpreted by me

## 2025-04-03 NOTE — PROGRESS NOTE ADULT - PROBLEM SELECTOR PLAN 3
Patient developed worsening dyspnea 2/2 loculated R pleural effusion with pleurx in place; pleurx not draining well. Started MIST protocol on 3/26. Pulmonology and ID following.   - Continue Zosyn as per ID  - Appreciate pulm and ID recs   - Oxygen supplementation PRN- wean as tolerated   - Rest of care as per primary team.  - Patient is NOT interested in further surgical interventions with CT surgery

## 2025-04-03 NOTE — PROGRESS NOTE ADULT - ATTENDING COMMENTS
63 year old female PMH:Anxiety Chronic back pain, Depression, Ovarian ca with malignant effusion R s/p R Pleurx ([place]) and Vertigo mass effect adjacent to distal colon w/o obstruction, mass effect on distal R ureter s/p ureter stent now s/p colostomy c/b bleeding now off AC,  Iliac vein occlusion status post stent presents for desensitization of carboplatin. Admitted to MICU for carboplatin desensitization, completed successfully on 3/26. Patient developed worsening dyspnea 2/2 loculated R pleural effusion with pleurx in place with CT imaging with R loculated effusion in two pockets; pleurx not draining well. Started MIST protocol on 3/26 with adequate output, s/p 6 doses, with increased CT output. BCx from 3/27 now 1/2 positive for bacteroides, probably GI source, on zosyn.  Low suspicion for empyema from R base loculated pleural effusion. Pleurx disconnected from pleurovac, drain every other day. Palliative following, ongoing GOC discussion.
63 year old female PMH: Anxiety Chronic back pain, Depression, Ovarian ca and Vertigo mass effect adjacent to distal colon w/o obstruction, mass effect on distal R ureter s/p ureterl stent now s/p colostomy c/b bleeding now off AC,  Iliac vein occlusion status post stent presents for desensitization of carboplatin.     Patient here for desensitization. Reported have been sob for the last several weeks. Has a R sided PLEURX which has not been draining well was pending removal. On arrival sat o2 of 88.     S/P desensitization without any issues. CXR with near complete opacification of the left lung, pocus with effusions. CT scan overnight with locularted pleural effusions, Most likely malignant based on history and in comparison to her previous CT report for [facility].     # Chemodesensitization  # Metastatic Ovarian CA  # Chronic LE edema 2/2 to malignant obstruction  # acute hypoxemic respiratory failure  # Loculated pleural effusion  - Completed chemo-desensitization per protocol from . Patient without any issues.   - Monitor for signs of delayed reaction and anaphylaxis   - CT with large loculated pleural effusions. D/W patient's outpatient CT surgery team and IP. Will try TPA to assess it assist with drainage of the loculated effusion.  -Effusion given history and presentation more likely to be malignant vs infectious.   - Place on Vac and give trial of TPA.   - Requiring O2 prior to start of chemo. Will wean as tolerated. Will likely need O2 on discharge.   - DVT ppx - Encourage ambulation, on hold for intra-pleural TPA  - Dispo full code. .
63 year old female PMH:Anxiety Chronic back pain, Depression, Ovarian ca with malignant effusion R s/p R Pleurx ([place]) and Vertigo mass effect adjacent to distal colon w/o obstruction, mass effect on distal R ureter s/p ureter stent now s/p colostomy c/b bleeding now off AC,  Iliac vein occlusion status post stent presents for desensitization of carboplatin. Admitted to MICU for carboplatin desensitization, completed successfully on 3/26. Patient developed worsening dyspnea 2/2 loculated R pleural effusion with pleurx in place with CT imaging with R loculated effusion in two pockets; pleurx not draining well. Started MIST protocol on 3/26 with adequate output, s/p 6 doses, with increased CT output. BCx from 3/27 now 1/2 positive for bacteroides, probably GI source, on zosyn.  Low suspicion for empyema from R base loculated pleural effusion. D/w family regarding further pleural interventions (another catheter vs thoracic sx VATS);  Spoke to patient and son who flew in/ in room about GOC. patient is on 3 LPM O2, appears comfortable, pleurx continues to drain. Palliative following, pt expressed interest in hospice referral but is very tearful and emotional and questioning discharge planning. Primary care team, palliative and family to have ongoing GOC discussion.
63 year old female PMH:Anxiety Chronic back pain, Depression, Ovarian ca with malignant effusion R s/p R Pleurx ([place]) and Vertigo mass effect adjacent to distal colon w/o obstruction, mass effect on distal R ureter s/p ureter stent now s/p colostomy c/b bleeding now off AC,  Iliac vein occlusion status post stent presents for desensitization of carboplatin. Admitted to MICU for carboplatin desensitization, completed successfully on 3/26. Patient developed worsening dyspnea 2/2 loculated R pleural effusion with pleurx in place with CT imaging with R loculated effusion in two pockets; pleurx not draining well. Started MIST protocol on 3/26 with adequate output, s/p 6 doses, with increased CT output. BCx from 3/27 now 1/2 positive for bacteroides, probably GI source, on zosyn.  Low suspicion for empyema from R base loculated pleural effusion. D/w family (2 brothers and son) regarding further pleural interventions (another catheter vs thoracic sx VATS); patient is on 3 LPM O2. GOC discussion ongoing.
Agree with the documentation above.  63 year old female PMH: Anxiety Chronic back pain, Depression, Ovarian ca with malignant effusion R s/p R Pleurx ([place]) and Vertigo mass effect adjacent to distal colon w/o obstruction, mass effect on distal R ureter s/p ureter stent now s/p colostomy c/b bleeding now off AC,  Iliac vein occlusion status post stent presents for desensitization of carboplatin. Found with loculated effusion started MIST protocol 3/26.   Mist protocol continued. No significant drainage noted on PleroVac. Patient does not report improvement on symptoms and seems depressed. Breath sounds decreased on the left and right lung fields  Given that she is not a surgical candidate for VATS, Could benefit from extended protocol to 5 days. a CT chest is needed to determine effects of therapy and further interventions (additional pigtail with continuation of MIST protocol).  Recommend depression screening and emotional support.  Continue O2 supplementation for hypoxemic respiratory failure.
63 year old female PMH: Anxiety Chronic back pain, Depression, Ovarian ca and Vertigo mass effect adjacent to distal colon w/o obstruction, mass effect on distal R ureter s/p ureterl stent now s/p colostomy c/b bleeding now off AC,  Iliac vein occlusion status post stent presents for desensitization of carboplatin.     Patient here for desensitization. Reported have been sob for the last several weeks. Has a R sided PLEURX which has not been draining well was pending removal. On arrival sat o2 of 88.     S/P desensitization without any issues. CXR with near complete opacification of the left lung, pocus with effusions. CT scan overnight with locularted pleural effusions, Most likely malignant based on history and in comparison to her previous CT report for [facility].     S/P TPA for loculation now on MIST given chemistry and cell count.   Cannot r/o infection given low glucose, elevated LDH and neutrophil predominant fluid. Now on MIST protocol.     # Chemodesensitization  # Metastatic Ovarian CA  # Chronic LE edema 2/2 to malignant obstruction  # acute hypoxemic respiratory failure  # Loculated pleural effusion  - Completed chemo-desensitization per protocol from . Patient without any issues.   - Monitor for signs of delayed reaction and anaphylaxis   - CT with large loculated pleural effusions. D/W patient's outpatient CT surgery team and IP. Chemistry/cell count, cannot r/o infection. Now on abx and MIST protocol. Some more output with MIST. If fails mist will need CT surgery vs IR CT guided chest tube.   - Requiring O2 prior to start of chemo. Will wean as tolerated. Will likely need O2 on discharge.   - DVT ppx - Encourage ambulation, on hold for intra-pleural TPA  - Dispo full code.

## 2025-04-04 LAB
ALBUMIN SERPL ELPH-MCNC: 2.4 G/DL — LOW (ref 3.3–5)
ALP SERPL-CCNC: 178 U/L — HIGH (ref 40–120)
ALT FLD-CCNC: 11 U/L — SIGNIFICANT CHANGE UP (ref 4–33)
ANION GAP SERPL CALC-SCNC: 15 MMOL/L — HIGH (ref 7–14)
AST SERPL-CCNC: 29 U/L — SIGNIFICANT CHANGE UP (ref 4–32)
BASOPHILS # BLD AUTO: 0.06 K/UL — SIGNIFICANT CHANGE UP (ref 0–0.2)
BASOPHILS NFR BLD AUTO: 0.6 % — SIGNIFICANT CHANGE UP (ref 0–2)
BILIRUB SERPL-MCNC: <0.2 MG/DL — SIGNIFICANT CHANGE UP (ref 0.2–1.2)
BUN SERPL-MCNC: 17 MG/DL — SIGNIFICANT CHANGE UP (ref 7–23)
CALCIUM SERPL-MCNC: 7.8 MG/DL — LOW (ref 8.4–10.5)
CHLORIDE SERPL-SCNC: 97 MMOL/L — LOW (ref 98–107)
CO2 SERPL-SCNC: 23 MMOL/L — SIGNIFICANT CHANGE UP (ref 22–31)
CREAT SERPL-MCNC: 0.86 MG/DL — SIGNIFICANT CHANGE UP (ref 0.5–1.3)
CULTURE RESULTS: SIGNIFICANT CHANGE UP
EGFR: 76 ML/MIN/1.73M2 — SIGNIFICANT CHANGE UP
EGFR: 76 ML/MIN/1.73M2 — SIGNIFICANT CHANGE UP
EOSINOPHIL # BLD AUTO: 0.17 K/UL — SIGNIFICANT CHANGE UP (ref 0–0.5)
EOSINOPHIL NFR BLD AUTO: 1.8 % — SIGNIFICANT CHANGE UP (ref 0–6)
GLUCOSE SERPL-MCNC: 94 MG/DL — SIGNIFICANT CHANGE UP (ref 70–99)
HCT VFR BLD CALC: 31.2 % — LOW (ref 34.5–45)
HGB BLD-MCNC: 9.1 G/DL — LOW (ref 11.5–15.5)
IANC: 7.79 K/UL — HIGH (ref 1.8–7.4)
IMM GRANULOCYTES NFR BLD AUTO: 1.3 % — HIGH (ref 0–0.9)
LYMPHOCYTES # BLD AUTO: 0.79 K/UL — LOW (ref 1–3.3)
LYMPHOCYTES # BLD AUTO: 8.2 % — LOW (ref 13–44)
MAGNESIUM SERPL-MCNC: 1.6 MG/DL — SIGNIFICANT CHANGE UP (ref 1.6–2.6)
MCHC RBC-ENTMCNC: 28.7 PG — SIGNIFICANT CHANGE UP (ref 27–34)
MCHC RBC-ENTMCNC: 29.2 G/DL — LOW (ref 32–36)
MCV RBC AUTO: 98.4 FL — SIGNIFICANT CHANGE UP (ref 80–100)
MONOCYTES # BLD AUTO: 0.7 K/UL — SIGNIFICANT CHANGE UP (ref 0–0.9)
MONOCYTES NFR BLD AUTO: 7.3 % — SIGNIFICANT CHANGE UP (ref 2–14)
NEUTROPHILS # BLD AUTO: 7.79 K/UL — HIGH (ref 1.8–7.4)
NEUTROPHILS NFR BLD AUTO: 80.8 % — HIGH (ref 43–77)
NRBC # BLD AUTO: 0 K/UL — SIGNIFICANT CHANGE UP (ref 0–0)
NRBC # FLD: 0 K/UL — SIGNIFICANT CHANGE UP (ref 0–0)
NRBC BLD AUTO-RTO: 0 /100 WBCS — SIGNIFICANT CHANGE UP (ref 0–0)
PHOSPHATE SERPL-MCNC: 2 MG/DL — LOW (ref 2.5–4.5)
PLATELET # BLD AUTO: 303 K/UL — SIGNIFICANT CHANGE UP (ref 150–400)
POTASSIUM SERPL-MCNC: 3.9 MMOL/L — SIGNIFICANT CHANGE UP (ref 3.5–5.3)
POTASSIUM SERPL-SCNC: 3.9 MMOL/L — SIGNIFICANT CHANGE UP (ref 3.5–5.3)
PROT SERPL-MCNC: 5.1 G/DL — LOW (ref 6–8.3)
RBC # BLD: 3.17 M/UL — LOW (ref 3.8–5.2)
RBC # FLD: 18.1 % — HIGH (ref 10.3–14.5)
SODIUM SERPL-SCNC: 135 MMOL/L — SIGNIFICANT CHANGE UP (ref 135–145)
SPECIMEN SOURCE: SIGNIFICANT CHANGE UP
WBC # BLD: 9.64 K/UL — SIGNIFICANT CHANGE UP (ref 3.8–10.5)
WBC # FLD AUTO: 9.64 K/UL — SIGNIFICANT CHANGE UP (ref 3.8–10.5)

## 2025-04-04 PROCEDURE — 99233 SBSQ HOSP IP/OBS HIGH 50: CPT

## 2025-04-04 PROCEDURE — 99232 SBSQ HOSP IP/OBS MODERATE 35: CPT

## 2025-04-04 RX ORDER — POTASSIUM PHOSPHATE, MONOBASIC POTASSIUM PHOSPHATE, DIBASIC INJECTION, 236; 224 MG/ML; MG/ML
30 SOLUTION, CONCENTRATE INTRAVENOUS ONCE
Refills: 0 | Status: COMPLETED | OUTPATIENT
Start: 2025-04-04 | End: 2025-04-04

## 2025-04-04 RX ORDER — ACETAMINOPHEN 500 MG/5ML
1000 LIQUID (ML) ORAL ONCE
Refills: 0 | Status: COMPLETED | OUTPATIENT
Start: 2025-04-04 | End: 2025-04-04

## 2025-04-04 RX ORDER — MAGNESIUM OXIDE 400 MG
400 TABLET ORAL
Refills: 0 | Status: COMPLETED | OUTPATIENT
Start: 2025-04-04 | End: 2025-04-05

## 2025-04-04 RX ADMIN — Medication 400 MILLIGRAM(S): at 17:26

## 2025-04-04 RX ADMIN — FLUTICASONE PROPIONATE 1 SPRAY(S): 50 SPRAY, METERED NASAL at 05:10

## 2025-04-04 RX ADMIN — Medication 1.5 MILLIGRAM(S): at 22:31

## 2025-04-04 RX ADMIN — Medication 400 MILLIGRAM(S): at 22:56

## 2025-04-04 RX ADMIN — POTASSIUM PHOSPHATE, MONOBASIC POTASSIUM PHOSPHATE, DIBASIC INJECTION, 83.33 MILLIMOLE(S): 236; 224 SOLUTION, CONCENTRATE INTRAVENOUS at 21:55

## 2025-04-04 RX ADMIN — Medication 1.5 MILLIGRAM(S): at 21:31

## 2025-04-04 RX ADMIN — Medication 25 GRAM(S): at 17:25

## 2025-04-04 RX ADMIN — Medication 0.5 MILLIGRAM(S): at 23:13

## 2025-04-04 RX ADMIN — Medication 25 GRAM(S): at 02:00

## 2025-04-04 RX ADMIN — Medication 1.5 MILLIGRAM(S): at 13:34

## 2025-04-04 RX ADMIN — Medication 80 MILLIGRAM(S): at 09:22

## 2025-04-04 RX ADMIN — Medication 1.5 MILLIGRAM(S): at 17:25

## 2025-04-04 RX ADMIN — Medication 25 GRAM(S): at 09:20

## 2025-04-04 RX ADMIN — ESCITALOPRAM OXALATE 10 MILLIGRAM(S): 20 TABLET ORAL at 12:47

## 2025-04-04 RX ADMIN — Medication 1000 MILLIGRAM(S): at 23:56

## 2025-04-04 RX ADMIN — FLUTICASONE PROPIONATE 1 SPRAY(S): 50 SPRAY, METERED NASAL at 17:28

## 2025-04-04 RX ADMIN — CYANOCOBALAMIN 1000 MICROGRAM(S): 1000 INJECTION INTRAMUSCULAR; SUBCUTANEOUS at 12:48

## 2025-04-04 RX ADMIN — Medication 80 MILLIGRAM(S): at 02:04

## 2025-04-04 RX ADMIN — Medication 400 MILLIGRAM(S): at 12:48

## 2025-04-04 RX ADMIN — Medication 1.5 MILLIGRAM(S): at 18:25

## 2025-04-04 RX ADMIN — Medication 80 MILLIGRAM(S): at 17:32

## 2025-04-04 RX ADMIN — Medication 1 APPLICATION(S): at 05:09

## 2025-04-04 RX ADMIN — Medication 1.5 MILLIGRAM(S): at 12:34

## 2025-04-04 NOTE — PROGRESS NOTE ADULT - PROBLEM SELECTOR PLAN 4
- Continue 150 mcg Fentanyl patch (3/31) Offered adjustments today but declined.   - Continue dilaudid 6mg PO q4 PRN for moderate pain   - Continue Dilaudid to 1.5 mg IV q4 PRN for severe pain   - simethicone tid for bloating/gas pain   - Bowel regimen while on opiates  - Narcan prn.  - Holistic rN referral

## 2025-04-04 NOTE — PROGRESS NOTE ADULT - PROBLEM SELECTOR PLAN 9
In the event of newly developing, evolving, or worsening symptoms, please contact the Palliative Medicine team via pager (if the patient is at [facility] #[phone] or if the patient is at [facility] #[phone]) The Geriatric and Palliative Medicine service has coverage 24 hours a day/ 7 days a week to provide medical recommendations regarding symptom management needs via telephone.

## 2025-04-04 NOTE — GOALS OF CARE CONVERSATION - ADVANCED CARE PLANNING - CONVERSATION DETAILS
Family meeting held with patient's family with Onc-Hospitalist team. Introduced role of staff members involved in patient's care. Reviewed patient's acute medical issues including bacteremia likely related from her metastatic cancer. Discussed that imaging did not reveal a source of infection and explained that patient will continue to be at risk of infection if further chemotherapy is offered as there is no source control. We also explained that patient has progression of disease on recent imaging, including worsening liver mets, peritoneal disease and malignant fluid build up impacting the kidneys. Family seems to understand that currently patient would not be a candidate for chemotherapy but they are hopeful that patient can be reassessed and evaluated for chemotherapy in the future, potentially with MD Sameer.     We recommended a transition of care to hospice. Educated family on the philosophy of hospice care. Discussed the services provided under hospice services emphasizing the goal of elevating patient's quality of life and optimizing symptom management and avoiding unnecessary future hospitalizations. Family shared their concerns that if something acutely happens at home, they would bring her back to the hospital as they "are not ready to give up". They repeatedly ask about patient's ability to transfer to [facility] vs. knowledge of clinical trials vs. over the counter medication that can "slow down the cancer". They want time to discuss as a family the information presented.

## 2025-04-04 NOTE — PROGRESS NOTE ADULT - ASSESSMENT
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 On admission patient also c/o dyspnea over the past few weeks. Further eval revealed loculated R pleural effusion with pleurx in place; pleurx not draining well. Started MIST protocol on 3/26    Active propblems  Fallopian tube cancer  Loculated Effusion  Empyema?   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 Admitted for carboplatin desensitization   Overall performance status of patient is poor. D/W Dr. Hughes, patient's disease is very advanced. She is not recommending further cancer therapy. Patient is currently a candidate for hospice.       Loculated Effusion  Empyema?   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 Patient developed worsening dyspnea 2/2 loculated R pleural effusion with pleurx in place; pleurx not draining well. Started MIST protocol on 3/26,  Pulmonary stating that this may be empyema. D/W ID, not entirely clear. No cxs obtained. Procal 0.24, not entirely + for PNA. Continue Zosyn for now, if discharged can be cipro and flagyl  Loculated effusion may be malignant more so than infectious  Follow up pulmonary  O2 supplement as needed  Patient expressed today that she is not interested in further interventions from CT surgery for loculated effusions    Bacteremia  D/W ID on 4/2 likely intra-abdominal source.   CT a/p with extensive cancer, bacteremia possibly translocation in the setting of advanced malignancy    Cancer related pain  Palliative care consulted, pain is now better controlled.    [facility]  Extensively addressed with her family and outpatient oncologist  She would like to defer medical decisions to them. Son Mich will be the point of contact for medical team ()  CT chest and a/p shown to family, compared [id number] to older scans. Disease progression is extensive.     Anxiety  Depression  Palliative care  Continue current meds

## 2025-04-04 NOTE — PROGRESS NOTE ADULT - NS ATTEST RISK PROBLEM GEN_ALL_CORE FT
Medical management as above, review of results/records, discussion with patient and primary team, documentation.
1. Number and complexity of problems addressed for this patient:    1.1 Moderate (At least 1)  [ ] 1 or more chronic illnesses with exacerbation, progression, or side effects of treatment  [ ] 2 or more stable chronic illnesses  [ ] 1 undiagnosed new problem with uncertain prognosis  [ ] 1 acute illness with systemic symptoms  [ ] 1 acute complicated injury  1.2 High (At least 1)   [ ] 1 or more chronic illnesses with severe exacerbation, progression, or side effects of treatment  [ x] 1 acute or chronic illnesses or injuries that may pose a threat to life or bodily function    2. Amount and/or Complexity of Data that was Reviewed and Analyzed for this case:       Moderate (1 out of 3)       High (2 out of 3)  2.1. (Any combination of 3 of the following)   [x ] Prior External notes were reviewed  [ ] Each test result was reviewed (see "LABS" and "RADIOLOGY & ADDITIONAL STUDIES" above)  [ ] The following tests were ordered and/or reviewed (Only count 1 point for ordering or reviewing a unique test):  	[ ]CBC  	[ ] Chemistry   	[ ] Imaging   	[ ] Other:   [ ] Assessment requiring an independent historian   		Name of historian and relationship:   2.2  [ ] Personally review and interpretation of  image or testing   2.3  [x ] Discussion of management or test interpretation with external physician/other qualified health care professional\appropriate source (not separately reported)    3. Risk of Complications and/or Morbidity or Mortality of for this Patient’s Management:  3.1 Moderate risk of morbidity from additional diagnostic testing or treatment (At least 1):   [ ] Prescription drug management   [ ] Decision regarding minor surgery, treatment, or procedure with identified patient or procedure risk factors  [ ] Decision regarding elective major surgery, treatment, or procedure without identified patient or procedure risk factors   [ ] Diagnosis or treatment significantly limited by social determinants of health   [ ] Other:   3.2 High risk of morbidity from additional diagnostic testing or treatment (At least 1):   [ ] Drug therapy requiring intensive monitoring for toxicity   [ ] Decision regarding elective major surgery, treatment, or procedure with identified patient or procedure risk factors   [ ] Decision regarding emergency major surgery, treatment, or procedure   [ ] Decision regarding hospitalization or escalation of hospital-level of care  [ ] Decision not to resuscitate, not to intubate, or to de-escalate care because of poor prognosis   [ ] Decision to proceed or not with artificial nutrition   [ x] Parenteral controlled substance  [ ] Other:
1. Number and complexity of problems addressed for this patient:    1.1 Moderate (At least 1)  [ ] 1 or more chronic illnesses with exacerbation, progression, or side effects of treatment  [ ] 2 or more stable chronic illnesses  [ ] 1 undiagnosed new problem with uncertain prognosis  [ ] 1 acute illness with systemic symptoms  [ ] 1 acute complicated injury  1.2 High (At least 1)   [ ] 1 or more chronic illnesses with severe exacerbation, progression, or side effects of treatment  [ x] 1 acute or chronic illnesses or injuries that may pose a threat to life or bodily function    2. Amount and/or Complexity of Data that was Reviewed and Analyzed for this case:       Moderate (1 out of 3)       High (2 out of 3)  2.1. (Any combination of 3 of the following)   [x ] Prior External notes were reviewed  [ ] Each test result was reviewed (see "LABS" and "RADIOLOGY & ADDITIONAL STUDIES" above)  [ ] The following tests were ordered and/or reviewed (Only count 1 point for ordering or reviewing a unique test):  	[ ]CBC  	[ ] Chemistry   	[ ] Imaging   	[ ] Other:   [ ] Assessment requiring an independent historian   		Name of historian and relationship:   2.2  [ ] Personally review and interpretation of  image or testing   2.3  [x ] Discussion of management or test interpretation with external physician/other qualified health care professional\appropriate source (not separately reported)    3. Risk of Complications and/or Morbidity or Mortality of for this Patient’s Management:  3.1 Moderate risk of morbidity from additional diagnostic testing or treatment (At least 1):   [ ] Prescription drug management   [ ] Decision regarding minor surgery, treatment, or procedure with identified patient or procedure risk factors  [ ] Decision regarding elective major surgery, treatment, or procedure without identified patient or procedure risk factors   [ ] Diagnosis or treatment significantly limited by social determinants of health   [ ] Other:   3.2 High risk of morbidity from additional diagnostic testing or treatment (At least 1):   [ ] Drug therapy requiring intensive monitoring for toxicity   [ ] Decision regarding elective major surgery, treatment, or procedure with identified patient or procedure risk factors   [ ] Decision regarding emergency major surgery, treatment, or procedure   [ ] Decision regarding hospitalization or escalation of hospital-level of care  [ ] Decision not to resuscitate, not to intubate, or to de-escalate care because of poor prognosis   [ ] Decision to proceed or not with artificial nutrition   [ x] Parenteral controlled substance  [ ] Other:     [x] 16 mins spent discussing goc

## 2025-04-04 NOTE — PROGRESS NOTE ADULT - SUBJECTIVE AND OBJECTIVE BOX
[facility] Geriatrics and Palliative Care  Cecile Kimble, Palliative Care Attending  Contact Info: Page [phone] (including Nights/Weekends), message on Microsoft Teams (Cecile Kimble), or leave  at Palliative Office [phone] (non-urgent)   Date of [id number] @ 11:43    SUBJECTIVE AND OBJECTIVE: Patient seen this AM with Geoffrey at bedside. Patient reports having difficult night at the hospital and unable to get rest. Geoffrey states that his family will be present for family meeting at 2pm today.     Indication for Geriatrics and Palliative Care Services/INTERVAL HPI: sx management and goc in setting of advanced malignancy     OVERNIGHT EVENTS:  > 4/4: Over the past 24 hours, patient required PRNs of IV dilaudid 1.5mg x3, PO ativan 0.5mg x1.   > 4/3: Over the past 24 hours, patient required PRNs of 1mg IV dilaudid x6. 0.5mg PO ativan x1.   > 4/2: Overnight events noted. Over the past 24 hours, patient required PRNs of 6mg PO dilaudid x2, 1mg IV dilaudid x2.   > 4/1: Over the past 24 hours, patient required PRNs of 1mg IV dilaudid x2, 6mg PO dilaudid x2 (after ATC dilaudid was discontinued), 0.5mg PO ativan x2.     DNR on chart:  Allergies    carboplatin (Unknown)  oxaliplatin (Unknown)  IV Contrast (Rash)  cisplatin (Unknown)  platinum containing compounds (Anaphylaxis)    Intolerances    MEDICATIONS  (STANDING):  alteplase  Injectable for Pleural Effusion 10 milliGRAM(s) IntraPleural. every 12 hours  chlorhexidine 2% Cloths 1 Application(s) Topical <User Schedule>  cyanocobalamin 1000 MICROGram(s) Oral daily  dornase nidhi Solution for Pleural Effusion 5 milliGRAM(s) IntraPleural. every 12 hours  escitalopram 10 milliGRAM(s) Oral daily  fentaNYL   Patch  50 MICROgram(s)/Hr 1 Patch Transdermal every 72 hours  fentaNYL   Patch 100 MICROgram(s)/Hr 1 Patch Transdermal every 72 hours  fluticasone propionate 50 MICROgram(s)/spray Nasal Spray 1 Spray(s) Both Nostrils two times a day  magnesium oxide 400 milliGRAM(s) Oral three times a day with meals  piperacillin/tazobactam IVPB.. 3.375 Gram(s) IV Intermittent every 8 hours  polyethylene glycol 3350 17 Gram(s) Oral daily  potassium phosphate IVPB 30 milliMole(s) IV Intermittent once  senna 2 Tablet(s) Oral at bedtime  simethicone 80 milliGRAM(s) Chew three times a day  sodium chloride 0.9% Solution for Pleural Effusion 30 milliLiter(s) IntraPleural. every 12 hours    MEDICATIONS  (PRN):  albuterol    0.083%. 2.5 milliGRAM(s) Nebulizer once PRN Wheezing  diphenhydrAMINE 50 milliGRAM(s) Oral once PRN MILD ALERGIC REACTION  glucagon  Injectable 1 milliGRAM(s) IV Push once PRN REFRACTORY CASES AND/OR ON BETA BLOCKERS  glucagon  Injectable 2 milliGRAM(s) IV Push once PRN REFRACTORY CASES AND/ OR ON BETA BLOCKERS  HYDROmorphone   Tablet 6 milliGRAM(s) Oral every 4 hours PRN Moderate Pain (4 - 6)  HYDROmorphone  Injectable 1.5 milliGRAM(s) IV Push every 4 hours PRN Severe Pain (7 - 10)  LORazepam     Tablet 0.5 milliGRAM(s) Oral two times a day PRN Anxiety  ondansetron Injectable 8 milliGRAM(s) IV Push every 8 hours PRN Nausea  pseudoephedrine 60 milliGRAM(s) Oral every 6 hours PRN nasal congesiton  sodium chloride 0.65% Nasal 1 Spray(s) Both Nostrils two times a day PRN Congestion      ITEMS UNCHECKED ARE NOT PRESENT    PRESENT SYMPTOMS: [ ]Unable to self-report - see [ ] CPOT [ ] PAINADS [ ] RDOS  Source if other than patient:  [ ]Family   [ ]Team     Pain: [x]yes [ ]no  QOL impact - debilitating   Location - rectum            Aggravating factors - progression of disease   Quality - ache  Radiation -  denies   Timing- constant   Severity (0-10 scale): 10  Minimal acceptable level / Pain goal (0-10 scale): 2    CPOT:    https://www.[facility].org/getattachment/[email or web address]/Critical-Care-Pain-Observation-Tool-(CPOT)    Dyspnea:                           [ ]Mild [ ]Moderate [ ]Severe  Anxiety:                             [ ]Mild [x ]Moderate [ ]Severe  Fatigue:                             [ ]Mild [ x]Moderate [ ]Severe  Nausea:                             [ ]Mild [ ]Moderate [ ]Severe  Loss of appetite:              [ ]Mild [ x]Moderate [ ]Severe  Constipation:                    [ ]Mild [ ]Moderate [ ]Severe  Other Symptoms: insomnia   [ x]All other review of systems negative     PCSSQ[Palliative Care Spiritual Screening Question]   Severity (0-10):  Score of 4 or > indicate consideration of Chaplaincy referral.  Chaplaincy Referral: [ ] yes [ ] refused [ ] following [x ] deferred    Caregiver [place]? : [ ] yes [ ] no [x ] Deferred [ ] Declined             Social work referral [ ] Patient & Family Centered Care Referral [ ]  Anticipatory Grief present?:  [ ] yes [ ] no  [x ] Deferred                  Social work referral [ ] Patient & Family Centered Care Referral [ ]      PHYSICAL EXAM:  Vital Signs Last 24 Hrs  T(C): 37 (04 Apr 2025 06:04), Max: 37.2 (03 Apr 2025 22:38)  T(F): 98.6 (04 Apr 2025 06:04), Max: 98.9 (03 Apr 2025 22:38)  HR: 104 (04 Apr 2025 06:04) (82 - 105)  BP: 100/70 (04 Apr 2025 06:04) (92/68 - 103/76)  BP(mean): --  RR: 18 (04 Apr 2025 06:04) (18 - 18)  SpO2: 96% (04 Apr 2025 06:04) (94% - 100%)    Parameters below as of 04 Apr 2025 06:04  Patient On (Oxygen Delivery Method): nasal cannula  O2 Flow (L/min): 3   I&O's Summary    03 Apr 2025 07:01  -  04 Apr 2025 07:00  --------------------------------------------------------  IN: 750 mL / OUT: 450 mL / NET: 300 mL       GENERAL:  [x]Alert  [x]Oriented x3   [ ]Lethargic  [x]Cachexia  [ ]Unarousable  [x]Verbal  [ ]Non-Verbal  [x] No Distress  Behavioral:   [x ] Anxiety  [ ] Delirium [ ] Agitation [] Calm  [ ] Other  HEENT:  [x]Normal  [ ] Temporal Wasting  [ ]Dry mouth   [ ]ET Tube/Trach  [ ]Oral lesions  [ ] Mucositis  PULMONARY:   [x]Clear [ ]Tachypnea  [ ]Audible excessive secretions   [ ]Rhonchi        [ ]Right [ ]Left [ ]Bilateral  [ ]Crackles        [ ]Right [ ]Left [ ]Bilateral  [ ]Wheezing     [ ]Right [ ]Left [ ]Bilateral  [ ]Diminished breath sounds [ ]right [ ]left [ ]bilateral  CARDIOVASCULAR:    [x]Regular [ ]Irregular [ ]Tachy  [ ]Rios [ ]Murmur [ ]Other  GASTROINTESTINAL:  [x]Soft  [ ]Distended   [x]+BS  [ ]Non tender [ ]Tender  [ ]PEG [ ]OGT/ NGT  Last BM: +colostomy   GENITOURINARY:  [x]Normal [ ] Incontinent   [ ]Oliguria/Anuria   [ ]Smiley  MUSCULOSKELETAL:   [ ]Normal   [x]Weakness  [x]Bed/Wheelchair bound [x ]Edema  [  ] amputation  [  ] contraction  NEUROLOGIC:   [x]No focal deficits  [ ]Cognitive impairment  [ ]Dysphagia [ ]Dysarthria [ ]Paresis [ ]Other   SKIN: See Nursing Skin Assessment for further details  [ ]Normal    [ ]Rash  [ ]Pressure ulcer(s)       Present on admission [ ]y [ ]n   [  ]  Wound    [  ] hyperpigmentation    CRITICAL CARE:  [ ]Shock Present  [ ]Septic [ ]Cardiogenic [ ]Neurologic [ ]Hypovolemic  [ ]Vasopressors [ ]Inotropes  [ ]Respiratory failure present [ ]Mechanical Ventilation [ ]Non-invasive ventilatory support [ ]High-Flow   [ ]Acute  [ ]Chronic [ ]Hypoxic  [ ]Hypercarbic [ ]Other  [ ]Other organ failure     LABS:                        9.1    9.64  )-----------( 303      ( 04 Apr 2025 05:15 )             31.2   04-04    135  |  97[L]  |  17  ----------------------------<  94  3.9   |  23  |  0.86    Ca    7.8[L]      04 Apr 2025 05:15  Phos  2.0     04-04  Mg     1.60     04-04    TPro  5.1[L]  /  Alb  2.4[L]  /  TBili  <0.2  /  DBili  x   /  AST  29  /  ALT  11  /  AlkPhos  178[H]  04-04      Urinalysis Basic - ( 04 Apr 2025 05:15 )    Color: x / Appearance: x / SG: x / pH: x  Gluc: 94 mg/dL / Ketone: x  / Bili: x / Urobili: x   Blood: x / Protein: x / Nitrite: x   Leuk Esterase: x / RBC: x / WBC x   Sq Epi: x / Non Sq Epi: x / Bacteria: x      RADIOLOGY & ADDITIONAL STUDIES: no new     Protein Calorie Malnutrition Present: [ ]mild [ ]moderate [ ]severe [ ]underweight [ ]morbid obesity  https://www.andeal.org/vault/2440/web/files/ONC/Table_Clinical%20Characteristics%20to%20Document%20Malnutrition-White%20JV%20et%20al%202012.pdf    Height (cm): 154.9 (03-25-25 @ 08:00), 153.01 (02-21-25 @ 10:00), 154.9 (04-19-24 @ 10:00)  Weight (kg): 67.8 (03-25-25 @ 08:00), 58.96 (03-12-25 @ 09:00), 53.5 (04-19-24 @ 10:00)  BMI (kg/m2): 28.3 (03-25-25 @ 08:00), 25.2 (03-12-25 @ 09:00), 22.9 (02-21-25 @ 10:00)    [ ]PPSV2 < or = 30%  [ ]significant weight loss [ ]poor nutritional intake [ ]anasarca[ ]Artificial Nutrition    Other REFERRALS:  [ ]Hospice  [ ]Child Life  [ ]Social Work  [ ]Case management [ ]Holistic Therapy

## 2025-04-04 NOTE — PROGRESS NOTE ADULT - SUBJECTIVE AND OBJECTIVE BOX
Infectious Diseases Follow Up:    Patient is a 64y old  Female who presents with a chief complaint of carbo desen (03 Apr 2025 16:25)      Interval History/ROS:  No acute events noted, afebrile.   Pt seen at bedside with son, she denies any acute changes, no abdominal pain     Allergies  carboplatin (Unknown)  oxaliplatin (Unknown)  IV Contrast (Rash)  cisplatin (Unknown)  platinum containing compounds (Anaphylaxis)        ANTIMICROBIALS:  piperacillin/tazobactam IVPB.. 3.375 every 8 hours      Current Abx:     Previous Abx     OTHER MEDS:  MEDICATIONS  (STANDING):  albuterol    0.083%. 2.5 once PRN  alteplase  Injectable for Pleural Effusion 10 every 12 hours  diphenhydrAMINE 50 once PRN  dornase nidhi Solution for Pleural Effusion 5 every 12 hours  escitalopram 10 daily  fentaNYL   Patch  50 MICROgram(s)/Hr 1 every 72 hours  fentaNYL   Patch 100 MICROgram(s)/Hr 1 every 72 hours  glucagon  Injectable 1 once PRN  glucagon  Injectable 2 once PRN  HYDROmorphone   Tablet 6 every 4 hours PRN  HYDROmorphone  Injectable 1.5 every 4 hours PRN  LORazepam     Tablet 0.5 two times a day PRN  ondansetron Injectable 8 every 8 hours PRN  polyethylene glycol 3350 17 daily  pseudoephedrine 60 every 6 hours PRN  senna 2 at bedtime  simethicone 80 three times a day      Vital Signs Last 24 Hrs  T(C): 36.7 (04 Apr 2025 12:36), Max: 37.2 (03 Apr 2025 22:38)  T(F): 98 (04 Apr 2025 12:36), Max: 98.9 (03 Apr 2025 22:38)  HR: 101 (04 Apr 2025 12:36) (82 - 105)  BP: 100/67 (04 Apr 2025 12:36) (92/68 - 103/76)  BP(mean): --  RR: 17 (04 Apr 2025 12:36) (17 - 18)  SpO2: 96% (04 Apr 2025 12:36) (94% - 100%)    Parameters below as of 04 Apr 2025 12:36  Patient On (Oxygen Delivery Method): nasal cannula        PHYSICAL EXAM:  GENERAL: NAD, thin  HEAD:  Atraumatic, Normocephalic  EYES: EOMI, conjunctiva and sclera clear  CHEST/LUNG: On NC, Clear to auscultation bilaterally; R PLEURX   HEART: Regular rate and rhythm;   ABDOMEN: Soft, NT, ND, +ostomy   PSYCH: AAOx3                          9.1    9.64  )-----------( 303      ( 04 Apr 2025 05:15 )             31.2       04-04    135  |  97[L]  |  17  ----------------------------<  94  3.9   |  23  |  0.86    Ca    7.8[L]      04 Apr 2025 05:15  Phos  2.0     04-04  Mg     1.60     04-04    TPro  5.1[L]  /  Alb  2.4[L]  /  TBili  <0.2  /  DBili  x   /  AST  29  /  ALT  11  /  AlkPhos  178[H]  04-04      Urinalysis Basic - ( 04 Apr 2025 05:15 )    Color: x / Appearance: x / SG: x / pH: x  Gluc: 94 mg/dL / Ketone: x  / Bili: x / Urobili: x   Blood: x / Protein: x / Nitrite: x   Leuk Esterase: x / RBC: x / WBC x   Sq Epi: x / Non Sq Epi: x / Bacteria: x        MICROBIOLOGY:  v  Drainage Drainage  04-01-25   Commensal brayden consistent with body site  --  --      Blood Blood-Venous  04-01-25   No growth at 72 Hours  --  --      Blood Blood-Peripheral  04-01-25   No growth at 72 Hours  --  --      Blood Blood-Peripheral  03-30-25   No growth at 4 days  --  --      Blood Blood-Peripheral  03-27-25   Growth in anaerobic bottle: Bacteroides thetaiotaomicron group  "Susceptibilities not performed"  Direct identification is available within approximately 3-5  hours either by Blood Panel Multiplexed PCR or Direct  MALDI-TOF. Details: [email or web address]  --  Blood Culture PCR                RADIOLOGY:    < from: CT Abdomen and Pelvis No Cont (04.02.25 @ 20:58) >  IMPRESSION:  *  Increase in size of pelvic masses and worsening hepatic metastatic   disease compared to prior examination 2/1/2024.  *  Bilateral pleural effusions, loculated on the right with right-sided   pigtail catheter in situ. Adjacent passive atelectasis. Superimposed   pneumonia is not excluded.  *  Bilateral hydroureteronephrosis. Left hydroureteronephrosis is new   compared to prior examination.  *  Right ureteral stent in appropriate position.    < end of copied text >

## 2025-04-04 NOTE — PROGRESS NOTE ADULT - SUBJECTIVE AND OBJECTIVE BOX
Keron John MD  Academic Hospitalist  Pager [phone]  Email: [email or web address]  Available on Microsoft Teams        PROGRESS NOTE:     Patient is a 64y old  Female who presents with a chief complaint of carbo desen (03 Apr 2025 16:25)      SUBJECTIVE / OVERNIGHT EVENTS:  Patient seen and examined this morning. Patient wishes not to engage, however seen walking in the hallway with PT.     Family meeting this afternoon:     MEDICATIONS  (STANDING):  alteplase  Injectable for Pleural Effusion 10 milliGRAM(s) IntraPleural. every 12 hours  chlorhexidine 2% Cloths 1 Application(s) Topical <User Schedule>  cyanocobalamin 1000 MICROGram(s) Oral daily  dornase nidhi Solution for Pleural Effusion 5 milliGRAM(s) IntraPleural. every 12 hours  escitalopram 10 milliGRAM(s) Oral daily  fentaNYL   Patch  50 MICROgram(s)/Hr 1 Patch Transdermal every 72 hours  fentaNYL   Patch 100 MICROgram(s)/Hr 1 Patch Transdermal every 72 hours  fluticasone propionate 50 MICROgram(s)/spray Nasal Spray 1 Spray(s) Both Nostrils two times a day  magnesium oxide 400 milliGRAM(s) Oral three times a day with meals  piperacillin/tazobactam IVPB.. 3.375 Gram(s) IV Intermittent every 8 hours  polyethylene glycol 3350 17 Gram(s) Oral daily  potassium phosphate IVPB 30 milliMole(s) IV Intermittent once  senna 2 Tablet(s) Oral at bedtime  simethicone 80 milliGRAM(s) Chew three times a day  sodium chloride 0.9% Solution for Pleural Effusion 30 milliLiter(s) IntraPleural. every 12 hours    MEDICATIONS  (PRN):  albuterol    0.083%. 2.5 milliGRAM(s) Nebulizer once PRN Wheezing  diphenhydrAMINE 50 milliGRAM(s) Oral once PRN MILD ALERGIC REACTION  glucagon  Injectable 1 milliGRAM(s) IV Push once PRN REFRACTORY CASES AND/OR ON BETA BLOCKERS  glucagon  Injectable 2 milliGRAM(s) IV Push once PRN REFRACTORY CASES AND/ OR ON BETA BLOCKERS  HYDROmorphone   Tablet 6 milliGRAM(s) Oral every 4 hours PRN Moderate Pain (4 - 6)  HYDROmorphone  Injectable 1.5 milliGRAM(s) IV Push every 4 hours PRN Severe Pain (7 - 10)  LORazepam     Tablet 0.5 milliGRAM(s) Oral two times a day PRN Anxiety  ondansetron Injectable 8 milliGRAM(s) IV Push every 8 hours PRN Nausea  pseudoephedrine 60 milliGRAM(s) Oral every 6 hours PRN nasal congesiton  sodium chloride 0.65% Nasal 1 Spray(s) Both Nostrils two times a day PRN Congestion      CAPILLARY BLOOD GLUCOSE        I&O's Summary    03 Apr 2025 07:01  -  04 Apr 2025 07:00  --------------------------------------------------------  IN: 750 mL / OUT: 450 mL / NET: 300 mL        PHYSICAL EXAM:  Vital Signs Last 24 Hrs  T(C): 36.7 (04 Apr 2025 12:36), Max: 37.2 (03 Apr 2025 22:38)  T(F): 98 (04 Apr 2025 12:36), Max: 98.9 (03 Apr 2025 22:38)  HR: 101 (04 Apr 2025 12:36) (82 - 105)  BP: 100/67 (04 Apr 2025 12:36) (92/68 - 103/76)  BP(mean): --  RR: 17 (04 Apr 2025 12:36) (17 - 18)  SpO2: 96% (04 Apr 2025 12:36) (95% - 100%)    Parameters below as of 04 Apr 2025 12:36  Patient On (Oxygen Delivery Method): nasal cannula        CONSTITUTIONAL: cahcectic, walking with PT slowly  RESPIRATORY: NC for O2 supplementation, some heavy breathing  CARDIOVASCULAR: No visible JVD, lower extremity edema;   ABDOMEN: Distended, is not guarding      LABS:                        9.1    9.64  )-----------( 303      ( 04 Apr 2025 05:15 )             31.2     04-04    135  |  97[L]  |  17  ----------------------------<  94  3.9   |  23  |  0.86    Ca    7.8[L]      04 Apr 2025 05:15  Phos  2.0     04-04  Mg     1.60     04-04    TPro  5.1[L]  /  Alb  2.4[L]  /  TBili  <0.2  /  DBili  x   /  AST  29  /  ALT  11  /  AlkPhos  178[H]  04-04          Urinalysis Basic - ( 04 Apr 2025 05:15 )    Color: x / Appearance: x / SG: x / pH: x  Gluc: 94 mg/dL / Ketone: x  / Bili: x / Urobili: x   Blood: x / Protein: x / Nitrite: x   Leuk Esterase: x / RBC: x / WBC x   Sq Epi: x / Non Sq Epi: x / Bacteria: x        Culture - Wound Aerobic (collected 01 Apr 2025 19:30)  Source: Drainage Drainage  Final Report (03 Apr 2025 16:08):    Commensal brayden consistent with body site        RADIOLOGY & ADDITIONAL TESTS:  Results Reviewed:   Imaging Personally Reviewed:  Electrocardiogram Personally Reviewed:    COORDINATION OF CARE:  Care Discussed with Consultants/Other Providers [Y/N]:  Prior or Outpatient Records Reviewed [Y/N]:   Keron John MD  Academic Hospitalist  Pager [phone]  Email: [email or web address]  Available on Microsoft Teams        PROGRESS NOTE:     Patient is a 64y old  Female who presents with a chief complaint of carbo desen (03 Apr 2025 16:25)      SUBJECTIVE / OVERNIGHT EVENTS:   Patient wishes not to engage, however seen walking in the hallway with PT.     Family meeting this afternoon in the presence of palliative care, REYES Penaloza, and Conner LOPEZ: Extensive discussion regarding the progression of the patient's disease, prognosis. Medical team recommended transition approach to comfort as any cancer treatment at this point would be more harmful than beneficial. The patient's family is not ready at this time to transition her care and are still interested in seeking further therapy for her cancer. The patient had reported that she will like to defer to family for all medical decisions.     MEDICATIONS  (STANDING):  alteplase  Injectable for Pleural Effusion 10 milliGRAM(s) IntraPleural. every 12 hours  chlorhexidine 2% Cloths 1 Application(s) Topical <User Schedule>  cyanocobalamin 1000 MICROGram(s) Oral daily  dornase nidhi Solution for Pleural Effusion 5 milliGRAM(s) IntraPleural. every 12 hours  escitalopram 10 milliGRAM(s) Oral daily  fentaNYL   Patch  50 MICROgram(s)/Hr 1 Patch Transdermal every 72 hours  fentaNYL   Patch 100 MICROgram(s)/Hr 1 Patch Transdermal every 72 hours  fluticasone propionate 50 MICROgram(s)/spray Nasal Spray 1 Spray(s) Both Nostrils two times a day  magnesium oxide 400 milliGRAM(s) Oral three times a day with meals  piperacillin/tazobactam IVPB.. 3.375 Gram(s) IV Intermittent every 8 hours  polyethylene glycol 3350 17 Gram(s) Oral daily  potassium phosphate IVPB 30 milliMole(s) IV Intermittent once  senna 2 Tablet(s) Oral at bedtime  simethicone 80 milliGRAM(s) Chew three times a day  sodium chloride 0.9% Solution for Pleural Effusion 30 milliLiter(s) IntraPleural. every 12 hours    MEDICATIONS  (PRN):  albuterol    0.083%. 2.5 milliGRAM(s) Nebulizer once PRN Wheezing  diphenhydrAMINE 50 milliGRAM(s) Oral once PRN MILD ALERGIC REACTION  glucagon  Injectable 1 milliGRAM(s) IV Push once PRN REFRACTORY CASES AND/OR ON BETA BLOCKERS  glucagon  Injectable 2 milliGRAM(s) IV Push once PRN REFRACTORY CASES AND/ OR ON BETA BLOCKERS  HYDROmorphone   Tablet 6 milliGRAM(s) Oral every 4 hours PRN Moderate Pain (4 - 6)  HYDROmorphone  Injectable 1.5 milliGRAM(s) IV Push every 4 hours PRN Severe Pain (7 - 10)  LORazepam     Tablet 0.5 milliGRAM(s) Oral two times a day PRN Anxiety  ondansetron Injectable 8 milliGRAM(s) IV Push every 8 hours PRN Nausea  pseudoephedrine 60 milliGRAM(s) Oral every 6 hours PRN nasal congesiton  sodium chloride 0.65% Nasal 1 Spray(s) Both Nostrils two times a day PRN Congestion      CAPILLARY BLOOD GLUCOSE        I&O's Summary    03 Apr 2025 07:01  -  04 Apr 2025 07:00  --------------------------------------------------------  IN: 750 mL / OUT: 450 mL / NET: 300 mL        PHYSICAL EXAM:  Vital Signs Last 24 Hrs  T(C): 36.7 (04 Apr 2025 12:36), Max: 37.2 (03 Apr 2025 22:38)  T(F): 98 (04 Apr 2025 12:36), Max: 98.9 (03 Apr 2025 22:38)  HR: 101 (04 Apr 2025 12:36) (82 - 105)  BP: 100/67 (04 Apr 2025 12:36) (92/68 - 103/76)  BP(mean): --  RR: 17 (04 Apr 2025 12:36) (17 - 18)  SpO2: 96% (04 Apr 2025 12:36) (95% - 100%)    Parameters below as of 04 Apr 2025 12:36  Patient On (Oxygen Delivery Method): nasal cannula        CONSTITUTIONAL: cahcectic, walking with PT slowly  RESPIRATORY: NC for O2 supplementation, some heavy breathing  CARDIOVASCULAR: No visible JVD, lower extremity edema;   ABDOMEN: Distended, is not guarding      LABS:                        9.1    9.64  )-----------( 303      ( 04 Apr 2025 05:15 )             31.2     04-04    135  |  97[L]  |  17  ----------------------------<  94  3.9   |  23  |  0.86    Ca    7.8[L]      04 Apr 2025 05:15  Phos  2.0     04-04  Mg     1.60     04-04    TPro  5.1[L]  /  Alb  2.4[L]  /  TBili  <0.2  /  DBili  x   /  AST  29  /  ALT  11  /  AlkPhos  178[H]  04-04          Urinalysis Basic - ( 04 Apr 2025 05:15 )    Color: x / Appearance: x / SG: x / pH: x  Gluc: 94 mg/dL / Ketone: x  / Bili: x / Urobili: x   Blood: x / Protein: x / Nitrite: x   Leuk Esterase: x / RBC: x / WBC x   Sq Epi: x / Non Sq Epi: x / Bacteria: x        Culture - Wound Aerobic (collected 01 Apr 2025 19:30)  Source: Drainage Drainage  Final Report (03 Apr 2025 16:08):    Commensal brayden consistent with body site        RADIOLOGY & ADDITIONAL TESTS:  Results Reviewed:   Imaging Personally Reviewed:  Electrocardiogram Personally Reviewed:    COORDINATION OF CARE:  Care Discussed with Consultants/Other Providers [Y/N]:  Prior or Outpatient Records Reviewed [Y/N]:

## 2025-04-04 NOTE — PROGRESS NOTE ADULT - ASSESSMENT
This is a 62 y/o F w/ PMHx ovarian ca (possible liver, lung mets) with malignant R effusion s/p R Pleurx ([place], 1/2025), mass effect adjacent to distal colon w/o obstruction, mass effect on distal R ureter s/p ureter stent now s/p colostomy c/b bleeding now off AC,  Iliac vein occlusion s/p stent who was admitted to [facility] MICU on 3/25/25 for desensitization of carboplatin, noted to have R sided PLEURX not draining well.  CT Chest w/ multiloculated R pleural effusion, some pockets of pleural fluid not communicated w/ catheter tip. Mod L PLEFF. Pt started on MIST protocol w/ TPA with more output.   Studies sent on 3/27 after TPA, with , RBC 29k, 59% neutrophils,  pH 8.1, glucose 33, LDH 1133, protein 1.6. No Cx sent.   Pt started on Vancomycin/Zosyn.     #Bacteroides bacteremia   #R multiloculated pleural effusion, s/p MIST protocol through PLEURX,   #C/f infected loculated pleural effusion?  #Mild leukocytosis  #Ovarian CA w/ likely lung, liver mets    Overall, a 62 y/o F w/ PMHx ovarian ca (possible liver, lung mets) with malignant R effusion s/p R Pleurx ([place], 1/2025), mass effect adjacent to distal colon w/o obstruction, mass effect on distal R ureter s/p ureter stent now s/p colostomy c/b bleeding now off AC, Iliac vein occlusion s/p stent admitted for chemo sensitization, noted to have non draining R PLEURX, s/p CT w/ multiloculated R pleural effusion, now on MIST protocol.   ID consulted for possible infected R loculated pleural effusion. Pt is currently afebrile, mild leukocytosis 12, procal low, BCx 1 set 3/27 NG, MRSA PCR neg.   Studies taken from existing PLEURX: (after TPA)  , RBC 29k, 59% neutrophils,  pH 8.1, glucose 33, LDH 1133, protein 1.6.  Difficult to interpret studies take from existing PLEURX and after TPA.  Given low nucleated cell count (~364 after correcting RBCs), high pH, and lack of other infectious findings (BCx NGTD, low PCT, no fevers, no PNA on CT), concern for empyema/parapneumonic effusion is quite low.     BCx from 3/27 now 1/2 positive for bacteroides, certainly a GI source. Given ovarian CA w/ mets, pt may have translocation. This would not be associated with a central line/port infection or PLEURX infection.   CT A/P on 4/3 with worsening malignancy, now b/l hydro, no abscess or intra-abdominal collection.      Recommendations:  1. C/w Zosyn 3.375 g q8 given bacteroides bacteremia and likely GI source of infection   2. F/u repeat BCx sent on 3/30, NG  3. Will need two week course of abx until 4/12 to finish 2 weeks of abx after negative BCx. If planned for d/c prior to that, can change to Ciprofloxacin 500 mg BID and Flaygl 500 mg TID to finish course.    Thank you for consulting us and involving us in the management of this patient's case. In addition to reviewing history, imaging, documents, labs, microbiology, and infection control strategies and potential issues.     ID will continue to follow    John Perez M.D.  Attending Physician  Division of Infectious Diseases  Department of Medicine    Please contact through MS Teams message.  Office: [phone] (after 5 PM or weekend).  Time-based billing (NON-critical care). This is a 64 y/o F w/ PMHx ovarian ca (possible liver, lung mets) with malignant R effusion s/p R Pleurx ([place], 1/2025), mass effect adjacent to distal colon w/o obstruction, mass effect on distal R ureter s/p ureter stent now s/p colostomy c/b bleeding now off AC,  Iliac vein occlusion s/p stent who was admitted to [facility] MICU on 3/25/25 for desensitization of carboplatin, noted to have R sided PLEURX not draining well.  CT Chest w/ multiloculated R pleural effusion, some pockets of pleural fluid not communicated w/ catheter tip. Mod L PLEFF. Pt started on MIST protocol w/ TPA with more output.   Studies sent on 3/27 after TPA, with , RBC 29k, 59% neutrophils,  pH 8.1, glucose 33, LDH 1133, protein 1.6. No Cx sent.   Pt started on Vancomycin/Zosyn.     #Bacteroides bacteremia   #R multiloculated pleural effusion, s/p MIST protocol through PLEURX,   #C/f infected loculated pleural effusion?  #Mild leukocytosis  #Ovarian CA w/ likely lung, liver mets    Overall, a 64 y/o F w/ PMHx ovarian ca (possible liver, lung mets) with malignant R effusion s/p R Pleurx ([place], 1/2025), mass effect adjacent to distal colon w/o obstruction, mass effect on distal R ureter s/p ureter stent now s/p colostomy c/b bleeding now off AC, Iliac vein occlusion s/p stent admitted for chemo sensitization, noted to have non draining R PLEURX, s/p CT w/ multiloculated R pleural effusion, now on MIST protocol.   ID consulted for possible infected R loculated pleural effusion. Pt is currently afebrile, mild leukocytosis 12, procal low, BCx 1 set 3/27 NG, MRSA PCR neg.   Studies taken from existing PLEURX: (after TPA)  , RBC 29k, 59% neutrophils,  pH 8.1, glucose 33, LDH 1133, protein 1.6.  Difficult to interpret studies take from existing PLEURX and after TPA.  Given low nucleated cell count (~364 after correcting RBCs), high pH, and lack of other infectious findings (BCx NGTD, low PCT, no fevers, no PNA on CT), concern for empyema/parapneumonic effusion is quite low.     BCx from 3/27 now 1/2 positive for bacteroides, certainly a GI source. Given ovarian CA w/ mets, pt may have translocation. This would not be associated with a central line/port infection or PLEURX infection.   CT A/P on 4/3 with worsening malignancy, now b/l hydro, no abscess or intra-abdominal collection.      Recommendations:  1. C/w Zosyn 3.375 g q8 given bacteroides bacteremia and likely GI source of infection   2. F/u repeat BCx sent on 3/30, NG  3. Will need two week course of abx until 4/12 to finish 2 weeks of abx after negative BCx. If planned for d/c prior to that, can change to Ciprofloxacin 500 mg BID and Flaygl 500 mg TID to finish course.    Thank you for consulting us and involving us in the management of this patient's case. In addition to reviewing history, imaging, documents, labs, microbiology, and infection control strategies and potential issues.     Inpatient ID consult team will sign off.    Further changes in lab values, imaging studies, or clinical status will not be known to ID inpatient consultants unless specifically communicated by primary team.    John Perez MD  Attending Physician  Division of Infectious Diseases  Department of Medicine    Please contact through MS Teams message.  Office: [phone] (after 5 PM or weekend)

## 2025-04-05 LAB
ALBUMIN SERPL ELPH-MCNC: 2 G/DL — LOW (ref 3.3–5)
ALP SERPL-CCNC: 168 U/L — HIGH (ref 40–120)
ALT FLD-CCNC: 11 U/L — SIGNIFICANT CHANGE UP (ref 4–33)
ANION GAP SERPL CALC-SCNC: 11 MMOL/L — SIGNIFICANT CHANGE UP (ref 7–14)
AST SERPL-CCNC: 27 U/L — SIGNIFICANT CHANGE UP (ref 4–32)
BASOPHILS # BLD AUTO: 0.06 K/UL — SIGNIFICANT CHANGE UP (ref 0–0.2)
BASOPHILS NFR BLD AUTO: 0.7 % — SIGNIFICANT CHANGE UP (ref 0–2)
BILIRUB SERPL-MCNC: <0.2 MG/DL — SIGNIFICANT CHANGE UP (ref 0.2–1.2)
BUN SERPL-MCNC: 17 MG/DL — SIGNIFICANT CHANGE UP (ref 7–23)
CALCIUM SERPL-MCNC: 7.9 MG/DL — LOW (ref 8.4–10.5)
CANCER AG125 SERPL-ACNC: 7238 U/ML — HIGH
CHLORIDE SERPL-SCNC: 101 MMOL/L — SIGNIFICANT CHANGE UP (ref 98–107)
CO2 SERPL-SCNC: 25 MMOL/L — SIGNIFICANT CHANGE UP (ref 22–31)
CREAT SERPL-MCNC: 0.92 MG/DL — SIGNIFICANT CHANGE UP (ref 0.5–1.3)
EGFR: 70 ML/MIN/1.73M2 — SIGNIFICANT CHANGE UP
EGFR: 70 ML/MIN/1.73M2 — SIGNIFICANT CHANGE UP
EOSINOPHIL # BLD AUTO: 0.21 K/UL — SIGNIFICANT CHANGE UP (ref 0–0.5)
EOSINOPHIL NFR BLD AUTO: 2.5 % — SIGNIFICANT CHANGE UP (ref 0–6)
GLUCOSE SERPL-MCNC: 93 MG/DL — SIGNIFICANT CHANGE UP (ref 70–99)
HCT VFR BLD CALC: 26.1 % — LOW (ref 34.5–45)
HGB BLD-MCNC: 8 G/DL — LOW (ref 11.5–15.5)
IANC: 6.68 K/UL — SIGNIFICANT CHANGE UP (ref 1.8–7.4)
IMM GRANULOCYTES NFR BLD AUTO: 1 % — HIGH (ref 0–0.9)
LYMPHOCYTES # BLD AUTO: 0.63 K/UL — LOW (ref 1–3.3)
LYMPHOCYTES # BLD AUTO: 7.5 % — LOW (ref 13–44)
MAGNESIUM SERPL-MCNC: 1.6 MG/DL — SIGNIFICANT CHANGE UP (ref 1.6–2.6)
MCHC RBC-ENTMCNC: 29.1 PG — SIGNIFICANT CHANGE UP (ref 27–34)
MCHC RBC-ENTMCNC: 30.7 G/DL — LOW (ref 32–36)
MCV RBC AUTO: 94.9 FL — SIGNIFICANT CHANGE UP (ref 80–100)
MONOCYTES # BLD AUTO: 0.69 K/UL — SIGNIFICANT CHANGE UP (ref 0–0.9)
MONOCYTES NFR BLD AUTO: 8.3 % — SIGNIFICANT CHANGE UP (ref 2–14)
NEUTROPHILS # BLD AUTO: 6.68 K/UL — SIGNIFICANT CHANGE UP (ref 1.8–7.4)
NEUTROPHILS NFR BLD AUTO: 80 % — HIGH (ref 43–77)
NRBC # BLD AUTO: 0 K/UL — SIGNIFICANT CHANGE UP (ref 0–0)
NRBC # FLD: 0 K/UL — SIGNIFICANT CHANGE UP (ref 0–0)
NRBC BLD AUTO-RTO: 0 /100 WBCS — SIGNIFICANT CHANGE UP (ref 0–0)
PHOSPHATE SERPL-MCNC: 3 MG/DL — SIGNIFICANT CHANGE UP (ref 2.5–4.5)
PLATELET # BLD AUTO: 262 K/UL — SIGNIFICANT CHANGE UP (ref 150–400)
POTASSIUM SERPL-MCNC: 4.3 MMOL/L — SIGNIFICANT CHANGE UP (ref 3.5–5.3)
POTASSIUM SERPL-SCNC: 4.3 MMOL/L — SIGNIFICANT CHANGE UP (ref 3.5–5.3)
PROT SERPL-MCNC: 4.7 G/DL — LOW (ref 6–8.3)
RBC # BLD: 2.75 M/UL — LOW (ref 3.8–5.2)
RBC # FLD: 18.3 % — HIGH (ref 10.3–14.5)
SODIUM SERPL-SCNC: 137 MMOL/L — SIGNIFICANT CHANGE UP (ref 135–145)
WBC # BLD: 8.35 K/UL — SIGNIFICANT CHANGE UP (ref 3.8–10.5)
WBC # FLD AUTO: 8.35 K/UL — SIGNIFICANT CHANGE UP (ref 3.8–10.5)

## 2025-04-05 PROCEDURE — 99232 SBSQ HOSP IP/OBS MODERATE 35: CPT

## 2025-04-05 RX ORDER — MAGNESIUM OXIDE 400 MG
400 TABLET ORAL
Refills: 0 | Status: COMPLETED | OUTPATIENT
Start: 2025-04-05 | End: 2025-04-06

## 2025-04-05 RX ORDER — ACETAMINOPHEN 500 MG/5ML
650 LIQUID (ML) ORAL ONCE
Refills: 0 | Status: COMPLETED | OUTPATIENT
Start: 2025-04-05 | End: 2025-04-05

## 2025-04-05 RX ADMIN — Medication 1.5 MILLIGRAM(S): at 07:46

## 2025-04-05 RX ADMIN — Medication 80 MILLIGRAM(S): at 18:32

## 2025-04-05 RX ADMIN — Medication 400 MILLIGRAM(S): at 09:11

## 2025-04-05 RX ADMIN — Medication 80 MILLIGRAM(S): at 22:20

## 2025-04-05 RX ADMIN — Medication 25 GRAM(S): at 18:31

## 2025-04-05 RX ADMIN — CYANOCOBALAMIN 1000 MICROGRAM(S): 1000 INJECTION INTRAMUSCULAR; SUBCUTANEOUS at 11:33

## 2025-04-05 RX ADMIN — Medication 1.5 MILLIGRAM(S): at 16:17

## 2025-04-05 RX ADMIN — Medication 1.5 MILLIGRAM(S): at 20:38

## 2025-04-05 RX ADMIN — Medication 25 GRAM(S): at 03:31

## 2025-04-05 RX ADMIN — ESCITALOPRAM OXALATE 10 MILLIGRAM(S): 20 TABLET ORAL at 11:33

## 2025-04-05 RX ADMIN — Medication 400 MILLIGRAM(S): at 18:31

## 2025-04-05 RX ADMIN — Medication 650 MILLIGRAM(S): at 22:04

## 2025-04-05 RX ADMIN — FLUTICASONE PROPIONATE 1 SPRAY(S): 50 SPRAY, METERED NASAL at 18:32

## 2025-04-05 RX ADMIN — Medication 1.5 MILLIGRAM(S): at 08:46

## 2025-04-05 RX ADMIN — Medication 1.5 MILLIGRAM(S): at 04:00

## 2025-04-05 RX ADMIN — FLUTICASONE PROPIONATE 1 SPRAY(S): 50 SPRAY, METERED NASAL at 06:32

## 2025-04-05 RX ADMIN — Medication 650 MILLIGRAM(S): at 23:04

## 2025-04-05 RX ADMIN — Medication 1.5 MILLIGRAM(S): at 17:17

## 2025-04-05 RX ADMIN — Medication 25 GRAM(S): at 11:33

## 2025-04-05 RX ADMIN — Medication 80 MILLIGRAM(S): at 06:32

## 2025-04-05 RX ADMIN — Medication 0.5 MILLIGRAM(S): at 22:04

## 2025-04-05 RX ADMIN — Medication 1.5 MILLIGRAM(S): at 03:00

## 2025-04-05 RX ADMIN — Medication 1.5 MILLIGRAM(S): at 20:23

## 2025-04-05 RX ADMIN — Medication 400 MILLIGRAM(S): at 11:33

## 2025-04-05 RX ADMIN — Medication 1 APPLICATION(S): at 06:32

## 2025-04-05 NOTE — PROGRESS NOTE ADULT - SUBJECTIVE AND OBJECTIVE BOX
Keron John MD  Academic Hospitalist  Pager [phone]  Email: [email or web address]  Available on Microsoft Teams        PROGRESS NOTE:     Patient is a 64y old  Female who presents with a chief complaint of Carbo desen (04 Apr 2025 14:41)      SUBJECTIVE / OVERNIGHT EVENTS:  Patient continues to wish not to engage. But reportedly feels better.       MEDICATIONS  (STANDING):  alteplase  Injectable for Pleural Effusion 10 milliGRAM(s) IntraPleural. every 12 hours  chlorhexidine 2% Cloths 1 Application(s) Topical <User Schedule>  cyanocobalamin 1000 MICROGram(s) Oral daily  dornase nidhi Solution for Pleural Effusion 5 milliGRAM(s) IntraPleural. every 12 hours  escitalopram 10 milliGRAM(s) Oral daily  fentaNYL   Patch  50 MICROgram(s)/Hr 1 Patch Transdermal every 72 hours  fentaNYL   Patch 100 MICROgram(s)/Hr 1 Patch Transdermal every 72 hours  fluticasone propionate 50 MICROgram(s)/spray Nasal Spray 1 Spray(s) Both Nostrils two times a day  magnesium oxide 400 milliGRAM(s) Oral three times a day with meals  piperacillin/tazobactam IVPB.. 3.375 Gram(s) IV Intermittent every 8 hours  polyethylene glycol 3350 17 Gram(s) Oral daily  senna 2 Tablet(s) Oral at bedtime  simethicone 80 milliGRAM(s) Chew three times a day  sodium chloride 0.9% Solution for Pleural Effusion 30 milliLiter(s) IntraPleural. every 12 hours    MEDICATIONS  (PRN):  albuterol    0.083%. 2.5 milliGRAM(s) Nebulizer once PRN Wheezing  diphenhydrAMINE 50 milliGRAM(s) Oral once PRN MILD ALERGIC REACTION  glucagon  Injectable 1 milliGRAM(s) IV Push once PRN REFRACTORY CASES AND/OR ON BETA BLOCKERS  glucagon  Injectable 2 milliGRAM(s) IV Push once PRN REFRACTORY CASES AND/ OR ON BETA BLOCKERS  HYDROmorphone   Tablet 6 milliGRAM(s) Oral every 4 hours PRN Moderate Pain (4 - 6)  HYDROmorphone  Injectable 1.5 milliGRAM(s) IV Push every 4 hours PRN Severe Pain (7 - 10)  LORazepam     Tablet 0.5 milliGRAM(s) Oral two times a day PRN Anxiety  ondansetron Injectable 8 milliGRAM(s) IV Push every 8 hours PRN Nausea  pseudoephedrine 60 milliGRAM(s) Oral every 6 hours PRN nasal congesiton  sodium chloride 0.65% Nasal 1 Spray(s) Both Nostrils two times a day PRN Congestion      CAPILLARY BLOOD GLUCOSE        I&O's Summary    04 Apr 2025 07:01  -  05 Apr 2025 07:00  --------------------------------------------------------  IN: 250 mL / OUT: 500 mL / NET: -250 mL        PHYSICAL EXAM:  Vital Signs Last 24 Hrs  T(C): 37.1 (05 Apr 2025 05:47), Max: 37.1 (05 Apr 2025 05:47)  T(F): 98.7 (05 Apr 2025 05:47), Max: 98.7 (05 Apr 2025 05:47)  HR: 96 (05 Apr 2025 05:47) (96 - 103)  BP: 95/55 (05 Apr 2025 05:47) (95/55 - 103/71)  BP(mean): --  RR: 18 (05 Apr 2025 05:47) (17 - 18)  SpO2: 95% (05 Apr 2025 05:47) (95% - 96%)    Parameters below as of 05 Apr 2025 05:47  Patient On (Oxygen Delivery Method): nasal cannula  O2 Flow (L/min): 3      CONSTITUTIONAL: cachectic   RESPIRATORY: NC for O2 supplementation, some heavy breathing  CARDIOVASCULAR: No visible JVD, +lower extremity edema;   ABDOMEN: Distended, is not guarding    LABS:                        8.0    8.35  )-----------( 262      ( 05 Apr 2025 07:12 )             26.1     04-05    137  |  101  |  17  ----------------------------<  93  4.3   |  25  |  0.92    Ca    7.9[L]      05 Apr 2025 07:12  Phos  3.0     04-05  Mg     1.60     04-05    TPro  4.7[L]  /  Alb  2.0[L]  /  TBili  <0.2  /  DBili  x   /  AST  27  /  ALT  11  /  AlkPhos  168[H]  04-05          Urinalysis Basic - ( 05 Apr 2025 07:12 )    Color: x / Appearance: x / SG: x / pH: x  Gluc: 93 mg/dL / Ketone: x  / Bili: x / Urobili: x   Blood: x / Protein: x / Nitrite: x   Leuk Esterase: x / RBC: x / WBC x   Sq Epi: x / Non Sq Epi: x / Bacteria: x          RADIOLOGY & ADDITIONAL TESTS:  Results Reviewed:   Imaging Personally Reviewed:  Electrocardiogram Personally Reviewed:    COORDINATION OF CARE:  Care Discussed with Consultants/Other Providers [Y/N]:  Prior or Outpatient Records Reviewed [Y/N]:   Keron John MD  Academic Hospitalist  Pager [phone]  Email: [email or web address]  Available on Microsoft Teams        PROGRESS NOTE:     Patient is a 64y old  Female who presents with a chief complaint of Carbo desen (04 Apr 2025 14:41)      SUBJECTIVE / OVERNIGHT EVENTS:  Patient continues does not wish to engage me. Witnessed her telling FUAD Ceja that she is feeling better and that Dr. Hughes came in last night to speak with her.       MEDICATIONS  (STANDING):  alteplase  Injectable for Pleural Effusion 10 milliGRAM(s) IntraPleural. every 12 hours  chlorhexidine 2% Cloths 1 Application(s) Topical <User Schedule>  cyanocobalamin 1000 MICROGram(s) Oral daily  dornase nidhi Solution for Pleural Effusion 5 milliGRAM(s) IntraPleural. every 12 hours  escitalopram 10 milliGRAM(s) Oral daily  fentaNYL   Patch  50 MICROgram(s)/Hr 1 Patch Transdermal every 72 hours  fentaNYL   Patch 100 MICROgram(s)/Hr 1 Patch Transdermal every 72 hours  fluticasone propionate 50 MICROgram(s)/spray Nasal Spray 1 Spray(s) Both Nostrils two times a day  magnesium oxide 400 milliGRAM(s) Oral three times a day with meals  piperacillin/tazobactam IVPB.. 3.375 Gram(s) IV Intermittent every 8 hours  polyethylene glycol 3350 17 Gram(s) Oral daily  senna 2 Tablet(s) Oral at bedtime  simethicone 80 milliGRAM(s) Chew three times a day  sodium chloride 0.9% Solution for Pleural Effusion 30 milliLiter(s) IntraPleural. every 12 hours    MEDICATIONS  (PRN):  albuterol    0.083%. 2.5 milliGRAM(s) Nebulizer once PRN Wheezing  diphenhydrAMINE 50 milliGRAM(s) Oral once PRN MILD ALERGIC REACTION  glucagon  Injectable 1 milliGRAM(s) IV Push once PRN REFRACTORY CASES AND/OR ON BETA BLOCKERS  glucagon  Injectable 2 milliGRAM(s) IV Push once PRN REFRACTORY CASES AND/ OR ON BETA BLOCKERS  HYDROmorphone   Tablet 6 milliGRAM(s) Oral every 4 hours PRN Moderate Pain (4 - 6)  HYDROmorphone  Injectable 1.5 milliGRAM(s) IV Push every 4 hours PRN Severe Pain (7 - 10)  LORazepam     Tablet 0.5 milliGRAM(s) Oral two times a day PRN Anxiety  ondansetron Injectable 8 milliGRAM(s) IV Push every 8 hours PRN Nausea  pseudoephedrine 60 milliGRAM(s) Oral every 6 hours PRN nasal congesiton  sodium chloride 0.65% Nasal 1 Spray(s) Both Nostrils two times a day PRN Congestion      CAPILLARY BLOOD GLUCOSE        I&O's Summary    04 Apr 2025 07:01  -  05 Apr 2025 07:00  --------------------------------------------------------  IN: 250 mL / OUT: 500 mL / NET: -250 mL        PHYSICAL EXAM:  Vital Signs Last 24 Hrs  T(C): 37.1 (05 Apr 2025 05:47), Max: 37.1 (05 Apr 2025 05:47)  T(F): 98.7 (05 Apr 2025 05:47), Max: 98.7 (05 Apr 2025 05:47)  HR: 96 (05 Apr 2025 05:47) (96 - 103)  BP: 95/55 (05 Apr 2025 05:47) (95/55 - 103/71)  BP(mean): --  RR: 18 (05 Apr 2025 05:47) (17 - 18)  SpO2: 95% (05 Apr 2025 05:47) (95% - 96%)    Parameters below as of 05 Apr 2025 05:47  Patient On (Oxygen Delivery Method): nasal cannula  O2 Flow (L/min): 3      CONSTITUTIONAL: cachectic, in bed  RESPIRATORY: NC for O2 supplementation, some heavy breathing  CARDIOVASCULAR: No visible JVD, +lower extremity edema;   ABDOMEN: Distended, is not guarding    LABS:                        8.0    8.35  )-----------( 262      ( 05 Apr 2025 07:12 )             26.1     04-05    137  |  101  |  17  ----------------------------<  93  4.3   |  25  |  0.92    Ca    7.9[L]      05 Apr 2025 07:12  Phos  3.0     04-05  Mg     1.60     04-05    TPro  4.7[L]  /  Alb  2.0[L]  /  TBili  <0.2  /  DBili  x   /  AST  27  /  ALT  11  /  AlkPhos  168[H]  04-05          Urinalysis Basic - ( 05 Apr 2025 07:12 )    Color: x / Appearance: x / SG: x / pH: x  Gluc: 93 mg/dL / Ketone: x  / Bili: x / Urobili: x   Blood: x / Protein: x / Nitrite: x   Leuk Esterase: x / RBC: x / WBC x   Sq Epi: x / Non Sq Epi: x / Bacteria: x          RADIOLOGY & ADDITIONAL TESTS:  Results Reviewed:   Imaging Personally Reviewed:  Electrocardiogram Personally Reviewed:    COORDINATION OF CARE:  Care Discussed with Consultants/Other Providers [Y/N]:  Prior or Outpatient Records Reviewed [Y/N]:

## 2025-04-05 NOTE — PROVIDER CONTACT NOTE (OTHER) - SITUATION
Pt potassium is 3.4, potassium chloride tablet given 40 Meq. Pt only took 1 out of 2 pills before she started feeling anxiety from the pills.
Pt abdomen is severely harden and swollen and due to location of colostomy tube, it is very difficult to get accurate read. However, Pt has voided a total of 900ml during shift
No output from Pleurx
Pus from stoma

## 2025-04-05 NOTE — PROGRESS NOTE ADULT - ASSESSMENT
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 On admission patient also c/o dyspnea over the past few weeks. Further eval revealed loculated R pleural effusion with pleurx in place; pleurx not draining well. Started MIST protocol. Hospitalization further c/b bacteriodes bacteremia, likely 2/2 GI translocation in setting of advanced malignancy.    Active propblems  Fallopian tube cancer  Loculated Effusion  Empyema?   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 Admitted for carboplatin desensitization   Overall performance status of patient is poor. D/W Dr. Hughes, patient's disease is very advanced. She is not recommending further cancer therapy. Patient is currently a candidate for hospice.       Loculated Effusion  Empyema?   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 Patient developed worsening dyspnea 2/2 loculated R pleural effusion with pleurx in place; pleurx not draining well. Started MIST protocol on 3/26,  Pulmonary stating that this may be empyema. D/W ID, not entirely clear. No cxs obtained. Procal 0.24, not entirely + for PNA. Continue Zosyn for now, if discharged can be cipro and flagyl  Loculated effusion may be malignant more so than infectious  Follow up pulmonary, another pocket noted, pulm recommended CT surgery, but patient does not wish cardiothoracic intervention. Patient is also a very poor candidate for aggressive intervention given her overall performance status.   O2 supplement as needed      Bacteremia  D/W ID- likely intra-abdominal source.   CT a/p with extensive cancer, bacteremia possibly translocation in the setting of advanced malignancy    Cancer related pain  Palliative care consulted, pain is now better controlled.    GOC  Mutliple extensive discussions with her family (Patient's 3 children, siblings and ) and outpatient oncologist  Patient would like to defer medical decisions to family. SonMich will be the point of contact for medical team ().  CT chest and a/p shown to Mich, compared [id number] to older scans. Disease progression is extensive.   Family at this time continues to seek further.     Anxiety  Depression  Palliative care  Continue current meds

## 2025-04-05 NOTE — PROVIDER CONTACT NOTE (OTHER) - ASSESSMENT
R pleurx drain R pleurx drain Attempted to drain. No fluid removed. However in pleurx line noted serosanguinous fluid.

## 2025-04-05 NOTE — PROVIDER CONTACT NOTE (OTHER) - RECOMMENDATIONS
follow protocol and call allergist if needed. [phone]
ACP made aware.
ACP notified
ACP notified
ACP made aware.

## 2025-04-05 NOTE — PROVIDER CONTACT NOTE (OTHER) - ACTION/TREATMENT ORDERED:
lexapro given early as per ACP
ACP made aware no new orders.
as stated above
ACP notified. Wound culture order and sent.
ACP notified

## 2025-04-05 NOTE — PROVIDER CONTACT NOTE (OTHER) - BACKGROUND
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
Pt admitted for chemo desensitization, found to have R pleural effusions.
Pt receiving Carboplatin as a desensitization
63yo F w/ PMHx of Anxiety/Depression, Vertigo, chronic back pain, L-sided hearing loss, afib, refractory fallopian tube ca to liver/lung c/b colonic/ureteral compression
Dx: 63yo F w/ , refractory fallopian tube ca to liver/lung c/b colonic/ureteral compression s/p colostomy and ureteral stent

## 2025-04-06 LAB
ALBUMIN SERPL ELPH-MCNC: 2 G/DL — LOW (ref 3.3–5)
ALP SERPL-CCNC: 158 U/L — HIGH (ref 40–120)
ALT FLD-CCNC: 13 U/L — SIGNIFICANT CHANGE UP (ref 4–33)
ANION GAP SERPL CALC-SCNC: 11 MMOL/L — SIGNIFICANT CHANGE UP (ref 7–14)
AST SERPL-CCNC: 29 U/L — SIGNIFICANT CHANGE UP (ref 4–32)
BASOPHILS # BLD AUTO: 0.05 K/UL — SIGNIFICANT CHANGE UP (ref 0–0.2)
BASOPHILS NFR BLD AUTO: 0.7 % — SIGNIFICANT CHANGE UP (ref 0–2)
BILIRUB SERPL-MCNC: <0.2 MG/DL — SIGNIFICANT CHANGE UP (ref 0.2–1.2)
BLD GP AB SCN SERPL QL: POSITIVE — SIGNIFICANT CHANGE UP
BUN SERPL-MCNC: 16 MG/DL — SIGNIFICANT CHANGE UP (ref 7–23)
CALCIUM SERPL-MCNC: 8 MG/DL — LOW (ref 8.4–10.5)
CHLORIDE SERPL-SCNC: 102 MMOL/L — SIGNIFICANT CHANGE UP (ref 98–107)
CO2 SERPL-SCNC: 23 MMOL/L — SIGNIFICANT CHANGE UP (ref 22–31)
CREAT SERPL-MCNC: 0.88 MG/DL — SIGNIFICANT CHANGE UP (ref 0.5–1.3)
CULTURE RESULTS: SIGNIFICANT CHANGE UP
CULTURE RESULTS: SIGNIFICANT CHANGE UP
EGFR: 74 ML/MIN/1.73M2 — SIGNIFICANT CHANGE UP
EGFR: 74 ML/MIN/1.73M2 — SIGNIFICANT CHANGE UP
EOSINOPHIL # BLD AUTO: 0.18 K/UL — SIGNIFICANT CHANGE UP (ref 0–0.5)
EOSINOPHIL NFR BLD AUTO: 2.5 % — SIGNIFICANT CHANGE UP (ref 0–6)
GLUCOSE SERPL-MCNC: 86 MG/DL — SIGNIFICANT CHANGE UP (ref 70–99)
HCT VFR BLD CALC: 27 % — LOW (ref 34.5–45)
HGB BLD-MCNC: 8 G/DL — LOW (ref 11.5–15.5)
IANC: 5.55 K/UL — SIGNIFICANT CHANGE UP (ref 1.8–7.4)
IMM GRANULOCYTES NFR BLD AUTO: 1.3 % — HIGH (ref 0–0.9)
LYMPHOCYTES # BLD AUTO: 0.73 K/UL — LOW (ref 1–3.3)
LYMPHOCYTES # BLD AUTO: 10.2 % — LOW (ref 13–44)
MAGNESIUM SERPL-MCNC: 1.8 MG/DL — SIGNIFICANT CHANGE UP (ref 1.6–2.6)
MCHC RBC-ENTMCNC: 28.6 PG — SIGNIFICANT CHANGE UP (ref 27–34)
MCHC RBC-ENTMCNC: 29.6 G/DL — LOW (ref 32–36)
MCV RBC AUTO: 96.4 FL — SIGNIFICANT CHANGE UP (ref 80–100)
MONOCYTES # BLD AUTO: 0.56 K/UL — SIGNIFICANT CHANGE UP (ref 0–0.9)
MONOCYTES NFR BLD AUTO: 7.8 % — SIGNIFICANT CHANGE UP (ref 2–14)
NEUTROPHILS # BLD AUTO: 5.55 K/UL — SIGNIFICANT CHANGE UP (ref 1.8–7.4)
NEUTROPHILS NFR BLD AUTO: 77.5 % — HIGH (ref 43–77)
NRBC # BLD AUTO: 0 K/UL — SIGNIFICANT CHANGE UP (ref 0–0)
NRBC # FLD: 0 K/UL — SIGNIFICANT CHANGE UP (ref 0–0)
NRBC BLD AUTO-RTO: 0 /100 WBCS — SIGNIFICANT CHANGE UP (ref 0–0)
PHOSPHATE SERPL-MCNC: 2.7 MG/DL — SIGNIFICANT CHANGE UP (ref 2.5–4.5)
PLATELET # BLD AUTO: 274 K/UL — SIGNIFICANT CHANGE UP (ref 150–400)
POTASSIUM SERPL-MCNC: 4.5 MMOL/L — SIGNIFICANT CHANGE UP (ref 3.5–5.3)
POTASSIUM SERPL-SCNC: 4.5 MMOL/L — SIGNIFICANT CHANGE UP (ref 3.5–5.3)
PROT SERPL-MCNC: 4.7 G/DL — LOW (ref 6–8.3)
RBC # BLD: 2.8 M/UL — LOW (ref 3.8–5.2)
RBC # FLD: 18.6 % — HIGH (ref 10.3–14.5)
RH IG SCN BLD-IMP: POSITIVE — SIGNIFICANT CHANGE UP
SODIUM SERPL-SCNC: 136 MMOL/L — SIGNIFICANT CHANGE UP (ref 135–145)
SPECIMEN SOURCE: SIGNIFICANT CHANGE UP
SPECIMEN SOURCE: SIGNIFICANT CHANGE UP
WBC # BLD: 7.16 K/UL — SIGNIFICANT CHANGE UP (ref 3.8–10.5)
WBC # FLD AUTO: 7.16 K/UL — SIGNIFICANT CHANGE UP (ref 3.8–10.5)

## 2025-04-06 PROCEDURE — 86077 PHYS BLOOD BANK SERV XMATCH: CPT

## 2025-04-06 PROCEDURE — 99232 SBSQ HOSP IP/OBS MODERATE 35: CPT

## 2025-04-06 RX ORDER — HYDROMORPHONE/SOD CHLOR,ISO/PF 2 MG/10 ML
0.5 SYRINGE (ML) INJECTION ONCE
Refills: 0 | Status: DISCONTINUED | OUTPATIENT
Start: 2025-04-06 | End: 2025-04-06

## 2025-04-06 RX ORDER — ACETAMINOPHEN 500 MG/5ML
1000 LIQUID (ML) ORAL ONCE
Refills: 0 | Status: DISCONTINUED | OUTPATIENT
Start: 2025-04-06 | End: 2025-04-06

## 2025-04-06 RX ADMIN — Medication 1.5 MILLIGRAM(S): at 16:28

## 2025-04-06 RX ADMIN — FLUTICASONE PROPIONATE 1 SPRAY(S): 50 SPRAY, METERED NASAL at 18:09

## 2025-04-06 RX ADMIN — Medication 80 MILLIGRAM(S): at 18:10

## 2025-04-06 RX ADMIN — Medication 1.5 MILLIGRAM(S): at 00:38

## 2025-04-06 RX ADMIN — Medication 80 MILLIGRAM(S): at 21:05

## 2025-04-06 RX ADMIN — Medication 1.5 MILLIGRAM(S): at 04:49

## 2025-04-06 RX ADMIN — Medication 0.5 MILLIGRAM(S): at 01:36

## 2025-04-06 RX ADMIN — Medication 0.5 MILLIGRAM(S): at 21:48

## 2025-04-06 RX ADMIN — FLUTICASONE PROPIONATE 1 SPRAY(S): 50 SPRAY, METERED NASAL at 04:37

## 2025-04-06 RX ADMIN — Medication 1.5 MILLIGRAM(S): at 09:23

## 2025-04-06 RX ADMIN — Medication 1.5 MILLIGRAM(S): at 00:23

## 2025-04-06 RX ADMIN — Medication 1.5 MILLIGRAM(S): at 12:39

## 2025-04-06 RX ADMIN — CYANOCOBALAMIN 1000 MICROGRAM(S): 1000 INJECTION INTRAMUSCULAR; SUBCUTANEOUS at 12:25

## 2025-04-06 RX ADMIN — ESCITALOPRAM OXALATE 10 MILLIGRAM(S): 20 TABLET ORAL at 12:24

## 2025-04-06 RX ADMIN — Medication 400 MILLIGRAM(S): at 08:23

## 2025-04-06 RX ADMIN — Medication 0.5 MILLIGRAM(S): at 10:27

## 2025-04-06 RX ADMIN — Medication 1 APPLICATION(S): at 01:42

## 2025-04-06 RX ADMIN — Medication 1.5 MILLIGRAM(S): at 04:34

## 2025-04-06 RX ADMIN — Medication 25 GRAM(S): at 18:09

## 2025-04-06 RX ADMIN — Medication 1.5 MILLIGRAM(S): at 12:24

## 2025-04-06 RX ADMIN — Medication 25 GRAM(S): at 10:13

## 2025-04-06 RX ADMIN — Medication 25 GRAM(S): at 01:39

## 2025-04-06 RX ADMIN — Medication 1.5 MILLIGRAM(S): at 20:35

## 2025-04-06 RX ADMIN — Medication 1.5 MILLIGRAM(S): at 20:50

## 2025-04-06 RX ADMIN — Medication 1.5 MILLIGRAM(S): at 16:43

## 2025-04-06 RX ADMIN — Medication 1.5 MILLIGRAM(S): at 08:23

## 2025-04-06 RX ADMIN — Medication 0.5 MILLIGRAM(S): at 01:51

## 2025-04-06 RX ADMIN — Medication 0.5 MILLIGRAM(S): at 10:12

## 2025-04-06 NOTE — CHART NOTE - NSCHARTNOTEFT_GEN_A_CORE
Discussed with pulm regarding pleurx without output. Per pulm, patient had decreasing output at home as well so the space may be dry. Recommends to give patient a break and change drainage to q72 hours. Will continue monitoring.

## 2025-04-06 NOTE — CHART NOTE - NSCHARTNOTESELECT_GEN_ALL_CORE
MAR Accept
MIST #4
MIST/Event Note
POCUS/Event Note
Event Note
Follow-up/Nutrition Services
Intrapleural tPA
Intrapleural tPA
MICU Downgrade
POCUS
POCUS/Event Note

## 2025-04-06 NOTE — PROGRESS NOTE ADULT - REASON FOR ADMISSION
carbo desen
carbo desensitization
carboplatin desen
desensitization of carboplatin.
Carboplatin desen
Desensitization of carboplatin.
Carbo desen
carbo desensitization

## 2025-04-06 NOTE — PROGRESS NOTE ADULT - ASSESSMENT
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 On admission patient also c/o dyspnea over the past few weeks. Further eval revealed loculated R pleural effusion with pleurx in place; pleurx not draining well. Started MIST protocol. Hospitalization further c/b bacteriodes bacteremia, likely 2/2 GI translocation in setting of advanced malignancy.    Active propblems  Fallopian tube cancer  Loculated Effusion  Empyema?   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 Admitted for carboplatin desensitization   Overall performance status of patient is poor. D/W Dr. Hughes, patient's disease is very advanced. She is not recommending further cancer therapy. Patient is currently a candidate for hospice.       Loculated Effusion  Empyema?   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 Patient developed worsening dyspnea 2/2 loculated R pleural effusion with pleurx in place; pleurx not draining well. Started MIST protocol on 3/26.   PleurX not draining well  Pulmonary stating that this may be empyema. D/W ID, not entirely clear. No cxs obtained. Procal 0.24, not entirely + for PNA. Continue Zosyn for now, if discharged can be cipro and flagyl  Loculated effusion may be malignant more so than infectious  Follow up pulmonary, another pocket noted, pulm recommended CT surgery, but patient does not wish cardiothoracic intervention. Patient is also a very poor candidate for aggressive intervention given her overall performance status.   O2 supplement as needed      Bacteremia  D/W ID- likely intra-abdominal source.   CT a/p with extensive cancer, bacteremia possibly translocation in the setting of advanced malignancy    Cancer related pain  Palliative care consulted, pain is now better controlled.    GOC  Mutliple extensive discussions with her family (Patient's 3 children, siblings and ) and outpatient oncologist  Patient would like to defer medical decisions to family. Son, Mich will be the point of contact for medical team ().  CT chest and a/p shown to Mich, compared [id number] to older scans. Disease progression is extensive.   Family at this time continues to seek further.     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 On admission patient also c/o dyspnea over the past few weeks. Further eval revealed loculated R pleural effusion with pleurx in place; pleurx not draining well. Started MIST protocol. Hospitalization further c/b bacteriodes bacteremia, likely 2/2 GI translocation in setting of advanced malignancy.    Active propblems  Fallopian tube cancer  Loculated Effusion  Empyema?   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 Admitted for carboplatin desensitization   Overall performance status of patient is poor. D/W Dr. Hughes, patient's disease is very advanced. She is not recommending further cancer therapy. Patient is currently a candidate for hospice.   Family still remains hopeful, wish to transition care to an OSH ([facility]) for more opinions.       Loculated Effusion  Empyema?   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 Patient developed worsening dyspnea 2/2 loculated R pleural effusion with pleurx in place; pleurx not draining well. Started MIST protocol on 3/26.   PleurX not draining well  Pulmonary stating that this may be empyema. D/W ID, not entirely clear. No cxs obtained. Procal 0.24, not entirely + for PNA. Continue Zosyn for now, if discharged can be cipro and flagyl  Loculated effusion may be malignant more so than infectious  Follow up pulmonary, another pocket noted, pulm recommended CT surgery, but patient does not wish cardiothoracic intervention. Patient is also a very poor candidate for aggressive intervention given her overall performance status.   O2 supplement as needed      Bacteremia  D/W ID- likely intra-abdominal source.   CT a/p with extensive cancer, bacteremia possibly translocation in the setting of advanced malignancy  Continue Zosyn for now, if discharged can be cipro and flagyl    Cancer related pain  Palliative care consulted, pain is now better controlled.    GOC  Mutliple extensive discussions with her family (Patient's 3 children, siblings and ) and outpatient oncologist  Patient would like to defer medical decisions to family. Son, Mich will be the point of contact for medical team ().  CT chest and a/p shown to Mich, compared [id number] to older scans. Disease progression is extensive.   Family at this time continues to seek further.     Anxiety  Depression  Palliative care  Continue current meds

## 2025-04-06 NOTE — PROGRESS NOTE ADULT - SUBJECTIVE AND OBJECTIVE BOX
Keron John MD  [employer]ist  Pager [phone]  Email: [email or web address]  Available on [employer] Teams        PROGRESS NOTE:     Patient is a 64y old  Female who presents with a chief complaint of Carboplatin desen (05 Apr 2025 08:56)      SUBJECTIVE / OVERNIGHT EVENTS:  Patient does not wish to engage with me. Seen in bed, d/w nurse, patient continued pain, requesting PRNs overnight, some of which had to be administered earlier than scheduled.       MEDICATIONS  (STANDING):  alteplase  Injectable for Pleural Effusion 10 milliGRAM(s) IntraPleural. every 12 hours  chlorhexidine 2% Cloths 1 Application(s) Topical <User Schedule>  cyanocobalamin 1000 MICROGram(s) Oral daily  dornase nidhi Solution for Pleural Effusion 5 milliGRAM(s) IntraPleural. every 12 hours  escitalopram 10 milliGRAM(s) Oral daily  fentaNYL   Patch  50 MICROgram(s)/Hr 1 Patch Transdermal every 72 hours  fentaNYL   Patch 100 MICROgram(s)/Hr 1 Patch Transdermal every 72 hours  fluticasone propionate 50 MICROgram(s)/spray Nasal Spray 1 Spray(s) Both Nostrils two times a day  piperacillin/tazobactam IVPB.. 3.375 Gram(s) IV Intermittent every 8 hours  polyethylene glycol 3350 17 Gram(s) Oral daily  senna 2 Tablet(s) Oral at bedtime  simethicone 80 milliGRAM(s) Chew three times a day  sodium chloride 0.9% Solution for Pleural Effusion 30 milliLiter(s) IntraPleural. every 12 hours    MEDICATIONS  (PRN):  albuterol    0.083%. 2.5 milliGRAM(s) Nebulizer once PRN Wheezing  diphenhydrAMINE 50 milliGRAM(s) Oral once PRN MILD ALERGIC REACTION  glucagon  Injectable 1 milliGRAM(s) IV Push once PRN REFRACTORY CASES AND/OR ON BETA BLOCKERS  glucagon  Injectable 2 milliGRAM(s) IV Push once PRN REFRACTORY CASES AND/ OR ON BETA BLOCKERS  HYDROmorphone   Tablet 6 milliGRAM(s) Oral every 4 hours PRN Moderate Pain (4 - 6)  HYDROmorphone  Injectable 1.5 milliGRAM(s) IV Push every 4 hours PRN Severe Pain (7 - 10)  LORazepam     Tablet 0.5 milliGRAM(s) Oral two times a day PRN Anxiety  ondansetron Injectable 8 milliGRAM(s) IV Push every 8 hours PRN Nausea  pseudoephedrine 60 milliGRAM(s) Oral every 6 hours PRN nasal congesiton  sodium chloride 0.65% Nasal 1 Spray(s) Both Nostrils two times a day PRN Congestion      CAPILLARY BLOOD GLUCOSE        I&O's Summary    05 Apr 2025 07:01  -  06 Apr 2025 07:00  --------------------------------------------------------  IN: 200 mL / OUT: 400 mL / NET: -200 mL        PHYSICAL EXAM:  Vital Signs Last 24 Hrs  T(C): 36.6 (06 Apr 2025 04:32), Max: 36.8 (05 Apr 2025 22:00)  T(F): 97.9 (06 Apr 2025 04:32), Max: 98.3 (05 Apr 2025 22:00)  HR: 89 (06 Apr 2025 04:32) (89 - 101)  BP: 101/69 (06 Apr 2025 04:32) (98/64 - 101/69)  BP(mean): --  RR: 16 (06 Apr 2025 04:32) (16 - 18)  SpO2: 94% (06 Apr 2025 04:32) (92% - 96%)    Parameters below as of 06 Apr 2025 04:32  Patient On (Oxygen Delivery Method): nasal cannula  O2 Flow (L/min): 3      CONSTITUTIONAL: cachectic, in bed  RESPIRATORY: NC for O2 supplementation, some heavy breathing  CARDIOVASCULAR: No visible JVD, +lower extremity edema;   ABDOMEN: Distended, is not guarding      LABS:                        8.0    7.16  )-----------( 274      ( 06 Apr 2025 05:45 )             27.0     04-06    136  |  102  |  16  ----------------------------<  86  4.5   |  23  |  0.88    Ca    8.0[L]      06 Apr 2025 05:45  Phos  2.7     04-06  Mg     1.80     04-06    TPro  4.7[L]  /  Alb  2.0[L]  /  TBili  <0.2  /  DBili  x   /  AST  29  /  ALT  13  /  AlkPhos  158[H]  04-06          Urinalysis Basic - ( 06 Apr 2025 05:45 )    Color: x / Appearance: x / SG: x / pH: x  Gluc: 86 mg/dL / Ketone: x  / Bili: x / Urobili: x   Blood: x / Protein: x / Nitrite: x   Leuk Esterase: x / RBC: x / WBC x   Sq Epi: x / Non Sq Epi: x / Bacteria: x          RADIOLOGY & ADDITIONAL TESTS:  Results Reviewed:   Imaging Personally Reviewed:  Electrocardiogram Personally Reviewed:    COORDINATION OF CARE:  Care Discussed with Consultants/Other Providers [Y/N]:  Prior or Outpatient Records Reviewed [Y/N]:   Keron John MD  Academic Hospitalist  Pager [phone]  Email: [email or web address]  Available on [employer] Teams        PROGRESS NOTE:     Patient is a 64y old  Female who presents with a chief complaint of Carboplatin desen (05 Apr 2025 08:56)      SUBJECTIVE / OVERNIGHT EVENTS:  Patient does not wish to engage with me. Seen in bed, d/w nurse, patient continued pain, requesting PRNs overnight, some of which had to be administered earlier than scheduled. PleurX without output.       MEDICATIONS  (STANDING):  alteplase  Injectable for Pleural Effusion 10 milliGRAM(s) IntraPleural. every 12 hours  chlorhexidine 2% Cloths 1 Application(s) Topical <User Schedule>  cyanocobalamin 1000 MICROGram(s) Oral daily  dornase nidhi Solution for Pleural Effusion 5 milliGRAM(s) IntraPleural. every 12 hours  escitalopram 10 milliGRAM(s) Oral daily  fentaNYL   Patch  50 MICROgram(s)/Hr 1 Patch Transdermal every 72 hours  fentaNYL   Patch 100 MICROgram(s)/Hr 1 Patch Transdermal every 72 hours  fluticasone propionate 50 MICROgram(s)/spray Nasal Spray 1 Spray(s) Both Nostrils two times a day  piperacillin/tazobactam IVPB.. 3.375 Gram(s) IV Intermittent every 8 hours  polyethylene glycol 3350 17 Gram(s) Oral daily  senna 2 Tablet(s) Oral at bedtime  simethicone 80 milliGRAM(s) Chew three times a day  sodium chloride 0.9% Solution for Pleural Effusion 30 milliLiter(s) IntraPleural. every 12 hours    MEDICATIONS  (PRN):  albuterol    0.083%. 2.5 milliGRAM(s) Nebulizer once PRN Wheezing  diphenhydrAMINE 50 milliGRAM(s) Oral once PRN MILD ALERGIC REACTION  glucagon  Injectable 1 milliGRAM(s) IV Push once PRN REFRACTORY CASES AND/OR ON BETA BLOCKERS  glucagon  Injectable 2 milliGRAM(s) IV Push once PRN REFRACTORY CASES AND/ OR ON BETA BLOCKERS  HYDROmorphone   Tablet 6 milliGRAM(s) Oral every 4 hours PRN Moderate Pain (4 - 6)  HYDROmorphone  Injectable 1.5 milliGRAM(s) IV Push every 4 hours PRN Severe Pain (7 - 10)  LORazepam     Tablet 0.5 milliGRAM(s) Oral two times a day PRN Anxiety  ondansetron Injectable 8 milliGRAM(s) IV Push every 8 hours PRN Nausea  pseudoephedrine 60 milliGRAM(s) Oral every 6 hours PRN nasal congesiton  sodium chloride 0.65% Nasal 1 Spray(s) Both Nostrils two times a day PRN Congestion      CAPILLARY BLOOD GLUCOSE        I&O's Summary    05 Apr 2025 07:01  -  06 Apr 2025 07:00  --------------------------------------------------------  IN: 200 mL / OUT: 400 mL / NET: -200 mL        PHYSICAL EXAM:  Vital Signs Last 24 Hrs  T(C): 36.6 (06 Apr 2025 04:32), Max: 36.8 (05 Apr 2025 22:00)  T(F): 97.9 (06 Apr 2025 04:32), Max: 98.3 (05 Apr 2025 22:00)  HR: 89 (06 Apr 2025 04:32) (89 - 101)  BP: 101/69 (06 Apr 2025 04:32) (98/64 - 101/69)  BP(mean): --  RR: 16 (06 Apr 2025 04:32) (16 - 18)  SpO2: 94% (06 Apr 2025 04:32) (92% - 96%)    Parameters below as of 06 Apr 2025 04:32  Patient On (Oxygen Delivery Method): nasal cannula  O2 Flow (L/min): 3      CONSTITUTIONAL: cachectic, in bed  RESPIRATORY: NC for O2 supplementation, some heavy breathing  CARDIOVASCULAR: No visible JVD, +lower extremity edema;   ABDOMEN: Distended, is not guarding      LABS:                        8.0    7.16  )-----------( 274      ( 06 Apr 2025 05:45 )             27.0     04-06    136  |  102  |  16  ----------------------------<  86  4.5   |  23  |  0.88    Ca    8.0[L]      06 Apr 2025 05:45  Phos  2.7     04-06  Mg     1.80     04-06    TPro  4.7[L]  /  Alb  2.0[L]  /  TBili  <0.2  /  DBili  x   /  AST  29  /  ALT  13  /  AlkPhos  158[H]  04-06          Urinalysis Basic - ( 06 Apr 2025 05:45 )    Color: x / Appearance: x / SG: x / pH: x  Gluc: 86 mg/dL / Ketone: x  / Bili: x / Urobili: x   Blood: x / Protein: x / Nitrite: x   Leuk Esterase: x / RBC: x / WBC x   Sq Epi: x / Non Sq Epi: x / Bacteria: x          RADIOLOGY & ADDITIONAL TESTS:  Results Reviewed:   Imaging Personally Reviewed:  Electrocardiogram Personally Reviewed:    COORDINATION OF CARE:  Care Discussed with Consultants/Other Providers [Y/N]:  Prior or Outpatient Records Reviewed [Y/N]:

## 2025-04-07 ENCOUNTER — RESULT REVIEW (OUTPATIENT)
Age: 65
End: 2025-04-07

## 2025-04-07 LAB
ALBUMIN SERPL ELPH-MCNC: 2.2 G/DL — LOW (ref 3.3–5)
ALP SERPL-CCNC: 187 U/L — HIGH (ref 40–120)
ALT FLD-CCNC: 13 U/L — SIGNIFICANT CHANGE UP (ref 4–33)
ANION GAP SERPL CALC-SCNC: 12 MMOL/L — SIGNIFICANT CHANGE UP (ref 7–14)
AST SERPL-CCNC: 33 U/L — HIGH (ref 4–32)
BASOPHILS # BLD AUTO: 0.06 K/UL — SIGNIFICANT CHANGE UP (ref 0–0.2)
BASOPHILS NFR BLD AUTO: 0.6 % — SIGNIFICANT CHANGE UP (ref 0–2)
BILIRUB SERPL-MCNC: <0.2 MG/DL — SIGNIFICANT CHANGE UP (ref 0.2–1.2)
BUN SERPL-MCNC: 17 MG/DL — SIGNIFICANT CHANGE UP (ref 7–23)
CALCIUM SERPL-MCNC: 8.2 MG/DL — LOW (ref 8.4–10.5)
CHLORIDE SERPL-SCNC: 100 MMOL/L — SIGNIFICANT CHANGE UP (ref 98–107)
CO2 SERPL-SCNC: 23 MMOL/L — SIGNIFICANT CHANGE UP (ref 22–31)
CREAT SERPL-MCNC: 0.93 MG/DL — SIGNIFICANT CHANGE UP (ref 0.5–1.3)
EGFR: 69 ML/MIN/1.73M2 — SIGNIFICANT CHANGE UP
EGFR: 69 ML/MIN/1.73M2 — SIGNIFICANT CHANGE UP
EOSINOPHIL # BLD AUTO: 0.18 K/UL — SIGNIFICANT CHANGE UP (ref 0–0.5)
EOSINOPHIL NFR BLD AUTO: 1.9 % — SIGNIFICANT CHANGE UP (ref 0–6)
GLUCOSE SERPL-MCNC: 91 MG/DL — SIGNIFICANT CHANGE UP (ref 70–99)
HCT VFR BLD CALC: 28.8 % — LOW (ref 34.5–45)
HGB BLD-MCNC: 8.5 G/DL — LOW (ref 11.5–15.5)
IANC: 7.68 K/UL — HIGH (ref 1.8–7.4)
IMM GRANULOCYTES NFR BLD AUTO: 0.8 % — SIGNIFICANT CHANGE UP (ref 0–0.9)
LYMPHOCYTES # BLD AUTO: 0.8 K/UL — LOW (ref 1–3.3)
LYMPHOCYTES # BLD AUTO: 8.4 % — LOW (ref 13–44)
MAGNESIUM SERPL-MCNC: 1.8 MG/DL — SIGNIFICANT CHANGE UP (ref 1.6–2.6)
MCHC RBC-ENTMCNC: 28.9 PG — SIGNIFICANT CHANGE UP (ref 27–34)
MCHC RBC-ENTMCNC: 29.5 G/DL — LOW (ref 32–36)
MCV RBC AUTO: 98 FL — SIGNIFICANT CHANGE UP (ref 80–100)
MONOCYTES # BLD AUTO: 0.67 K/UL — SIGNIFICANT CHANGE UP (ref 0–0.9)
MONOCYTES NFR BLD AUTO: 7.1 % — SIGNIFICANT CHANGE UP (ref 2–14)
NEUTROPHILS # BLD AUTO: 7.68 K/UL — HIGH (ref 1.8–7.4)
NEUTROPHILS NFR BLD AUTO: 81.2 % — HIGH (ref 43–77)
NRBC # BLD AUTO: 0 K/UL — SIGNIFICANT CHANGE UP (ref 0–0)
NRBC # FLD: 0 K/UL — SIGNIFICANT CHANGE UP (ref 0–0)
NRBC BLD AUTO-RTO: 0 /100 WBCS — SIGNIFICANT CHANGE UP (ref 0–0)
PHOSPHATE SERPL-MCNC: 3.1 MG/DL — SIGNIFICANT CHANGE UP (ref 2.5–4.5)
PLATELET # BLD AUTO: 277 K/UL — SIGNIFICANT CHANGE UP (ref 150–400)
POTASSIUM SERPL-MCNC: 4.6 MMOL/L — SIGNIFICANT CHANGE UP (ref 3.5–5.3)
POTASSIUM SERPL-SCNC: 4.6 MMOL/L — SIGNIFICANT CHANGE UP (ref 3.5–5.3)
PROT SERPL-MCNC: 5 G/DL — LOW (ref 6–8.3)
RBC # BLD: 2.94 M/UL — LOW (ref 3.8–5.2)
RBC # FLD: 18.9 % — HIGH (ref 10.3–14.5)
SODIUM SERPL-SCNC: 135 MMOL/L — SIGNIFICANT CHANGE UP (ref 135–145)
WBC # BLD: 9.47 K/UL — SIGNIFICANT CHANGE UP (ref 3.8–10.5)
WBC # FLD AUTO: 9.47 K/UL — SIGNIFICANT CHANGE UP (ref 3.8–10.5)

## 2025-04-07 PROCEDURE — 93970 EXTREMITY STUDY: CPT | Mod: 26

## 2025-04-07 PROCEDURE — 99233 SBSQ HOSP IP/OBS HIGH 50: CPT

## 2025-04-07 RX ORDER — HYDROMORPHONE/SOD CHLOR,ISO/PF 2 MG/10 ML
6 SYRINGE (ML) INJECTION EVERY 4 HOURS
Refills: 0 | Status: DISCONTINUED | OUTPATIENT
Start: 2025-04-07 | End: 2025-04-08

## 2025-04-07 RX ORDER — HYDROMORPHONE/SOD CHLOR,ISO/PF 2 MG/10 ML
1 SYRINGE (ML) INJECTION
Refills: 0 | DISCHARGE

## 2025-04-07 RX ORDER — NALOXONE HYDROCHLORIDE 0.4 MG/ML
1 INJECTION, SOLUTION INTRAMUSCULAR; INTRAVENOUS; SUBCUTANEOUS
Qty: 1 | Refills: 0
Start: 2025-04-07 | End: 2025-05-06

## 2025-04-07 RX ORDER — FENTANYL CITRATE-0.9 % NACL/PF 100MCG/2ML
1 SYRINGE (ML) INTRAVENOUS
Qty: 10 | Refills: 0
Start: 2025-04-07 | End: 2025-05-06

## 2025-04-07 RX ORDER — HYDROMORPHONE/SOD CHLOR,ISO/PF 2 MG/10 ML
4 SYRINGE (ML) INJECTION EVERY 4 HOURS
Refills: 0 | Status: DISCONTINUED | OUTPATIENT
Start: 2025-04-07 | End: 2025-04-08

## 2025-04-07 RX ORDER — CYANOCOBALAMIN 1000 UG/ML
1 INJECTION INTRAMUSCULAR; SUBCUTANEOUS
Refills: 0 | DISCHARGE

## 2025-04-07 RX ORDER — CYANOCOBALAMIN 1000 UG/ML
1 INJECTION INTRAMUSCULAR; SUBCUTANEOUS
Qty: 30 | Refills: 0
Start: 2025-04-07 | End: 2025-05-06

## 2025-04-07 RX ORDER — PSEUDOEPHEDRINE HCL 30 MG
2 TABLET ORAL
Qty: 80 | Refills: 0
Start: 2025-04-07 | End: 2025-04-16

## 2025-04-07 RX ORDER — FLUTICASONE PROPIONATE 50 UG/1
1 SPRAY, METERED NASAL
Qty: 1 | Refills: 0
Start: 2025-04-07 | End: 2025-05-06

## 2025-04-07 RX ORDER — ONDANSETRON HCL/PF 4 MG/2 ML
1 VIAL (ML) INJECTION
Qty: 56 | Refills: 0
Start: 2025-04-07 | End: 2025-04-20

## 2025-04-07 RX ORDER — SODIUM CHLORIDE 0.65 %
1 AEROSOL, SPRAY (ML) NASAL
Qty: 1 | Refills: 0
Start: 2025-04-07 | End: 2025-05-06

## 2025-04-07 RX ORDER — ESCITALOPRAM OXALATE 20 MG/1
1 TABLET ORAL
Qty: 30 | Refills: 0
Start: 2025-04-07 | End: 2025-05-06

## 2025-04-07 RX ORDER — CIPROFLOXACIN HCL 250 MG
1 TABLET ORAL
Qty: 10 | Refills: 0
Start: 2025-04-07 | End: 2025-04-11

## 2025-04-07 RX ORDER — METRONIDAZOLE 250 MG
1 TABLET ORAL
Qty: 15 | Refills: 0
Start: 2025-04-07 | End: 2025-04-11

## 2025-04-07 RX ORDER — ACETAMINOPHEN 500 MG/5ML
650 LIQUID (ML) ORAL ONCE
Refills: 0 | Status: COMPLETED | OUTPATIENT
Start: 2025-04-07 | End: 2025-04-07

## 2025-04-07 RX ORDER — SENNA 187 MG
2 TABLET ORAL
Qty: 60 | Refills: 0
Start: 2025-04-07 | End: 2025-05-06

## 2025-04-07 RX ORDER — HYDROMORPHONE/SOD CHLOR,ISO/PF 2 MG/10 ML
1.5 SYRINGE (ML) INJECTION
Qty: 90 | Refills: 0
Start: 2025-04-07 | End: 2025-04-16

## 2025-04-07 RX ORDER — SIMETHICONE 80 MG
1 TABLET,CHEWABLE ORAL
Qty: 90 | Refills: 0
Start: 2025-04-07 | End: 2025-05-06

## 2025-04-07 RX ORDER — POLYETHYLENE GLYCOL 3350 17 G/17G
17 POWDER, FOR SOLUTION ORAL
Qty: 510 | Refills: 0
Start: 2025-04-07 | End: 2025-05-06

## 2025-04-07 RX ORDER — ONDANSETRON HCL/PF 4 MG/2 ML
1 VIAL (ML) INJECTION
Refills: 0 | DISCHARGE

## 2025-04-07 RX ORDER — LORAZEPAM 4 MG/ML
1 VIAL (ML) INJECTION
Qty: 20 | Refills: 0
Start: 2025-04-07 | End: 2025-04-16

## 2025-04-07 RX ADMIN — Medication 0.5 MILLIGRAM(S): at 19:57

## 2025-04-07 RX ADMIN — Medication 1 APPLICATION(S): at 00:52

## 2025-04-07 RX ADMIN — Medication 6 MILLIGRAM(S): at 13:47

## 2025-04-07 RX ADMIN — Medication 83.33 MILLILITER(S): at 18:27

## 2025-04-07 RX ADMIN — CYANOCOBALAMIN 1000 MICROGRAM(S): 1000 INJECTION INTRAMUSCULAR; SUBCUTANEOUS at 12:30

## 2025-04-07 RX ADMIN — Medication 650 MILLIGRAM(S): at 16:37

## 2025-04-07 RX ADMIN — Medication 1.5 MILLIGRAM(S): at 07:16

## 2025-04-07 RX ADMIN — Medication 6 MILLIGRAM(S): at 22:43

## 2025-04-07 RX ADMIN — Medication 25 GRAM(S): at 18:28

## 2025-04-07 RX ADMIN — Medication 80 MILLIGRAM(S): at 12:30

## 2025-04-07 RX ADMIN — Medication 6 MILLIGRAM(S): at 14:17

## 2025-04-07 RX ADMIN — Medication 25 GRAM(S): at 02:12

## 2025-04-07 RX ADMIN — Medication 1.5 MILLIGRAM(S): at 12:06

## 2025-04-07 RX ADMIN — Medication 1.5 MILLIGRAM(S): at 07:31

## 2025-04-07 RX ADMIN — Medication 1.5 MILLIGRAM(S): at 11:36

## 2025-04-07 RX ADMIN — Medication 6 MILLIGRAM(S): at 21:43

## 2025-04-07 RX ADMIN — Medication 1.5 MILLIGRAM(S): at 01:07

## 2025-04-07 RX ADMIN — Medication 25 GRAM(S): at 11:42

## 2025-04-07 RX ADMIN — Medication 1.5 MILLIGRAM(S): at 00:52

## 2025-04-07 RX ADMIN — Medication 80 MILLIGRAM(S): at 21:06

## 2025-04-07 RX ADMIN — ESCITALOPRAM OXALATE 10 MILLIGRAM(S): 20 TABLET ORAL at 11:42

## 2025-04-07 RX ADMIN — Medication 80 MILLIGRAM(S): at 18:23

## 2025-04-07 RX ADMIN — Medication 650 MILLIGRAM(S): at 16:07

## 2025-04-07 NOTE — PROGRESS NOTE ADULT - PROBLEM SELECTOR PLAN 4
- 4/7 - Counseled/educated patient and son on transition to PO pain regimen for safe discharge planning to ensure pain control when at home. Counseled / educated about onset of action/ duration of effect of PO pain regimen vs IV pain regimen. They are amenable to transition off IV Dilaudid to PO Dilaudid   - Fentanyl Fentanyl patch 150 mcg (Dose last adjusted on 3/31)  - Discontinue IV Dilaudid PRN   - Start PO Dilaudid 4mg q4 PRN moderate pain (hold for hypotension, oversedation, respiratory depression)  - Start PO Dilaudid 6mg q4 PRN severe pain (hold for hypotension, oversedation, respiratory depression)  - simethicone tid for bloating/gas pain   - Bowel regimen while on opiates  - Narcan prn.  - Holistic rN referral - 4/7 - Counseled/educated patient and son on transition to PO pain regimen for safe discharge planning to ensure pain control when at home. Counseled / educated about onset of action/ duration of effect of PO pain regimen vs IV pain regimen. They are amenable to transition off IV Dilaudid to PO Dilaudid   - Fentanyl patch 150 mcg (Dose last adjusted on 3/31)  - Discontinue IV Dilaudid PRN   - Start PO Dilaudid 4mg q4 PRN moderate pain (hold for hypotension, oversedation, respiratory depression)  - Start PO Dilaudid 6mg q4 PRN severe pain (hold for hypotension, oversedation, respiratory depression)  - simethicone tid for bloating/gas pain   - Bowel regimen while on opiates  - Narcan prn.  - Holistic rN referral

## 2025-04-07 NOTE — PROGRESS NOTE ADULT - SUBJECTIVE AND OBJECTIVE BOX
Indication for Geriatrics and Palliative Care Services/INTERVAL HPI: sx management and goc in setting of advanced malignancy     INTERVAL EVENTS/ OVERNIGHT EVENTS:  In past 24 hours , received IV Dilaudid 1.5mg x6 , IV Dilaudid 0.5mg x1, and PO Ativan 0.5mg x1      SUBJECTIVE AND OBJECTIVE:   Patient seen this AM with son Geoffrey at bedside. Patient reports feeling okay during encounter .   Pain when present is at rectum and can be as severe as 10/10. However, pain has been well controlled when receiving her prn doses of IV Dilaudid.   Patient defers further discussions to be held with her son Geoffrey regarding her medical care and disposition planning     Allergies    carboplatin (Unknown)  oxaliplatin (Unknown)  IV Contrast (Rash)  cisplatin (Unknown)  platinum containing compounds (Anaphylaxis)    Intolerances    MEDICATIONS  (STANDING):  alteplase  Injectable for Pleural Effusion 10 milliGRAM(s) IntraPleural. every 12 hours  chlorhexidine 2% Cloths 1 Application(s) Topical <User Schedule>  cyanocobalamin 1000 MICROGram(s) Oral daily  dornase nidhi Solution for Pleural Effusion 5 milliGRAM(s) IntraPleural. every 12 hours  escitalopram 10 milliGRAM(s) Oral daily  fentaNYL   Patch  50 MICROgram(s)/Hr 1 Patch Transdermal every 72 hours  fentaNYL   Patch 100 MICROgram(s)/Hr 1 Patch Transdermal every 72 hours  fluticasone propionate 50 MICROgram(s)/spray Nasal Spray 1 Spray(s) Both Nostrils two times a day  piperacillin/tazobactam IVPB.. 3.375 Gram(s) IV Intermittent every 8 hours  polyethylene glycol 3350 17 Gram(s) Oral daily  senna 2 Tablet(s) Oral at bedtime  simethicone 80 milliGRAM(s) Chew three times a day  sodium chloride 0.9% Solution for Pleural Effusion 30 milliLiter(s) IntraPleural. every 12 hours    MEDICATIONS  (PRN):  albuterol    0.083%. 2.5 milliGRAM(s) Nebulizer once PRN Wheezing  diphenhydrAMINE 50 milliGRAM(s) Oral once PRN MILD ALERGIC REACTION  glucagon  Injectable 1 milliGRAM(s) IV Push once PRN REFRACTORY CASES AND/OR ON BETA BLOCKERS  glucagon  Injectable 2 milliGRAM(s) IV Push once PRN REFRACTORY CASES AND/ OR ON BETA BLOCKERS  HYDROmorphone   Tablet 6 milliGRAM(s) Oral every 4 hours PRN Severe Pain (7 - 10)  HYDROmorphone   Tablet 4 milliGRAM(s) Oral every 4 hours PRN Moderate Pain (4 - 6)  LORazepam     Tablet 0.5 milliGRAM(s) Oral two times a day PRN Anxiety  ondansetron Injectable 8 milliGRAM(s) IV Push every 8 hours PRN Nausea  pseudoephedrine 60 milliGRAM(s) Oral every 6 hours PRN nasal congesiton  sodium chloride 0.65% Nasal 1 Spray(s) Both Nostrils two times a day PRN Congestion      ITEMS UNCHECKED ARE NOT PRESENT    PRESENT SYMPTOMS: [ ]Unable to self-report - see [ ] CPOT [ ] PAINADS [ ] RDOS  Source if other than patient:  [ ]Family   [ ]Team     Pain: [x]yes [ ]no  QOL impact - debilitating   Location - rectum            Aggravating factors - progression of disease   Quality - sharp, cramp, ache  Radiation -  back   Timing- constant with intermittent spikes  Severity (0-10 scale): 10  Minimal acceptable level / Pain goal (0-10 scale): 2    CPOT:    https://www.sccm.org/getattachment/[email or web address]/Critical-Care-Pain-Observation-Tool-(CPOT)    Dyspnea:                           [ ]Mild [ ]Moderate [ ]Severe  Anxiety:                             [ ]Mild [x ]Moderate [ ]Severe  Fatigue:                             [ ]Mild [ x]Moderate [ ]Severe  Nausea:                             [ ]Mild [ ]Moderate [ ]Severe  Loss of appetite:              [ ]Mild [ x]Moderate [ ]Severe  Constipation:                    [ ]Mild [ ]Moderate [ ]Severe    Other Symptoms:   [ x]All other review of systems negative     PCSSQ[Palliative Care Spiritual Screening Question]   Severity (0-10):  Score of 4 or > indicate consideration of Chaplaincy referral.  Chaplaincy Referral: [ ] yes [ ] refused [ ] following [x ] deferred    Caregiver [place]? : [ ] yes [ ] no [x ] Deferred [ ] Declined             Social work referral [ ] Patient & Family Centered Care Referral [ ]  Anticipatory Grief present?:  [ ] yes [ ] no  [x ] Deferred                  Social work referral [ ] Patient & Family Centered Care Referral [ ]      PHYSICAL EXAM:  Vital Signs Last 24 Hrs  T(C): 36.4 (07 Apr 2025 18:12), Max: 37.1 (06 Apr 2025 20:35)  T(F): 97.6 (07 Apr 2025 18:12), Max: 98.7 (06 Apr 2025 20:35)  HR: 92 (07 Apr 2025 18:12) (92 - 102)  BP: 93/- (07 Apr 2025 18:12) (93/- - 116/75)  BP(mean): 64 (07 Apr 2025 18:12) (64 - 64)  RR: 17 (07 Apr 2025 18:12) (16 - 18)  SpO2: 94% (07 Apr 2025 18:12) (94% - 95%)    Parameters below as of 07 Apr 2025 18:12  Patient On (Oxygen Delivery Method): nasal cannula  O2 Flow (L/min): 4       GENERAL:  [x]Alert  [x]Oriented x3   [ ]Lethargic  [x]Cachexia  [ ]Unarousable  [x]Verbal  [ ]Non-Verbal  [x] No Distress  Behavioral:   [x ] Anxiety  [ ] Delirium [ ] Agitation [] Calm  [ ] Other  HEENT:  [x]Normal  [ ] Temporal Wasting  [ ]Dry mouth   [ ]ET Tube/Trach  [ ]Oral lesions  [ ] Mucositis  PULMONARY: normal respiratory effort, no accessory muscle use  []Clear [ ]Tachypnea  [ ]Audible excessive secretions   [ ]Rhonchi        [ ]Right [ ]Left [ ]Bilateral  [ ]Crackles        [ ]Right [ ]Left [ ]Bilateral  [ ]Wheezing     [ ]Right [ ]Left [ ]Bilateral  [ ]Diminished breath sounds [ ]right [ ]left [ ]bilateral  CARDIOVASCULAR:    [x]Regular [ ]Irregular [ ]Tachy  [ ]Rios [ ]Murmur [ ]Other  GASTROINTESTINAL:  [x]Soft  [ ]Distended   []+BS  [x ]Non tender [ ]Tender  [ ]PEG [ ]OGT/ NGT  Last BM: +colostomy   GENITOURINARY:  [x]Normal [ ] Incontinent   [ ]Oliguria/Anuria   [ ]Smiley  MUSCULOSKELETAL:   [ ]Normal   [x]Weakness  [x]Bed/Wheelchair bound [x ]Edema  [  ] amputation  [  ] contraction  NEUROLOGIC:   [x]No focal deficits  [ ]Cognitive impairment  [ ]Dysphagia [ ]Dysarthria [ ]Paresis [ ]Other   SKIN: See Nursing Skin Assessment for further details  [ ]Normal    [ ]Rash  [ ]Pressure ulcer(s)       Present on admission [ ]y [ ]n   [  ]  Wound    [  ] hyperpigmentation    CRITICAL CARE:  [ ]Shock Present  [ ]Septic [ ]Cardiogenic [ ]Neurologic [ ]Hypovolemic  [ ]Vasopressors [ ]Inotropes  [ ]Respiratory failure present [ ]Mechanical Ventilation [ ]Non-invasive ventilatory support [ ]High-Flow   [ ]Acute  [ ]Chronic [ ]Hypoxic  [ ]Hypercarbic [ ]Other  [ ]Other organ failure     LABS:                                          8.5    9.47  )-----------( 277      ( 07 Apr 2025 05:15 )             28.8       04-07    135  |  100  |  17  ----------------------------<  91  4.6   |  23  |  0.93    Ca    8.2[L]      07 Apr 2025 05:15  Phos  3.1     04-07  Mg     1.80     04-07    TPro  5.0[L]  /  Alb  2.2[L]  /  TBili  <0.2  /  DBili  x   /  AST  33[H]  /  ALT  13  /  AlkPhos  187[H]  04-07        RADIOLOGY & ADDITIONAL STUDIES: reviewed     Protein Calorie Malnutrition Present: [ ]mild [ ]moderate [ ]severe [ ]underweight [ ]morbid obesity  https://www.andeal.org/vault/2440/web/files/ONC/Table_Clinical%20Characteristics%20to%20Document%20Malnutrition-White%20JV%20et%20al%202012.pdf    Height (cm): 154.9 (03-25-25 @ 08:00), 153.01 (02-21-25 @ 10:00), 154.9 (04-19-24 @ 10:00)  Weight (kg): 67.8 (03-25-25 @ 08:00), 58.96 (03-12-25 @ 09:00), 53.5 (04-19-24 @ 10:00)  BMI (kg/m2): 28.3 (03-25-25 @ 08:00), 25.2 (03-12-25 @ 09:00), 22.9 (02-21-25 @ 10:00)    [ ]PPSV2 < or = 30%  [ ]significant weight loss [ ]poor nutritional intake [ ]anasarca[ ]Artificial Nutrition    Other REFERRALS:  [ x]Hospice  [ ]Child Life  [ ]Social Work  [x ]Case management [ ]Holistic Therapy    Indication for Geriatrics and Palliative Care Services/INTERVAL HPI: sx management and goc in setting of advanced malignancy     INTERVAL EVENTS/ OVERNIGHT EVENTS:  In past 24 hours , received IV Dilaudid 1.5mg x6 , IV Dilaudid 0.5mg x1, and PO Ativan 0.5mg x1      SUBJECTIVE AND OBJECTIVE:   Patient seen this AM with son Geoffrey at bedside. Patient reports feeling okay during encounter .   Pain when present is at rectum and can be as severe as 10/10. However, pain has been well controlled when receiving her prn doses of IV Dilaudid.   Patient defers further discussions to be held with her son Geoffrey regarding her medical care and disposition planning     Allergies    carboplatin (Unknown)  oxaliplatin (Unknown)  IV Contrast (Rash)  cisplatin (Unknown)  platinum containing compounds (Anaphylaxis)    Intolerances    MEDICATIONS  (STANDING):  alteplase  Injectable for Pleural Effusion 10 milliGRAM(s) IntraPleural. every 12 hours  chlorhexidine 2% Cloths 1 Application(s) Topical <User Schedule>  cyanocobalamin 1000 MICROGram(s) Oral daily  dornase nidhi Solution for Pleural Effusion 5 milliGRAM(s) IntraPleural. every 12 hours  escitalopram 10 milliGRAM(s) Oral daily  fentaNYL   Patch  50 MICROgram(s)/Hr 1 Patch Transdermal every 72 hours  fentaNYL   Patch 100 MICROgram(s)/Hr 1 Patch Transdermal every 72 hours  fluticasone propionate 50 MICROgram(s)/spray Nasal Spray 1 Spray(s) Both Nostrils two times a day  piperacillin/tazobactam IVPB.. 3.375 Gram(s) IV Intermittent every 8 hours  polyethylene glycol 3350 17 Gram(s) Oral daily  senna 2 Tablet(s) Oral at bedtime  simethicone 80 milliGRAM(s) Chew three times a day  sodium chloride 0.9% Solution for Pleural Effusion 30 milliLiter(s) IntraPleural. every 12 hours    MEDICATIONS  (PRN):  albuterol    0.083%. 2.5 milliGRAM(s) Nebulizer once PRN Wheezing  diphenhydrAMINE 50 milliGRAM(s) Oral once PRN MILD ALERGIC REACTION  glucagon  Injectable 1 milliGRAM(s) IV Push once PRN REFRACTORY CASES AND/OR ON BETA BLOCKERS  glucagon  Injectable 2 milliGRAM(s) IV Push once PRN REFRACTORY CASES AND/ OR ON BETA BLOCKERS  HYDROmorphone   Tablet 6 milliGRAM(s) Oral every 4 hours PRN Severe Pain (7 - 10)  HYDROmorphone   Tablet 4 milliGRAM(s) Oral every 4 hours PRN Moderate Pain (4 - 6)  LORazepam     Tablet 0.5 milliGRAM(s) Oral two times a day PRN Anxiety  ondansetron Injectable 8 milliGRAM(s) IV Push every 8 hours PRN Nausea  pseudoephedrine 60 milliGRAM(s) Oral every 6 hours PRN nasal congesiton  sodium chloride 0.65% Nasal 1 Spray(s) Both Nostrils two times a day PRN Congestion      ITEMS UNCHECKED ARE NOT PRESENT    PRESENT SYMPTOMS: [ ]Unable to self-report - see [ ] CPOT [ ] PAINADS [ ] RDOS  Source if other than patient:  [ ]Family   [ ]Team     Pain: [x]yes [ ]no  QOL impact - debilitating   Location - rectum            Aggravating factors - progression of disease   Quality - sharp, cramp, ache  Radiation -  back   Timing- constant with intermittent spikes  Severity (0-10 scale): 10  Minimal acceptable level / Pain goal (0-10 scale): 2    CPOT:    https://www.sccm.org/getattachment/dkd74u89-8u8d-[email or web address]-9o4n-8607a5984b2w/Critical-Care-Pain-Observation-Tool-(CPOT)    Dyspnea:                           [ ]Mild [ ]Moderate [ ]Severe  Anxiety:                             [ ]Mild [x ]Moderate [ ]Severe  Fatigue:                             [ ]Mild [ x]Moderate [ ]Severe  Nausea:                             [ ]Mild [ ]Moderate [ ]Severe  Loss of appetite:              [ ]Mild [ x]Moderate [ ]Severe  Constipation:                    [ ]Mild [ ]Moderate [ ]Severe    Other Symptoms:   [ x]All other review of systems negative     PCSSQ[Palliative Care Spiritual Screening Question]   Severity (0-10):  Score of 4 or > indicate consideration of Chaplaincy referral.  Chaplaincy Referral: [ ] yes [ ] refused [ ] following [x ] deferred    Caregiver [place]? : [ ] yes [ ] no [x ] Deferred [ ] Declined             Social work referral [ ] Patient & Family Centered Care Referral [ ]  Anticipatory Grief present?:  [ ] yes [ ] no  [x ] Deferred                  Social work referral [ ] Patient & Family Centered Care Referral [ ]      PHYSICAL EXAM:  Vital Signs Last 24 Hrs  T(C): 36.4 (07 Apr 2025 18:12), Max: 37.1 (06 Apr 2025 20:35)  T(F): 97.6 (07 Apr 2025 18:12), Max: 98.7 (06 Apr 2025 20:35)  HR: 92 (07 Apr 2025 18:12) (92 - 102)  BP: 93/- (07 Apr 2025 18:12) (93/- - 116/75)  BP(mean): 64 (07 Apr 2025 18:12) (64 - 64)  RR: 17 (07 Apr 2025 18:12) (16 - 18)  SpO2: 94% (07 Apr 2025 18:12) (94% - 95%)    Parameters below as of 07 Apr 2025 18:12  Patient On (Oxygen Delivery Method): nasal cannula  O2 Flow (L/min): 4       GENERAL:  [x]Alert  [x]Oriented x3   [ ]Lethargic  [x]Cachexia  [ ]Unarousable  [x]Verbal  [ ]Non-Verbal  [x] No Distress  Behavioral:   [x ] Anxiety  [ ] Delirium [ ] Agitation [] Calm  [ ] Other  HEENT:  [x]Normal  [ ] Temporal Wasting  [ ]Dry mouth   [ ]ET Tube/Trach  [ ]Oral lesions  [ ] Mucositis  PULMONARY: normal respiratory effort, no accessory muscle use  []Clear [ ]Tachypnea  [ ]Audible excessive secretions   [ ]Rhonchi        [ ]Right [ ]Left [ ]Bilateral  [ ]Crackles        [ ]Right [ ]Left [ ]Bilateral  [ ]Wheezing     [ ]Right [ ]Left [ ]Bilateral  [ ]Diminished breath sounds [ ]right [ ]left [ ]bilateral  CARDIOVASCULAR:    [x]Regular [ ]Irregular [ ]Tachy  [ ]Rios [ ]Murmur [ ]Other  GASTROINTESTINAL:  [x]Soft  [x ]Distended   []+BS  [x ]Non tender [ ]Tender  [ ]PEG [ ]OGT/ NGT  Last BM: +colostomy   GENITOURINARY:  [x]Normal [ ] Incontinent   [ ]Oliguria/Anuria   [ ]Smiley  MUSCULOSKELETAL:   [ ]Normal   [x]Weakness  [x]Bed/Wheelchair bound [x ]Edema  [  ] amputation  [  ] contraction  NEUROLOGIC:   [x]No focal deficits  [ ]Cognitive impairment  [ ]Dysphagia [ ]Dysarthria [ ]Paresis [ ]Other   SKIN: See Nursing Skin Assessment for further details  [ ]Normal    [ ]Rash  [ ]Pressure ulcer(s)       Present on admission [ ]y [ ]n   [  ]  Wound    [  ] hyperpigmentation    CRITICAL CARE:  [ ]Shock Present  [ ]Septic [ ]Cardiogenic [ ]Neurologic [ ]Hypovolemic  [ ]Vasopressors [ ]Inotropes  [ ]Respiratory failure present [ ]Mechanical Ventilation [ ]Non-invasive ventilatory support [ ]High-Flow   [ ]Acute  [ ]Chronic [ ]Hypoxic  [ ]Hypercarbic [ ]Other  [ ]Other organ failure     LABS:                                          8.5    9.47  )-----------( 277      ( 07 Apr 2025 05:15 )             28.8       04-07    135  |  100  |  17  ----------------------------<  91  4.6   |  23  |  0.93    Ca    8.2[L]      07 Apr 2025 05:15  Phos  3.1     04-07  Mg     1.80     04-07    TPro  5.0[L]  /  Alb  2.2[L]  /  TBili  <0.2  /  DBili  x   /  AST  33[H]  /  ALT  13  /  AlkPhos  187[H]  04-07        RADIOLOGY & ADDITIONAL STUDIES: reviewed     Protein Calorie Malnutrition Present: [ ]mild [ ]moderate [ ]severe [ ]underweight [ ]morbid obesity  https://www.andeal.org/vault/2440/web/files/ONC/Table_Clinical%20Characteristics%20to%20Document%20Malnutrition-White%20JV%20et%20al%493025.pdf    Height (cm): 154.9 (03-25-25 @ 08:00), 153.01 (02-21-25 @ 10:00), 154.9 (04-19-24 @ 10:00)  Weight (kg): 67.8 (03-25-25 @ 08:00), 58.96 (03-12-25 @ 09:00), 53.5 (04-19-24 @ 10:00)  BMI (kg/m2): 28.3 (03-25-25 @ 08:00), 25.2 (03-12-25 @ 09:00), 22.9 (02-21-25 @ 10:00)    [ ]PPSV2 < or = 30%  [ ]significant weight loss [ ]poor nutritional intake [ ]anasarca[ ]Artificial Nutrition    Other REFERRALS:  [ x]Hospice  [ ]Child Life  [ ]Social Work  [x ]Case management [ ]Holistic Therapy

## 2025-04-07 NOTE — PROGRESS NOTE ADULT - SUBJECTIVE AND OBJECTIVE BOX
SOLID TUMOR ONCOLOGY HOSPITALIST PROGRESS NOTE    S: No acute events overnight.  Pt had no new complaints this am.  She denied pain, dyspnea.  She indicated that she felt scared about going home, but indicated that she would not want to go to any facility post discharge.  She also confirmed that she deferred all medical decision making, tee her dispo planning, to her family.    CURRENT MEDICATIONS  MEDICATIONS  (STANDING):  alteplase  Injectable for Pleural Effusion 10 milliGRAM(s) IntraPleural. every 12 hours  chlorhexidine 2% Cloths 1 Application(s) Topical <User Schedule>  cyanocobalamin 1000 MICROGram(s) Oral daily  dornase nidhi Solution for Pleural Effusion 5 milliGRAM(s) IntraPleural. every 12 hours  escitalopram 10 milliGRAM(s) Oral daily  fentaNYL   Patch  50 MICROgram(s)/Hr 1 Patch Transdermal every 72 hours  fentaNYL   Patch 100 MICROgram(s)/Hr 1 Patch Transdermal every 72 hours  fluticasone propionate 50 MICROgram(s)/spray Nasal Spray 1 Spray(s) Both Nostrils two times a day  piperacillin/tazobactam IVPB.. 3.375 Gram(s) IV Intermittent every 8 hours  polyethylene glycol 3350 17 Gram(s) Oral daily  senna 2 Tablet(s) Oral at bedtime  simethicone 80 milliGRAM(s) Chew three times a day  sodium chloride 0.9% Solution for Pleural Effusion 30 milliLiter(s) IntraPleural. every 12 hours  MEDICATIONS  (PRN):  albuterol    0.083%. 2.5 milliGRAM(s) Nebulizer once PRN Wheezing  diphenhydrAMINE 50 milliGRAM(s) Oral once PRN MILD ALERGIC REACTION  glucagon  Injectable 1 milliGRAM(s) IV Push once PRN REFRACTORY CASES AND/OR ON BETA BLOCKERS  glucagon  Injectable 2 milliGRAM(s) IV Push once PRN REFRACTORY CASES AND/ OR ON BETA BLOCKERS  HYDROmorphone   Tablet 6 milliGRAM(s) Oral every 4 hours PRN Severe Pain (7 - 10)  HYDROmorphone   Tablet 4 milliGRAM(s) Oral every 4 hours PRN Moderate Pain (4 - 6)  LORazepam     Tablet 0.5 milliGRAM(s) Oral two times a day PRN Anxiety  ondansetron Injectable 8 milliGRAM(s) IV Push every 8 hours PRN Nausea  pseudoephedrine 60 milliGRAM(s) Oral every 6 hours PRN nasal congesiton  sodium chloride 0.65% Nasal 1 Spray(s) Both Nostrils two times a day PRN Congestion      PHYSICAL EXAM  T(C): 36.8 (04-07-25 @ 11:13), Max: 37.1 (04-06-25 @ 20:35)  HR: 92 (04-07-25 @ 11:13) (92 - 102)  BP: 99/61 (04-07-25 @ 11:13) (99/61 - 116/75)  RR: 18 (04-07-25 @ 11:13) (16 - 18)  SpO2: 95% (04-07-25 @ 11:13) (94% - 98%)    04-06-25 @ 07:01  -  04-07-25 @ 07:00  --------------------------------------------------------  IN: 0 mL / OUT: 1600 mL / NET: -1600 mL        LABS                        8.5    9.47  )-----------( 277      ( 07 Apr 2025 05:15 )             28.8     04-07    135  |  100  |  17  ----------------------------<  91  4.6   |  23  |  0.93    Ca    8.2[L]      07 Apr 2025 05:15  Phos  3.1     04-07  Mg     1.80     04-07    TPro  5.0[L]  /  Alb  2.2[L]  /  TBili  <0.2  /  DBili  x   /  AST  33[H]  /  ALT  13  /  AlkPhos  187[H]  04-07      ADDITIONAL LABS  Ca 125: 7238      MICROBIOLOGY  Abx  Zosyn 3/27-present  $Vanc 3/27-29    Cx Data  4/1 wound cx (stoma): brayden c/w body site  4/1 Bcx: NTD   3/30 Bcx: Neg  3/27 MRSA/MSSA: Not detected  3/27 Bcx (PIV x 2): ++Bacteroides thetaiotaomicron group       PERTINENT RADIOLOGY  4/7 VA duplex B/L LE:  No evidence deep vein thrombosis in the visualized right and left lower extremities. Subcutaneous edema noted in the bilateral lower extremities.  4/2 CT AP: Increase in size of pelvic masses and worsening hepatic metastatic disease compared to prior examination 2/1/2024. Bilateral pleural effusions, loculated on the right with right-sided pigtail catheter in situ. Adjacent passive atelectasis. Superimposed pneumonia is not excluded. Bilateral hydroureteronephrosis. Left hydroureteronephrosis is new compared to prior examination. Right ureteral stent in appropriate position.  3/30 CT Chest: No significant change compared to 3/25/2025.  Unchanged moderate pleural effusions, loculated on the right, with right   Pleurx in place. Several of the right-sided anterior and lateral pleural   loculations do not appear to communicate with the chest tube.  Unchanged extensive metastatic disease in the chest and upper abdomen compared to 3/25/2025 and increased since more remote studies.  3/28 TTE: EF 73%, no valvular disease. trace pericardial effusion adjacent to R ventricle.  3/27 CXR: follow-up w/ b/lk effusions loculated on the R w/ a PleurX cath in place unchanged  3/25 CT Chest: mod sized multiloculated R pleural effusion w/ R pleural catheter. The cath tiup does not communicate the pockets of pleural fluid. MOd L pleural effusion. Right pleural and fissural nodularity d/t mets. PArtially imaged b/l hydronephrosis, R > L. Multiple hypodense lesions w/i partially imaged liver since 2/1/24  3/25 CXR: L right upper/mid lung field opacity

## 2025-04-07 NOTE — PROGRESS NOTE ADULT - NUTRITIONAL ASSESSMENT
This patient has been assessed with a concern for Malnutrition and has been determined to have a diagnosis/diagnoses of Severe protein-calorie malnutrition.    This patient is being managed with:   Diet Regular-  Entered: Mar 25 2025 12:18PM  

## 2025-04-07 NOTE — PROGRESS NOTE ADULT - PROBLEM SELECTOR PLAN 3
- CT 4/2 - *  Bilateral pleural effusions, loculated on the right with right-sided pigtail catheter in situ. Adjacent passive atelectasis. Superimposed pneumonia is not excluded.  - Patient developed worsening dyspnea 2/2 loculated R pleural effusion with pleurx in place; pleurx not draining well. Started MIST protocol on 3/26. Pulmonology and ID following.   - Patient is NOT interested in further surgical interventions with CT surgery  - Continue Zosyn as per ID  - Appreciate pulm and ID recs   - Oxygen supplementation PRN- wean as tolerated   - Rest of care as per primary team.

## 2025-04-07 NOTE — PROGRESS NOTE ADULT - ASSESSMENT
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  Pt was admitted to [facility] ICU on 3/25 for next-line cancer treatment- Carboplatin monotherapy with desensitization protocol (pt tolerated desens without significant adverse effect on 3/25/25). Her hospital course has been further c/b acute hypoxic resp failure 2/2 worsening loculated R pleural effusion, s/p MIST protocol x3 to R pleurex, with improved drainage; she was also found to have Bacteroides thetaiotaomicron group bacteremia, current on Zosyn (ID following). Pt deemed non-candidate for systemic cancer treatment, and hospice has been recommended; after multiple GOC conversations, pt consented to home hospice placement on 4/7.     ACTIVE PROBLEMS  Met fallopian ca  Admission for chemotherapy (desensitization protocol)  GOC counseling, discussion  Acute hypoxic resp failure 2/2 R pleural effusion s/p Pleurex  Bacteroides bacteremia  Cancer related pain  h/o Anxiety/depression  Severe prot zee malnutrition  Hypercoag state  Immunocompromised due to cancer    Met fallopian ca  Admission for chemotherapy (desensitization protocol)  GOC counseling, discussion  Pt consented to home hospice placement on 4/7, but she remains full code at this time  SW referral for home hospice placement is in progress  Long-term prognosis: poor (weeks to short months)    Acute hypoxic resp failure 2/2 R pleural effusion s/p Pleurex  Continuing R pleurex drainage q72h   Continuing supportive resp care prn    Bacteroides bacteremia  Pt remains afebrile, normotensive  Bcxs negative since 4/2  Appreciate ID consult  Continuing Zosyn with plan to transition to Cipro/Flagyl at discharge, abx to be completed on 4/12    Cancer related pain  Continuing Fentanyl TDP 150mcg q72h  Continuing Dilaudid PO 4/6mg q4/prn  Continuing bowel regimen for OIC prevention    h/o Anxiety/depression  Continuing Ativan PO 0.5mg BID/prn  Continuing Lexapro 10mg daily    Severe prot zee malnutrition: continuing regular diet for comfort    Hypercoag state: continuing SCDs

## 2025-04-07 NOTE — PROGRESS NOTE ADULT - PROBLEM SELECTOR PLAN 9
Case reviewed with primary team and care coordination team  Hospice Team informed of referral    In the event of newly developing, evolving, or worsening symptoms, please contact the Palliative Medicine team via pager (if the patient is at [facility] #[phone] or if the patient is at [facility] #[phone]) The Geriatric and Palliative Medicine service has coverage 24 hours a day/ 7 days a week to provide medical recommendations regarding symptom management needs via telephone.

## 2025-04-08 ENCOUNTER — TRANSCRIPTION ENCOUNTER (OUTPATIENT)
Age: 65
End: 2025-04-08

## 2025-04-08 VITALS
RESPIRATION RATE: 18 BRPM | TEMPERATURE: 98 F | DIASTOLIC BLOOD PRESSURE: 68 MMHG | OXYGEN SATURATION: 94 % | SYSTOLIC BLOOD PRESSURE: 102 MMHG | HEART RATE: 102 BPM

## 2025-04-08 LAB
ALBUMIN SERPL ELPH-MCNC: 2.2 G/DL — LOW (ref 3.3–5)
ALP SERPL-CCNC: 198 U/L — HIGH (ref 40–120)
ALT FLD-CCNC: 15 U/L — SIGNIFICANT CHANGE UP (ref 4–33)
ANION GAP SERPL CALC-SCNC: 9 MMOL/L — SIGNIFICANT CHANGE UP (ref 7–14)
AST SERPL-CCNC: 32 U/L — SIGNIFICANT CHANGE UP (ref 4–32)
BASOPHILS # BLD AUTO: 0.05 K/UL — SIGNIFICANT CHANGE UP (ref 0–0.2)
BASOPHILS NFR BLD AUTO: 0.5 % — SIGNIFICANT CHANGE UP (ref 0–2)
BILIRUB SERPL-MCNC: <0.2 MG/DL — SIGNIFICANT CHANGE UP (ref 0.2–1.2)
BUN SERPL-MCNC: 17 MG/DL — SIGNIFICANT CHANGE UP (ref 7–23)
CALCIUM SERPL-MCNC: 8.3 MG/DL — LOW (ref 8.4–10.5)
CHLORIDE SERPL-SCNC: 100 MMOL/L — SIGNIFICANT CHANGE UP (ref 98–107)
CO2 SERPL-SCNC: 26 MMOL/L — SIGNIFICANT CHANGE UP (ref 22–31)
CREAT SERPL-MCNC: 0.95 MG/DL — SIGNIFICANT CHANGE UP (ref 0.5–1.3)
EGFR: 68 ML/MIN/1.73M2 — SIGNIFICANT CHANGE UP
EGFR: 68 ML/MIN/1.73M2 — SIGNIFICANT CHANGE UP
EOSINOPHIL # BLD AUTO: 0.13 K/UL — SIGNIFICANT CHANGE UP (ref 0–0.5)
EOSINOPHIL NFR BLD AUTO: 1.2 % — SIGNIFICANT CHANGE UP (ref 0–6)
GLUCOSE SERPL-MCNC: 94 MG/DL — SIGNIFICANT CHANGE UP (ref 70–99)
HCT VFR BLD CALC: 27.9 % — LOW (ref 34.5–45)
HGB BLD-MCNC: 8.4 G/DL — LOW (ref 11.5–15.5)
IANC: 8.61 K/UL — HIGH (ref 1.8–7.4)
IMM GRANULOCYTES NFR BLD AUTO: 0.9 % — SIGNIFICANT CHANGE UP (ref 0–0.9)
LYMPHOCYTES # BLD AUTO: 0.76 K/UL — LOW (ref 1–3.3)
LYMPHOCYTES # BLD AUTO: 7.3 % — LOW (ref 13–44)
MAGNESIUM SERPL-MCNC: 1.9 MG/DL — SIGNIFICANT CHANGE UP (ref 1.6–2.6)
MCHC RBC-ENTMCNC: 28.8 PG — SIGNIFICANT CHANGE UP (ref 27–34)
MCHC RBC-ENTMCNC: 30.1 G/DL — LOW (ref 32–36)
MCV RBC AUTO: 95.5 FL — SIGNIFICANT CHANGE UP (ref 80–100)
MONOCYTES # BLD AUTO: 0.78 K/UL — SIGNIFICANT CHANGE UP (ref 0–0.9)
MONOCYTES NFR BLD AUTO: 7.5 % — SIGNIFICANT CHANGE UP (ref 2–14)
NEUTROPHILS # BLD AUTO: 8.61 K/UL — HIGH (ref 1.8–7.4)
NEUTROPHILS NFR BLD AUTO: 82.6 % — HIGH (ref 43–77)
NRBC # BLD AUTO: 0 K/UL — SIGNIFICANT CHANGE UP (ref 0–0)
NRBC # FLD: 0 K/UL — SIGNIFICANT CHANGE UP (ref 0–0)
NRBC BLD AUTO-RTO: 0 /100 WBCS — SIGNIFICANT CHANGE UP (ref 0–0)
PHOSPHATE SERPL-MCNC: 3.1 MG/DL — SIGNIFICANT CHANGE UP (ref 2.5–4.5)
PLATELET # BLD AUTO: 260 K/UL — SIGNIFICANT CHANGE UP (ref 150–400)
POTASSIUM SERPL-MCNC: 4.6 MMOL/L — SIGNIFICANT CHANGE UP (ref 3.5–5.3)
POTASSIUM SERPL-SCNC: 4.6 MMOL/L — SIGNIFICANT CHANGE UP (ref 3.5–5.3)
PROT SERPL-MCNC: 5.3 G/DL — LOW (ref 6–8.3)
RBC # BLD: 2.92 M/UL — LOW (ref 3.8–5.2)
RBC # FLD: 18.8 % — HIGH (ref 10.3–14.5)
SODIUM SERPL-SCNC: 135 MMOL/L — SIGNIFICANT CHANGE UP (ref 135–145)
WBC # BLD: 10.42 K/UL — SIGNIFICANT CHANGE UP (ref 3.8–10.5)
WBC # FLD AUTO: 10.42 K/UL — SIGNIFICANT CHANGE UP (ref 3.8–10.5)

## 2025-04-08 PROCEDURE — 99239 HOSP IP/OBS DSCHRG MGMT >30: CPT

## 2025-04-08 PROCEDURE — 99233 SBSQ HOSP IP/OBS HIGH 50: CPT

## 2025-04-08 RX ORDER — HEPARIN SODIUM,PORCINE/NS/PF 20/20 ML
500 SYRINGE (ML) INTRAVENOUS ONCE
Refills: 0 | Status: DISCONTINUED | OUTPATIENT
Start: 2025-04-08 | End: 2025-04-08

## 2025-04-08 RX ORDER — HYDROMORPHONE/SOD CHLOR,ISO/PF 2 MG/10 ML
0.5 SYRINGE (ML) INJECTION ONCE
Refills: 0 | Status: DISCONTINUED | OUTPATIENT
Start: 2025-04-08 | End: 2025-04-08

## 2025-04-08 RX ADMIN — Medication 1 APPLICATION(S): at 04:47

## 2025-04-08 RX ADMIN — ESCITALOPRAM OXALATE 10 MILLIGRAM(S): 20 TABLET ORAL at 11:48

## 2025-04-08 RX ADMIN — Medication 6 MILLIGRAM(S): at 01:27

## 2025-04-08 RX ADMIN — Medication 80 MILLIGRAM(S): at 10:53

## 2025-04-08 RX ADMIN — Medication 6 MILLIGRAM(S): at 02:27

## 2025-04-08 RX ADMIN — Medication 6 MILLIGRAM(S): at 06:11

## 2025-04-08 RX ADMIN — Medication 25 GRAM(S): at 10:58

## 2025-04-08 RX ADMIN — POLYETHYLENE GLYCOL 3350 17 GRAM(S): 17 POWDER, FOR SOLUTION ORAL at 11:49

## 2025-04-08 RX ADMIN — Medication 0.5 MILLIGRAM(S): at 05:02

## 2025-04-08 RX ADMIN — Medication 25 GRAM(S): at 01:33

## 2025-04-08 RX ADMIN — Medication 0.5 MILLIGRAM(S): at 11:31

## 2025-04-08 RX ADMIN — Medication 0.5 MILLIGRAM(S): at 04:47

## 2025-04-08 NOTE — PROGRESS NOTE ADULT - PROBLEM SELECTOR PLAN 6
- Continue Zofran PRN for nausea.
Pt reports declining functional status, gets very dyspneic with long periods of exertion.   PT recs- Outpt PT   PPSV 50%   Please assist with ADLs.
- Continue Zofran PRN for nausea.

## 2025-04-08 NOTE — PROGRESS NOTE ADULT - PROBLEM SELECTOR PLAN 5
- Continue Zofran PRN for nausea.
continue with Ativan 0.5mg PO bid PRN- can consider increasing to tid prn   Family providing emotional support
- PO Ativan 0.5mg PO bid PRN  - Family providing emotional support
continue with Ativan 0.5mg PO bid PRN- can consider increasing to tid prn   Family providing emotional support
continue with Ativan 0.5mg PO bid PRN- can consider increasing to tid prn   Family providing emotional support
- PO Ativan 0.5mg PO bid PRN  - Family providing emotional support

## 2025-04-08 NOTE — DISCHARGE NOTE NURSING/CASE MANAGEMENT/SOCIAL WORK - NSDCPEFALRISK_GEN_ALL_CORE
For information on Fall & Injury Prevention, visit: [email or web address] OR  [email or web address] OR  https://www.cdc.gov/steadi/patient.html

## 2025-04-08 NOTE — PROGRESS NOTE ADULT - PROBLEM SELECTOR PLAN 8
> Patient is supported by her spouse, and 3 adult children. Currently, she has capacity to make her own decisions, though she includes her family in decision making.   > 4/7 -Patient defers further discussions to be held with her son Geoffrey regarding her medical care and disposition planning  Son Geoffrey is interested in home hospice referral. Introduced services provided with home hospice.
> Patient is supported by her spouse, and 3 adult children. Currently, she has capacity to make her own decisions, though she includes her family in decision making.   > 4/7 -Patient defers further discussions to be held with her son Geoffrey regarding her medical care and disposition planning  Son Geoffrey is interested in home hospice referral. Introduced services provided with home hospice.
FULL CODE  > Patient is supported by her spouse, and 3 adult children. Currently, she has capacity to make her own decisions, though she includes her family in decision making. See goc note above- discussed hospice option  > 4/2: Ordered CTa/p and family will discuss pursuing CTa/p and potential drainage pending imaging results. No decision on hospice.  Palliative team remains available for family meeting once son is able to coordinate     Caregiver support SW referral
In the event of newly developing, evolving, or worsening symptoms, please contact the Palliative Medicine team via pager (if the patient is at [facility] #[phone] or if the patient is at [facility] #[phone]) The Geriatric and Palliative Medicine service has coverage 24 hours a day/ 7 days a week to provide medical recommendations regarding symptom management needs via telephone.
FULL CODE  > Patient is supported by her spouse, and 3 adult children. Currently, she has capacity to make her own decisions, though she includes her family in decision making. See [facility] note above- discussed hospice option  > 4/4: Plan for family meeting today at 2pm   > 4/2: Ordered CTa/p and family will discuss pursuing CTa/p and potential drainage pending imaging results. No decision on hospice.  Palliative team remains available for family meeting once son is able to coordinate     Caregiver support SW referral
FULL CODE  > Patient is supported by her spouse, and 3 adult children. Currently, she has capacity to make her own decisions, though she includes her family in decision making. See goc note above- discussed hospice option  > 4/2: Ordered CTa/p and family will discuss pursuing CTa/p and potential drainage pending imaging results. No decision on hospice.    Caregiver support SW referral

## 2025-04-08 NOTE — PROGRESS NOTE ADULT - PROBLEM SELECTOR PLAN 2
- Infectious Disease recommendations appreciated   - On IV zosyn   - management as per primary team - Infectious Disease recommendations appreciated   - s/p IV zosyn   - management as per primary team

## 2025-04-08 NOTE — PROGRESS NOTE ADULT - TIME BILLING
Reviewing the chart, interpreting lab data, discussing case with team, interview and examination of patient, and documentation. Pt is at high risk of mortality due to his disease.
minutes spent on total encounter; more than 50% of the visit was spent counseling and / or coordinating care by the attending physician.  The necessity of the time spent during the encounter on this date of service was due to:     review of laboratory data, radiology results, consultants' recommendations, documentation in [place], discussion with patient/ACP and interdisciplinary staff (such as , social workers, etc). Interventions were performed as documented above.
20 mins-Vitals, meds, new results/radiology, interdisciplinary charting reviewed   20 mins-Patient seen and examined and plan discussed, all questions were answered to the best of my ability  20 mins-Charting/documentation and orders.
minutes spent on total encounter; more than 50% of the visit was spent counseling and / or coordinating care by the attending physician.  The necessity of the time spent during the encounter on this date of service was due to:     review of laboratory data, radiology results, consultants' recommendations, documentation in [place], discussion with patient/ACP and interdisciplinary staff (such as , social workers, etc). Interventions were performed as documented above.    Hospice appropriate. Extensive amounts of time at bedside.
Chart review, interviewing and examination, along with laboratory and imaging interpretation. Also, Counseling of potential therapeutic options from this point forward and triage guidance was offered.  Over half of the minutes were spent on counselling and coordinating specifics of care.
minutes spent on total encounter; more than 50% of the visit was spent counseling and / or coordinating care by the attending physician.  The necessity of the time spent during the encounter on this date of service was due to:     review of laboratory data, radiology results, consultants' recommendations, documentation in [place], discussion with patient/ACP and interdisciplinary staff (such as , social workers, etc). Interventions were performed as documented above.
minutes spent on total encounter; more than 50% of the visit was spent counseling and / or coordinating care by the attending physician.  The necessity of the time spent during the encounter on this date of service was due to:     review of laboratory data, radiology results, consultants' recommendations, documentation in [place], discussion with patient/ACP and interdisciplinary staff (such as , social workers, etc). Interventions were performed as documented above.
minutes spent on total encounter; more than 50% of the visit was spent counseling and / or coordinating care by the attending physician.  The necessity of the time spent during the encounter on this date of service was due to:     review of laboratory data, radiology results, consultants' recommendations, documentation in [place], discussion with patient/ACP and interdisciplinary staff (such as , social workers, etc). Interventions were performed as documented above.
minutes spent on total encounter; more than 50% of the visit was spent counseling and / or coordinating care by the attending physician.  The necessity of the time spent during the encounter on this date of service was due to:     review of laboratory data, radiology results, consultants' recommendations, documentation in [place], discussion with patient/ACP and interdisciplinary staff (such as , social workers, etc). Interventions were performed as documented above.
Time spent for extensive review of the physical chart, electronic medical record, and documentation to obtain collateral information including but not limited to:  [x] Inpatient records (ED, H&P, primary team, and consultants if applicable, care coordination)  [x] Inpatient values/results (biomarkers, immunoassays, imaging, and microbiology results)  [x] Current or proposed treatment plans  [x] Pharmacotherapy review  [x] Discussion and coordinating care with primary team and interdisciplinary staff and floor staff  [x] Discussion including counseling/ education with the patient, surrogate decision maker, or family
Time spent for extensive review of the physical chart, electronic medical record, and documentation to obtain collateral information including but not limited to:  [x] Inpatient records (ED, H&P, primary team, and consultants if applicable, care coordination)  [x] Inpatient values/results (biomarkers, immunoassays, imaging, and microbiology results)  [x] Current or proposed treatment plans  [x] Pharmacotherapy review  [x] Discussion and coordinating care with primary team and interdisciplinary staff and floor staff  [x] Discussion including counseling/ education with the patient, surrogate decision maker, or family

## 2025-04-08 NOTE — DISCHARGE NOTE PROVIDER - HOSPITAL COURSE
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 Now admitted for carboplatin desens. C/c/b R pleural effusion requiring NC, MIST protocol started 3/26 via pleurx w improved drainage, repeat CT chest no interval change. C/c/b Bacteroides thetaiotaomicron group bacteremia, ID following, cont Zosyn, switched to Cipro/Flagyl on discharge, CTAP w/ POD. Pt no longer candidate for DMT, GOC in progress.     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 Now a/f carbo desens.  -Ca 125 now 7238 (prior 4120 03/25)  -rest of plan per Dr. Hughes; no longer candidate for DMT > rec hospice    #cancer-related pain  - Pall care consulted for pain control and GOC assistance  - fent patch increased 150 mcg, prn iv/po dilaudid for severe/mod     ---ID---  #Bacteroides Bacteremia  #?empyema  - 3/27 Bcx ++ Bacteroides thetaiotaomicron group   - cont Zosyn (through 4/12, can transition to Cipro/flagyl on DC)  - ID consulted, low c/f infectious process in lung, would need thora w/ infectious labs  - CTAP: increase in size of pelvic masses, worsening hepatic mets, b/l pleural effusions, b/l hydroureteronephrosis, left side new.     ---RESP---  #SOB  -New O2 requirement;   -CT Chest: mod sized multiloculated R pleural effusion w/ R pleural catheter. The cath tiup does not communicate the pockets of pleural fluid. MOd L pleural effusion. Right pleural and fissural nodularity d/t mets.  -IP also consulted as there are many loculations seen in the CT with fluid and pleurx not appropriately draining  - s/p MIST for total of 6 doses (last dose given 3/29),  - repeat CT Chest no interval change  - TTE EF 73%.     ---GI---  #GI/Nutrition  -Pt with multiple surgeries and now with mass effect from ehr ovarian cancer. 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  Pt was admitted to [facility] ICU on 3/25 for next-line cancer treatment- Carboplatin monotherapy with desensitization protocol (pt tolerated desens without significant adverse effect on 3/25/25). Her hospital course has been further c/b acute hypoxic resp failure 2/2 worsening loculated R pleural effusion, s/p MIST protocol x3 to R pleurex, with improved drainage; she was also found to have Bacteroides thetaiotaomicron group bacteremia, current on Zosyn (ID following). Pt deemed non-candidate for systemic cancer treatment, and hospice has been recommended; after multiple GOC conversations, pt consented to home hospice placement on 4/7.     ---HEM/ONC---  #Met fallopian tube ca  -Ca 125 now 7238 (prior 4120 03/25)  -4/2 CT a/p c/w POD  -Pt is no longer candidate for DMT > rec hospice  -Pt consented to home hospice placement on 4/7, but she remains full code at this time  -Long-term prognosis: poor (weeks to short months)    #cancer-related pain  -Pall care consulted for pain control and GOC assistance  -Fent patch increased 150 mcg, cont dilaudid po 4/6mg for breakthrough pain    ---ID---  #Bacteroides Bacteremia  #?empyema  -3/27 Bcx +ve for Bacteroides thetaiotaomicron group   -ID consulted, low c/f infectious process in lung, would need thora w/ infectious labs (deferred as pt transitioned to hospice placement)  -Cont Zosyn through 4/12 > transitioned to Cipro/flagyl on DC    ---RESP---  #SOB  #Acute hypoxic resp failure 2/2 R pleural effusion s/p Pleurex  -CT Chest: mod sized multiloculated R pleural effusion w/ R pleural catheter. The cath tiup does not communicate the pockets of pleural fluid. MOd L pleural effusion. Right pleural and fissural nodularity d/t mets.  -iPulm also consulted as there are many loculations seen in the CT with fluid and pleurx not appropriately draining  -S/p MIST for total of 6 doses (last dose given 3/29),  -Repeat CT Chest no interval change  -TTE EF 73%  -Continuing R pleurex drainage q72h   -Continuing supportive resp care prn    ---GI---  #GI/Nutrition  -Pt with multiple surgeries and now with mass effect from ehr ovarian cancer. She is s/p diverting colostomy with some leakage noted around the bag  -No signs of infection noted

## 2025-04-08 NOTE — PROVIDER CONTACT NOTE (MEDICATION) - BACKGROUND
Pt hx of metastatic fallopian tube ca. Planned for d/c to home hospice 4/8. Transitioned to po dilaudid on 4/7

## 2025-04-08 NOTE — PROGRESS NOTE ADULT - PROBLEM SELECTOR PLAN 4
- 4/7 - Counseled/educated patient and son on transition to PO pain regimen for safe discharge planning to ensure pain control when at home. Counseled / educated about onset of action/ duration of effect of PO pain regimen vs IV pain regimen. They are amenable to transition off IV Dilaudid to PO Dilaudid   - Fentanyl Fentanyl patch 150 mcg (Dose last adjusted on 3/31)  - Discontinue IV Dilaudid PRN   - Start PO Dilaudid 4mg q4 PRN moderate pain (hold for hypotension, oversedation, respiratory depression)  - Start PO Dilaudid 6mg q4 PRN severe pain (hold for hypotension, oversedation, respiratory depression)  - simethicone tid for bloating/gas pain   - Bowel regimen while on opiates  - Narcan prn.  - Holistic rN referral - 4/8 - Discussed with patient and son Mich that based on prn usage of dilaudid could possibly consider increase in fentanyl patch vs holding off on increasing dose as well as patient a bit sleepy this morning (which possibly could be from not sleeping well due to anxiety last night). Patient and son agree to hold off on increasing fentanyl patch dose today. Counseled that can continue to monitor her pain with current regimen and if pain not controlled when at home recommended for them to reach out to hospice team for further adjustments at that time; they are agreeable to plan of care.   - Fentanyl patch 150 mcg (Dose last adjusted on 3/31)  - PO Dilaudid 4mg q4 PRN moderate pain (hold for hypotension, oversedation, respiratory depression)  - PO Dilaudid 6mg q4 PRN severe pain (hold for hypotension, oversedation, respiratory depression)  - simethicone tid for bloating/gas pain   - Bowel regimen while on opiates  - Narcan prn.  - Holistic rN referral

## 2025-04-08 NOTE — DISCHARGE NOTE PROVIDER - ATTENDING DISCHARGE PHYSICAL EXAMINATION:
Vital Signs Last 24 Hrs  T(C): 36.8 (08 Apr 2025 05:53), Max: 36.8 (08 Apr 2025 05:53)  T(F): 98.2 (08 Apr 2025 05:53), Max: 98.2 (08 Apr 2025 05:53)  HR: 102 (08 Apr 2025 05:53) (88 - 102)  BP: 102/68 (08 Apr 2025 05:53) (93/- - 115/76)  BP(mean): 64 (07 Apr 2025 18:12) (64 - 64)  RR: 18 (08 Apr 2025 05:53) (17 - 18)  SpO2: 94% (08 Apr 2025 05:53) (94% - 95%)  Parameters below as of 08 Apr 2025 05:53  Patient On (Oxygen Delivery Method): nasal cannula  O2 Flow (L/min): 3  Pale, ill-appearing woman, sitting upright in bed, nad  Answering questions appropriately and following commands  Anicteric sclera, no oral lesions/thrush  RRR, no m/r/g  CTAB, no w/c/r  Abd soft, mildly distended; ostomy pink  CN 2-12 grossly intact; no gross focal neuro deficits; pt moves all extremities spontaneously

## 2025-04-08 NOTE — DISCHARGE NOTE PROVIDER - NSDCCPCAREPLAN_GEN_ALL_CORE_FT
PRINCIPAL DISCHARGE DIAGNOSIS  Diagnosis: Cancer of fallopian tube  Assessment and Plan of Treatment: You were initially admitted to the intensive care until (ICU) on 3/25 for your cycle 1 Carboplatin desensitization.  You had tolerated the treatment well.  However you had multiple complications since the desens (see below), spoke with Dr. Hughes, there are no further cancer directed therapies and reccomend Hospice care      SECONDARY DISCHARGE DIAGNOSES  Diagnosis: Bacteremia  Assessment and Plan of Treatment: You were found to have bacteroides bacteremia, Infectious disease was following, and you were started on Zosyn and switch to Cipro and Flaygyl on discharge until 4/12    Diagnosis: Pleural effusion  Assessment and Plan of Treatment: You had increase oxygen requirement, and CT chest noted moderate sized R pleural effusion  with R pleural catheter.  The cath tip does not communicat with the pocket of fluid, interventional pulm did MIST x 6 doses with some drainage.  Now plan for every 2 days drainage or increase if has more symptoms    Diagnosis: Goals of care, counseling/discussion  Assessment and Plan of Treatment: Pall care was following you and decision was made for home with hospice care    Diagnosis: Anxiety  Assessment and Plan of Treatment: Continue on Lexapro and Ativan PRN    Diagnosis: Nausea & vomiting  Assessment and Plan of Treatment: You can continue Zofran as needed    Diagnosis: Neoplasm related pain  Assessment and Plan of Treatment: You will continue Fentanyl patch 150mcg and Dilaudid 4/6mg PRN

## 2025-04-08 NOTE — PROGRESS NOTE ADULT - PROBLEM SELECTOR PLAN 1
Pt diagnosed 9 years ago S/P multiple lines of therapy, now on Paclitaxel. Admitted for carboplatin desensitization - completed. Patient follows with Dr. Hughes outpatient.   - Appreciate inpt heme/onc recs- case discussed with Dr. John who is in communication with Dr. Hughes. Dr. Hughes is not recommending further DMT- patient is eligible for hospice services, however patient is having difficulty making a decision to accept hospice services.  - 4/3: Pt's son Mich, is coordinating family meeting with involved family members to determine next steps.
Pt diagnosed 9 years ago S/P multiple lines of therapy, now on Paclitaxel. Admitted for carboplatin desensitization - completed. Patient follows with Dr. Hughes outpatient.   - Appreciate inpt heme/onc recs- case discussed with Dr. John who is in communication with Dr. Hughes. Dr. Hughes is not recommending further DMT- patient is eligible for hospice services, however patient is having difficulty making a decision to accept hospice services.  > pending family meeting today at 2pm
Pt diagnosed 9 years ago S/P multiple lines of therapy, now on Paclitaxel. Admitted for carboplatin desensitization - completed. Patient follows with Dr. Hughes outpatient.   - Appreciate inpt heme/onc recs- case discussed with Dr. John who is in communication with Dr. Hughes. Dr. Hughes is not recommending further DMT- patient is eligible for hospice services, however patient is having difficulty making a decision to accept hospice services.
Pt diagnosed 9 years ago S/P multiple lines of therapy, now on Paclitaxel. Admitted for carboplatin desensitization - completed. Patient follows with Dr. Villavicencio outpatient.   - Appreciate inpt heme/onc recs- case discussed with Dr. John who is in communication with Dr. Villavicencio. Dr. Villavicencio is not recommending further DMT- patient is eligible for hospice services.
- Pt diagnosed 9 years ago S/P multiple lines of therapy, now on Paclitaxel. Admitted for carboplatin desensitization - completed. Patient follows with Dr. Hughes outpatient.   - CT 4/2 - *  Increase in size of pelvic masses and worsening hepatic metastatic disease compared to prior examination 2/1/2024.  - Appreciate inpt heme/onc recs- case discussed with Dr. John who is in communication with Dr. Hughes. Dr. Hughes is not recommending further DMT  - 4/7 - Patient defers further discussions to be held with her son Geoffrey regarding her medical care and disposition planning   Son Geoffrey is interested in home hospice referral. Introduced services provided with home hospice.
- Pt diagnosed 9 years ago S/P multiple lines of therapy, now on Paclitaxel. Admitted for carboplatin desensitization - completed. Patient follows with Dr. Hughes outpatient.   - CT 4/2 - *  Increase in size of pelvic masses and worsening hepatic metastatic disease compared to prior examination 2/1/2024.  - Appreciate inpt heme/onc recs- case discussed with Dr. John who is in communication with Dr. Hughes. Dr. Hughes is not recommending further DMT  - 4/7 - Patient defers further discussions to be held with her son Geoffrey regarding her medical care and disposition planning   Son Geoffrey is interested in home hospice referral. Introduced services provided with home hospice.

## 2025-04-08 NOTE — PROGRESS NOTE ADULT - PROBLEM SELECTOR PROBLEM 4
Neoplasm related pain
Anxiety
Neoplasm related pain

## 2025-04-08 NOTE — PROGRESS NOTE ADULT - SUBJECTIVE AND OBJECTIVE BOX
Indication for Geriatrics and Palliative Care Services/INTERVAL HPI: sx management and goc in setting of advanced malignancy     INTERVAL EVENTS/ OVERNIGHT EVENTS:  In past 24 hours , received IV Dilaudid 1.5mg x1 , IV Dilaudid 0.5mg x1, PO Dilaudid 6mg x4, and PO Ativan 0.5mg x1      SUBJECTIVE AND OBJECTIVE:   Patient seen this AM with son Mich at bedside. Son umu patient did not sleep well last night due to anxiety. Patient sleepy this morning, awakes to voice but falls asleep intermittently between questions. She reports feeling okay during encounter . States pain is controlled     Allergies    carboplatin (Unknown)  oxaliplatin (Unknown)  IV Contrast (Rash)  cisplatin (Unknown)  platinum containing compounds (Anaphylaxis)    Intolerances    MEDICATIONS  (STANDING):  alteplase  Injectable for Pleural Effusion 10 milliGRAM(s) IntraPleural. every 12 hours  chlorhexidine 2% Cloths 1 Application(s) Topical <User Schedule>  cyanocobalamin 1000 MICROGram(s) Oral daily  dornase nidhi Solution for Pleural Effusion 5 milliGRAM(s) IntraPleural. every 12 hours  escitalopram 10 milliGRAM(s) Oral daily  fentaNYL   Patch  50 MICROgram(s)/Hr 1 Patch Transdermal every 72 hours  fentaNYL   Patch 100 MICROgram(s)/Hr 1 Patch Transdermal every 72 hours  fluticasone propionate 50 MICROgram(s)/spray Nasal Spray 1 Spray(s) Both Nostrils two times a day  heparin  Lock Flush 100 Units/mL Injectable 500 Unit(s) IV Push once  polyethylene glycol 3350 17 Gram(s) Oral daily  senna 2 Tablet(s) Oral at bedtime  simethicone 80 milliGRAM(s) Chew three times a day  sodium chloride 0.9% Solution for Pleural Effusion 30 milliLiter(s) IntraPleural. every 12 hours    MEDICATIONS  (PRN):  albuterol    0.083%. 2.5 milliGRAM(s) Nebulizer once PRN Wheezing  diphenhydrAMINE 50 milliGRAM(s) Oral once PRN MILD ALERGIC REACTION  glucagon  Injectable 1 milliGRAM(s) IV Push once PRN REFRACTORY CASES AND/OR ON BETA BLOCKERS  glucagon  Injectable 2 milliGRAM(s) IV Push once PRN REFRACTORY CASES AND/ OR ON BETA BLOCKERS  HYDROmorphone   Tablet 6 milliGRAM(s) Oral every 4 hours PRN Severe Pain (7 - 10)  HYDROmorphone   Tablet 4 milliGRAM(s) Oral every 4 hours PRN Moderate Pain (4 - 6)  LORazepam     Tablet 0.5 milliGRAM(s) Oral two times a day PRN Anxiety  ondansetron Injectable 8 milliGRAM(s) IV Push every 8 hours PRN Nausea  pseudoephedrine 60 milliGRAM(s) Oral every 6 hours PRN nasal congesiton  sodium chloride 0.65% Nasal 1 Spray(s) Both Nostrils two times a day PRN Congestion        ITEMS UNCHECKED ARE NOT PRESENT    PRESENT SYMPTOMS: [ ]Unable to self-report - see [ ] CPOT [ ] PAINADS [ ] RDOS  Source if other than patient:  [ ]Family   [ ]Team     Pain: [x]yes [ ]no  QOL impact - debilitating   Location - rectum            Aggravating factors - progression of disease   Quality - sharp, cramp, ache  Radiation -  back   Timing- constant with intermittent spikes  Severity (0-10 scale): 10  Minimal acceptable level / Pain goal (0-10 scale): 2    CPOT:    https://www.[facility].org/getattachment/[email or web address]/Critical-Care-Pain-Observation-Tool-(CPOT)    Dyspnea:                           [ ]Mild [ ]Moderate [ ]Severe  Anxiety:                             [ ]Mild [x ]Moderate [ ]Severe  Fatigue:                             [ ]Mild [ x]Moderate [ ]Severe  Nausea:                             [ ]Mild [ ]Moderate [ ]Severe  Loss of appetite:              [ ]Mild [ x]Moderate [ ]Severe  Constipation:                    [ ]Mild [ ]Moderate [ ]Severe    Other Symptoms:   [ x]All other review of systems negative     PCSSQ[Palliative Care Spiritual Screening Question]   Severity (0-10):  Score of 4 or > indicate consideration of Chaplaincy referral.  Chaplaincy Referral: [ ] yes [ ] refused [ ] following [x ] deferred    Caregiver [place]? : [ ] yes [ ] no [x ] Deferred [ ] Declined             Social work referral [ ] Patient & Family Centered Care Referral [ ]  Anticipatory Grief present?:  [ ] yes [ ] no  [x ] Deferred                  Social work referral [ ] Patient & Family Centered Care Referral [ ]      PHYSICAL EXAM:  Vital Signs Last 24 Hrs  T(C): 36.8 (08 Apr 2025 05:53), Max: 36.8 (08 Apr 2025 05:53)  T(F): 98.2 (08 Apr 2025 05:53), Max: 98.2 (08 Apr 2025 05:53)  HR: 102 (08 Apr 2025 05:53) (88 - 102)  BP: 102/68 (08 Apr 2025 05:53) (93/- - 115/76)  BP(mean): 64 (07 Apr 2025 18:12) (64 - 64)  RR: 18 (08 Apr 2025 05:53) (17 - 18)  SpO2: 94% (08 Apr 2025 05:53) (94% - 95%)    Parameters below as of 08 Apr 2025 05:53  Patient On (Oxygen Delivery Method): nasal cannula  O2 Flow (L/min): 3         GENERAL:  [x]Alert  [x]Oriented x3   [ ]Lethargic  [x]Cachexia  [ ]Unarousable  [x]Verbal  [ ]Non-Verbal  [x] No Distress  Behavioral:   [x ] Anxiety  [ ] Delirium [ ] Agitation [] Calm  [ ] Other  HEENT:  [x]Normal  [ ] Temporal Wasting  [ ]Dry mouth   [ ]ET Tube/Trach  [ ]Oral lesions  [ ] Mucositis  PULMONARY: normal respiratory effort, no accessory muscle use  []Clear [ ]Tachypnea  [ ]Audible excessive secretions   [ ]Rhonchi        [ ]Right [ ]Left [ ]Bilateral  [ ]Crackles        [ ]Right [ ]Left [ ]Bilateral  [ ]Wheezing     [ ]Right [ ]Left [ ]Bilateral  [ ]Diminished breath sounds [ ]right [ ]left [ ]bilateral  CARDIOVASCULAR:    [x]Regular [ ]Irregular [ ]Tachy  [ ]Rios [ ]Murmur [ ]Other  GASTROINTESTINAL:  [x]Soft  [ ]Distended   []+BS  [x ]Non tender [ ]Tender  [ ]PEG [ ]OGT/ NGT  Last BM: +colostomy   GENITOURINARY:  [x]Normal [ ] Incontinent   [ ]Oliguria/Anuria   [ ]Smiley  MUSCULOSKELETAL:   [ ]Normal   [x]Weakness  [x]Bed/Wheelchair bound [x ]Edema  [  ] amputation  [  ] contraction  NEUROLOGIC:   [x]No focal deficits  [ ]Cognitive impairment  [ ]Dysphagia [ ]Dysarthria [ ]Paresis [ ]Other   SKIN: See Nursing Skin Assessment for further details  [ ]Normal    [ ]Rash  [ ]Pressure ulcer(s)       Present on admission [ ]y [ ]n   [  ]  Wound    [  ] hyperpigmentation    CRITICAL CARE:  [ ]Shock Present  [ ]Septic [ ]Cardiogenic [ ]Neurologic [ ]Hypovolemic  [ ]Vasopressors [ ]Inotropes  [ ]Respiratory failure present [ ]Mechanical Ventilation [ ]Non-invasive ventilatory support [ ]High-Flow   [ ]Acute  [ ]Chronic [ ]Hypoxic  [ ]Hypercarbic [ ]Other  [ ]Other organ failure     LABS:                                          8.5    9.47  )-----------( 277      ( 07 Apr 2025 05:15 )             28.8       04-07    135  |  100  |  17  ----------------------------<  91  4.6   |  23  |  0.93    Ca    8.2[L]      07 Apr 2025 05:15  Phos  3.1     04-07  Mg     1.80     04-07    TPro  5.0[L]  /  Alb  2.2[L]  /  TBili  <0.2  /  DBili  x   /  AST  33[H]  /  ALT  13  /  AlkPhos  187[H]  04-07        RADIOLOGY & ADDITIONAL STUDIES: reviewed     Protein Calorie Malnutrition Present: [ ]mild [ ]moderate [ ]severe [ ]underweight [ ]morbid obesity  https://www.andeal.org/vault/2440/web/files/ONC/Table_Clinical%20Characteristics%20to%20Document%20Malnutrition-White%20JV%20et%20al%202012.pdf    Height (cm): 154.9 (03-25-25 @ 08:00), 153.01 (02-21-25 @ 10:00), 154.9 (04-19-24 @ 10:00)  Weight (kg): 67.8 (03-25-25 @ 08:00), 58.96 (03-12-25 @ 09:00), 53.5 (04-19-24 @ 10:00)  BMI (kg/m2): 28.3 (03-25-25 @ 08:00), 25.2 (03-12-25 @ 09:00), 22.9 (02-21-25 @ 10:00)    [ ]PPSV2 < or = 30%  [ ]significant weight loss [ ]poor nutritional intake [ ]anasarca[ ]Artificial Nutrition    Other REFERRALS:  [ x]Hospice  [ ]Child Life  [ ]Social Work  [x ]Case management [ ]Holistic Therapy    Indication for Geriatrics and Palliative Care Services/INTERVAL HPI: sx management and goc in setting of advanced malignancy     INTERVAL EVENTS/ OVERNIGHT EVENTS:  In past 24 hours , received IV Dilaudid 1.5mg x1 , IV Dilaudid 0.5mg x1, PO Dilaudid 6mg x4, and PO Ativan 0.5mg x1      SUBJECTIVE AND OBJECTIVE:   Patient seen this AM with son Mich at bedside. Son umu patient did not sleep well last night due to anxiety. Patient sleepy this morning, awakes to voice but falls asleep intermittently between questions. She reports feeling okay during encounter . States pain is controlled     Allergies    carboplatin (Unknown)  oxaliplatin (Unknown)  IV Contrast (Rash)  cisplatin (Unknown)  platinum containing compounds (Anaphylaxis)    Intolerances    MEDICATIONS  (STANDING):  alteplase  Injectable for Pleural Effusion 10 milliGRAM(s) IntraPleural. every 12 hours  chlorhexidine 2% Cloths 1 Application(s) Topical <User Schedule>  cyanocobalamin 1000 MICROGram(s) Oral daily  dornase nidhi Solution for Pleural Effusion 5 milliGRAM(s) IntraPleural. every 12 hours  escitalopram 10 milliGRAM(s) Oral daily  fentaNYL   Patch  50 MICROgram(s)/Hr 1 Patch Transdermal every 72 hours  fentaNYL   Patch 100 MICROgram(s)/Hr 1 Patch Transdermal every 72 hours  fluticasone propionate 50 MICROgram(s)/spray Nasal Spray 1 Spray(s) Both Nostrils two times a day  heparin  Lock Flush 100 Units/mL Injectable 500 Unit(s) IV Push once  polyethylene glycol 3350 17 Gram(s) Oral daily  senna 2 Tablet(s) Oral at bedtime  simethicone 80 milliGRAM(s) Chew three times a day  sodium chloride 0.9% Solution for Pleural Effusion 30 milliLiter(s) IntraPleural. every 12 hours    MEDICATIONS  (PRN):  albuterol    0.083%. 2.5 milliGRAM(s) Nebulizer once PRN Wheezing  diphenhydrAMINE 50 milliGRAM(s) Oral once PRN MILD ALERGIC REACTION  glucagon  Injectable 1 milliGRAM(s) IV Push once PRN REFRACTORY CASES AND/OR ON BETA BLOCKERS  glucagon  Injectable 2 milliGRAM(s) IV Push once PRN REFRACTORY CASES AND/ OR ON BETA BLOCKERS  HYDROmorphone   Tablet 6 milliGRAM(s) Oral every 4 hours PRN Severe Pain (7 - 10)  HYDROmorphone   Tablet 4 milliGRAM(s) Oral every 4 hours PRN Moderate Pain (4 - 6)  LORazepam     Tablet 0.5 milliGRAM(s) Oral two times a day PRN Anxiety  ondansetron Injectable 8 milliGRAM(s) IV Push every 8 hours PRN Nausea  pseudoephedrine 60 milliGRAM(s) Oral every 6 hours PRN nasal congesiton  sodium chloride 0.65% Nasal 1 Spray(s) Both Nostrils two times a day PRN Congestion        ITEMS UNCHECKED ARE NOT PRESENT    PRESENT SYMPTOMS: [ ]Unable to self-report - see [ ] CPOT [ ] PAINADS [ ] RDOS  Source if other than patient:  [ ]Family   [ ]Team     Pain: [x]yes [ ]no  QOL impact - debilitating   Location - rectum            Aggravating factors - progression of disease   Quality - sharp, cramp, ache  Radiation -  back   Timing- constant with intermittent spikes  Severity (0-10 scale): 10  Minimal acceptable level / Pain goal (0-10 scale): 2    CPOT:    https://www.[facility].org/getattachment/[email or web address]/Critical-Care-Pain-Observation-Tool-(CPOT)    Dyspnea:                           [ ]Mild [ ]Moderate [ ]Severe  Anxiety:                             [ ]Mild [x ]Moderate [ ]Severe  Fatigue:                             [ ]Mild [ x]Moderate [ ]Severe  Nausea:                             [ ]Mild [ ]Moderate [ ]Severe  Loss of appetite:              [ ]Mild [ x]Moderate [ ]Severe  Constipation:                    [ ]Mild [ ]Moderate [ ]Severe    Other Symptoms:   [ x]All other review of systems negative     PCSSQ[Palliative Care Spiritual Screening Question]   Severity (0-10):  Score of 4 or > indicate consideration of Chaplaincy referral.  Chaplaincy Referral: [ ] yes [ ] refused [ ] following [x ] deferred    Caregiver [place]? : [ ] yes [ ] no [x ] Deferred [ ] Declined             Social work referral [ ] Patient & Family Centered Care Referral [ ]  Anticipatory Grief present?:  [ ] yes [ ] no  [x ] Deferred                  Social work referral [ ] Patient & Family Centered Care Referral [ ]      PHYSICAL EXAM:  Vital Signs Last 24 Hrs  T(C): 36.8 (08 Apr 2025 05:53), Max: 36.8 (08 Apr 2025 05:53)  T(F): 98.2 (08 Apr 2025 05:53), Max: 98.2 (08 Apr 2025 05:53)  HR: 102 (08 Apr 2025 05:53) (88 - 102)  BP: 102/68 (08 Apr 2025 05:53) (93/- - 115/76)  BP(mean): 64 (07 Apr 2025 18:12) (64 - 64)  RR: 18 (08 Apr 2025 05:53) (17 - 18)  SpO2: 94% (08 Apr 2025 05:53) (94% - 95%)    Parameters below as of 08 Apr 2025 05:53  Patient On (Oxygen Delivery Method): nasal cannula  O2 Flow (L/min): 3         GENERAL:  [x]Alert  [x]Oriented x3   [ ]Lethargic  [x]Cachexia  [ ]Unarousable  [x]Verbal  [ ]Non-Verbal  [x] No Distress  Behavioral:   [x ] Anxiety  [ ] Delirium [ ] Agitation [] Calm  [ ] Other  HEENT:  [x]Normal  [ ] Temporal Wasting  [ ]Dry mouth   [ ]ET Tube/Trach  [ ]Oral lesions  [ ] Mucositis  PULMONARY: normal respiratory effort, no accessory muscle use  []Clear [ ]Tachypnea  [ ]Audible excessive secretions   [ ]Rhonchi        [ ]Right [ ]Left [ ]Bilateral  [ ]Crackles        [ ]Right [ ]Left [ ]Bilateral  [ ]Wheezing     [ ]Right [ ]Left [ ]Bilateral  [ ]Diminished breath sounds [ ]right [ ]left [ ]bilateral  CARDIOVASCULAR:    [x]Regular [ ]Irregular [ ]Tachy  [ ]Rios [ ]Murmur [ ]Other  GASTROINTESTINAL:  [x]Soft  [x ]Distended   []+BS  [x ]Non tender [ ]Tender  [ ]PEG [ ]OGT/ NGT  Last BM: +colostomy   GENITOURINARY:  [x]Normal [ ] Incontinent   [ ]Oliguria/Anuria   [ ]Smiley  MUSCULOSKELETAL:   [ ]Normal   [x]Weakness  [x]Bed/Wheelchair bound [x ]Edema  [  ] amputation  [  ] contraction  NEUROLOGIC:   [x]No focal deficits  [ ]Cognitive impairment  [ ]Dysphagia [ ]Dysarthria [ ]Paresis [ ]Other   SKIN: See Nursing Skin Assessment for further details  [ ]Normal    [ ]Rash  [ ]Pressure ulcer(s)       Present on admission [ ]y [ ]n   [  ]  Wound    [  ] hyperpigmentation    CRITICAL CARE:  [ ]Shock Present  [ ]Septic [ ]Cardiogenic [ ]Neurologic [ ]Hypovolemic  [ ]Vasopressors [ ]Inotropes  [ ]Respiratory failure present [ ]Mechanical Ventilation [ ]Non-invasive ventilatory support [ ]High-Flow   [ ]Acute  [ ]Chronic [ ]Hypoxic  [ ]Hypercarbic [ ]Other  [ ]Other organ failure     LABS:                                          8.5    9.47  )-----------( 277      ( 07 Apr 2025 05:15 )             28.8       04-07    135  |  100  |  17  ----------------------------<  91  4.6   |  23  |  0.93    Ca    8.2[L]      07 Apr 2025 05:15  Phos  3.1     04-07  Mg     1.80     04-07    TPro  5.0[L]  /  Alb  2.2[L]  /  TBili  <0.2  /  DBili  x   /  AST  33[H]  /  ALT  13  /  AlkPhos  187[H]  04-07        RADIOLOGY & ADDITIONAL STUDIES: reviewed     Protein Calorie Malnutrition Present: [ ]mild [ ]moderate [ ]severe [ ]underweight [ ]morbid obesity  https://www.andeal.org/vault/2440/web/files/ONC/Table_Clinical%20Characteristics%20to%20Document%20Malnutrition-White%20JV%20et%20al%202012.pdf    Height (cm): 154.9 (03-25-25 @ 08:00), 153.01 (02-21-25 @ 10:00), 154.9 (04-19-24 @ 10:00)  Weight (kg): 67.8 (03-25-25 @ 08:00), 58.96 (03-12-25 @ 09:00), 53.5 (04-19-24 @ 10:00)  BMI (kg/m2): 28.3 (03-25-25 @ 08:00), 25.2 (03-12-25 @ 09:00), 22.9 (02-21-25 @ 10:00)    [ ]PPSV2 < or = 30%  [ ]significant weight loss [ ]poor nutritional intake [ ]anasarca[ ]Artificial Nutrition    Other REFERRALS:  [ x]Hospice  [ ]Child Life  [ ]Social Work  [x ]Case management [ ]Holistic Therapy

## 2025-04-08 NOTE — PROGRESS NOTE ADULT - PROBLEM SELECTOR PROBLEM 3
Pleural effusion
Neoplasm related pain
Pleural effusion
Pleural effusion

## 2025-04-08 NOTE — PROGRESS NOTE ADULT - PROBLEM SELECTOR PLAN 9
Case reviewed with primary team and care coordination team  Hospice Team informed of referral    In the event of newly developing, evolving, or worsening symptoms, please contact the Palliative Medicine team via pager (if the patient is at [facility] #[phone] or if the patient is at [facility] #[phone]) The Geriatric and Palliative Medicine service has coverage 24 hours a day/ 7 days a week to provide medical recommendations regarding symptom management needs via telephone. Case reviewed with primary team and care coordination team and hospice team  Patient to be discharged home later today with hospice services     In the event of newly developing, evolving, or worsening symptoms, please contact the Palliative Medicine team via pager (if the patient is at [facility] #[phone] or if the patient is at [facility] #[phone]) The Geriatric and Palliative Medicine service has coverage 24 hours a day/ 7 days a week to provide medical recommendations regarding symptom management needs via telephone.

## 2025-04-08 NOTE — PROGRESS NOTE ADULT - PROBLEM SELECTOR PROBLEM 1
Cancer of fallopian tube

## 2025-04-08 NOTE — DISCHARGE NOTE NURSING/CASE MANAGEMENT/SOCIAL WORK - PATIENT PORTAL LINK FT
You can access the FollowMyHealth Patient Portal offered by [facility] by registering at the following website: [email or web address]. By joining [employer]’s FollowMyHealth portal, you will also be able to view your health information using other applications (apps) compatible with our system.

## 2025-04-08 NOTE — PROGRESS NOTE ADULT - PROVIDER SPECIALTY LIST ADULT
Hospitalist
Hospitalist
Infectious Disease
MICU
Palliative Care
Palliative Care
Pulmonology
Pulmonology
Hospitalist
Infectious Disease
MICU
Palliative Care
Pulmonology
Heme/Onc
Hospitalist
Infectious Disease
Pulmonology
Hospitalist
Infectious Disease
Hospitalist
Palliative Care

## 2025-04-08 NOTE — DISCHARGE NOTE NURSING/CASE MANAGEMENT/SOCIAL WORK - FINANCIAL ASSISTANCE
[facility] provides services at a reduced cost to those who are determined to be eligible through [facility]’s financial assistance program. Information regarding [facility]’s financial assistance program can be found by going to [email or web address] or by calling [phone].

## 2025-04-08 NOTE — DISCHARGE NOTE PROVIDER - DETAILS OF MALNUTRITION DIAGNOSIS/DIAGNOSES
This patient has been assessed with a concern for Malnutrition and was treated during this hospitalization for the following Nutrition diagnosis/diagnoses:     -  03/26/2025: Severe protein-calorie malnutrition

## 2025-04-08 NOTE — PROGRESS NOTE ADULT - PROBLEM SELECTOR PROBLEM 8
Encounter for palliative care
Goals of care, counseling/discussion

## 2025-04-08 NOTE — DISCHARGE NOTE PROVIDER - NSDCMRMEDTOKEN_GEN_ALL_CORE_FT
Cipro 500 mg oral tablet: 1 tab(s) orally 2 times a day  cyanocobalamin 1000 mcg oral tablet: 1 tab(s) orally once a day  escitalopram 10 mg oral tablet: 1 tab(s) orally once a day  fentaNYL 100 mcg/hr transdermal film, extended release: 1 patch transdermal every 72 hours MDD: 1  fentaNYL 50 mcg/hr transdermal film, extended release: 1 patch transdermal every 72 hours MDD: 1  fluticasone 50 mcg/inh nasal spray: 1 spray(s) nasal 2 times a day  HYDROmorphone 4 mg oral tablet: 1.5 tab(s) orally every 4 hours as needed for  severe pain MDD: 6 doses  LORazepam 0.5 mg oral tablet: 1 tab(s) orally 2 times a day as needed for Anxiety MDD: 2 doses  metroNIDAZOLE 500 mg oral tablet: 1 tab(s) orally 3 times a day  Narcan 4 mg/0.1 mL nasal spray: 1 spray(s) intranasally 1 to 2 times a day MDD: 1  ondansetron 4 mg oral tablet: 1 tab(s) orally every 6 hours as needed for  nausea  polyethylene glycol 3350 oral powder for reconstitution: 17 gram(s) orally once a day  pseudoephedrine 30 mg oral tablet: 2 tab(s) orally every 6 hours as needed for nasal congesiton MDD: 4 doses  senna leaf extract oral tablet: 2 tab(s) orally once a day (at bedtime)  simethicone 80 mg oral tablet, chewable: 1 tab(s) orally 3 times a day  sodium chloride 0.65% nasal spray: 1 spray(s) nasal once a day

## 2025-04-08 NOTE — DISCHARGE NOTE PROVIDER - NSDCFUSCHEDAPPT_GEN_ALL_CORE_FT
Rani Reinoso  [facility]  [address]  Scheduled Appointment: 04/11/2025

## 2025-04-08 NOTE — PROGRESS NOTE ADULT - PROBLEM SELECTOR PLAN 7
Pt reports declining functional status, gets very dyspneic with long periods of exertion.   PT recs- Outpt PT   PPSV 50%   Please assist with ADLs.
FULL CODE  > Patient is supported by her spouse, and 3 adult children. Currently, she has capacity to make her own decisions, though she includes her family in decision making. See goc note above- discussed hospice option
Pt reports declining functional status, gets very dyspneic with long periods of exertion.   PT recs- Outpt PT   PPSV 50%   Please assist with ADLs.
Pt reports declining functional status, gets very dyspneic with long periods of exertion.   PT recs- Outpt PT   PPSV 50%   Please assist with ADLs.

## 2025-04-08 NOTE — PROGRESS NOTE ADULT - PROBLEM SELECTOR PLAN 3
- CT 4/2 - *  Bilateral pleural effusions, loculated on the right with right-sided pigtail catheter in situ. Adjacent passive atelectasis. Superimposed pneumonia is not excluded.  - Patient developed worsening dyspnea 2/2 loculated R pleural effusion with pleurx in place; pleurx not draining well. Started MIST protocol on 3/26. Pulmonology and ID following.   - Patient is NOT interested in further surgical interventions with CT surgery  - Continue Zosyn as per ID  - Appreciate pulm and ID recs   - Oxygen supplementation PRN- wean as tolerated   - Rest of care as per primary team. - CT 4/2 - *  Bilateral pleural effusions, loculated on the right with right-sided pigtail catheter in situ. Adjacent passive atelectasis. Superimposed pneumonia is not excluded.  - Patient developed worsening dyspnea 2/2 loculated R pleural effusion with pleurx in place; pleurx not draining well. Started MIST protocol on 3/26. Pulmonology and ID following.   - Patient is NOT interested in further surgical interventions with CT surgery  - s/p zosyn   - Appreciate pulm and ID recs   - Oxygen supplementation PRN- wean as tolerated   - Rest of care as per primary team.

## 2025-04-11 ENCOUNTER — APPOINTMENT (OUTPATIENT)
Dept: PEDIATRIC ALLERGY IMMUNOLOGY | Facility: CLINIC | Age: 65
End: 2025-04-11

## 2025-04-11 DIAGNOSIS — Z88.0 ALLERGY STATUS TO PENICILLIN: ICD-10-CM

## 2025-04-22 NOTE — ED ADULT NURSE NOTE - NS ED NURSE DISCH DISPOSITION
Verbal order with read back Alex Daly MD.  Ok to take medication from a cardiac standpoint    Admitted

## 2025-06-20 NOTE — H&P ADULT - PROBLEM SELECTOR PLAN 3
A&O. BP elevated, not met parameter for med, other VSS on ra. On tele-NSR. Independent. Showered. Potassium replaced. MD notified low sodium. Will continue on POC.   Problem: Adult Inpatient Plan of Care  Goal: Plan of Care Review  Description: The Plan of Care Review/Shift note should be completed every shift.  The Outcome Evaluation is a brief statement about your assessment that the patient is improving, declining, or no change.  This information will be displayed automatically on your shift  note.  Outcome: Progressing   Goal Outcome Evaluation:                             Inpatient consult to Physical Medicine Rehab  Consult performed by: Roxane Baumann NP  Consult ordered by: Coral Mccabe NP  Reason for consult: Assess rehab needs      Reviewed patient history and current admission.  Rehab team following.  Full consult to follow.    ARNOLD Rosales, FNP-C  Physical Medicine & Rehabilitation   11/27/2023      Takes Lexapro 10mg PO daily at home. Likely 2/2 difficulty dealing with advanced cancer dx  - c/w Lexapro 10mg starting tomorrow AM

## (undated) DEVICE — NDL SPINAL 22G X 3.5" QUINCKE

## (undated) DEVICE — DRAPE 1/2 SHEET 40X57"

## (undated) DEVICE — TUBING IV EXTENSION PUMP MICROBORE LUER LOCK 36"

## (undated) DEVICE — DRSG 4 X 4" 6PLY

## (undated) DEVICE — DRAPE TOWEL BLUE 17" X 24"

## (undated) DEVICE — GLV 7.5 ULTRAFREE MAX

## (undated) DEVICE — DRAPE PEDIATRIC DIRECTIONAL INCISION

## (undated) DEVICE — TRAY EPIDURAL SINGLE DOSE

## (undated) DEVICE — PREP DURAPREP 6CC